# Patient Record
Sex: FEMALE | Race: WHITE | NOT HISPANIC OR LATINO
[De-identification: names, ages, dates, MRNs, and addresses within clinical notes are randomized per-mention and may not be internally consistent; named-entity substitution may affect disease eponyms.]

---

## 2017-01-04 ENCOUNTER — APPOINTMENT (OUTPATIENT)
Dept: CARDIOLOGY | Facility: CLINIC | Age: 72
End: 2017-01-04

## 2017-01-13 ENCOUNTER — APPOINTMENT (OUTPATIENT)
Dept: CARDIOLOGY | Facility: CLINIC | Age: 72
End: 2017-01-13

## 2017-04-05 ENCOUNTER — APPOINTMENT (OUTPATIENT)
Dept: CARDIOLOGY | Facility: CLINIC | Age: 72
End: 2017-04-05

## 2017-04-17 ENCOUNTER — APPOINTMENT (OUTPATIENT)
Dept: CARDIOLOGY | Facility: CLINIC | Age: 72
End: 2017-04-17

## 2017-04-17 VITALS — WEIGHT: 184 LBS | HEIGHT: 60 IN | BODY MASS INDEX: 36.12 KG/M2

## 2017-04-17 VITALS — DIASTOLIC BLOOD PRESSURE: 80 MMHG | SYSTOLIC BLOOD PRESSURE: 130 MMHG

## 2017-04-17 DIAGNOSIS — Z00.00 ENCOUNTER FOR GENERAL ADULT MEDICAL EXAMINATION W/OUT ABNORMAL FINDINGS: ICD-10-CM

## 2017-06-17 ENCOUNTER — OUTPATIENT (OUTPATIENT)
Dept: OUTPATIENT SERVICES | Facility: HOSPITAL | Age: 72
LOS: 1 days | Discharge: HOME | End: 2017-06-17

## 2017-06-17 DIAGNOSIS — R47.81 SLURRED SPEECH: ICD-10-CM

## 2017-06-28 DIAGNOSIS — I10 ESSENTIAL (PRIMARY) HYPERTENSION: ICD-10-CM

## 2017-06-28 DIAGNOSIS — I67.9 CEREBROVASCULAR DISEASE, UNSPECIFIED: ICD-10-CM

## 2017-06-28 DIAGNOSIS — E78.5 HYPERLIPIDEMIA, UNSPECIFIED: ICD-10-CM

## 2017-06-28 DIAGNOSIS — I69.322 DYSARTHRIA FOLLOWING CEREBRAL INFARCTION: ICD-10-CM

## 2017-06-28 DIAGNOSIS — E55.9 VITAMIN D DEFICIENCY, UNSPECIFIED: ICD-10-CM

## 2017-06-28 DIAGNOSIS — R53.83 OTHER FATIGUE: ICD-10-CM

## 2017-06-28 DIAGNOSIS — M15.9 POLYOSTEOARTHRITIS, UNSPECIFIED: ICD-10-CM

## 2017-06-28 DIAGNOSIS — E11.9 TYPE 2 DIABETES MELLITUS WITHOUT COMPLICATIONS: ICD-10-CM

## 2017-09-11 ENCOUNTER — APPOINTMENT (OUTPATIENT)
Dept: CARDIOLOGY | Facility: CLINIC | Age: 72
End: 2017-09-11

## 2017-09-11 VITALS — WEIGHT: 184 LBS | HEIGHT: 60 IN | BODY MASS INDEX: 36.12 KG/M2

## 2017-09-11 VITALS — DIASTOLIC BLOOD PRESSURE: 80 MMHG | HEART RATE: 68 BPM | SYSTOLIC BLOOD PRESSURE: 140 MMHG

## 2017-10-09 ENCOUNTER — APPOINTMENT (OUTPATIENT)
Dept: CARDIOLOGY | Facility: CLINIC | Age: 72
End: 2017-10-09

## 2017-10-26 ENCOUNTER — OUTPATIENT (OUTPATIENT)
Dept: OUTPATIENT SERVICES | Facility: HOSPITAL | Age: 72
LOS: 1 days | Discharge: HOME | End: 2017-10-26

## 2017-10-26 DIAGNOSIS — E11.9 TYPE 2 DIABETES MELLITUS WITHOUT COMPLICATIONS: ICD-10-CM

## 2017-10-26 DIAGNOSIS — I10 ESSENTIAL (PRIMARY) HYPERTENSION: ICD-10-CM

## 2017-10-26 DIAGNOSIS — Z01.810 ENCOUNTER FOR PREPROCEDURAL CARDIOVASCULAR EXAMINATION: ICD-10-CM

## 2017-10-26 DIAGNOSIS — R47.81 SLURRED SPEECH: ICD-10-CM

## 2017-10-26 DIAGNOSIS — I67.9 CEREBROVASCULAR DISEASE, UNSPECIFIED: ICD-10-CM

## 2017-10-26 DIAGNOSIS — E78.5 HYPERLIPIDEMIA, UNSPECIFIED: ICD-10-CM

## 2018-01-29 ENCOUNTER — APPOINTMENT (OUTPATIENT)
Dept: CARDIOLOGY | Facility: CLINIC | Age: 73
End: 2018-01-29

## 2018-03-12 ENCOUNTER — APPOINTMENT (OUTPATIENT)
Dept: CARDIOLOGY | Facility: CLINIC | Age: 73
End: 2018-03-12

## 2018-03-12 VITALS — HEIGHT: 60 IN | BODY MASS INDEX: 37.11 KG/M2 | WEIGHT: 189 LBS

## 2018-03-12 VITALS — HEART RATE: 83 BPM | SYSTOLIC BLOOD PRESSURE: 126 MMHG | DIASTOLIC BLOOD PRESSURE: 84 MMHG

## 2018-03-12 RX ORDER — LOSARTAN POTASSIUM 25 MG/1
25 TABLET, FILM COATED ORAL DAILY
Qty: 90 | Refills: 3 | Status: ACTIVE | COMMUNITY
Start: 2018-03-12 | End: 1900-01-01

## 2018-03-12 RX ORDER — PNV NO.95/FERROUS FUM/FOLIC AC 28MG-0.8MG
TABLET ORAL
Refills: 0 | Status: ACTIVE | COMMUNITY

## 2018-03-12 RX ORDER — SIMVASTATIN 20 MG/1
20 TABLET, FILM COATED ORAL DAILY
Refills: 0 | Status: ACTIVE | COMMUNITY

## 2018-03-12 RX ORDER — CHOLECALCIFEROL (VITAMIN D3) 25 MCG
TABLET ORAL
Refills: 0 | Status: ACTIVE | COMMUNITY

## 2018-03-21 ENCOUNTER — APPOINTMENT (OUTPATIENT)
Dept: CARDIOLOGY | Facility: CLINIC | Age: 73
End: 2018-03-21

## 2018-03-30 ENCOUNTER — APPOINTMENT (OUTPATIENT)
Dept: CARDIOLOGY | Facility: CLINIC | Age: 73
End: 2018-03-30

## 2018-04-04 ENCOUNTER — LABORATORY RESULT (OUTPATIENT)
Age: 73
End: 2018-04-04

## 2018-04-04 ENCOUNTER — OUTPATIENT (OUTPATIENT)
Dept: OUTPATIENT SERVICES | Facility: HOSPITAL | Age: 73
LOS: 1 days | Discharge: HOME | End: 2018-04-04

## 2018-04-04 DIAGNOSIS — I63.9 CEREBRAL INFARCTION, UNSPECIFIED: ICD-10-CM

## 2018-04-04 DIAGNOSIS — I10 ESSENTIAL (PRIMARY) HYPERTENSION: ICD-10-CM

## 2018-04-04 DIAGNOSIS — E78.5 HYPERLIPIDEMIA, UNSPECIFIED: ICD-10-CM

## 2018-04-04 DIAGNOSIS — E11.9 TYPE 2 DIABETES MELLITUS WITHOUT COMPLICATIONS: ICD-10-CM

## 2018-04-04 DIAGNOSIS — I34.0 NONRHEUMATIC MITRAL (VALVE) INSUFFICIENCY: ICD-10-CM

## 2018-04-11 ENCOUNTER — APPOINTMENT (OUTPATIENT)
Dept: CARDIOLOGY | Facility: CLINIC | Age: 73
End: 2018-04-11

## 2018-04-11 VITALS — HEART RATE: 78 BPM | OXYGEN SATURATION: 96 % | SYSTOLIC BLOOD PRESSURE: 140 MMHG | DIASTOLIC BLOOD PRESSURE: 70 MMHG

## 2018-04-11 VITALS — HEIGHT: 60 IN | BODY MASS INDEX: 36.71 KG/M2 | WEIGHT: 187 LBS

## 2018-04-11 VITALS — HEIGHT: 60 IN

## 2018-04-11 RX ORDER — METFORMIN HYDROCHLORIDE 500 MG/1
500 TABLET, COATED ORAL TWICE DAILY
Qty: 180 | Refills: 1 | Status: ACTIVE | COMMUNITY

## 2018-04-11 RX ORDER — DONEPEZIL HYDROCHLORIDE 10 MG/1
10 TABLET ORAL DAILY
Refills: 0 | Status: ACTIVE | COMMUNITY
Start: 2017-01-25

## 2018-04-11 RX ORDER — MINOCYCLINE HYDROCHLORIDE 50 MG/1
50 CAPSULE ORAL
Refills: 0 | Status: ACTIVE | COMMUNITY

## 2018-09-17 ENCOUNTER — OUTPATIENT (OUTPATIENT)
Dept: OUTPATIENT SERVICES | Facility: HOSPITAL | Age: 73
LOS: 1 days | Discharge: HOME | End: 2018-09-17

## 2018-09-17 DIAGNOSIS — R53.83 OTHER FATIGUE: ICD-10-CM

## 2018-09-17 DIAGNOSIS — E55.9 VITAMIN D DEFICIENCY, UNSPECIFIED: ICD-10-CM

## 2018-09-17 DIAGNOSIS — Z00.01 ENCOUNTER FOR GENERAL ADULT MEDICAL EXAMINATION WITH ABNORMAL FINDINGS: ICD-10-CM

## 2018-09-17 DIAGNOSIS — I10 ESSENTIAL (PRIMARY) HYPERTENSION: ICD-10-CM

## 2018-09-17 DIAGNOSIS — I67.9 CEREBROVASCULAR DISEASE, UNSPECIFIED: ICD-10-CM

## 2018-09-17 DIAGNOSIS — E78.5 HYPERLIPIDEMIA, UNSPECIFIED: ICD-10-CM

## 2018-09-17 DIAGNOSIS — E11.9 TYPE 2 DIABETES MELLITUS WITHOUT COMPLICATIONS: ICD-10-CM

## 2018-09-17 DIAGNOSIS — E83.52 HYPERCALCEMIA: ICD-10-CM

## 2018-10-17 ENCOUNTER — APPOINTMENT (OUTPATIENT)
Dept: CARDIOLOGY | Facility: CLINIC | Age: 73
End: 2018-10-17

## 2019-01-01 ENCOUNTER — INPATIENT (INPATIENT)
Facility: HOSPITAL | Age: 74
LOS: 6 days | Discharge: NURSING, REHAB OR LONG-TERM CARE | End: 2019-07-19
Attending: INTERNAL MEDICINE | Admitting: INTERNAL MEDICINE
Payer: MEDICARE

## 2019-01-01 ENCOUNTER — TRANSCRIPTION ENCOUNTER (OUTPATIENT)
Age: 74
End: 2019-01-01

## 2019-01-01 ENCOUNTER — APPOINTMENT (OUTPATIENT)
Dept: CARDIOLOGY | Facility: CLINIC | Age: 74
End: 2019-01-01

## 2019-01-01 ENCOUNTER — OUTPATIENT (OUTPATIENT)
Dept: OUTPATIENT SERVICES | Facility: HOSPITAL | Age: 74
LOS: 1 days | Discharge: HOME | End: 2019-01-01

## 2019-01-01 ENCOUNTER — EMERGENCY (EMERGENCY)
Facility: HOSPITAL | Age: 74
LOS: 0 days | End: 2019-11-17
Attending: EMERGENCY MEDICINE | Admitting: EMERGENCY MEDICINE
Payer: MEDICARE

## 2019-01-01 ENCOUNTER — RESULT REVIEW (OUTPATIENT)
Age: 74
End: 2019-01-01

## 2019-01-01 ENCOUNTER — INPATIENT (INPATIENT)
Facility: HOSPITAL | Age: 74
LOS: 40 days | Discharge: NURSING, REHAB OR LONG-TERM CARE | End: 2019-07-09
Attending: INTERNAL MEDICINE | Admitting: INTERNAL MEDICINE
Payer: MEDICARE

## 2019-01-01 ENCOUNTER — APPOINTMENT (OUTPATIENT)
Dept: NEUROLOGY | Facility: CLINIC | Age: 74
End: 2019-01-01
Payer: MEDICARE

## 2019-01-01 ENCOUNTER — APPOINTMENT (OUTPATIENT)
Dept: CARDIOLOGY | Facility: CLINIC | Age: 74
End: 2019-01-01
Payer: MEDICARE

## 2019-01-01 ENCOUNTER — RECORD ABSTRACTING (OUTPATIENT)
Age: 74
End: 2019-01-01

## 2019-01-01 ENCOUNTER — CLINICAL ADVICE (OUTPATIENT)
Age: 74
End: 2019-01-01

## 2019-01-01 VITALS
WEIGHT: 174 LBS | HEIGHT: 60 IN | DIASTOLIC BLOOD PRESSURE: 80 MMHG | HEART RATE: 79 BPM | BODY MASS INDEX: 34.16 KG/M2 | SYSTOLIC BLOOD PRESSURE: 130 MMHG

## 2019-01-01 VITALS
RESPIRATION RATE: 20 BRPM | DIASTOLIC BLOOD PRESSURE: 98 MMHG | SYSTOLIC BLOOD PRESSURE: 175 MMHG | HEIGHT: 65 IN | WEIGHT: 164.91 LBS | HEART RATE: 72 BPM

## 2019-01-01 VITALS — SYSTOLIC BLOOD PRESSURE: 124 MMHG | DIASTOLIC BLOOD PRESSURE: 80 MMHG | HEART RATE: 59 BPM

## 2019-01-01 VITALS — OXYGEN SATURATION: 100 %

## 2019-01-01 VITALS
HEART RATE: 76 BPM | BODY MASS INDEX: 34.95 KG/M2 | WEIGHT: 178 LBS | DIASTOLIC BLOOD PRESSURE: 83 MMHG | SYSTOLIC BLOOD PRESSURE: 167 MMHG | HEIGHT: 60 IN

## 2019-01-01 VITALS
SYSTOLIC BLOOD PRESSURE: 145 MMHG | TEMPERATURE: 97 F | RESPIRATION RATE: 18 BRPM | DIASTOLIC BLOOD PRESSURE: 64 MMHG | HEART RATE: 64 BPM

## 2019-01-01 VITALS
WEIGHT: 229.94 LBS | RESPIRATION RATE: 28 BRPM | HEART RATE: 126 BPM | OXYGEN SATURATION: 90 % | DIASTOLIC BLOOD PRESSURE: 110 MMHG | SYSTOLIC BLOOD PRESSURE: 200 MMHG | HEIGHT: 66 IN

## 2019-01-01 VITALS — HEART RATE: 59 BPM | HEIGHT: 60 IN | WEIGHT: 176 LBS | BODY MASS INDEX: 34.55 KG/M2

## 2019-01-01 DIAGNOSIS — J96.11 CHRONIC RESPIRATORY FAILURE WITH HYPOXIA: ICD-10-CM

## 2019-01-01 DIAGNOSIS — B95.5 UNSPECIFIED STREPTOCOCCUS AS THE CAUSE OF DISEASES CLASSIFIED ELSEWHERE: ICD-10-CM

## 2019-01-01 DIAGNOSIS — J18.9 PNEUMONIA, UNSPECIFIED ORGANISM: ICD-10-CM

## 2019-01-01 DIAGNOSIS — J69.0 PNEUMONITIS DUE TO INHALATION OF FOOD AND VOMIT: ICD-10-CM

## 2019-01-01 DIAGNOSIS — Z93.1 GASTROSTOMY STATUS: Chronic | ICD-10-CM

## 2019-01-01 DIAGNOSIS — K44.9 DIAPHRAGMATIC HERNIA WITHOUT OBSTRUCTION OR GANGRENE: ICD-10-CM

## 2019-01-01 DIAGNOSIS — Z86.73 PERSONAL HISTORY OF TRANSIENT ISCHEMIC ATTACK (TIA), AND CEREBRAL INFARCTION W/OUT RESIDUAL DEFICITS: ICD-10-CM

## 2019-01-01 DIAGNOSIS — R29.810 FACIAL WEAKNESS: ICD-10-CM

## 2019-01-01 DIAGNOSIS — E11.9 TYPE 2 DIABETES MELLITUS W/OUT COMPLICATIONS: ICD-10-CM

## 2019-01-01 DIAGNOSIS — Z79.84 LONG TERM (CURRENT) USE OF ORAL HYPOGLYCEMIC DRUGS: ICD-10-CM

## 2019-01-01 DIAGNOSIS — I10 ESSENTIAL (PRIMARY) HYPERTENSION: ICD-10-CM

## 2019-01-01 DIAGNOSIS — E78.5 HYPERLIPIDEMIA, UNSPECIFIED: ICD-10-CM

## 2019-01-01 DIAGNOSIS — I69.328 OTHER SPEECH AND LANGUAGE DEFICITS FOLLOWING CEREBRAL INFARCTION: ICD-10-CM

## 2019-01-01 DIAGNOSIS — N39.0 URINARY TRACT INFECTION, SITE NOT SPECIFIED: ICD-10-CM

## 2019-01-01 DIAGNOSIS — Z87.891 PERSONAL HISTORY OF NICOTINE DEPENDENCE: ICD-10-CM

## 2019-01-01 DIAGNOSIS — I25.10 ATHEROSCLEROTIC HEART DISEASE OF NATIVE CORONARY ARTERY WITHOUT ANGINA PECTORIS: ICD-10-CM

## 2019-01-01 DIAGNOSIS — K21.9 GASTRO-ESOPHAGEAL REFLUX DISEASE WITHOUT ESOPHAGITIS: ICD-10-CM

## 2019-01-01 DIAGNOSIS — N12 TUBULO-INTERSTITIAL NEPHRITIS, NOT SPECIFIED AS ACUTE OR CHRONIC: ICD-10-CM

## 2019-01-01 DIAGNOSIS — R47.89 OTHER SPEECH DISTURBANCES: ICD-10-CM

## 2019-01-01 DIAGNOSIS — R26.81 UNSTEADINESS ON FEET: ICD-10-CM

## 2019-01-01 DIAGNOSIS — I34.0 NONRHEUMATIC MITRAL (VALVE) INSUFFICIENCY: ICD-10-CM

## 2019-01-01 DIAGNOSIS — I69.154 HEMIPLEGIA AND HEMIPARESIS FOLLOWING NONTRAUMATIC INTRACEREBRAL HEMORRHAGE AFFECTING LEFT NON-DOMINANT SIDE: ICD-10-CM
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
## 2019-01-01 DIAGNOSIS — J13 PNEUMONIA DUE TO STREPTOCOCCUS PNEUMONIAE: ICD-10-CM

## 2019-01-01 DIAGNOSIS — I69.128 OTHER SPEECH AND LANGUAGE DEFICITS FOLLOWING NONTRAUMATIC INTRACEREBRAL HEMORRHAGE: ICD-10-CM

## 2019-01-01 DIAGNOSIS — R62.7 ADULT FAILURE TO THRIVE: ICD-10-CM

## 2019-01-01 DIAGNOSIS — I16.1 HYPERTENSIVE EMERGENCY: ICD-10-CM

## 2019-01-01 DIAGNOSIS — K57.90 DIVERTICULOSIS OF INTESTINE, PART UNSPECIFIED, WITHOUT PERFORATION OR ABSCESS WITHOUT BLEEDING: ICD-10-CM

## 2019-01-01 DIAGNOSIS — Z82.49 FAMILY HISTORY OF ISCHEMIC HEART DISEASE AND OTHER DISEASES OF THE CIRCULATORY SYSTEM: ICD-10-CM

## 2019-01-01 DIAGNOSIS — I69.254 HEMIPLEGIA AND HEMIPARESIS FOLLOWING OTHER NONTRAUMATIC INTRACRANIAL HEMORRHAGE AFFECTING LEFT NON-DOMINANT SIDE: ICD-10-CM

## 2019-01-01 DIAGNOSIS — H91.90 UNSPECIFIED HEARING LOSS, UNSPECIFIED EAR: ICD-10-CM

## 2019-01-01 DIAGNOSIS — Z93.0 TRACHEOSTOMY STATUS: ICD-10-CM

## 2019-01-01 DIAGNOSIS — G81.94 HEMIPLEGIA, UNSPECIFIED AFFECTING LEFT NONDOMINANT SIDE: ICD-10-CM

## 2019-01-01 DIAGNOSIS — R53.83 OTHER FATIGUE: ICD-10-CM

## 2019-01-01 DIAGNOSIS — I46.9 CARDIAC ARREST, CAUSE UNSPECIFIED: ICD-10-CM

## 2019-01-01 DIAGNOSIS — Z74.01 BED CONFINEMENT STATUS: ICD-10-CM

## 2019-01-01 DIAGNOSIS — E55.9 VITAMIN D DEFICIENCY, UNSPECIFIED: ICD-10-CM

## 2019-01-01 DIAGNOSIS — G40.909 EPILEPSY, UNSPECIFIED, NOT INTRACTABLE, WITHOUT STATUS EPILEPTICUS: ICD-10-CM

## 2019-01-01 DIAGNOSIS — M19.91 PRIMARY OSTEOARTHRITIS, UNSPECIFIED SITE: ICD-10-CM

## 2019-01-01 DIAGNOSIS — Z99.11 DEPENDENCE ON RESPIRATOR [VENTILATOR] STATUS: ICD-10-CM

## 2019-01-01 DIAGNOSIS — R78.81 BACTEREMIA: ICD-10-CM

## 2019-01-01 DIAGNOSIS — E11.9 TYPE 2 DIABETES MELLITUS WITHOUT COMPLICATIONS: ICD-10-CM

## 2019-01-01 DIAGNOSIS — G31.84 MILD COGNITIVE IMPAIRMENT, SO STATED: ICD-10-CM

## 2019-01-01 DIAGNOSIS — F41.9 ANXIETY DISORDER, UNSPECIFIED: ICD-10-CM

## 2019-01-01 DIAGNOSIS — R53.81 OTHER MALAISE: ICD-10-CM

## 2019-01-01 DIAGNOSIS — E11.40 TYPE 2 DIABETES MELLITUS WITH DIABETIC NEUROPATHY, UNSPECIFIED: ICD-10-CM

## 2019-01-01 DIAGNOSIS — J96.10 CHRONIC RESPIRATORY FAILURE, UNSPECIFIED WHETHER WITH HYPOXIA OR HYPERCAPNIA: ICD-10-CM

## 2019-01-01 DIAGNOSIS — I63.412 CEREBRAL INFARCTION DUE TO EMBOLISM OF LEFT MIDDLE CEREBRAL ARTERY: ICD-10-CM

## 2019-01-01 DIAGNOSIS — R13.10 DYSPHAGIA, UNSPECIFIED: ICD-10-CM

## 2019-01-01 DIAGNOSIS — B96.20 UNSPECIFIED ESCHERICHIA COLI [E. COLI] AS THE CAUSE OF DISEASES CLASSIFIED ELSEWHERE: ICD-10-CM

## 2019-01-01 DIAGNOSIS — Z79.4 LONG TERM (CURRENT) USE OF INSULIN: ICD-10-CM

## 2019-01-01 DIAGNOSIS — I69.120 APHASIA FOLLOWING NONTRAUMATIC INTRACEREBRAL HEMORRHAGE: ICD-10-CM

## 2019-01-01 DIAGNOSIS — R53.1 WEAKNESS: ICD-10-CM

## 2019-01-01 DIAGNOSIS — Z87.898 PERSONAL HISTORY OF OTHER SPECIFIED CONDITIONS: ICD-10-CM

## 2019-01-01 DIAGNOSIS — I45.81 LONG QT SYNDROME: ICD-10-CM

## 2019-01-01 DIAGNOSIS — I47.2 VENTRICULAR TACHYCARDIA: ICD-10-CM

## 2019-01-01 DIAGNOSIS — Z79.82 LONG TERM (CURRENT) USE OF ASPIRIN: ICD-10-CM

## 2019-01-01 DIAGNOSIS — E83.39 OTHER DISORDERS OF PHOSPHORUS METABOLISM: ICD-10-CM

## 2019-01-01 DIAGNOSIS — I62.9 NONTRAUMATIC INTRACRANIAL HEMORRHAGE, UNSPECIFIED: ICD-10-CM

## 2019-01-01 DIAGNOSIS — M19.90 UNSPECIFIED OSTEOARTHRITIS, UNSPECIFIED SITE: ICD-10-CM

## 2019-01-01 DIAGNOSIS — R26.89 OTHER ABNORMALITIES OF GAIT AND MOBILITY: ICD-10-CM

## 2019-01-01 DIAGNOSIS — I35.0 NONRHEUMATIC AORTIC (VALVE) STENOSIS: ICD-10-CM

## 2019-01-01 DIAGNOSIS — R20.2 PARESTHESIA OF SKIN: ICD-10-CM

## 2019-01-01 DIAGNOSIS — R79.9 ABNORMAL FINDING OF BLOOD CHEMISTRY, UNSPECIFIED: ICD-10-CM

## 2019-01-01 DIAGNOSIS — Z93.1 GASTROSTOMY STATUS: ICD-10-CM

## 2019-01-01 DIAGNOSIS — L89.321 PRESSURE ULCER OF LEFT BUTTOCK, STAGE 1: ICD-10-CM

## 2019-01-01 DIAGNOSIS — E66.9 OBESITY, UNSPECIFIED: ICD-10-CM

## 2019-01-01 DIAGNOSIS — J98.11 ATELECTASIS: ICD-10-CM

## 2019-01-01 DIAGNOSIS — I61.9 NONTRAUMATIC INTRACEREBRAL HEMORRHAGE, UNSPECIFIED: ICD-10-CM

## 2019-01-01 DIAGNOSIS — A41.81 SEPSIS DUE TO ENTEROCOCCUS: ICD-10-CM

## 2019-01-01 DIAGNOSIS — R06.02 SHORTNESS OF BREATH: ICD-10-CM

## 2019-01-01 DIAGNOSIS — I69.322 DYSARTHRIA FOLLOWING CEREBRAL INFARCTION: ICD-10-CM

## 2019-01-01 DIAGNOSIS — I63.9 CEREBRAL INFARCTION, UNSPECIFIED: ICD-10-CM

## 2019-01-01 LAB
-  AMIKACIN: SIGNIFICANT CHANGE UP
-  AMIKACIN: SIGNIFICANT CHANGE UP
-  AMPICILLIN/SULBACTAM: SIGNIFICANT CHANGE UP
-  AMPICILLIN/SULBACTAM: SIGNIFICANT CHANGE UP
-  AMPICILLIN: SIGNIFICANT CHANGE UP
-  AZTREONAM: SIGNIFICANT CHANGE UP
-  AZTREONAM: SIGNIFICANT CHANGE UP
-  CEFAZOLIN: SIGNIFICANT CHANGE UP
-  CEFEPIME: SIGNIFICANT CHANGE UP
-  CEFEPIME: SIGNIFICANT CHANGE UP
-  CEFOXITIN: SIGNIFICANT CHANGE UP
-  CEFOXITIN: SIGNIFICANT CHANGE UP
-  CEFTRIAXONE: SIGNIFICANT CHANGE UP
-  CEFTRIAXONE: SIGNIFICANT CHANGE UP
-  CIPROFLOXACIN: SIGNIFICANT CHANGE UP
-  ERTAPENEM: SIGNIFICANT CHANGE UP
-  ERTAPENEM: SIGNIFICANT CHANGE UP
-  GENTAMICIN SYNERGY: SIGNIFICANT CHANGE UP
-  GENTAMICIN: SIGNIFICANT CHANGE UP
-  GENTAMICIN: SIGNIFICANT CHANGE UP
-  IMIPENEM: SIGNIFICANT CHANGE UP
-  IMIPENEM: SIGNIFICANT CHANGE UP
-  LEVOFLOXACIN: SIGNIFICANT CHANGE UP
-  MEROPENEM: SIGNIFICANT CHANGE UP
-  MEROPENEM: SIGNIFICANT CHANGE UP
-  NITROFURANTOIN: SIGNIFICANT CHANGE UP
-  PIPERACILLIN/TAZOBACTAM: SIGNIFICANT CHANGE UP
-  PIPERACILLIN/TAZOBACTAM: SIGNIFICANT CHANGE UP
-  STREPTOCOCCUS SP. (NOT GRP A, B OR S PNEUMONIAE): SIGNIFICANT CHANGE UP
-  TETRACYCLINE: SIGNIFICANT CHANGE UP
-  TETRACYCLINE: SIGNIFICANT CHANGE UP
-  TIGECYCLINE: SIGNIFICANT CHANGE UP
-  TIGECYCLINE: SIGNIFICANT CHANGE UP
-  TOBRAMYCIN: SIGNIFICANT CHANGE UP
-  TOBRAMYCIN: SIGNIFICANT CHANGE UP
-  TRIMETHOPRIM/SULFAMETHOXAZOLE: SIGNIFICANT CHANGE UP
-  TRIMETHOPRIM/SULFAMETHOXAZOLE: SIGNIFICANT CHANGE UP
-  VANCOMYCIN: SIGNIFICANT CHANGE UP
ALBUMIN SERPL ELPH-MCNC: 2.8 G/DL — LOW (ref 3.5–5.2)
ALBUMIN SERPL ELPH-MCNC: 2.9 G/DL — LOW (ref 3.5–5.2)
ALBUMIN SERPL ELPH-MCNC: 2.9 G/DL — LOW (ref 3.5–5.2)
ALBUMIN SERPL ELPH-MCNC: 3 G/DL — LOW (ref 3.5–5.2)
ALBUMIN SERPL ELPH-MCNC: 3.1 G/DL — LOW (ref 3.5–5.2)
ALBUMIN SERPL ELPH-MCNC: 3.2 G/DL — LOW (ref 3.5–5.2)
ALBUMIN SERPL ELPH-MCNC: 3.3 G/DL — LOW (ref 3.5–5.2)
ALBUMIN SERPL ELPH-MCNC: 3.4 G/DL — LOW (ref 3.5–5.2)
ALBUMIN SERPL ELPH-MCNC: 3.4 G/DL — LOW (ref 3.5–5.2)
ALBUMIN SERPL ELPH-MCNC: 3.5 G/DL — SIGNIFICANT CHANGE UP (ref 3.5–5.2)
ALBUMIN SERPL ELPH-MCNC: 3.7 G/DL — SIGNIFICANT CHANGE UP (ref 3.5–5.2)
ALBUMIN SERPL ELPH-MCNC: 3.7 G/DL — SIGNIFICANT CHANGE UP (ref 3.5–5.2)
ALBUMIN SERPL ELPH-MCNC: 4 G/DL — SIGNIFICANT CHANGE UP (ref 3.5–5.2)
ALBUMIN SERPL ELPH-MCNC: 4.1 G/DL — SIGNIFICANT CHANGE UP (ref 3.5–5.2)
ALBUMIN SERPL ELPH-MCNC: 4.7 G/DL — SIGNIFICANT CHANGE UP (ref 3.5–5.2)
ALP SERPL-CCNC: 103 U/L — SIGNIFICANT CHANGE UP (ref 30–115)
ALP SERPL-CCNC: 48 U/L — SIGNIFICANT CHANGE UP (ref 30–115)
ALP SERPL-CCNC: 57 U/L — SIGNIFICANT CHANGE UP (ref 30–115)
ALP SERPL-CCNC: 61 U/L — SIGNIFICANT CHANGE UP (ref 30–115)
ALP SERPL-CCNC: 62 U/L — SIGNIFICANT CHANGE UP (ref 30–115)
ALP SERPL-CCNC: 64 U/L — SIGNIFICANT CHANGE UP (ref 30–115)
ALP SERPL-CCNC: 65 U/L — SIGNIFICANT CHANGE UP (ref 30–115)
ALP SERPL-CCNC: 65 U/L — SIGNIFICANT CHANGE UP (ref 30–115)
ALP SERPL-CCNC: 70 U/L — SIGNIFICANT CHANGE UP (ref 30–115)
ALP SERPL-CCNC: 71 U/L — SIGNIFICANT CHANGE UP (ref 30–115)
ALP SERPL-CCNC: 72 U/L — SIGNIFICANT CHANGE UP (ref 30–115)
ALP SERPL-CCNC: 73 U/L — SIGNIFICANT CHANGE UP (ref 30–115)
ALP SERPL-CCNC: 74 U/L — SIGNIFICANT CHANGE UP (ref 30–115)
ALP SERPL-CCNC: 75 U/L — SIGNIFICANT CHANGE UP (ref 30–115)
ALP SERPL-CCNC: 77 U/L — SIGNIFICANT CHANGE UP (ref 30–115)
ALP SERPL-CCNC: 78 U/L — SIGNIFICANT CHANGE UP (ref 30–115)
ALP SERPL-CCNC: 78 U/L — SIGNIFICANT CHANGE UP (ref 30–115)
ALP SERPL-CCNC: 80 U/L — SIGNIFICANT CHANGE UP (ref 30–115)
ALP SERPL-CCNC: 88 U/L — SIGNIFICANT CHANGE UP (ref 30–115)
ALP SERPL-CCNC: 93 U/L — SIGNIFICANT CHANGE UP (ref 30–115)
ALP SERPL-CCNC: 93 U/L — SIGNIFICANT CHANGE UP (ref 30–115)
ALT FLD-CCNC: 11 U/L — SIGNIFICANT CHANGE UP (ref 0–41)
ALT FLD-CCNC: 13 U/L — SIGNIFICANT CHANGE UP (ref 0–41)
ALT FLD-CCNC: 15 U/L — SIGNIFICANT CHANGE UP (ref 0–41)
ALT FLD-CCNC: 15 U/L — SIGNIFICANT CHANGE UP (ref 0–41)
ALT FLD-CCNC: 16 U/L — SIGNIFICANT CHANGE UP (ref 0–41)
ALT FLD-CCNC: 16 U/L — SIGNIFICANT CHANGE UP (ref 0–41)
ALT FLD-CCNC: 18 U/L — SIGNIFICANT CHANGE UP (ref 0–41)
ALT FLD-CCNC: 18 U/L — SIGNIFICANT CHANGE UP (ref 0–41)
ALT FLD-CCNC: 20 U/L — SIGNIFICANT CHANGE UP (ref 0–41)
ALT FLD-CCNC: 20 U/L — SIGNIFICANT CHANGE UP (ref 0–41)
ALT FLD-CCNC: 21 U/L — SIGNIFICANT CHANGE UP (ref 0–41)
ALT FLD-CCNC: 22 U/L — SIGNIFICANT CHANGE UP (ref 0–41)
ALT FLD-CCNC: 23 U/L — SIGNIFICANT CHANGE UP (ref 0–41)
ALT FLD-CCNC: 24 U/L — SIGNIFICANT CHANGE UP (ref 0–41)
ALT FLD-CCNC: 24 U/L — SIGNIFICANT CHANGE UP (ref 0–41)
ALT FLD-CCNC: 29 U/L — SIGNIFICANT CHANGE UP (ref 0–41)
ALT FLD-CCNC: 30 U/L — SIGNIFICANT CHANGE UP (ref 0–41)
ALT FLD-CCNC: 31 U/L — SIGNIFICANT CHANGE UP (ref 0–41)
ALT FLD-CCNC: 32 U/L — SIGNIFICANT CHANGE UP (ref 0–41)
ALT FLD-CCNC: 33 U/L — SIGNIFICANT CHANGE UP (ref 0–41)
ALT FLD-CCNC: 33 U/L — SIGNIFICANT CHANGE UP (ref 0–41)
ALT FLD-CCNC: 36 U/L — SIGNIFICANT CHANGE UP (ref 0–41)
ANION GAP SERPL CALC-SCNC: 10 MMOL/L — SIGNIFICANT CHANGE UP (ref 7–14)
ANION GAP SERPL CALC-SCNC: 11 MMOL/L — SIGNIFICANT CHANGE UP (ref 7–14)
ANION GAP SERPL CALC-SCNC: 12 MMOL/L — SIGNIFICANT CHANGE UP (ref 7–14)
ANION GAP SERPL CALC-SCNC: 13 MMOL/L — SIGNIFICANT CHANGE UP (ref 7–14)
ANION GAP SERPL CALC-SCNC: 14 MMOL/L — SIGNIFICANT CHANGE UP (ref 7–14)
ANION GAP SERPL CALC-SCNC: 15 MMOL/L — HIGH (ref 7–14)
ANION GAP SERPL CALC-SCNC: 16 MMOL/L — HIGH (ref 7–14)
ANION GAP SERPL CALC-SCNC: 16 MMOL/L — HIGH (ref 7–14)
ANION GAP SERPL CALC-SCNC: 18 MMOL/L — HIGH (ref 7–14)
ANION GAP SERPL CALC-SCNC: 9 MMOL/L — SIGNIFICANT CHANGE UP (ref 7–14)
ANISOCYTOSIS BLD QL: SIGNIFICANT CHANGE UP
APPEARANCE UR: ABNORMAL
APPEARANCE UR: CLEAR — SIGNIFICANT CHANGE UP
APTT BLD: 29 SEC — SIGNIFICANT CHANGE UP (ref 27–39.2)
APTT BLD: 29.8 SEC — SIGNIFICANT CHANGE UP (ref 27–39.2)
APTT BLD: 32.2 SEC — SIGNIFICANT CHANGE UP (ref 27–39.2)
APTT BLD: 34.5 SEC — SIGNIFICANT CHANGE UP (ref 27–39.2)
APTT BLD: 37 SEC — SIGNIFICANT CHANGE UP (ref 27–39.2)
AST SERPL-CCNC: 10 U/L — SIGNIFICANT CHANGE UP (ref 0–41)
AST SERPL-CCNC: 11 U/L — SIGNIFICANT CHANGE UP (ref 0–41)
AST SERPL-CCNC: 12 U/L — SIGNIFICANT CHANGE UP (ref 0–41)
AST SERPL-CCNC: 14 U/L — SIGNIFICANT CHANGE UP (ref 0–41)
AST SERPL-CCNC: 16 U/L — SIGNIFICANT CHANGE UP (ref 0–41)
AST SERPL-CCNC: 16 U/L — SIGNIFICANT CHANGE UP (ref 0–41)
AST SERPL-CCNC: 17 U/L — SIGNIFICANT CHANGE UP (ref 0–41)
AST SERPL-CCNC: 18 U/L — SIGNIFICANT CHANGE UP (ref 0–41)
AST SERPL-CCNC: 18 U/L — SIGNIFICANT CHANGE UP (ref 0–41)
AST SERPL-CCNC: 19 U/L — SIGNIFICANT CHANGE UP (ref 0–41)
AST SERPL-CCNC: 19 U/L — SIGNIFICANT CHANGE UP (ref 0–41)
AST SERPL-CCNC: 20 U/L — SIGNIFICANT CHANGE UP (ref 0–41)
AST SERPL-CCNC: 31 U/L — SIGNIFICANT CHANGE UP (ref 0–41)
AST SERPL-CCNC: 8 U/L — SIGNIFICANT CHANGE UP (ref 0–41)
AST SERPL-CCNC: 9 U/L — SIGNIFICANT CHANGE UP (ref 0–41)
BACTERIA # UR AUTO: ABNORMAL
BASE EXCESS BLDA CALC-SCNC: -0.2 MMOL/L — SIGNIFICANT CHANGE UP (ref -2–2)
BASE EXCESS BLDA CALC-SCNC: 1.8 MMOL/L — SIGNIFICANT CHANGE UP (ref -2–2)
BASE EXCESS BLDA CALC-SCNC: 2.1 MMOL/L — HIGH (ref -2–2)
BASE EXCESS BLDA CALC-SCNC: 3.1 MMOL/L — HIGH (ref -2–2)
BASE EXCESS BLDA CALC-SCNC: 4.6 MMOL/L — HIGH (ref -2–2)
BASE EXCESS BLDA CALC-SCNC: 5.5 MMOL/L — HIGH (ref -2–2)
BASE EXCESS BLDA CALC-SCNC: 5.5 MMOL/L — HIGH (ref -2–2)
BASE EXCESS BLDA CALC-SCNC: 8.6 MMOL/L — HIGH (ref -2–2)
BASE EXCESS BLDV CALC-SCNC: 4.9 MMOL/L — HIGH (ref -2–2)
BASOPHILS # BLD AUTO: 0.02 K/UL — SIGNIFICANT CHANGE UP (ref 0–0.2)
BASOPHILS # BLD AUTO: 0.03 K/UL — SIGNIFICANT CHANGE UP (ref 0–0.2)
BASOPHILS # BLD AUTO: 0.04 K/UL — SIGNIFICANT CHANGE UP (ref 0–0.2)
BASOPHILS # BLD AUTO: 0.06 K/UL — SIGNIFICANT CHANGE UP (ref 0–0.2)
BASOPHILS NFR BLD AUTO: 0.2 % — SIGNIFICANT CHANGE UP (ref 0–1)
BASOPHILS NFR BLD AUTO: 0.3 % — SIGNIFICANT CHANGE UP (ref 0–1)
BASOPHILS NFR BLD AUTO: 0.4 % — SIGNIFICANT CHANGE UP (ref 0–1)
BASOPHILS NFR BLD AUTO: 0.5 % — SIGNIFICANT CHANGE UP (ref 0–1)
BASOPHILS NFR BLD AUTO: 0.6 % — SIGNIFICANT CHANGE UP (ref 0–1)
BASOPHILS NFR BLD AUTO: 0.6 % — SIGNIFICANT CHANGE UP (ref 0–1)
BASOPHILS NFR BLD AUTO: 0.7 % — SIGNIFICANT CHANGE UP (ref 0–1)
BASOPHILS NFR BLD AUTO: 0.9 % — SIGNIFICANT CHANGE UP (ref 0–1)
BILIRUB SERPL-MCNC: 0.4 MG/DL — SIGNIFICANT CHANGE UP (ref 0.2–1.2)
BILIRUB SERPL-MCNC: 0.5 MG/DL — SIGNIFICANT CHANGE UP (ref 0.2–1.2)
BILIRUB SERPL-MCNC: 0.6 MG/DL — SIGNIFICANT CHANGE UP (ref 0.2–1.2)
BILIRUB SERPL-MCNC: 0.7 MG/DL — SIGNIFICANT CHANGE UP (ref 0.2–1.2)
BILIRUB SERPL-MCNC: 0.8 MG/DL — SIGNIFICANT CHANGE UP (ref 0.2–1.2)
BILIRUB SERPL-MCNC: 0.8 MG/DL — SIGNIFICANT CHANGE UP (ref 0.2–1.2)
BILIRUB SERPL-MCNC: 1 MG/DL — SIGNIFICANT CHANGE UP (ref 0.2–1.2)
BILIRUB SERPL-MCNC: 1.1 MG/DL — SIGNIFICANT CHANGE UP (ref 0.2–1.2)
BILIRUB UR-MCNC: NEGATIVE — SIGNIFICANT CHANGE UP
BILIRUB UR-MCNC: NEGATIVE — SIGNIFICANT CHANGE UP
BLD GP AB SCN SERPL QL: SIGNIFICANT CHANGE UP
BUN SERPL-MCNC: 15 MG/DL — SIGNIFICANT CHANGE UP (ref 10–20)
BUN SERPL-MCNC: 16 MG/DL — SIGNIFICANT CHANGE UP (ref 10–20)
BUN SERPL-MCNC: 17 MG/DL — SIGNIFICANT CHANGE UP (ref 10–20)
BUN SERPL-MCNC: 18 MG/DL — SIGNIFICANT CHANGE UP (ref 10–20)
BUN SERPL-MCNC: 18 MG/DL — SIGNIFICANT CHANGE UP (ref 10–20)
BUN SERPL-MCNC: 19 MG/DL — SIGNIFICANT CHANGE UP (ref 10–20)
BUN SERPL-MCNC: 21 MG/DL — HIGH (ref 10–20)
BUN SERPL-MCNC: 21 MG/DL — HIGH (ref 10–20)
BUN SERPL-MCNC: 22 MG/DL — HIGH (ref 10–20)
BUN SERPL-MCNC: 23 MG/DL — HIGH (ref 10–20)
BUN SERPL-MCNC: 24 MG/DL — HIGH (ref 10–20)
BUN SERPL-MCNC: 25 MG/DL — HIGH (ref 10–20)
BUN SERPL-MCNC: 27 MG/DL — HIGH (ref 10–20)
BUN SERPL-MCNC: 27 MG/DL — HIGH (ref 10–20)
BUN SERPL-MCNC: 28 MG/DL — HIGH (ref 10–20)
BUN SERPL-MCNC: 28 MG/DL — HIGH (ref 10–20)
BUN SERPL-MCNC: 31 MG/DL — HIGH (ref 10–20)
BUN SERPL-MCNC: 34 MG/DL — HIGH (ref 10–20)
C DIFF BY PCR RESULT: NEGATIVE — SIGNIFICANT CHANGE UP
C DIFF TOX GENS STL QL NAA+PROBE: SIGNIFICANT CHANGE UP
CA-I SERPL-SCNC: 1.48 MMOL/L — HIGH (ref 1.12–1.3)
CALCIUM SERPL-MCNC: 10 MG/DL — SIGNIFICANT CHANGE UP (ref 8.5–10.1)
CALCIUM SERPL-MCNC: 10 MG/DL — SIGNIFICANT CHANGE UP (ref 8.5–10.1)
CALCIUM SERPL-MCNC: 10.2 MG/DL — HIGH (ref 8.5–10.1)
CALCIUM SERPL-MCNC: 10.5 MG/DL — HIGH (ref 8.5–10.1)
CALCIUM SERPL-MCNC: 10.6 MG/DL — HIGH (ref 8.5–10.1)
CALCIUM SERPL-MCNC: 10.7 MG/DL — HIGH (ref 8.5–10.1)
CALCIUM SERPL-MCNC: 10.8 MG/DL — HIGH (ref 8.5–10.1)
CALCIUM SERPL-MCNC: 10.8 MG/DL — HIGH (ref 8.5–10.1)
CALCIUM SERPL-MCNC: 10.9 MG/DL — HIGH (ref 8.5–10.1)
CALCIUM SERPL-MCNC: 10.9 MG/DL — HIGH (ref 8.5–10.1)
CALCIUM SERPL-MCNC: 11 MG/DL — HIGH (ref 8.5–10.1)
CALCIUM SERPL-MCNC: 11.2 MG/DL — HIGH (ref 8.5–10.1)
CALCIUM SERPL-MCNC: 11.2 MG/DL — HIGH (ref 8.5–10.1)
CALCIUM SERPL-MCNC: 11.6 MG/DL — HIGH (ref 8.5–10.1)
CALCIUM SERPL-MCNC: 11.7 MG/DL — HIGH (ref 8.5–10.1)
CALCIUM SERPL-MCNC: 12.1 MG/DL — HIGH (ref 8.5–10.1)
CALCIUM SERPL-MCNC: 12.2 MG/DL — HIGH (ref 8.5–10.1)
CALCIUM SERPL-MCNC: 9.1 MG/DL — SIGNIFICANT CHANGE UP (ref 8.5–10.1)
CALCIUM SERPL-MCNC: 9.2 MG/DL — SIGNIFICANT CHANGE UP (ref 8.5–10.1)
CALCIUM SERPL-MCNC: 9.3 MG/DL — SIGNIFICANT CHANGE UP (ref 8.5–10.1)
CALCIUM SERPL-MCNC: 9.4 MG/DL — SIGNIFICANT CHANGE UP (ref 8.5–10.1)
CALCIUM SERPL-MCNC: 9.6 MG/DL — SIGNIFICANT CHANGE UP (ref 8.5–10.1)
CALCIUM SERPL-MCNC: 9.6 MG/DL — SIGNIFICANT CHANGE UP (ref 8.5–10.1)
CALCIUM SERPL-MCNC: 9.7 MG/DL — SIGNIFICANT CHANGE UP (ref 8.5–10.1)
CALCIUM SERPL-MCNC: 9.8 MG/DL — SIGNIFICANT CHANGE UP (ref 8.5–10.1)
CALCIUM SERPL-MCNC: 9.9 MG/DL — SIGNIFICANT CHANGE UP (ref 8.5–10.1)
CHLORIDE SERPL-SCNC: 100 MMOL/L — SIGNIFICANT CHANGE UP (ref 98–110)
CHLORIDE SERPL-SCNC: 101 MMOL/L — SIGNIFICANT CHANGE UP (ref 98–110)
CHLORIDE SERPL-SCNC: 102 MMOL/L — SIGNIFICANT CHANGE UP (ref 98–110)
CHLORIDE SERPL-SCNC: 103 MMOL/L — SIGNIFICANT CHANGE UP (ref 98–110)
CHLORIDE SERPL-SCNC: 103 MMOL/L — SIGNIFICANT CHANGE UP (ref 98–110)
CHLORIDE SERPL-SCNC: 104 MMOL/L — SIGNIFICANT CHANGE UP (ref 98–110)
CHLORIDE SERPL-SCNC: 105 MMOL/L — SIGNIFICANT CHANGE UP (ref 98–110)
CHLORIDE SERPL-SCNC: 105 MMOL/L — SIGNIFICANT CHANGE UP (ref 98–110)
CHLORIDE SERPL-SCNC: 106 MMOL/L — SIGNIFICANT CHANGE UP (ref 98–110)
CHLORIDE SERPL-SCNC: 107 MMOL/L — SIGNIFICANT CHANGE UP (ref 98–110)
CHLORIDE SERPL-SCNC: 108 MMOL/L — SIGNIFICANT CHANGE UP (ref 98–110)
CHLORIDE SERPL-SCNC: 108 MMOL/L — SIGNIFICANT CHANGE UP (ref 98–110)
CHLORIDE SERPL-SCNC: 109 MMOL/L — SIGNIFICANT CHANGE UP (ref 98–110)
CHLORIDE SERPL-SCNC: 110 MMOL/L — SIGNIFICANT CHANGE UP (ref 98–110)
CHLORIDE SERPL-SCNC: 110 MMOL/L — SIGNIFICANT CHANGE UP (ref 98–110)
CHLORIDE SERPL-SCNC: 94 MMOL/L — LOW (ref 98–110)
CHLORIDE SERPL-SCNC: 98 MMOL/L — SIGNIFICANT CHANGE UP (ref 98–110)
CHLORIDE SERPL-SCNC: 99 MMOL/L — SIGNIFICANT CHANGE UP (ref 98–110)
CO2 SERPL-SCNC: 21 MMOL/L — SIGNIFICANT CHANGE UP (ref 17–32)
CO2 SERPL-SCNC: 22 MMOL/L — SIGNIFICANT CHANGE UP (ref 17–32)
CO2 SERPL-SCNC: 22 MMOL/L — SIGNIFICANT CHANGE UP (ref 17–32)
CO2 SERPL-SCNC: 23 MMOL/L — SIGNIFICANT CHANGE UP (ref 17–32)
CO2 SERPL-SCNC: 24 MMOL/L — SIGNIFICANT CHANGE UP (ref 17–32)
CO2 SERPL-SCNC: 24 MMOL/L — SIGNIFICANT CHANGE UP (ref 17–32)
CO2 SERPL-SCNC: 25 MMOL/L — SIGNIFICANT CHANGE UP (ref 17–32)
CO2 SERPL-SCNC: 25 MMOL/L — SIGNIFICANT CHANGE UP (ref 17–32)
CO2 SERPL-SCNC: 26 MMOL/L — SIGNIFICANT CHANGE UP (ref 17–32)
CO2 SERPL-SCNC: 27 MMOL/L — SIGNIFICANT CHANGE UP (ref 17–32)
CO2 SERPL-SCNC: 28 MMOL/L — SIGNIFICANT CHANGE UP (ref 17–32)
CO2 SERPL-SCNC: 30 MMOL/L — SIGNIFICANT CHANGE UP (ref 17–32)
CO2 SERPL-SCNC: 31 MMOL/L — SIGNIFICANT CHANGE UP (ref 17–32)
COLOR SPEC: SIGNIFICANT CHANGE UP
COLOR SPEC: YELLOW — SIGNIFICANT CHANGE UP
CORTIS AM PEAK SERPL-MCNC: 16.5 UG/DL — SIGNIFICANT CHANGE UP (ref 6–18.4)
CREAT SERPL-MCNC: 0.6 MG/DL — LOW (ref 0.7–1.5)
CREAT SERPL-MCNC: 0.7 MG/DL — SIGNIFICANT CHANGE UP (ref 0.7–1.5)
CREAT SERPL-MCNC: 0.8 MG/DL — SIGNIFICANT CHANGE UP (ref 0.7–1.5)
CREAT SERPL-MCNC: 0.9 MG/DL — SIGNIFICANT CHANGE UP (ref 0.7–1.5)
CREAT SERPL-MCNC: 1 MG/DL — SIGNIFICANT CHANGE UP (ref 0.7–1.5)
CRP SERPL-MCNC: 1.68 MG/DL — HIGH (ref 0–0.4)
CRP SERPL-MCNC: 2.08 MG/DL — HIGH (ref 0–0.4)
CULTURE RESULTS: NO GROWTH — SIGNIFICANT CHANGE UP
CULTURE RESULTS: NO GROWTH — SIGNIFICANT CHANGE UP
CULTURE RESULTS: SIGNIFICANT CHANGE UP
DIFF PNL FLD: NEGATIVE — SIGNIFICANT CHANGE UP
DIFF PNL FLD: NEGATIVE — SIGNIFICANT CHANGE UP
ENTEROCOC DNA BLD POS QL NAA+NON-PROBE: SIGNIFICANT CHANGE UP
EOSINOPHIL # BLD AUTO: 0.02 K/UL — SIGNIFICANT CHANGE UP (ref 0–0.7)
EOSINOPHIL # BLD AUTO: 0.02 K/UL — SIGNIFICANT CHANGE UP (ref 0–0.7)
EOSINOPHIL # BLD AUTO: 0.04 K/UL — SIGNIFICANT CHANGE UP (ref 0–0.7)
EOSINOPHIL # BLD AUTO: 0.04 K/UL — SIGNIFICANT CHANGE UP (ref 0–0.7)
EOSINOPHIL # BLD AUTO: 0.05 K/UL — SIGNIFICANT CHANGE UP (ref 0–0.7)
EOSINOPHIL # BLD AUTO: 0.05 K/UL — SIGNIFICANT CHANGE UP (ref 0–0.7)
EOSINOPHIL # BLD AUTO: 0.09 K/UL — SIGNIFICANT CHANGE UP (ref 0–0.7)
EOSINOPHIL # BLD AUTO: 0.1 K/UL — SIGNIFICANT CHANGE UP (ref 0–0.7)
EOSINOPHIL # BLD AUTO: 0.1 K/UL — SIGNIFICANT CHANGE UP (ref 0–0.7)
EOSINOPHIL # BLD AUTO: 0.11 K/UL — SIGNIFICANT CHANGE UP (ref 0–0.7)
EOSINOPHIL # BLD AUTO: 0.11 K/UL — SIGNIFICANT CHANGE UP (ref 0–0.7)
EOSINOPHIL # BLD AUTO: 0.12 K/UL — SIGNIFICANT CHANGE UP (ref 0–0.7)
EOSINOPHIL # BLD AUTO: 0.13 K/UL — SIGNIFICANT CHANGE UP (ref 0–0.7)
EOSINOPHIL # BLD AUTO: 0.14 K/UL — SIGNIFICANT CHANGE UP (ref 0–0.7)
EOSINOPHIL # BLD AUTO: 0.15 K/UL — SIGNIFICANT CHANGE UP (ref 0–0.7)
EOSINOPHIL # BLD AUTO: 0.16 K/UL — SIGNIFICANT CHANGE UP (ref 0–0.7)
EOSINOPHIL # BLD AUTO: 0.17 K/UL — SIGNIFICANT CHANGE UP (ref 0–0.7)
EOSINOPHIL # BLD AUTO: 0.18 K/UL — SIGNIFICANT CHANGE UP (ref 0–0.7)
EOSINOPHIL # BLD AUTO: 0.3 K/UL — SIGNIFICANT CHANGE UP (ref 0–0.7)
EOSINOPHIL # BLD AUTO: 0.31 K/UL — SIGNIFICANT CHANGE UP (ref 0–0.7)
EOSINOPHIL # BLD AUTO: 0.35 K/UL — SIGNIFICANT CHANGE UP (ref 0–0.7)
EOSINOPHIL NFR BLD AUTO: 0.2 % — SIGNIFICANT CHANGE UP (ref 0–8)
EOSINOPHIL NFR BLD AUTO: 0.2 % — SIGNIFICANT CHANGE UP (ref 0–8)
EOSINOPHIL NFR BLD AUTO: 0.4 % — SIGNIFICANT CHANGE UP (ref 0–8)
EOSINOPHIL NFR BLD AUTO: 0.5 % — SIGNIFICANT CHANGE UP (ref 0–8)
EOSINOPHIL NFR BLD AUTO: 0.7 % — SIGNIFICANT CHANGE UP (ref 0–8)
EOSINOPHIL NFR BLD AUTO: 0.9 % — SIGNIFICANT CHANGE UP (ref 0–8)
EOSINOPHIL NFR BLD AUTO: 1 % — SIGNIFICANT CHANGE UP (ref 0–8)
EOSINOPHIL NFR BLD AUTO: 1 % — SIGNIFICANT CHANGE UP (ref 0–8)
EOSINOPHIL NFR BLD AUTO: 1.1 % — SIGNIFICANT CHANGE UP (ref 0–8)
EOSINOPHIL NFR BLD AUTO: 1.2 % — SIGNIFICANT CHANGE UP (ref 0–8)
EOSINOPHIL NFR BLD AUTO: 1.4 % — SIGNIFICANT CHANGE UP (ref 0–8)
EOSINOPHIL NFR BLD AUTO: 1.5 % — SIGNIFICANT CHANGE UP (ref 0–8)
EOSINOPHIL NFR BLD AUTO: 1.5 % — SIGNIFICANT CHANGE UP (ref 0–8)
EOSINOPHIL NFR BLD AUTO: 1.6 % — SIGNIFICANT CHANGE UP (ref 0–8)
EOSINOPHIL NFR BLD AUTO: 1.7 % — SIGNIFICANT CHANGE UP (ref 0–8)
EOSINOPHIL NFR BLD AUTO: 1.8 % — SIGNIFICANT CHANGE UP (ref 0–8)
EOSINOPHIL NFR BLD AUTO: 1.9 % — SIGNIFICANT CHANGE UP (ref 0–8)
EOSINOPHIL NFR BLD AUTO: 1.9 % — SIGNIFICANT CHANGE UP (ref 0–8)
EOSINOPHIL NFR BLD AUTO: 2.9 % — SIGNIFICANT CHANGE UP (ref 0–8)
EOSINOPHIL NFR BLD AUTO: 2.9 % — SIGNIFICANT CHANGE UP (ref 0–8)
EOSINOPHIL NFR BLD AUTO: 4.2 % — SIGNIFICANT CHANGE UP (ref 0–8)
EOSINOPHIL NFR BLD AUTO: 4.5 % — SIGNIFICANT CHANGE UP (ref 0–8)
EOSINOPHIL NFR BLD AUTO: 6.2 % — SIGNIFICANT CHANGE UP (ref 0–8)
EOSINOPHIL NFR BLD AUTO: 6.5 % — SIGNIFICANT CHANGE UP (ref 0–8)
EPI CELLS # UR: 1 /HPF — SIGNIFICANT CHANGE UP (ref 0–5)
ESTIMATED AVERAGE GLUCOSE: 171 MG/DL — HIGH (ref 68–114)
GAS PNL BLDA: SIGNIFICANT CHANGE UP
GAS PNL BLDV: 139 MMOL/L — SIGNIFICANT CHANGE UP (ref 136–145)
GAS PNL BLDV: SIGNIFICANT CHANGE UP
GIANT PLATELETS BLD QL SMEAR: PRESENT — SIGNIFICANT CHANGE UP
GLUCOSE BLDC GLUCOMTR-MCNC: 102 MG/DL — HIGH (ref 70–99)
GLUCOSE BLDC GLUCOMTR-MCNC: 108 MG/DL — HIGH (ref 70–99)
GLUCOSE BLDC GLUCOMTR-MCNC: 110 MG/DL — HIGH (ref 70–99)
GLUCOSE BLDC GLUCOMTR-MCNC: 114 MG/DL — HIGH (ref 70–99)
GLUCOSE BLDC GLUCOMTR-MCNC: 114 MG/DL — HIGH (ref 70–99)
GLUCOSE BLDC GLUCOMTR-MCNC: 120 MG/DL — HIGH (ref 70–99)
GLUCOSE BLDC GLUCOMTR-MCNC: 121 MG/DL — HIGH (ref 70–99)
GLUCOSE BLDC GLUCOMTR-MCNC: 122 MG/DL — HIGH (ref 70–99)
GLUCOSE BLDC GLUCOMTR-MCNC: 124 MG/DL — HIGH (ref 70–99)
GLUCOSE BLDC GLUCOMTR-MCNC: 125 MG/DL — HIGH (ref 70–99)
GLUCOSE BLDC GLUCOMTR-MCNC: 126 MG/DL — HIGH (ref 70–99)
GLUCOSE BLDC GLUCOMTR-MCNC: 127 MG/DL — HIGH (ref 70–99)
GLUCOSE BLDC GLUCOMTR-MCNC: 128 MG/DL — HIGH (ref 70–99)
GLUCOSE BLDC GLUCOMTR-MCNC: 128 MG/DL — HIGH (ref 70–99)
GLUCOSE BLDC GLUCOMTR-MCNC: 129 MG/DL — HIGH (ref 70–99)
GLUCOSE BLDC GLUCOMTR-MCNC: 130 MG/DL — HIGH (ref 70–99)
GLUCOSE BLDC GLUCOMTR-MCNC: 131 MG/DL — HIGH (ref 70–99)
GLUCOSE BLDC GLUCOMTR-MCNC: 132 MG/DL — HIGH (ref 70–99)
GLUCOSE BLDC GLUCOMTR-MCNC: 133 MG/DL — HIGH (ref 70–99)
GLUCOSE BLDC GLUCOMTR-MCNC: 134 MG/DL — HIGH (ref 70–99)
GLUCOSE BLDC GLUCOMTR-MCNC: 135 MG/DL — HIGH (ref 70–99)
GLUCOSE BLDC GLUCOMTR-MCNC: 135 MG/DL — HIGH (ref 70–99)
GLUCOSE BLDC GLUCOMTR-MCNC: 136 MG/DL — HIGH (ref 70–99)
GLUCOSE BLDC GLUCOMTR-MCNC: 137 MG/DL — HIGH (ref 70–99)
GLUCOSE BLDC GLUCOMTR-MCNC: 138 MG/DL — HIGH (ref 70–99)
GLUCOSE BLDC GLUCOMTR-MCNC: 140 MG/DL — HIGH (ref 70–99)
GLUCOSE BLDC GLUCOMTR-MCNC: 141 MG/DL — HIGH (ref 70–99)
GLUCOSE BLDC GLUCOMTR-MCNC: 142 MG/DL — HIGH (ref 70–99)
GLUCOSE BLDC GLUCOMTR-MCNC: 143 MG/DL — HIGH (ref 70–99)
GLUCOSE BLDC GLUCOMTR-MCNC: 144 MG/DL — HIGH (ref 70–99)
GLUCOSE BLDC GLUCOMTR-MCNC: 145 MG/DL — HIGH (ref 70–99)
GLUCOSE BLDC GLUCOMTR-MCNC: 146 MG/DL — HIGH (ref 70–99)
GLUCOSE BLDC GLUCOMTR-MCNC: 146 MG/DL — HIGH (ref 70–99)
GLUCOSE BLDC GLUCOMTR-MCNC: 147 MG/DL — HIGH (ref 70–99)
GLUCOSE BLDC GLUCOMTR-MCNC: 148 MG/DL — HIGH (ref 70–99)
GLUCOSE BLDC GLUCOMTR-MCNC: 148 MG/DL — HIGH (ref 70–99)
GLUCOSE BLDC GLUCOMTR-MCNC: 149 MG/DL — HIGH (ref 70–99)
GLUCOSE BLDC GLUCOMTR-MCNC: 150 MG/DL — HIGH (ref 70–99)
GLUCOSE BLDC GLUCOMTR-MCNC: 151 MG/DL — HIGH (ref 70–99)
GLUCOSE BLDC GLUCOMTR-MCNC: 152 MG/DL — HIGH (ref 70–99)
GLUCOSE BLDC GLUCOMTR-MCNC: 152 MG/DL — HIGH (ref 70–99)
GLUCOSE BLDC GLUCOMTR-MCNC: 153 MG/DL — HIGH (ref 70–99)
GLUCOSE BLDC GLUCOMTR-MCNC: 154 MG/DL — HIGH (ref 70–99)
GLUCOSE BLDC GLUCOMTR-MCNC: 156 MG/DL — HIGH (ref 70–99)
GLUCOSE BLDC GLUCOMTR-MCNC: 157 MG/DL — HIGH (ref 70–99)
GLUCOSE BLDC GLUCOMTR-MCNC: 158 MG/DL — HIGH (ref 70–99)
GLUCOSE BLDC GLUCOMTR-MCNC: 159 MG/DL — HIGH (ref 70–99)
GLUCOSE BLDC GLUCOMTR-MCNC: 160 MG/DL — HIGH (ref 70–99)
GLUCOSE BLDC GLUCOMTR-MCNC: 161 MG/DL — HIGH (ref 70–99)
GLUCOSE BLDC GLUCOMTR-MCNC: 162 MG/DL — HIGH (ref 70–99)
GLUCOSE BLDC GLUCOMTR-MCNC: 162 MG/DL — HIGH (ref 70–99)
GLUCOSE BLDC GLUCOMTR-MCNC: 163 MG/DL — HIGH (ref 70–99)
GLUCOSE BLDC GLUCOMTR-MCNC: 164 MG/DL — HIGH (ref 70–99)
GLUCOSE BLDC GLUCOMTR-MCNC: 164 MG/DL — HIGH (ref 70–99)
GLUCOSE BLDC GLUCOMTR-MCNC: 165 MG/DL — HIGH (ref 70–99)
GLUCOSE BLDC GLUCOMTR-MCNC: 166 MG/DL — HIGH (ref 70–99)
GLUCOSE BLDC GLUCOMTR-MCNC: 167 MG/DL — HIGH (ref 70–99)
GLUCOSE BLDC GLUCOMTR-MCNC: 168 MG/DL — HIGH (ref 70–99)
GLUCOSE BLDC GLUCOMTR-MCNC: 169 MG/DL — HIGH (ref 70–99)
GLUCOSE BLDC GLUCOMTR-MCNC: 169 MG/DL — HIGH (ref 70–99)
GLUCOSE BLDC GLUCOMTR-MCNC: 170 MG/DL — HIGH (ref 70–99)
GLUCOSE BLDC GLUCOMTR-MCNC: 170 MG/DL — HIGH (ref 70–99)
GLUCOSE BLDC GLUCOMTR-MCNC: 171 MG/DL — HIGH (ref 70–99)
GLUCOSE BLDC GLUCOMTR-MCNC: 172 MG/DL — HIGH (ref 70–99)
GLUCOSE BLDC GLUCOMTR-MCNC: 173 MG/DL — HIGH (ref 70–99)
GLUCOSE BLDC GLUCOMTR-MCNC: 175 MG/DL — HIGH (ref 70–99)
GLUCOSE BLDC GLUCOMTR-MCNC: 176 MG/DL — HIGH (ref 70–99)
GLUCOSE BLDC GLUCOMTR-MCNC: 177 MG/DL — HIGH (ref 70–99)
GLUCOSE BLDC GLUCOMTR-MCNC: 178 MG/DL — HIGH (ref 70–99)
GLUCOSE BLDC GLUCOMTR-MCNC: 178 MG/DL — HIGH (ref 70–99)
GLUCOSE BLDC GLUCOMTR-MCNC: 179 MG/DL — HIGH (ref 70–99)
GLUCOSE BLDC GLUCOMTR-MCNC: 179 MG/DL — HIGH (ref 70–99)
GLUCOSE BLDC GLUCOMTR-MCNC: 181 MG/DL — HIGH (ref 70–99)
GLUCOSE BLDC GLUCOMTR-MCNC: 181 MG/DL — HIGH (ref 70–99)
GLUCOSE BLDC GLUCOMTR-MCNC: 182 MG/DL — HIGH (ref 70–99)
GLUCOSE BLDC GLUCOMTR-MCNC: 183 MG/DL — HIGH (ref 70–99)
GLUCOSE BLDC GLUCOMTR-MCNC: 183 MG/DL — HIGH (ref 70–99)
GLUCOSE BLDC GLUCOMTR-MCNC: 185 MG/DL — HIGH (ref 70–99)
GLUCOSE BLDC GLUCOMTR-MCNC: 186 MG/DL — HIGH (ref 70–99)
GLUCOSE BLDC GLUCOMTR-MCNC: 187 MG/DL — HIGH (ref 70–99)
GLUCOSE BLDC GLUCOMTR-MCNC: 188 MG/DL — HIGH (ref 70–99)
GLUCOSE BLDC GLUCOMTR-MCNC: 188 MG/DL — HIGH (ref 70–99)
GLUCOSE BLDC GLUCOMTR-MCNC: 189 MG/DL — HIGH (ref 70–99)
GLUCOSE BLDC GLUCOMTR-MCNC: 190 MG/DL — HIGH (ref 70–99)
GLUCOSE BLDC GLUCOMTR-MCNC: 192 MG/DL — HIGH (ref 70–99)
GLUCOSE BLDC GLUCOMTR-MCNC: 193 MG/DL — HIGH (ref 70–99)
GLUCOSE BLDC GLUCOMTR-MCNC: 194 MG/DL — HIGH (ref 70–99)
GLUCOSE BLDC GLUCOMTR-MCNC: 195 MG/DL — HIGH (ref 70–99)
GLUCOSE BLDC GLUCOMTR-MCNC: 197 MG/DL — HIGH (ref 70–99)
GLUCOSE BLDC GLUCOMTR-MCNC: 197 MG/DL — HIGH (ref 70–99)
GLUCOSE BLDC GLUCOMTR-MCNC: 198 MG/DL — HIGH (ref 70–99)
GLUCOSE BLDC GLUCOMTR-MCNC: 199 MG/DL — HIGH (ref 70–99)
GLUCOSE BLDC GLUCOMTR-MCNC: 200 MG/DL — HIGH (ref 70–99)
GLUCOSE BLDC GLUCOMTR-MCNC: 200 MG/DL — HIGH (ref 70–99)
GLUCOSE BLDC GLUCOMTR-MCNC: 201 MG/DL — HIGH (ref 70–99)
GLUCOSE BLDC GLUCOMTR-MCNC: 202 MG/DL — HIGH (ref 70–99)
GLUCOSE BLDC GLUCOMTR-MCNC: 203 MG/DL — HIGH (ref 70–99)
GLUCOSE BLDC GLUCOMTR-MCNC: 203 MG/DL — HIGH (ref 70–99)
GLUCOSE BLDC GLUCOMTR-MCNC: 204 MG/DL — HIGH (ref 70–99)
GLUCOSE BLDC GLUCOMTR-MCNC: 205 MG/DL — HIGH (ref 70–99)
GLUCOSE BLDC GLUCOMTR-MCNC: 207 MG/DL — HIGH (ref 70–99)
GLUCOSE BLDC GLUCOMTR-MCNC: 208 MG/DL — HIGH (ref 70–99)
GLUCOSE BLDC GLUCOMTR-MCNC: 210 MG/DL — HIGH (ref 70–99)
GLUCOSE BLDC GLUCOMTR-MCNC: 212 MG/DL — HIGH (ref 70–99)
GLUCOSE BLDC GLUCOMTR-MCNC: 213 MG/DL — HIGH (ref 70–99)
GLUCOSE BLDC GLUCOMTR-MCNC: 213 MG/DL — HIGH (ref 70–99)
GLUCOSE BLDC GLUCOMTR-MCNC: 215 MG/DL — HIGH (ref 70–99)
GLUCOSE BLDC GLUCOMTR-MCNC: 218 MG/DL — HIGH (ref 70–99)
GLUCOSE BLDC GLUCOMTR-MCNC: 218 MG/DL — HIGH (ref 70–99)
GLUCOSE BLDC GLUCOMTR-MCNC: 220 MG/DL — HIGH (ref 70–99)
GLUCOSE BLDC GLUCOMTR-MCNC: 221 MG/DL — HIGH (ref 70–99)
GLUCOSE BLDC GLUCOMTR-MCNC: 222 MG/DL — HIGH (ref 70–99)
GLUCOSE BLDC GLUCOMTR-MCNC: 226 MG/DL — HIGH (ref 70–99)
GLUCOSE BLDC GLUCOMTR-MCNC: 231 MG/DL — HIGH (ref 70–99)
GLUCOSE BLDC GLUCOMTR-MCNC: 236 MG/DL — HIGH (ref 70–99)
GLUCOSE BLDC GLUCOMTR-MCNC: 239 MG/DL — HIGH (ref 70–99)
GLUCOSE BLDC GLUCOMTR-MCNC: 253 MG/DL — HIGH (ref 70–99)
GLUCOSE BLDC GLUCOMTR-MCNC: 253 MG/DL — HIGH (ref 70–99)
GLUCOSE BLDC GLUCOMTR-MCNC: 254 MG/DL — HIGH (ref 70–99)
GLUCOSE BLDC GLUCOMTR-MCNC: 257 MG/DL — HIGH (ref 70–99)
GLUCOSE BLDC GLUCOMTR-MCNC: 261 MG/DL — HIGH (ref 70–99)
GLUCOSE BLDC GLUCOMTR-MCNC: 262 MG/DL — HIGH (ref 70–99)
GLUCOSE BLDC GLUCOMTR-MCNC: 264 MG/DL — HIGH (ref 70–99)
GLUCOSE BLDC GLUCOMTR-MCNC: 268 MG/DL — HIGH (ref 70–99)
GLUCOSE BLDC GLUCOMTR-MCNC: 271 MG/DL — HIGH (ref 70–99)
GLUCOSE BLDC GLUCOMTR-MCNC: 297 MG/DL — HIGH (ref 70–99)
GLUCOSE BLDC GLUCOMTR-MCNC: 80 MG/DL — SIGNIFICANT CHANGE UP (ref 70–99)
GLUCOSE BLDC GLUCOMTR-MCNC: 99 MG/DL — SIGNIFICANT CHANGE UP (ref 70–99)
GLUCOSE SERPL-MCNC: 120 MG/DL — HIGH (ref 70–99)
GLUCOSE SERPL-MCNC: 128 MG/DL — HIGH (ref 70–99)
GLUCOSE SERPL-MCNC: 139 MG/DL — HIGH (ref 70–99)
GLUCOSE SERPL-MCNC: 144 MG/DL — HIGH (ref 70–99)
GLUCOSE SERPL-MCNC: 145 MG/DL — HIGH (ref 70–99)
GLUCOSE SERPL-MCNC: 149 MG/DL — HIGH (ref 70–99)
GLUCOSE SERPL-MCNC: 157 MG/DL — HIGH (ref 70–99)
GLUCOSE SERPL-MCNC: 157 MG/DL — HIGH (ref 70–99)
GLUCOSE SERPL-MCNC: 158 MG/DL — HIGH (ref 70–99)
GLUCOSE SERPL-MCNC: 159 MG/DL — HIGH (ref 70–99)
GLUCOSE SERPL-MCNC: 169 MG/DL — HIGH (ref 70–99)
GLUCOSE SERPL-MCNC: 180 MG/DL — HIGH (ref 70–99)
GLUCOSE SERPL-MCNC: 181 MG/DL — HIGH (ref 70–99)
GLUCOSE SERPL-MCNC: 183 MG/DL — HIGH (ref 70–99)
GLUCOSE SERPL-MCNC: 184 MG/DL — HIGH (ref 70–99)
GLUCOSE SERPL-MCNC: 184 MG/DL — HIGH (ref 70–99)
GLUCOSE SERPL-MCNC: 186 MG/DL — HIGH (ref 70–99)
GLUCOSE SERPL-MCNC: 188 MG/DL — HIGH (ref 70–99)
GLUCOSE SERPL-MCNC: 201 MG/DL — HIGH (ref 70–99)
GLUCOSE SERPL-MCNC: 201 MG/DL — HIGH (ref 70–99)
GLUCOSE SERPL-MCNC: 202 MG/DL — HIGH (ref 70–99)
GLUCOSE SERPL-MCNC: 207 MG/DL — HIGH (ref 70–99)
GLUCOSE SERPL-MCNC: 208 MG/DL — HIGH (ref 70–99)
GLUCOSE SERPL-MCNC: 210 MG/DL — HIGH (ref 70–99)
GLUCOSE SERPL-MCNC: 223 MG/DL — HIGH (ref 70–99)
GLUCOSE SERPL-MCNC: 224 MG/DL — HIGH (ref 70–99)
GLUCOSE SERPL-MCNC: 228 MG/DL — HIGH (ref 70–99)
GLUCOSE SERPL-MCNC: 232 MG/DL — HIGH (ref 70–99)
GLUCOSE SERPL-MCNC: 256 MG/DL — HIGH (ref 70–99)
GLUCOSE SERPL-MCNC: 266 MG/DL — HIGH (ref 70–99)
GLUCOSE SERPL-MCNC: 302 MG/DL — HIGH (ref 70–99)
GLUCOSE SERPL-MCNC: 406 MG/DL — HIGH (ref 70–99)
GLUCOSE UR QL: >=1000 MG/DL
GLUCOSE UR QL: ABNORMAL
GRAM STN FLD: SIGNIFICANT CHANGE UP
HBA1C BLD-MCNC: 7.6 % — HIGH (ref 4–5.6)
HCO3 BLDA-SCNC: 25 MMOL/L — SIGNIFICANT CHANGE UP (ref 23–27)
HCO3 BLDA-SCNC: 27 MMOL/L — SIGNIFICANT CHANGE UP (ref 23–27)
HCO3 BLDA-SCNC: 27 MMOL/L — SIGNIFICANT CHANGE UP (ref 23–27)
HCO3 BLDA-SCNC: 28 MMOL/L — HIGH (ref 23–27)
HCO3 BLDA-SCNC: 29 MMOL/L — HIGH (ref 23–27)
HCO3 BLDA-SCNC: 29 MMOL/L — HIGH (ref 23–27)
HCO3 BLDA-SCNC: 30 MMOL/L — HIGH (ref 23–27)
HCO3 BLDA-SCNC: 31 MMOL/L — HIGH (ref 23–27)
HCO3 BLDV-SCNC: 32 MMOL/L — HIGH (ref 22–29)
HCT VFR BLD CALC: 32.2 % — LOW (ref 37–47)
HCT VFR BLD CALC: 32.9 % — LOW (ref 37–47)
HCT VFR BLD CALC: 33.2 % — LOW (ref 37–47)
HCT VFR BLD CALC: 33.4 % — LOW (ref 37–47)
HCT VFR BLD CALC: 33.7 % — LOW (ref 37–47)
HCT VFR BLD CALC: 33.9 % — LOW (ref 37–47)
HCT VFR BLD CALC: 34.1 % — LOW (ref 37–47)
HCT VFR BLD CALC: 34.3 % — LOW (ref 37–47)
HCT VFR BLD CALC: 34.6 % — LOW (ref 37–47)
HCT VFR BLD CALC: 34.7 % — LOW (ref 37–47)
HCT VFR BLD CALC: 35.2 % — LOW (ref 37–47)
HCT VFR BLD CALC: 35.3 % — LOW (ref 37–47)
HCT VFR BLD CALC: 35.5 % — LOW (ref 37–47)
HCT VFR BLD CALC: 35.6 % — LOW (ref 37–47)
HCT VFR BLD CALC: 35.9 % — LOW (ref 37–47)
HCT VFR BLD CALC: 35.9 % — LOW (ref 37–47)
HCT VFR BLD CALC: 36 % — LOW (ref 37–47)
HCT VFR BLD CALC: 36.1 % — LOW (ref 37–47)
HCT VFR BLD CALC: 36.2 % — LOW (ref 37–47)
HCT VFR BLD CALC: 36.2 % — LOW (ref 37–47)
HCT VFR BLD CALC: 36.3 % — LOW (ref 37–47)
HCT VFR BLD CALC: 37.1 % — SIGNIFICANT CHANGE UP (ref 37–47)
HCT VFR BLD CALC: 37.5 % — SIGNIFICANT CHANGE UP (ref 37–47)
HCT VFR BLD CALC: 38.5 % — SIGNIFICANT CHANGE UP (ref 37–47)
HCT VFR BLD CALC: 39.1 % — SIGNIFICANT CHANGE UP (ref 37–47)
HCT VFR BLD CALC: 39.4 % — SIGNIFICANT CHANGE UP (ref 37–47)
HCT VFR BLD CALC: 41.2 % — SIGNIFICANT CHANGE UP (ref 37–47)
HCT VFR BLD CALC: 47.1 % — HIGH (ref 37–47)
HCT VFR BLDA CALC: 40.2 % — SIGNIFICANT CHANGE UP (ref 34–44)
HCV AB S/CO SERPL IA: 0.13 S/CO — SIGNIFICANT CHANGE UP (ref 0–0.99)
HCV AB SERPL-IMP: SIGNIFICANT CHANGE UP
HGB BLD CALC-MCNC: 13.1 G/DL — LOW (ref 14–18)
HGB BLD-MCNC: 10.1 G/DL — LOW (ref 12–16)
HGB BLD-MCNC: 10.2 G/DL — LOW (ref 12–16)
HGB BLD-MCNC: 10.3 G/DL — LOW (ref 12–16)
HGB BLD-MCNC: 10.4 G/DL — LOW (ref 12–16)
HGB BLD-MCNC: 10.4 G/DL — LOW (ref 12–16)
HGB BLD-MCNC: 10.5 G/DL — LOW (ref 12–16)
HGB BLD-MCNC: 10.6 G/DL — LOW (ref 12–16)
HGB BLD-MCNC: 10.7 G/DL — LOW (ref 12–16)
HGB BLD-MCNC: 10.8 G/DL — LOW (ref 12–16)
HGB BLD-MCNC: 10.9 G/DL — LOW (ref 12–16)
HGB BLD-MCNC: 11 G/DL — LOW (ref 12–16)
HGB BLD-MCNC: 11.1 G/DL — LOW (ref 12–16)
HGB BLD-MCNC: 11.2 G/DL — LOW (ref 12–16)
HGB BLD-MCNC: 11.2 G/DL — LOW (ref 12–16)
HGB BLD-MCNC: 11.3 G/DL — LOW (ref 12–16)
HGB BLD-MCNC: 11.5 G/DL — LOW (ref 12–16)
HGB BLD-MCNC: 11.9 G/DL — LOW (ref 12–16)
HGB BLD-MCNC: 12 G/DL — SIGNIFICANT CHANGE UP (ref 12–16)
HGB BLD-MCNC: 12.6 G/DL — SIGNIFICANT CHANGE UP (ref 12–16)
HGB BLD-MCNC: 12.8 G/DL — SIGNIFICANT CHANGE UP (ref 12–16)
HGB BLD-MCNC: 14.4 G/DL — SIGNIFICANT CHANGE UP (ref 12–16)
HGB BLD-MCNC: 9.9 G/DL — LOW (ref 12–16)
HOROWITZ INDEX BLDA+IHG-RTO: 40 — SIGNIFICANT CHANGE UP
HYALINE CASTS # UR AUTO: 7 /LPF — SIGNIFICANT CHANGE UP (ref 0–7)
HYPOCHROMIA BLD QL: SLIGHT — SIGNIFICANT CHANGE UP
IMM GRANULOCYTES NFR BLD AUTO: 0.1 % — SIGNIFICANT CHANGE UP (ref 0.1–0.3)
IMM GRANULOCYTES NFR BLD AUTO: 0.2 % — SIGNIFICANT CHANGE UP (ref 0.1–0.3)
IMM GRANULOCYTES NFR BLD AUTO: 0.2 % — SIGNIFICANT CHANGE UP (ref 0.1–0.3)
IMM GRANULOCYTES NFR BLD AUTO: 0.3 % — SIGNIFICANT CHANGE UP (ref 0.1–0.3)
IMM GRANULOCYTES NFR BLD AUTO: 0.4 % — HIGH (ref 0.1–0.3)
IMM GRANULOCYTES NFR BLD AUTO: 0.4 % — HIGH (ref 0.1–0.3)
IMM GRANULOCYTES NFR BLD AUTO: 0.5 % — HIGH (ref 0.1–0.3)
IMM GRANULOCYTES NFR BLD AUTO: 0.6 % — HIGH (ref 0.1–0.3)
IMM GRANULOCYTES NFR BLD AUTO: 0.7 % — HIGH (ref 0.1–0.3)
IMM GRANULOCYTES NFR BLD AUTO: 0.9 % — HIGH (ref 0.1–0.3)
IMM GRANULOCYTES NFR BLD AUTO: 1.4 % — HIGH (ref 0.1–0.3)
IMM GRANULOCYTES NFR BLD AUTO: 1.6 % — HIGH (ref 0.1–0.3)
IMM GRANULOCYTES NFR BLD AUTO: 2 % — HIGH (ref 0.1–0.3)
IMM GRANULOCYTES NFR BLD AUTO: 2.1 % — HIGH (ref 0.1–0.3)
INR BLD: 0.96 RATIO — SIGNIFICANT CHANGE UP (ref 0.65–1.3)
INR BLD: 0.96 RATIO — SIGNIFICANT CHANGE UP (ref 0.65–1.3)
INR BLD: 0.97 RATIO — SIGNIFICANT CHANGE UP (ref 0.65–1.3)
INR BLD: 1.03 RATIO — SIGNIFICANT CHANGE UP (ref 0.65–1.3)
INR BLD: 1.08 RATIO — SIGNIFICANT CHANGE UP (ref 0.65–1.3)
KETONES UR-MCNC: NEGATIVE — SIGNIFICANT CHANGE UP
KETONES UR-MCNC: NEGATIVE — SIGNIFICANT CHANGE UP
LACTATE BLDV-MCNC: 1.6 MMOL/L — SIGNIFICANT CHANGE UP (ref 0.5–1.6)
LACTATE SERPL-SCNC: 2 MMOL/L — SIGNIFICANT CHANGE UP (ref 0.5–2.2)
LEUKOCYTE ESTERASE UR-ACNC: ABNORMAL
LEUKOCYTE ESTERASE UR-ACNC: NEGATIVE — SIGNIFICANT CHANGE UP
LEVETIRACETAM SERPL-MCNC: 31.8 MCG/ML — SIGNIFICANT CHANGE UP (ref 12–46)
LEVETIRACETAM SERPL-MCNC: 47 MCG/ML — HIGH (ref 12–46)
LEVETIRACETAM SERPL-MCNC: 47.2 MCG/ML — HIGH (ref 12–46)
LEVETIRACETAM SERPL-MCNC: 55.5 MCG/ML — HIGH (ref 12–46)
LEVETIRACETAM SERPL-MCNC: 61.9 MCG/ML — HIGH (ref 12–46)
LEVETIRACETAM SERPL-MCNC: 68.8 MCG/ML — HIGH (ref 12–46)
LIDOCAIN IGE QN: 23 U/L — SIGNIFICANT CHANGE UP (ref 7–60)
LYMPHOCYTES # BLD AUTO: 0.92 K/UL — LOW (ref 1.2–3.4)
LYMPHOCYTES # BLD AUTO: 0.95 K/UL — LOW (ref 1.2–3.4)
LYMPHOCYTES # BLD AUTO: 0.95 K/UL — LOW (ref 1.2–3.4)
LYMPHOCYTES # BLD AUTO: 0.96 K/UL — LOW (ref 1.2–3.4)
LYMPHOCYTES # BLD AUTO: 1.03 K/UL — LOW (ref 1.2–3.4)
LYMPHOCYTES # BLD AUTO: 1.13 K/UL — LOW (ref 1.2–3.4)
LYMPHOCYTES # BLD AUTO: 1.16 K/UL — LOW (ref 1.2–3.4)
LYMPHOCYTES # BLD AUTO: 1.17 K/UL — LOW (ref 1.2–3.4)
LYMPHOCYTES # BLD AUTO: 1.21 K/UL — SIGNIFICANT CHANGE UP (ref 1.2–3.4)
LYMPHOCYTES # BLD AUTO: 1.23 K/UL — SIGNIFICANT CHANGE UP (ref 1.2–3.4)
LYMPHOCYTES # BLD AUTO: 1.29 K/UL — SIGNIFICANT CHANGE UP (ref 1.2–3.4)
LYMPHOCYTES # BLD AUTO: 1.33 K/UL — SIGNIFICANT CHANGE UP (ref 1.2–3.4)
LYMPHOCYTES # BLD AUTO: 1.36 K/UL — SIGNIFICANT CHANGE UP (ref 1.2–3.4)
LYMPHOCYTES # BLD AUTO: 1.36 K/UL — SIGNIFICANT CHANGE UP (ref 1.2–3.4)
LYMPHOCYTES # BLD AUTO: 1.37 K/UL — SIGNIFICANT CHANGE UP (ref 1.2–3.4)
LYMPHOCYTES # BLD AUTO: 1.38 K/UL — SIGNIFICANT CHANGE UP (ref 1.2–3.4)
LYMPHOCYTES # BLD AUTO: 1.43 K/UL — SIGNIFICANT CHANGE UP (ref 1.2–3.4)
LYMPHOCYTES # BLD AUTO: 1.49 K/UL — SIGNIFICANT CHANGE UP (ref 1.2–3.4)
LYMPHOCYTES # BLD AUTO: 1.52 K/UL — SIGNIFICANT CHANGE UP (ref 1.2–3.4)
LYMPHOCYTES # BLD AUTO: 1.63 K/UL — SIGNIFICANT CHANGE UP (ref 1.2–3.4)
LYMPHOCYTES # BLD AUTO: 1.67 K/UL — SIGNIFICANT CHANGE UP (ref 1.2–3.4)
LYMPHOCYTES # BLD AUTO: 1.81 K/UL — SIGNIFICANT CHANGE UP (ref 1.2–3.4)
LYMPHOCYTES # BLD AUTO: 1.89 K/UL — SIGNIFICANT CHANGE UP (ref 1.2–3.4)
LYMPHOCYTES # BLD AUTO: 10.8 % — LOW (ref 20.5–51.1)
LYMPHOCYTES # BLD AUTO: 11.2 % — LOW (ref 20.5–51.1)
LYMPHOCYTES # BLD AUTO: 11.5 % — LOW (ref 20.5–51.1)
LYMPHOCYTES # BLD AUTO: 11.8 % — LOW (ref 20.5–51.1)
LYMPHOCYTES # BLD AUTO: 11.8 % — LOW (ref 20.5–51.1)
LYMPHOCYTES # BLD AUTO: 13.5 % — LOW (ref 20.5–51.1)
LYMPHOCYTES # BLD AUTO: 13.8 % — LOW (ref 20.5–51.1)
LYMPHOCYTES # BLD AUTO: 14.1 % — LOW (ref 20.5–51.1)
LYMPHOCYTES # BLD AUTO: 14.2 % — LOW (ref 20.5–51.1)
LYMPHOCYTES # BLD AUTO: 15.3 % — LOW (ref 20.5–51.1)
LYMPHOCYTES # BLD AUTO: 15.9 % — LOW (ref 20.5–51.1)
LYMPHOCYTES # BLD AUTO: 17.5 % — LOW (ref 20.5–51.1)
LYMPHOCYTES # BLD AUTO: 18.6 % — LOW (ref 20.5–51.1)
LYMPHOCYTES # BLD AUTO: 19.3 % — LOW (ref 20.5–51.1)
LYMPHOCYTES # BLD AUTO: 2.37 K/UL — SIGNIFICANT CHANGE UP (ref 1.2–3.4)
LYMPHOCYTES # BLD AUTO: 2.39 K/UL — SIGNIFICANT CHANGE UP (ref 1.2–3.4)
LYMPHOCYTES # BLD AUTO: 2.61 K/UL — SIGNIFICANT CHANGE UP (ref 1.2–3.4)
LYMPHOCYTES # BLD AUTO: 20.4 % — LOW (ref 20.5–51.1)
LYMPHOCYTES # BLD AUTO: 21.2 % — SIGNIFICANT CHANGE UP (ref 20.5–51.1)
LYMPHOCYTES # BLD AUTO: 21.6 % — SIGNIFICANT CHANGE UP (ref 20.5–51.1)
LYMPHOCYTES # BLD AUTO: 22.7 % — SIGNIFICANT CHANGE UP (ref 20.5–51.1)
LYMPHOCYTES # BLD AUTO: 23.4 % — SIGNIFICANT CHANGE UP (ref 20.5–51.1)
LYMPHOCYTES # BLD AUTO: 23.7 % — SIGNIFICANT CHANGE UP (ref 20.5–51.1)
LYMPHOCYTES # BLD AUTO: 24.6 % — SIGNIFICANT CHANGE UP (ref 20.5–51.1)
LYMPHOCYTES # BLD AUTO: 24.7 % — SIGNIFICANT CHANGE UP (ref 20.5–51.1)
LYMPHOCYTES # BLD AUTO: 25.1 % — SIGNIFICANT CHANGE UP (ref 20.5–51.1)
LYMPHOCYTES # BLD AUTO: 25.8 % — SIGNIFICANT CHANGE UP (ref 20.5–51.1)
LYMPHOCYTES # BLD AUTO: 31.4 % — SIGNIFICANT CHANGE UP (ref 20.5–51.1)
LYMPHOCYTES # BLD AUTO: 37.9 % — SIGNIFICANT CHANGE UP (ref 20.5–51.1)
MAGNESIUM SERPL-MCNC: 1.7 MG/DL — LOW (ref 1.8–2.4)
MAGNESIUM SERPL-MCNC: 1.7 MG/DL — LOW (ref 1.8–2.4)
MAGNESIUM SERPL-MCNC: 1.9 MG/DL — SIGNIFICANT CHANGE UP (ref 1.8–2.4)
MAGNESIUM SERPL-MCNC: 2 MG/DL — SIGNIFICANT CHANGE UP (ref 1.8–2.4)
MAGNESIUM SERPL-MCNC: 2 MG/DL — SIGNIFICANT CHANGE UP (ref 1.8–2.4)
MAGNESIUM SERPL-MCNC: 2.1 MG/DL — SIGNIFICANT CHANGE UP (ref 1.8–2.4)
MAGNESIUM SERPL-MCNC: 2.2 MG/DL — SIGNIFICANT CHANGE UP (ref 1.8–2.4)
MAGNESIUM SERPL-MCNC: 2.3 MG/DL — SIGNIFICANT CHANGE UP (ref 1.8–2.4)
MAGNESIUM SERPL-MCNC: 2.4 MG/DL — SIGNIFICANT CHANGE UP (ref 1.8–2.4)
MAGNESIUM SERPL-MCNC: 2.7 MG/DL — HIGH (ref 1.8–2.4)
MANUAL SMEAR VERIFICATION: SIGNIFICANT CHANGE UP
MCHC RBC-ENTMCNC: 24.9 PG — LOW (ref 27–31)
MCHC RBC-ENTMCNC: 25 PG — LOW (ref 27–31)
MCHC RBC-ENTMCNC: 25 PG — LOW (ref 27–31)
MCHC RBC-ENTMCNC: 25.1 PG — LOW (ref 27–31)
MCHC RBC-ENTMCNC: 25.2 PG — LOW (ref 27–31)
MCHC RBC-ENTMCNC: 25.3 PG — LOW (ref 27–31)
MCHC RBC-ENTMCNC: 25.4 PG — LOW (ref 27–31)
MCHC RBC-ENTMCNC: 25.6 PG — LOW (ref 27–31)
MCHC RBC-ENTMCNC: 25.7 PG — LOW (ref 27–31)
MCHC RBC-ENTMCNC: 25.8 PG — LOW (ref 27–31)
MCHC RBC-ENTMCNC: 25.9 PG — LOW (ref 27–31)
MCHC RBC-ENTMCNC: 25.9 PG — LOW (ref 27–31)
MCHC RBC-ENTMCNC: 26 PG — LOW (ref 27–31)
MCHC RBC-ENTMCNC: 26.2 PG — LOW (ref 27–31)
MCHC RBC-ENTMCNC: 26.4 PG — LOW (ref 27–31)
MCHC RBC-ENTMCNC: 29.9 G/DL — LOW (ref 32–37)
MCHC RBC-ENTMCNC: 30.1 G/DL — LOW (ref 32–37)
MCHC RBC-ENTMCNC: 30.3 G/DL — LOW (ref 32–37)
MCHC RBC-ENTMCNC: 30.5 G/DL — LOW (ref 32–37)
MCHC RBC-ENTMCNC: 30.6 G/DL — LOW (ref 32–37)
MCHC RBC-ENTMCNC: 30.7 G/DL — LOW (ref 32–37)
MCHC RBC-ENTMCNC: 30.8 G/DL — LOW (ref 32–37)
MCHC RBC-ENTMCNC: 30.9 G/DL — LOW (ref 32–37)
MCHC RBC-ENTMCNC: 31 G/DL — LOW (ref 32–37)
MCHC RBC-ENTMCNC: 31.1 G/DL — LOW (ref 32–37)
MCHC RBC-ENTMCNC: 31.2 G/DL — LOW (ref 32–37)
MCHC RBC-ENTMCNC: 31.2 G/DL — LOW (ref 32–37)
MCHC RBC-ENTMCNC: 31.4 G/DL — LOW (ref 32–37)
MCHC RBC-ENTMCNC: 31.6 G/DL — LOW (ref 32–37)
MCHC RBC-ENTMCNC: 32.2 G/DL — SIGNIFICANT CHANGE UP (ref 32–37)
MCV RBC AUTO: 78.4 FL — LOW (ref 81–99)
MCV RBC AUTO: 80.5 FL — LOW (ref 81–99)
MCV RBC AUTO: 80.7 FL — LOW (ref 81–99)
MCV RBC AUTO: 81.2 FL — SIGNIFICANT CHANGE UP (ref 81–99)
MCV RBC AUTO: 81.3 FL — SIGNIFICANT CHANGE UP (ref 81–99)
MCV RBC AUTO: 81.5 FL — SIGNIFICANT CHANGE UP (ref 81–99)
MCV RBC AUTO: 82 FL — SIGNIFICANT CHANGE UP (ref 81–99)
MCV RBC AUTO: 82.1 FL — SIGNIFICANT CHANGE UP (ref 81–99)
MCV RBC AUTO: 82.1 FL — SIGNIFICANT CHANGE UP (ref 81–99)
MCV RBC AUTO: 82.2 FL — SIGNIFICANT CHANGE UP (ref 81–99)
MCV RBC AUTO: 82.2 FL — SIGNIFICANT CHANGE UP (ref 81–99)
MCV RBC AUTO: 82.4 FL — SIGNIFICANT CHANGE UP (ref 81–99)
MCV RBC AUTO: 82.6 FL — SIGNIFICANT CHANGE UP (ref 81–99)
MCV RBC AUTO: 82.7 FL — SIGNIFICANT CHANGE UP (ref 81–99)
MCV RBC AUTO: 82.9 FL — SIGNIFICANT CHANGE UP (ref 81–99)
MCV RBC AUTO: 83.1 FL — SIGNIFICANT CHANGE UP (ref 81–99)
MCV RBC AUTO: 83.4 FL — SIGNIFICANT CHANGE UP (ref 81–99)
MCV RBC AUTO: 83.5 FL — SIGNIFICANT CHANGE UP (ref 81–99)
MCV RBC AUTO: 83.5 FL — SIGNIFICANT CHANGE UP (ref 81–99)
MCV RBC AUTO: 83.8 FL — SIGNIFICANT CHANGE UP (ref 81–99)
MCV RBC AUTO: 84.1 FL — SIGNIFICANT CHANGE UP (ref 81–99)
MCV RBC AUTO: 84.3 FL — SIGNIFICANT CHANGE UP (ref 81–99)
MCV RBC AUTO: 84.3 FL — SIGNIFICANT CHANGE UP (ref 81–99)
MCV RBC AUTO: 84.4 FL — SIGNIFICANT CHANGE UP (ref 81–99)
MCV RBC AUTO: 84.4 FL — SIGNIFICANT CHANGE UP (ref 81–99)
MCV RBC AUTO: 84.6 FL — SIGNIFICANT CHANGE UP (ref 81–99)
MCV RBC AUTO: 84.9 FL — SIGNIFICANT CHANGE UP (ref 81–99)
MCV RBC AUTO: 85 FL — SIGNIFICANT CHANGE UP (ref 81–99)
MCV RBC AUTO: 85.3 FL — SIGNIFICANT CHANGE UP (ref 81–99)
MCV RBC AUTO: 85.4 FL — SIGNIFICANT CHANGE UP (ref 81–99)
METAMYELOCYTES # FLD: 0.9 % — HIGH (ref 0–0)
METHOD TYPE: SIGNIFICANT CHANGE UP
MICROCYTES BLD QL: SIGNIFICANT CHANGE UP
MONOCYTES # BLD AUTO: 0.35 K/UL — SIGNIFICANT CHANGE UP (ref 0.1–0.6)
MONOCYTES # BLD AUTO: 0.44 K/UL — SIGNIFICANT CHANGE UP (ref 0.1–0.6)
MONOCYTES # BLD AUTO: 0.45 K/UL — SIGNIFICANT CHANGE UP (ref 0.1–0.6)
MONOCYTES # BLD AUTO: 0.47 K/UL — SIGNIFICANT CHANGE UP (ref 0.1–0.6)
MONOCYTES # BLD AUTO: 0.49 K/UL — SIGNIFICANT CHANGE UP (ref 0.1–0.6)
MONOCYTES # BLD AUTO: 0.51 K/UL — SIGNIFICANT CHANGE UP (ref 0.1–0.6)
MONOCYTES # BLD AUTO: 0.51 K/UL — SIGNIFICANT CHANGE UP (ref 0.1–0.6)
MONOCYTES # BLD AUTO: 0.52 K/UL — SIGNIFICANT CHANGE UP (ref 0.1–0.6)
MONOCYTES # BLD AUTO: 0.53 K/UL — SIGNIFICANT CHANGE UP (ref 0.1–0.6)
MONOCYTES # BLD AUTO: 0.56 K/UL — SIGNIFICANT CHANGE UP (ref 0.1–0.6)
MONOCYTES # BLD AUTO: 0.56 K/UL — SIGNIFICANT CHANGE UP (ref 0.1–0.6)
MONOCYTES # BLD AUTO: 0.57 K/UL — SIGNIFICANT CHANGE UP (ref 0.1–0.6)
MONOCYTES # BLD AUTO: 0.58 K/UL — SIGNIFICANT CHANGE UP (ref 0.1–0.6)
MONOCYTES # BLD AUTO: 0.59 K/UL — SIGNIFICANT CHANGE UP (ref 0.1–0.6)
MONOCYTES # BLD AUTO: 0.59 K/UL — SIGNIFICANT CHANGE UP (ref 0.1–0.6)
MONOCYTES # BLD AUTO: 0.66 K/UL — HIGH (ref 0.1–0.6)
MONOCYTES # BLD AUTO: 0.66 K/UL — HIGH (ref 0.1–0.6)
MONOCYTES # BLD AUTO: 0.67 K/UL — HIGH (ref 0.1–0.6)
MONOCYTES # BLD AUTO: 0.74 K/UL — HIGH (ref 0.1–0.6)
MONOCYTES # BLD AUTO: 0.76 K/UL — HIGH (ref 0.1–0.6)
MONOCYTES # BLD AUTO: 0.8 K/UL — HIGH (ref 0.1–0.6)
MONOCYTES # BLD AUTO: 0.86 K/UL — HIGH (ref 0.1–0.6)
MONOCYTES NFR BLD AUTO: 4.9 % — SIGNIFICANT CHANGE UP (ref 1.7–9.3)
MONOCYTES NFR BLD AUTO: 5 % — SIGNIFICANT CHANGE UP (ref 1.7–9.3)
MONOCYTES NFR BLD AUTO: 6.1 % — SIGNIFICANT CHANGE UP (ref 1.7–9.3)
MONOCYTES NFR BLD AUTO: 6.2 % — SIGNIFICANT CHANGE UP (ref 1.7–9.3)
MONOCYTES NFR BLD AUTO: 6.3 % — SIGNIFICANT CHANGE UP (ref 1.7–9.3)
MONOCYTES NFR BLD AUTO: 6.6 % — SIGNIFICANT CHANGE UP (ref 1.7–9.3)
MONOCYTES NFR BLD AUTO: 6.6 % — SIGNIFICANT CHANGE UP (ref 1.7–9.3)
MONOCYTES NFR BLD AUTO: 6.7 % — SIGNIFICANT CHANGE UP (ref 1.7–9.3)
MONOCYTES NFR BLD AUTO: 6.8 % — SIGNIFICANT CHANGE UP (ref 1.7–9.3)
MONOCYTES NFR BLD AUTO: 6.8 % — SIGNIFICANT CHANGE UP (ref 1.7–9.3)
MONOCYTES NFR BLD AUTO: 6.9 % — SIGNIFICANT CHANGE UP (ref 1.7–9.3)
MONOCYTES NFR BLD AUTO: 7.1 % — SIGNIFICANT CHANGE UP (ref 1.7–9.3)
MONOCYTES NFR BLD AUTO: 7.1 % — SIGNIFICANT CHANGE UP (ref 1.7–9.3)
MONOCYTES NFR BLD AUTO: 7.4 % — SIGNIFICANT CHANGE UP (ref 1.7–9.3)
MONOCYTES NFR BLD AUTO: 7.4 % — SIGNIFICANT CHANGE UP (ref 1.7–9.3)
MONOCYTES NFR BLD AUTO: 7.5 % — SIGNIFICANT CHANGE UP (ref 1.7–9.3)
MONOCYTES NFR BLD AUTO: 7.5 % — SIGNIFICANT CHANGE UP (ref 1.7–9.3)
MONOCYTES NFR BLD AUTO: 8.1 % — SIGNIFICANT CHANGE UP (ref 1.7–9.3)
MONOCYTES NFR BLD AUTO: 8.8 % — SIGNIFICANT CHANGE UP (ref 1.7–9.3)
MONOCYTES NFR BLD AUTO: 9.3 % — SIGNIFICANT CHANGE UP (ref 1.7–9.3)
MONOCYTES NFR BLD AUTO: 9.4 % — HIGH (ref 1.7–9.3)
MONOCYTES NFR BLD AUTO: 9.4 % — HIGH (ref 1.7–9.3)
MONOCYTES NFR BLD AUTO: 9.7 % — HIGH (ref 1.7–9.3)
MONOCYTES NFR BLD AUTO: 9.7 % — HIGH (ref 1.7–9.3)
MRSA PCR RESULT.: NEGATIVE — SIGNIFICANT CHANGE UP
MYELOCYTES NFR BLD: 1.8 % — HIGH (ref 0–0)
NEUTROPHILS # BLD AUTO: 10.22 K/UL — HIGH (ref 1.4–6.5)
NEUTROPHILS # BLD AUTO: 2.41 K/UL — SIGNIFICANT CHANGE UP (ref 1.4–6.5)
NEUTROPHILS # BLD AUTO: 2.9 K/UL — SIGNIFICANT CHANGE UP (ref 1.4–6.5)
NEUTROPHILS # BLD AUTO: 3.23 K/UL — SIGNIFICANT CHANGE UP (ref 1.4–6.5)
NEUTROPHILS # BLD AUTO: 3.24 K/UL — SIGNIFICANT CHANGE UP (ref 1.4–6.5)
NEUTROPHILS # BLD AUTO: 3.54 K/UL — SIGNIFICANT CHANGE UP (ref 1.4–6.5)
NEUTROPHILS # BLD AUTO: 3.66 K/UL — SIGNIFICANT CHANGE UP (ref 1.4–6.5)
NEUTROPHILS # BLD AUTO: 4.03 K/UL — SIGNIFICANT CHANGE UP (ref 1.4–6.5)
NEUTROPHILS # BLD AUTO: 4.35 K/UL — SIGNIFICANT CHANGE UP (ref 1.4–6.5)
NEUTROPHILS # BLD AUTO: 4.39 K/UL — SIGNIFICANT CHANGE UP (ref 1.4–6.5)
NEUTROPHILS # BLD AUTO: 4.79 K/UL — SIGNIFICANT CHANGE UP (ref 1.4–6.5)
NEUTROPHILS # BLD AUTO: 5 K/UL — SIGNIFICANT CHANGE UP (ref 1.4–6.5)
NEUTROPHILS # BLD AUTO: 5.36 K/UL — SIGNIFICANT CHANGE UP (ref 1.4–6.5)
NEUTROPHILS # BLD AUTO: 5.54 K/UL — SIGNIFICANT CHANGE UP (ref 1.4–6.5)
NEUTROPHILS # BLD AUTO: 6.06 K/UL — SIGNIFICANT CHANGE UP (ref 1.4–6.5)
NEUTROPHILS # BLD AUTO: 6.19 K/UL — SIGNIFICANT CHANGE UP (ref 1.4–6.5)
NEUTROPHILS # BLD AUTO: 6.27 K/UL — SIGNIFICANT CHANGE UP (ref 1.4–6.5)
NEUTROPHILS # BLD AUTO: 6.57 K/UL — HIGH (ref 1.4–6.5)
NEUTROPHILS # BLD AUTO: 6.69 K/UL — HIGH (ref 1.4–6.5)
NEUTROPHILS # BLD AUTO: 6.72 K/UL — HIGH (ref 1.4–6.5)
NEUTROPHILS # BLD AUTO: 6.81 K/UL — HIGH (ref 1.4–6.5)
NEUTROPHILS # BLD AUTO: 7.01 K/UL — HIGH (ref 1.4–6.5)
NEUTROPHILS # BLD AUTO: 7.06 K/UL — HIGH (ref 1.4–6.5)
NEUTROPHILS # BLD AUTO: 7.14 K/UL — HIGH (ref 1.4–6.5)
NEUTROPHILS # BLD AUTO: 8.72 K/UL — HIGH (ref 1.4–6.5)
NEUTROPHILS # BLD AUTO: 9.01 K/UL — HIGH (ref 1.4–6.5)
NEUTROPHILS NFR BLD AUTO: 51.8 % — SIGNIFICANT CHANGE UP (ref 42.2–75.2)
NEUTROPHILS NFR BLD AUTO: 55.6 % — SIGNIFICANT CHANGE UP (ref 42.2–75.2)
NEUTROPHILS NFR BLD AUTO: 59.8 % — SIGNIFICANT CHANGE UP (ref 42.2–75.2)
NEUTROPHILS NFR BLD AUTO: 60.1 % — SIGNIFICANT CHANGE UP (ref 42.2–75.2)
NEUTROPHILS NFR BLD AUTO: 61.8 % — SIGNIFICANT CHANGE UP (ref 42.2–75.2)
NEUTROPHILS NFR BLD AUTO: 63.2 % — SIGNIFICANT CHANGE UP (ref 42.2–75.2)
NEUTROPHILS NFR BLD AUTO: 64 % — SIGNIFICANT CHANGE UP (ref 42.2–75.2)
NEUTROPHILS NFR BLD AUTO: 67.5 % — SIGNIFICANT CHANGE UP (ref 42.2–75.2)
NEUTROPHILS NFR BLD AUTO: 67.9 % — SIGNIFICANT CHANGE UP (ref 42.2–75.2)
NEUTROPHILS NFR BLD AUTO: 67.9 % — SIGNIFICANT CHANGE UP (ref 42.2–75.2)
NEUTROPHILS NFR BLD AUTO: 68 % — SIGNIFICANT CHANGE UP (ref 42.2–75.2)
NEUTROPHILS NFR BLD AUTO: 68.6 % — SIGNIFICANT CHANGE UP (ref 42.2–75.2)
NEUTROPHILS NFR BLD AUTO: 69.2 % — SIGNIFICANT CHANGE UP (ref 42.2–75.2)
NEUTROPHILS NFR BLD AUTO: 70.8 % — SIGNIFICANT CHANGE UP (ref 42.2–75.2)
NEUTROPHILS NFR BLD AUTO: 72.4 % — SIGNIFICANT CHANGE UP (ref 42.2–75.2)
NEUTROPHILS NFR BLD AUTO: 72.6 % — SIGNIFICANT CHANGE UP (ref 42.2–75.2)
NEUTROPHILS NFR BLD AUTO: 73 % — SIGNIFICANT CHANGE UP (ref 42.2–75.2)
NEUTROPHILS NFR BLD AUTO: 76.7 % — HIGH (ref 42.2–75.2)
NEUTROPHILS NFR BLD AUTO: 77.1 % — HIGH (ref 42.2–75.2)
NEUTROPHILS NFR BLD AUTO: 77.2 % — HIGH (ref 42.2–75.2)
NEUTROPHILS NFR BLD AUTO: 77.7 % — HIGH (ref 42.2–75.2)
NEUTROPHILS NFR BLD AUTO: 79.4 % — HIGH (ref 42.2–75.2)
NEUTROPHILS NFR BLD AUTO: 80.4 % — HIGH (ref 42.2–75.2)
NEUTROPHILS NFR BLD AUTO: 80.9 % — HIGH (ref 42.2–75.2)
NEUTROPHILS NFR BLD AUTO: 81.1 % — HIGH (ref 42.2–75.2)
NEUTROPHILS NFR BLD AUTO: 81.6 % — HIGH (ref 42.2–75.2)
NITRITE UR-MCNC: NEGATIVE — SIGNIFICANT CHANGE UP
NITRITE UR-MCNC: NEGATIVE — SIGNIFICANT CHANGE UP
NRBC # BLD: 0 /100 WBCS — SIGNIFICANT CHANGE UP (ref 0–0)
NT-PROBNP SERPL-SCNC: 295 PG/ML — SIGNIFICANT CHANGE UP (ref 0–300)
ORGANISM # SPEC MICROSCOPIC CNT: SIGNIFICANT CHANGE UP
PCO2 BLDA: 35 MMHG — LOW (ref 38–42)
PCO2 BLDA: 36 MMHG — LOW (ref 38–42)
PCO2 BLDA: 37 MMHG — LOW (ref 38–42)
PCO2 BLDA: 40 MMHG — SIGNIFICANT CHANGE UP (ref 38–42)
PCO2 BLDA: 41 MMHG — SIGNIFICANT CHANGE UP (ref 38–42)
PCO2 BLDA: 41 MMHG — SIGNIFICANT CHANGE UP (ref 38–42)
PCO2 BLDA: 42 MMHG — SIGNIFICANT CHANGE UP (ref 38–42)
PCO2 BLDA: 47 MMHG — HIGH (ref 38–42)
PCO2 BLDV: 59 MMHG — HIGH (ref 41–51)
PH BLDA: 7.38 — SIGNIFICANT CHANGE UP (ref 7.38–7.42)
PH BLDA: 7.39 — SIGNIFICANT CHANGE UP (ref 7.38–7.42)
PH BLDA: 7.42 — SIGNIFICANT CHANGE UP (ref 7.38–7.42)
PH BLDA: 7.46 — HIGH (ref 7.38–7.42)
PH BLDA: 7.47 — HIGH (ref 7.38–7.42)
PH BLDA: 7.47 — HIGH (ref 7.38–7.42)
PH BLDA: 7.52 — HIGH (ref 7.38–7.42)
PH BLDA: 7.55 — HIGH (ref 7.38–7.42)
PH BLDV: 7.35 — SIGNIFICANT CHANGE UP (ref 7.26–7.43)
PH UR: 6.5 — SIGNIFICANT CHANGE UP (ref 5–8)
PH UR: 6.5 — SIGNIFICANT CHANGE UP (ref 5–8)
PHOSPHATE SERPL-MCNC: 2 MG/DL — LOW (ref 2.1–4.9)
PHOSPHATE SERPL-MCNC: 2.3 MG/DL — SIGNIFICANT CHANGE UP (ref 2.1–4.9)
PHOSPHATE SERPL-MCNC: 2.3 MG/DL — SIGNIFICANT CHANGE UP (ref 2.1–4.9)
PHOSPHATE SERPL-MCNC: 2.4 MG/DL — SIGNIFICANT CHANGE UP (ref 2.1–4.9)
PHOSPHATE SERPL-MCNC: 2.5 MG/DL — SIGNIFICANT CHANGE UP (ref 2.1–4.9)
PHOSPHATE SERPL-MCNC: 2.7 MG/DL — SIGNIFICANT CHANGE UP (ref 2.1–4.9)
PHOSPHATE SERPL-MCNC: 2.9 MG/DL — SIGNIFICANT CHANGE UP (ref 2.1–4.9)
PHOSPHATE SERPL-MCNC: 3.1 MG/DL — SIGNIFICANT CHANGE UP (ref 2.1–4.9)
PHOSPHATE SERPL-MCNC: 3.2 MG/DL — SIGNIFICANT CHANGE UP (ref 2.1–4.9)
PHOSPHATE SERPL-MCNC: 3.3 MG/DL — SIGNIFICANT CHANGE UP (ref 2.1–4.9)
PHOSPHATE SERPL-MCNC: 3.4 MG/DL — SIGNIFICANT CHANGE UP (ref 2.1–4.9)
PHOSPHATE SERPL-MCNC: 3.5 MG/DL — SIGNIFICANT CHANGE UP (ref 2.1–4.9)
PHOSPHATE SERPL-MCNC: 3.9 MG/DL — SIGNIFICANT CHANGE UP (ref 2.1–4.9)
PLAT MORPH BLD: NORMAL — SIGNIFICANT CHANGE UP
PLATELET # BLD AUTO: 181 K/UL — SIGNIFICANT CHANGE UP (ref 130–400)
PLATELET # BLD AUTO: 181 K/UL — SIGNIFICANT CHANGE UP (ref 130–400)
PLATELET # BLD AUTO: 185 K/UL — SIGNIFICANT CHANGE UP (ref 130–400)
PLATELET # BLD AUTO: 192 K/UL — SIGNIFICANT CHANGE UP (ref 130–400)
PLATELET # BLD AUTO: 201 K/UL — SIGNIFICANT CHANGE UP (ref 130–400)
PLATELET # BLD AUTO: 202 K/UL — SIGNIFICANT CHANGE UP (ref 130–400)
PLATELET # BLD AUTO: 209 K/UL — SIGNIFICANT CHANGE UP (ref 130–400)
PLATELET # BLD AUTO: 218 K/UL — SIGNIFICANT CHANGE UP (ref 130–400)
PLATELET # BLD AUTO: 221 K/UL — SIGNIFICANT CHANGE UP (ref 130–400)
PLATELET # BLD AUTO: 223 K/UL — SIGNIFICANT CHANGE UP (ref 130–400)
PLATELET # BLD AUTO: 230 K/UL — SIGNIFICANT CHANGE UP (ref 130–400)
PLATELET # BLD AUTO: 230 K/UL — SIGNIFICANT CHANGE UP (ref 130–400)
PLATELET # BLD AUTO: 241 K/UL — SIGNIFICANT CHANGE UP (ref 130–400)
PLATELET # BLD AUTO: 244 K/UL — SIGNIFICANT CHANGE UP (ref 130–400)
PLATELET # BLD AUTO: 254 K/UL — SIGNIFICANT CHANGE UP (ref 130–400)
PLATELET # BLD AUTO: 256 K/UL — SIGNIFICANT CHANGE UP (ref 130–400)
PLATELET # BLD AUTO: 261 K/UL — SIGNIFICANT CHANGE UP (ref 130–400)
PLATELET # BLD AUTO: 262 K/UL — SIGNIFICANT CHANGE UP (ref 130–400)
PLATELET # BLD AUTO: 282 K/UL — SIGNIFICANT CHANGE UP (ref 130–400)
PLATELET # BLD AUTO: 285 K/UL — SIGNIFICANT CHANGE UP (ref 130–400)
PLATELET # BLD AUTO: 301 K/UL — SIGNIFICANT CHANGE UP (ref 130–400)
PLATELET # BLD AUTO: 342 K/UL — SIGNIFICANT CHANGE UP (ref 130–400)
PLATELET # BLD AUTO: 357 K/UL — SIGNIFICANT CHANGE UP (ref 130–400)
PLATELET # BLD AUTO: 362 K/UL — SIGNIFICANT CHANGE UP (ref 130–400)
PLATELET # BLD AUTO: 382 K/UL — SIGNIFICANT CHANGE UP (ref 130–400)
PLATELET # BLD AUTO: 382 K/UL — SIGNIFICANT CHANGE UP (ref 130–400)
PLATELET # BLD AUTO: 389 K/UL — SIGNIFICANT CHANGE UP (ref 130–400)
PLATELET # BLD AUTO: 423 K/UL — HIGH (ref 130–400)
PLATELET # BLD AUTO: 425 K/UL — HIGH (ref 130–400)
PLATELET # BLD AUTO: 489 K/UL — HIGH (ref 130–400)
PO2 BLDA: 109 MMHG — HIGH (ref 78–95)
PO2 BLDA: 114 MMHG — HIGH (ref 78–95)
PO2 BLDA: 119 MMHG — HIGH (ref 78–95)
PO2 BLDA: 65 MMHG — LOW (ref 78–95)
PO2 BLDA: 78 MMHG — SIGNIFICANT CHANGE UP (ref 78–95)
PO2 BLDA: 79 MMHG — SIGNIFICANT CHANGE UP (ref 78–95)
PO2 BLDA: 86 MMHG — SIGNIFICANT CHANGE UP (ref 78–95)
PO2 BLDA: 92 MMHG — SIGNIFICANT CHANGE UP (ref 78–95)
PO2 BLDV: 71 MMHG — HIGH (ref 20–40)
POLYCHROMASIA BLD QL SMEAR: SIGNIFICANT CHANGE UP
POTASSIUM BLDV-SCNC: 4.9 MMOL/L — SIGNIFICANT CHANGE UP (ref 3.3–5.6)
POTASSIUM SERPL-MCNC: 3.7 MMOL/L — SIGNIFICANT CHANGE UP (ref 3.5–5)
POTASSIUM SERPL-MCNC: 3.9 MMOL/L — SIGNIFICANT CHANGE UP (ref 3.5–5)
POTASSIUM SERPL-MCNC: 4 MMOL/L — SIGNIFICANT CHANGE UP (ref 3.5–5)
POTASSIUM SERPL-MCNC: 4.1 MMOL/L — SIGNIFICANT CHANGE UP (ref 3.5–5)
POTASSIUM SERPL-MCNC: 4.2 MMOL/L — SIGNIFICANT CHANGE UP (ref 3.5–5)
POTASSIUM SERPL-MCNC: 4.3 MMOL/L — SIGNIFICANT CHANGE UP (ref 3.5–5)
POTASSIUM SERPL-MCNC: 4.4 MMOL/L — SIGNIFICANT CHANGE UP (ref 3.5–5)
POTASSIUM SERPL-MCNC: 4.5 MMOL/L — SIGNIFICANT CHANGE UP (ref 3.5–5)
POTASSIUM SERPL-MCNC: 4.6 MMOL/L — SIGNIFICANT CHANGE UP (ref 3.5–5)
POTASSIUM SERPL-MCNC: 4.7 MMOL/L — SIGNIFICANT CHANGE UP (ref 3.5–5)
POTASSIUM SERPL-MCNC: 4.9 MMOL/L — SIGNIFICANT CHANGE UP (ref 3.5–5)
POTASSIUM SERPL-MCNC: 4.9 MMOL/L — SIGNIFICANT CHANGE UP (ref 3.5–5)
POTASSIUM SERPL-MCNC: 5 MMOL/L — SIGNIFICANT CHANGE UP (ref 3.5–5)
POTASSIUM SERPL-MCNC: 5.1 MMOL/L — HIGH (ref 3.5–5)
POTASSIUM SERPL-MCNC: 5.3 MMOL/L — HIGH (ref 3.5–5)
POTASSIUM SERPL-SCNC: 3.7 MMOL/L — SIGNIFICANT CHANGE UP (ref 3.5–5)
POTASSIUM SERPL-SCNC: 3.9 MMOL/L — SIGNIFICANT CHANGE UP (ref 3.5–5)
POTASSIUM SERPL-SCNC: 4 MMOL/L — SIGNIFICANT CHANGE UP (ref 3.5–5)
POTASSIUM SERPL-SCNC: 4.1 MMOL/L — SIGNIFICANT CHANGE UP (ref 3.5–5)
POTASSIUM SERPL-SCNC: 4.2 MMOL/L — SIGNIFICANT CHANGE UP (ref 3.5–5)
POTASSIUM SERPL-SCNC: 4.3 MMOL/L — SIGNIFICANT CHANGE UP (ref 3.5–5)
POTASSIUM SERPL-SCNC: 4.4 MMOL/L — SIGNIFICANT CHANGE UP (ref 3.5–5)
POTASSIUM SERPL-SCNC: 4.5 MMOL/L — SIGNIFICANT CHANGE UP (ref 3.5–5)
POTASSIUM SERPL-SCNC: 4.6 MMOL/L — SIGNIFICANT CHANGE UP (ref 3.5–5)
POTASSIUM SERPL-SCNC: 4.7 MMOL/L — SIGNIFICANT CHANGE UP (ref 3.5–5)
POTASSIUM SERPL-SCNC: 4.9 MMOL/L — SIGNIFICANT CHANGE UP (ref 3.5–5)
POTASSIUM SERPL-SCNC: 4.9 MMOL/L — SIGNIFICANT CHANGE UP (ref 3.5–5)
POTASSIUM SERPL-SCNC: 5 MMOL/L — SIGNIFICANT CHANGE UP (ref 3.5–5)
POTASSIUM SERPL-SCNC: 5.1 MMOL/L — HIGH (ref 3.5–5)
POTASSIUM SERPL-SCNC: 5.3 MMOL/L — HIGH (ref 3.5–5)
PREALB SERPL-MCNC: 21 MG/DL — SIGNIFICANT CHANGE UP (ref 20–40)
PREALB SERPL-MCNC: 23 MG/DL — SIGNIFICANT CHANGE UP (ref 20–40)
PROCALCITONIN SERPL-MCNC: 0.22 NG/ML — HIGH (ref 0.02–0.1)
PROT SERPL-MCNC: 5.4 G/DL — LOW (ref 6–8)
PROT SERPL-MCNC: 5.4 G/DL — LOW (ref 6–8)
PROT SERPL-MCNC: 5.5 G/DL — LOW (ref 6–8)
PROT SERPL-MCNC: 5.6 G/DL — LOW (ref 6–8)
PROT SERPL-MCNC: 5.7 G/DL — LOW (ref 6–8)
PROT SERPL-MCNC: 5.7 G/DL — LOW (ref 6–8)
PROT SERPL-MCNC: 5.8 G/DL — LOW (ref 6–8)
PROT SERPL-MCNC: 5.9 G/DL — LOW (ref 6–8)
PROT SERPL-MCNC: 6 G/DL — SIGNIFICANT CHANGE UP (ref 6–8)
PROT SERPL-MCNC: 6.1 G/DL — SIGNIFICANT CHANGE UP (ref 6–8)
PROT SERPL-MCNC: 6.2 G/DL — SIGNIFICANT CHANGE UP (ref 6–8)
PROT SERPL-MCNC: 6.3 G/DL — SIGNIFICANT CHANGE UP (ref 6–8)
PROT SERPL-MCNC: 6.3 G/DL — SIGNIFICANT CHANGE UP (ref 6–8)
PROT SERPL-MCNC: 6.6 G/DL — SIGNIFICANT CHANGE UP (ref 6–8)
PROT SERPL-MCNC: 6.6 G/DL — SIGNIFICANT CHANGE UP (ref 6–8)
PROT SERPL-MCNC: 7 G/DL — SIGNIFICANT CHANGE UP (ref 6–8)
PROT SERPL-MCNC: 7.4 G/DL — SIGNIFICANT CHANGE UP (ref 6–8)
PROT SERPL-MCNC: 7.8 G/DL — SIGNIFICANT CHANGE UP (ref 6–8)
PROT UR-MCNC: NEGATIVE MG/DL — SIGNIFICANT CHANGE UP
PROT UR-MCNC: SIGNIFICANT CHANGE UP
PROTHROM AB SERPL-ACNC: 11.1 SEC — SIGNIFICANT CHANGE UP (ref 9.95–12.87)
PROTHROM AB SERPL-ACNC: 11.1 SEC — SIGNIFICANT CHANGE UP (ref 9.95–12.87)
PROTHROM AB SERPL-ACNC: 11.2 SEC — SIGNIFICANT CHANGE UP (ref 9.95–12.87)
PROTHROM AB SERPL-ACNC: 11.8 SEC — SIGNIFICANT CHANGE UP (ref 9.95–12.87)
PROTHROM AB SERPL-ACNC: 12.4 SEC — SIGNIFICANT CHANGE UP (ref 9.95–12.87)
RBC # BLD: 3.94 M/UL — LOW (ref 4.2–5.4)
RBC # BLD: 3.96 M/UL — LOW (ref 4.2–5.4)
RBC # BLD: 3.97 M/UL — LOW (ref 4.2–5.4)
RBC # BLD: 4 M/UL — LOW (ref 4.2–5.4)
RBC # BLD: 4.04 M/UL — LOW (ref 4.2–5.4)
RBC # BLD: 4.04 M/UL — LOW (ref 4.2–5.4)
RBC # BLD: 4.09 M/UL — LOW (ref 4.2–5.4)
RBC # BLD: 4.1 M/UL — LOW (ref 4.2–5.4)
RBC # BLD: 4.21 M/UL — SIGNIFICANT CHANGE UP (ref 4.2–5.4)
RBC # BLD: 4.26 M/UL — SIGNIFICANT CHANGE UP (ref 4.2–5.4)
RBC # BLD: 4.27 M/UL — SIGNIFICANT CHANGE UP (ref 4.2–5.4)
RBC # BLD: 4.28 M/UL — SIGNIFICANT CHANGE UP (ref 4.2–5.4)
RBC # BLD: 4.3 M/UL — SIGNIFICANT CHANGE UP (ref 4.2–5.4)
RBC # BLD: 4.31 M/UL — SIGNIFICANT CHANGE UP (ref 4.2–5.4)
RBC # BLD: 4.33 M/UL — SIGNIFICANT CHANGE UP (ref 4.2–5.4)
RBC # BLD: 4.34 M/UL — SIGNIFICANT CHANGE UP (ref 4.2–5.4)
RBC # BLD: 4.34 M/UL — SIGNIFICANT CHANGE UP (ref 4.2–5.4)
RBC # BLD: 4.35 M/UL — SIGNIFICANT CHANGE UP (ref 4.2–5.4)
RBC # BLD: 4.36 M/UL — SIGNIFICANT CHANGE UP (ref 4.2–5.4)
RBC # BLD: 4.37 M/UL — SIGNIFICANT CHANGE UP (ref 4.2–5.4)
RBC # BLD: 4.38 M/UL — SIGNIFICANT CHANGE UP (ref 4.2–5.4)
RBC # BLD: 4.39 M/UL — SIGNIFICANT CHANGE UP (ref 4.2–5.4)
RBC # BLD: 4.61 M/UL — SIGNIFICANT CHANGE UP (ref 4.2–5.4)
RBC # BLD: 4.77 M/UL — SIGNIFICANT CHANGE UP (ref 4.2–5.4)
RBC # BLD: 4.89 M/UL — SIGNIFICANT CHANGE UP (ref 4.2–5.4)
RBC # BLD: 4.99 M/UL — SIGNIFICANT CHANGE UP (ref 4.2–5.4)
RBC # BLD: 5.74 M/UL — HIGH (ref 4.2–5.4)
RBC # FLD: 14.2 % — SIGNIFICANT CHANGE UP (ref 11.5–14.5)
RBC # FLD: 14.2 % — SIGNIFICANT CHANGE UP (ref 11.5–14.5)
RBC # FLD: 14.3 % — SIGNIFICANT CHANGE UP (ref 11.5–14.5)
RBC # FLD: 14.5 % — SIGNIFICANT CHANGE UP (ref 11.5–14.5)
RBC # FLD: 14.6 % — HIGH (ref 11.5–14.5)
RBC # FLD: 14.7 % — HIGH (ref 11.5–14.5)
RBC # FLD: 14.8 % — HIGH (ref 11.5–14.5)
RBC # FLD: 15.1 % — HIGH (ref 11.5–14.5)
RBC # FLD: 15.1 % — HIGH (ref 11.5–14.5)
RBC # FLD: 15.3 % — HIGH (ref 11.5–14.5)
RBC # FLD: 15.3 % — HIGH (ref 11.5–14.5)
RBC # FLD: 15.4 % — HIGH (ref 11.5–14.5)
RBC # FLD: 15.5 % — HIGH (ref 11.5–14.5)
RBC # FLD: 15.7 % — HIGH (ref 11.5–14.5)
RBC # FLD: 15.8 % — HIGH (ref 11.5–14.5)
RBC # FLD: 15.9 % — HIGH (ref 11.5–14.5)
RBC BLD AUTO: ABNORMAL
RBC CASTS # UR COMP ASSIST: 1 /HPF — SIGNIFICANT CHANGE UP (ref 0–4)
SAO2 % BLDA: 95 % — SIGNIFICANT CHANGE UP (ref 94–98)
SAO2 % BLDA: 97 % — SIGNIFICANT CHANGE UP (ref 94–98)
SAO2 % BLDA: 98 % — SIGNIFICANT CHANGE UP (ref 94–98)
SAO2 % BLDA: 98 % — SIGNIFICANT CHANGE UP (ref 94–98)
SAO2 % BLDA: 99 % — HIGH (ref 94–98)
SAO2 % BLDA: 99 % — HIGH (ref 94–98)
SAO2 % BLDV: 94 % — SIGNIFICANT CHANGE UP
SODIUM SERPL-SCNC: 137 MMOL/L — SIGNIFICANT CHANGE UP (ref 135–146)
SODIUM SERPL-SCNC: 138 MMOL/L — SIGNIFICANT CHANGE UP (ref 135–146)
SODIUM SERPL-SCNC: 138 MMOL/L — SIGNIFICANT CHANGE UP (ref 135–146)
SODIUM SERPL-SCNC: 139 MMOL/L — SIGNIFICANT CHANGE UP (ref 135–146)
SODIUM SERPL-SCNC: 140 MMOL/L — SIGNIFICANT CHANGE UP (ref 135–146)
SODIUM SERPL-SCNC: 141 MMOL/L — SIGNIFICANT CHANGE UP (ref 135–146)
SODIUM SERPL-SCNC: 142 MMOL/L — SIGNIFICANT CHANGE UP (ref 135–146)
SODIUM SERPL-SCNC: 143 MMOL/L — SIGNIFICANT CHANGE UP (ref 135–146)
SODIUM SERPL-SCNC: 145 MMOL/L — SIGNIFICANT CHANGE UP (ref 135–146)
SODIUM SERPL-SCNC: 146 MMOL/L — SIGNIFICANT CHANGE UP (ref 135–146)
SP GR SPEC: 1.02 — SIGNIFICANT CHANGE UP (ref 1.01–1.02)
SP GR SPEC: 1.02 — SIGNIFICANT CHANGE UP (ref 1.01–1.03)
SPECIMEN SOURCE: SIGNIFICANT CHANGE UP
SURGICAL PATHOLOGY STUDY: SIGNIFICANT CHANGE UP
TROPONIN T SERPL-MCNC: 0.01 NG/ML — SIGNIFICANT CHANGE UP
TROPONIN T SERPL-MCNC: 0.02 NG/ML — HIGH
TROPONIN T SERPL-MCNC: 0.02 NG/ML — HIGH
TROPONIN T SERPL-MCNC: <0.01 NG/ML — SIGNIFICANT CHANGE UP
TSH SERPL-MCNC: 1.21 UIU/ML — SIGNIFICANT CHANGE UP (ref 0.27–4.2)
TSH SERPL-MCNC: 1.28 UIU/ML — SIGNIFICANT CHANGE UP (ref 0.27–4.2)
TYPE + AB SCN PNL BLD: SIGNIFICANT CHANGE UP
UROBILINOGEN FLD QL: 2 MG/DL (ref 0.2–0.2)
UROBILINOGEN FLD QL: SIGNIFICANT CHANGE UP
WBC # BLD: 10.1 K/UL — SIGNIFICANT CHANGE UP (ref 4.8–10.8)
WBC # BLD: 10.74 K/UL — SIGNIFICANT CHANGE UP (ref 4.8–10.8)
WBC # BLD: 12.36 K/UL — HIGH (ref 4.8–10.8)
WBC # BLD: 12.72 K/UL — HIGH (ref 4.8–10.8)
WBC # BLD: 4.33 K/UL — LOW (ref 4.8–10.8)
WBC # BLD: 4.83 K/UL — SIGNIFICANT CHANGE UP (ref 4.8–10.8)
WBC # BLD: 5.34 K/UL — SIGNIFICANT CHANGE UP (ref 4.8–10.8)
WBC # BLD: 5.41 K/UL — SIGNIFICANT CHANGE UP (ref 4.8–10.8)
WBC # BLD: 5.59 K/UL — SIGNIFICANT CHANGE UP (ref 4.8–10.8)
WBC # BLD: 5.69 K/UL — SIGNIFICANT CHANGE UP (ref 4.8–10.8)
WBC # BLD: 5.93 K/UL — SIGNIFICANT CHANGE UP (ref 4.8–10.8)
WBC # BLD: 5.94 K/UL — SIGNIFICANT CHANGE UP (ref 4.8–10.8)
WBC # BLD: 6.25 K/UL — SIGNIFICANT CHANGE UP (ref 4.8–10.8)
WBC # BLD: 6.34 K/UL — SIGNIFICANT CHANGE UP (ref 4.8–10.8)
WBC # BLD: 6.77 K/UL — SIGNIFICANT CHANGE UP (ref 4.8–10.8)
WBC # BLD: 6.87 K/UL — SIGNIFICANT CHANGE UP (ref 4.8–10.8)
WBC # BLD: 6.95 K/UL — SIGNIFICANT CHANGE UP (ref 4.8–10.8)
WBC # BLD: 7.09 K/UL — SIGNIFICANT CHANGE UP (ref 4.8–10.8)
WBC # BLD: 7.36 K/UL — SIGNIFICANT CHANGE UP (ref 4.8–10.8)
WBC # BLD: 7.63 K/UL — SIGNIFICANT CHANGE UP (ref 4.8–10.8)
WBC # BLD: 7.79 K/UL — SIGNIFICANT CHANGE UP (ref 4.8–10.8)
WBC # BLD: 8.56 K/UL — SIGNIFICANT CHANGE UP (ref 4.8–10.8)
WBC # BLD: 8.62 K/UL — SIGNIFICANT CHANGE UP (ref 4.8–10.8)
WBC # BLD: 8.67 K/UL — SIGNIFICANT CHANGE UP (ref 4.8–10.8)
WBC # BLD: 8.74 K/UL — SIGNIFICANT CHANGE UP (ref 4.8–10.8)
WBC # BLD: 8.76 K/UL — SIGNIFICANT CHANGE UP (ref 4.8–10.8)
WBC # BLD: 8.93 K/UL — SIGNIFICANT CHANGE UP (ref 4.8–10.8)
WBC # BLD: 9.08 K/UL — SIGNIFICANT CHANGE UP (ref 4.8–10.8)
WBC # BLD: 9.43 K/UL — SIGNIFICANT CHANGE UP (ref 4.8–10.8)
WBC # BLD: 9.72 K/UL — SIGNIFICANT CHANGE UP (ref 4.8–10.8)
WBC # FLD AUTO: 10.1 K/UL — SIGNIFICANT CHANGE UP (ref 4.8–10.8)
WBC # FLD AUTO: 10.74 K/UL — SIGNIFICANT CHANGE UP (ref 4.8–10.8)
WBC # FLD AUTO: 12.36 K/UL — HIGH (ref 4.8–10.8)
WBC # FLD AUTO: 12.72 K/UL — HIGH (ref 4.8–10.8)
WBC # FLD AUTO: 4.33 K/UL — LOW (ref 4.8–10.8)
WBC # FLD AUTO: 4.83 K/UL — SIGNIFICANT CHANGE UP (ref 4.8–10.8)
WBC # FLD AUTO: 5.34 K/UL — SIGNIFICANT CHANGE UP (ref 4.8–10.8)
WBC # FLD AUTO: 5.41 K/UL — SIGNIFICANT CHANGE UP (ref 4.8–10.8)
WBC # FLD AUTO: 5.59 K/UL — SIGNIFICANT CHANGE UP (ref 4.8–10.8)
WBC # FLD AUTO: 5.69 K/UL — SIGNIFICANT CHANGE UP (ref 4.8–10.8)
WBC # FLD AUTO: 5.93 K/UL — SIGNIFICANT CHANGE UP (ref 4.8–10.8)
WBC # FLD AUTO: 5.94 K/UL — SIGNIFICANT CHANGE UP (ref 4.8–10.8)
WBC # FLD AUTO: 6.25 K/UL — SIGNIFICANT CHANGE UP (ref 4.8–10.8)
WBC # FLD AUTO: 6.34 K/UL — SIGNIFICANT CHANGE UP (ref 4.8–10.8)
WBC # FLD AUTO: 6.77 K/UL — SIGNIFICANT CHANGE UP (ref 4.8–10.8)
WBC # FLD AUTO: 6.87 K/UL — SIGNIFICANT CHANGE UP (ref 4.8–10.8)
WBC # FLD AUTO: 6.95 K/UL — SIGNIFICANT CHANGE UP (ref 4.8–10.8)
WBC # FLD AUTO: 7.09 K/UL — SIGNIFICANT CHANGE UP (ref 4.8–10.8)
WBC # FLD AUTO: 7.36 K/UL — SIGNIFICANT CHANGE UP (ref 4.8–10.8)
WBC # FLD AUTO: 7.63 K/UL — SIGNIFICANT CHANGE UP (ref 4.8–10.8)
WBC # FLD AUTO: 7.79 K/UL — SIGNIFICANT CHANGE UP (ref 4.8–10.8)
WBC # FLD AUTO: 8.56 K/UL — SIGNIFICANT CHANGE UP (ref 4.8–10.8)
WBC # FLD AUTO: 8.62 K/UL — SIGNIFICANT CHANGE UP (ref 4.8–10.8)
WBC # FLD AUTO: 8.67 K/UL — SIGNIFICANT CHANGE UP (ref 4.8–10.8)
WBC # FLD AUTO: 8.74 K/UL — SIGNIFICANT CHANGE UP (ref 4.8–10.8)
WBC # FLD AUTO: 8.76 K/UL — SIGNIFICANT CHANGE UP (ref 4.8–10.8)
WBC # FLD AUTO: 8.93 K/UL — SIGNIFICANT CHANGE UP (ref 4.8–10.8)
WBC # FLD AUTO: 9.08 K/UL — SIGNIFICANT CHANGE UP (ref 4.8–10.8)
WBC # FLD AUTO: 9.43 K/UL — SIGNIFICANT CHANGE UP (ref 4.8–10.8)
WBC # FLD AUTO: 9.72 K/UL — SIGNIFICANT CHANGE UP (ref 4.8–10.8)
WBC UR QL: 153 /HPF — HIGH (ref 0–5)

## 2019-01-01 PROCEDURE — 93010 ELECTROCARDIOGRAM REPORT: CPT | Mod: 77

## 2019-01-01 PROCEDURE — 31600 PLANNED TRACHEOSTOMY: CPT

## 2019-01-01 PROCEDURE — 99291 CRITICAL CARE FIRST HOUR: CPT

## 2019-01-01 PROCEDURE — 93970 EXTREMITY STUDY: CPT | Mod: 26

## 2019-01-01 PROCEDURE — 71045 X-RAY EXAM CHEST 1 VIEW: CPT | Mod: 26

## 2019-01-01 PROCEDURE — 74177 CT ABD & PELVIS W/CONTRAST: CPT | Mod: 26

## 2019-01-01 PROCEDURE — 92950 HEART/LUNG RESUSCITATION CPR: CPT

## 2019-01-01 PROCEDURE — 99285 EMERGENCY DEPT VISIT HI MDM: CPT | Mod: 25

## 2019-01-01 PROCEDURE — 93010 ELECTROCARDIOGRAM REPORT: CPT

## 2019-01-01 PROCEDURE — 88305 TISSUE EXAM BY PATHOLOGIST: CPT | Mod: 26

## 2019-01-01 PROCEDURE — 36620 INSERTION CATHETER ARTERY: CPT

## 2019-01-01 PROCEDURE — 95819 EEG AWAKE AND ASLEEP: CPT | Mod: 26

## 2019-01-01 PROCEDURE — 99223 1ST HOSP IP/OBS HIGH 75: CPT | Mod: 25

## 2019-01-01 PROCEDURE — 70450 CT HEAD/BRAIN W/O DYE: CPT | Mod: 26

## 2019-01-01 PROCEDURE — 43246 EGD PLACE GASTROSTOMY TUBE: CPT | Mod: 79

## 2019-01-01 PROCEDURE — 99232 SBSQ HOSP IP/OBS MODERATE 35: CPT

## 2019-01-01 PROCEDURE — 72170 X-RAY EXAM OF PELVIS: CPT | Mod: 26

## 2019-01-01 PROCEDURE — 99232 SBSQ HOSP IP/OBS MODERATE 35: CPT | Mod: 25

## 2019-01-01 PROCEDURE — 72125 CT NECK SPINE W/O DYE: CPT | Mod: 26

## 2019-01-01 PROCEDURE — 71250 CT THORAX DX C-: CPT | Mod: 26

## 2019-01-01 PROCEDURE — 73090 X-RAY EXAM OF FOREARM: CPT | Mod: 26,LT

## 2019-01-01 PROCEDURE — 71045 X-RAY EXAM CHEST 1 VIEW: CPT | Mod: 26,77

## 2019-01-01 PROCEDURE — 99222 1ST HOSP IP/OBS MODERATE 55: CPT

## 2019-01-01 PROCEDURE — 99215 OFFICE O/P EST HI 40 MIN: CPT

## 2019-01-01 PROCEDURE — 99291 CRITICAL CARE FIRST HOUR: CPT | Mod: 25

## 2019-01-01 PROCEDURE — 99204 OFFICE O/P NEW MOD 45 MIN: CPT

## 2019-01-01 PROCEDURE — 93000 ELECTROCARDIOGRAM COMPLETE: CPT

## 2019-01-01 PROCEDURE — 31500 INSERT EMERGENCY AIRWAY: CPT

## 2019-01-01 PROCEDURE — 93306 TTE W/DOPPLER COMPLETE: CPT | Mod: 26

## 2019-01-01 PROCEDURE — 88312 SPECIAL STAINS GROUP 1: CPT | Mod: 26

## 2019-01-01 PROCEDURE — 99285 EMERGENCY DEPT VISIT HI MDM: CPT

## 2019-01-01 PROCEDURE — 99233 SBSQ HOSP IP/OBS HIGH 50: CPT

## 2019-01-01 PROCEDURE — 99231 SBSQ HOSP IP/OBS SF/LOW 25: CPT

## 2019-01-01 PROCEDURE — 95951: CPT | Mod: 26

## 2019-01-01 RX ORDER — PANTOPRAZOLE SODIUM 20 MG/1
40 TABLET, DELAYED RELEASE ORAL DAILY
Refills: 0 | Status: DISCONTINUED | OUTPATIENT
Start: 2019-01-01 | End: 2019-01-01

## 2019-01-01 RX ORDER — LEVETIRACETAM 250 MG/1
1500 TABLET, FILM COATED ORAL
Refills: 0 | Status: DISCONTINUED | OUTPATIENT
Start: 2019-01-01 | End: 2019-01-01

## 2019-01-01 RX ORDER — GLUCAGON INJECTION, SOLUTION 0.5 MG/.1ML
1 INJECTION, SOLUTION SUBCUTANEOUS ONCE
Refills: 0 | Status: DISCONTINUED | OUTPATIENT
Start: 2019-01-01 | End: 2019-01-01

## 2019-01-01 RX ORDER — HEPARIN SODIUM 5000 [USP'U]/ML
5000 INJECTION INTRAVENOUS; SUBCUTANEOUS EVERY 8 HOURS
Refills: 0 | Status: DISCONTINUED | OUTPATIENT
Start: 2019-01-01 | End: 2019-01-01

## 2019-01-01 RX ORDER — CHOLECALCIFEROL (VITAMIN D3) 125 MCG
1000 CAPSULE ORAL DAILY
Refills: 0 | Status: DISCONTINUED | OUTPATIENT
Start: 2019-01-01 | End: 2019-01-01

## 2019-01-01 RX ORDER — AMLODIPINE BESYLATE 2.5 MG/1
1 TABLET ORAL
Qty: 0 | Refills: 0 | DISCHARGE

## 2019-01-01 RX ORDER — PROPOFOL 10 MG/ML
1 INJECTION, EMULSION INTRAVENOUS
Qty: 1000 | Refills: 0 | Status: DISCONTINUED | OUTPATIENT
Start: 2019-01-01 | End: 2019-01-01

## 2019-01-01 RX ORDER — LEVETIRACETAM 250 MG/1
1000 TABLET, FILM COATED ORAL
Refills: 0 | Status: DISCONTINUED | OUTPATIENT
Start: 2019-01-01 | End: 2019-01-01

## 2019-01-01 RX ORDER — INSULIN GLARGINE 100 [IU]/ML
18 INJECTION, SOLUTION SUBCUTANEOUS EVERY MORNING
Refills: 0 | Status: DISCONTINUED | OUTPATIENT
Start: 2019-01-01 | End: 2019-01-01

## 2019-01-01 RX ORDER — SODIUM CHLORIDE 9 MG/ML
1000 INJECTION, SOLUTION INTRAVENOUS
Refills: 0 | Status: DISCONTINUED | OUTPATIENT
Start: 2019-01-01 | End: 2019-01-01

## 2019-01-01 RX ORDER — CEFEPIME 1 G/1
2000 INJECTION, POWDER, FOR SOLUTION INTRAMUSCULAR; INTRAVENOUS EVERY 8 HOURS
Refills: 0 | Status: DISCONTINUED | OUTPATIENT
Start: 2019-01-01 | End: 2019-01-01

## 2019-01-01 RX ORDER — DEXTROSE 50 % IN WATER 50 %
25 SYRINGE (ML) INTRAVENOUS ONCE
Refills: 0 | Status: DISCONTINUED | OUTPATIENT
Start: 2019-01-01 | End: 2019-01-01

## 2019-01-01 RX ORDER — DEXTROSE 50 % IN WATER 50 %
15 SYRINGE (ML) INTRAVENOUS ONCE
Refills: 0 | Status: DISCONTINUED | OUTPATIENT
Start: 2019-01-01 | End: 2019-01-01

## 2019-01-01 RX ORDER — DEXTROSE 50 % IN WATER 50 %
12.5 SYRINGE (ML) INTRAVENOUS ONCE
Refills: 0 | Status: DISCONTINUED | OUTPATIENT
Start: 2019-01-01 | End: 2019-01-01

## 2019-01-01 RX ORDER — ONDANSETRON 8 MG/1
4 TABLET, FILM COATED ORAL ONCE
Refills: 0 | Status: COMPLETED | OUTPATIENT
Start: 2019-01-01 | End: 2019-01-01

## 2019-01-01 RX ORDER — LEVETIRACETAM 250 MG/1
1000 TABLET, FILM COATED ORAL EVERY 12 HOURS
Refills: 0 | Status: DISCONTINUED | OUTPATIENT
Start: 2019-01-01 | End: 2019-01-01

## 2019-01-01 RX ORDER — MAGNESIUM SULFATE 500 MG/ML
2 VIAL (ML) INJECTION ONCE
Refills: 0 | Status: COMPLETED | OUTPATIENT
Start: 2019-01-01 | End: 2019-01-01

## 2019-01-01 RX ORDER — METOPROLOL TARTRATE 50 MG
25 TABLET ORAL ONCE
Refills: 0 | Status: COMPLETED | OUTPATIENT
Start: 2019-01-01 | End: 2019-01-01

## 2019-01-01 RX ORDER — DOCUSATE SODIUM 100 MG
100 CAPSULE ORAL THREE TIMES A DAY
Refills: 0 | Status: DISCONTINUED | OUTPATIENT
Start: 2019-01-01 | End: 2019-01-01

## 2019-01-01 RX ORDER — SIMVASTATIN 20 MG/1
20 TABLET, FILM COATED ORAL AT BEDTIME
Refills: 0 | Status: DISCONTINUED | OUTPATIENT
Start: 2019-01-01 | End: 2019-01-01

## 2019-01-01 RX ORDER — AMLODIPINE BESYLATE 2.5 MG/1
5 TABLET ORAL ONCE
Refills: 0 | Status: COMPLETED | OUTPATIENT
Start: 2019-01-01 | End: 2019-01-01

## 2019-01-01 RX ORDER — CEFTRIAXONE 500 MG/1
1 INJECTION, POWDER, FOR SOLUTION INTRAMUSCULAR; INTRAVENOUS ONCE
Refills: 0 | Status: COMPLETED | OUTPATIENT
Start: 2019-01-01 | End: 2019-01-01

## 2019-01-01 RX ORDER — LABETALOL HCL 100 MG
10 TABLET ORAL ONCE
Refills: 0 | Status: COMPLETED | OUTPATIENT
Start: 2019-01-01 | End: 2019-01-01

## 2019-01-01 RX ORDER — AMOXICILLIN 250 MG/5ML
1 SUSPENSION, RECONSTITUTED, ORAL (ML) ORAL
Qty: 0 | Refills: 0 | DISCHARGE
End: 2019-01-01

## 2019-01-01 RX ORDER — CHLORHEXIDINE GLUCONATE 213 G/1000ML
1 SOLUTION TOPICAL
Refills: 0 | Status: DISCONTINUED | OUTPATIENT
Start: 2019-01-01 | End: 2019-01-01

## 2019-01-01 RX ORDER — NYSTATIN 500MM UNIT
5 POWDER (EA) MISCELLANEOUS
Qty: 0 | Refills: 0 | DISCHARGE
Start: 2019-01-01

## 2019-01-01 RX ORDER — LOSARTAN POTASSIUM 100 MG/1
25 TABLET, FILM COATED ORAL DAILY
Refills: 0 | Status: DISCONTINUED | OUTPATIENT
Start: 2019-01-01 | End: 2019-01-01

## 2019-01-01 RX ORDER — HYDRALAZINE HCL 50 MG
50 TABLET ORAL
Refills: 0 | Status: DISCONTINUED | OUTPATIENT
Start: 2019-01-01 | End: 2019-01-01

## 2019-01-01 RX ORDER — HYDRALAZINE HCL 50 MG
25 TABLET ORAL THREE TIMES A DAY
Refills: 0 | Status: DISCONTINUED | OUTPATIENT
Start: 2019-01-01 | End: 2019-01-01

## 2019-01-01 RX ORDER — POTASSIUM CHLORIDE 20 MEQ
20 PACKET (EA) ORAL ONCE
Refills: 0 | Status: COMPLETED | OUTPATIENT
Start: 2019-01-01 | End: 2019-01-01

## 2019-01-01 RX ORDER — AMLODIPINE BESYLATE 2.5 MG/1
10 TABLET ORAL ONCE
Refills: 0 | Status: COMPLETED | OUTPATIENT
Start: 2019-01-01 | End: 2019-01-01

## 2019-01-01 RX ORDER — MUPIROCIN 20 MG/G
1 OINTMENT TOPICAL EVERY 12 HOURS
Refills: 0 | Status: DISCONTINUED | OUTPATIENT
Start: 2019-01-01 | End: 2019-01-01

## 2019-01-01 RX ORDER — INSULIN LISPRO 100/ML
6 VIAL (ML) SUBCUTANEOUS
Refills: 0 | Status: DISCONTINUED | OUTPATIENT
Start: 2019-01-01 | End: 2019-01-01

## 2019-01-01 RX ORDER — INSULIN LISPRO 100/ML
3 VIAL (ML) SUBCUTANEOUS
Refills: 0 | Status: DISCONTINUED | OUTPATIENT
Start: 2019-01-01 | End: 2019-01-01

## 2019-01-01 RX ORDER — POTASSIUM CHLORIDE 20 MEQ
40 PACKET (EA) ORAL ONCE
Refills: 0 | Status: COMPLETED | OUTPATIENT
Start: 2019-01-01 | End: 2019-01-01

## 2019-01-01 RX ORDER — CEFEPIME 1 G/1
2000 INJECTION, POWDER, FOR SOLUTION INTRAMUSCULAR; INTRAVENOUS ONCE
Refills: 0 | Status: COMPLETED | OUTPATIENT
Start: 2019-01-01 | End: 2019-01-01

## 2019-01-01 RX ORDER — NYSTATIN 500MM UNIT
500000 POWDER (EA) MISCELLANEOUS
Refills: 0 | Status: DISCONTINUED | OUTPATIENT
Start: 2019-01-01 | End: 2019-01-01

## 2019-01-01 RX ORDER — MIDAZOLAM HYDROCHLORIDE 1 MG/ML
4 INJECTION, SOLUTION INTRAMUSCULAR; INTRAVENOUS ONCE
Refills: 0 | Status: DISCONTINUED | OUTPATIENT
Start: 2019-01-01 | End: 2019-01-01

## 2019-01-01 RX ORDER — FENOFIBRATE 145 MG/1
145 TABLET, COATED ORAL DAILY
Refills: 0 | Status: ACTIVE | COMMUNITY

## 2019-01-01 RX ORDER — HYDROMORPHONE HYDROCHLORIDE 2 MG/ML
1 INJECTION INTRAMUSCULAR; INTRAVENOUS; SUBCUTANEOUS ONCE
Refills: 0 | Status: DISCONTINUED | OUTPATIENT
Start: 2019-01-01 | End: 2019-01-01

## 2019-01-01 RX ORDER — NICARDIPINE HYDROCHLORIDE 30 MG/1
5 CAPSULE, EXTENDED RELEASE ORAL
Qty: 40 | Refills: 0 | Status: DISCONTINUED | OUTPATIENT
Start: 2019-01-01 | End: 2019-01-01

## 2019-01-01 RX ORDER — NYSTATIN CREAM 100000 [USP'U]/G
1 CREAM TOPICAL THREE TIMES A DAY
Refills: 0 | Status: DISCONTINUED | OUTPATIENT
Start: 2019-01-01 | End: 2019-01-01

## 2019-01-01 RX ORDER — CHLORHEXIDINE GLUCONATE 213 G/1000ML
15 SOLUTION TOPICAL
Qty: 0 | Refills: 0 | DISCHARGE
Start: 2019-01-01

## 2019-01-01 RX ORDER — LEVETIRACETAM 250 MG/1
1000 TABLET, FILM COATED ORAL ONCE
Refills: 0 | Status: COMPLETED | OUTPATIENT
Start: 2019-01-01 | End: 2019-01-01

## 2019-01-01 RX ORDER — AMLODIPINE BESYLATE 2.5 MG/1
2.5 TABLET ORAL DAILY
Refills: 0 | Status: DISCONTINUED | OUTPATIENT
Start: 2019-01-01 | End: 2019-01-01

## 2019-01-01 RX ORDER — LOSARTAN POTASSIUM 100 MG/1
50 TABLET, FILM COATED ORAL DAILY
Refills: 0 | Status: DISCONTINUED | OUTPATIENT
Start: 2019-01-01 | End: 2019-01-01

## 2019-01-01 RX ORDER — MORPHINE SULFATE 50 MG/1
4 CAPSULE, EXTENDED RELEASE ORAL ONCE
Refills: 0 | Status: DISCONTINUED | OUTPATIENT
Start: 2019-01-01 | End: 2019-01-01

## 2019-01-01 RX ORDER — AMLODIPINE BESYLATE 2.5 MG/1
10 TABLET ORAL DAILY
Refills: 0 | Status: DISCONTINUED | OUTPATIENT
Start: 2019-01-01 | End: 2019-01-01

## 2019-01-01 RX ORDER — ACETAMINOPHEN 500 MG
650 TABLET ORAL EVERY 6 HOURS
Refills: 0 | Status: DISCONTINUED | OUTPATIENT
Start: 2019-01-01 | End: 2019-01-01

## 2019-01-01 RX ORDER — PREGABALIN 225 MG/1
1000 CAPSULE ORAL DAILY
Refills: 0 | Status: DISCONTINUED | OUTPATIENT
Start: 2019-01-01 | End: 2019-01-01

## 2019-01-01 RX ORDER — SODIUM CHLORIDE 9 MG/ML
500 INJECTION INTRAMUSCULAR; INTRAVENOUS; SUBCUTANEOUS ONCE
Refills: 0 | Status: COMPLETED | OUTPATIENT
Start: 2019-01-01 | End: 2019-01-01

## 2019-01-01 RX ORDER — SODIUM CHLORIDE 9 MG/ML
1000 INJECTION, SOLUTION INTRAVENOUS ONCE
Refills: 0 | Status: COMPLETED | OUTPATIENT
Start: 2019-01-01 | End: 2019-01-01

## 2019-01-01 RX ORDER — PANTOPRAZOLE SODIUM 20 MG/1
40 TABLET, DELAYED RELEASE ORAL
Refills: 0 | Status: DISCONTINUED | OUTPATIENT
Start: 2019-01-01 | End: 2019-01-01

## 2019-01-01 RX ORDER — INSULIN LISPRO 100/ML
VIAL (ML) SUBCUTANEOUS EVERY 6 HOURS
Refills: 0 | Status: DISCONTINUED | OUTPATIENT
Start: 2019-01-01 | End: 2019-01-01

## 2019-01-01 RX ORDER — ACETAMINOPHEN 500 MG
2 TABLET ORAL
Qty: 0 | Refills: 0 | DISCHARGE
Start: 2019-01-01

## 2019-01-01 RX ORDER — AMPICILLIN TRIHYDRATE 250 MG
2 CAPSULE ORAL EVERY 4 HOURS
Refills: 0 | Status: DISCONTINUED | OUTPATIENT
Start: 2019-01-01 | End: 2019-01-01

## 2019-01-01 RX ORDER — INSULIN HUMAN 100 [IU]/ML
2 INJECTION, SOLUTION SUBCUTANEOUS
Qty: 100 | Refills: 0 | Status: DISCONTINUED | OUTPATIENT
Start: 2019-01-01 | End: 2019-01-01

## 2019-01-01 RX ORDER — SODIUM CHLORIDE 9 MG/ML
1000 INJECTION INTRAMUSCULAR; INTRAVENOUS; SUBCUTANEOUS
Refills: 0 | Status: DISCONTINUED | OUTPATIENT
Start: 2019-01-01 | End: 2019-01-01

## 2019-01-01 RX ORDER — POTASSIUM PHOSPHATE, MONOBASIC POTASSIUM PHOSPHATE, DIBASIC 236; 224 MG/ML; MG/ML
30 INJECTION, SOLUTION INTRAVENOUS ONCE
Refills: 0 | Status: COMPLETED | OUTPATIENT
Start: 2019-01-01 | End: 2019-01-01

## 2019-01-01 RX ORDER — INSULIN LISPRO 100/ML
VIAL (ML) SUBCUTANEOUS EVERY 4 HOURS
Refills: 0 | Status: DISCONTINUED | OUTPATIENT
Start: 2019-01-01 | End: 2019-01-01

## 2019-01-01 RX ORDER — CHOLECALCIFEROL (VITAMIN D3) 125 MCG
1000 CAPSULE ORAL
Qty: 0 | Refills: 0 | DISCHARGE
Start: 2019-01-01

## 2019-01-01 RX ORDER — DEXMEDETOMIDINE HYDROCHLORIDE IN 0.9% SODIUM CHLORIDE 4 UG/ML
78 INJECTION INTRAVENOUS ONCE
Refills: 0 | Status: COMPLETED | OUTPATIENT
Start: 2019-01-01 | End: 2019-01-01

## 2019-01-01 RX ORDER — LEVETIRACETAM 250 MG/1
500 TABLET, FILM COATED ORAL EVERY 12 HOURS
Refills: 0 | Status: DISCONTINUED | OUTPATIENT
Start: 2019-01-01 | End: 2019-01-01

## 2019-01-01 RX ORDER — LOSARTAN POTASSIUM 100 MG/1
1 TABLET, FILM COATED ORAL
Qty: 0 | Refills: 0 | DISCHARGE
Start: 2019-01-01

## 2019-01-01 RX ORDER — PREGABALIN 225 MG/1
1 CAPSULE ORAL
Qty: 0 | Refills: 0 | DISCHARGE

## 2019-01-01 RX ORDER — INSULIN GLARGINE 100 [IU]/ML
18 INJECTION, SOLUTION SUBCUTANEOUS
Qty: 0 | Refills: 0 | DISCHARGE

## 2019-01-01 RX ORDER — GLIMEPIRIDE 1 MG
1 TABLET ORAL
Qty: 0 | Refills: 0 | DISCHARGE

## 2019-01-01 RX ORDER — DONEPEZIL HYDROCHLORIDE 10 MG/1
1 TABLET, FILM COATED ORAL
Qty: 0 | Refills: 0 | DISCHARGE

## 2019-01-01 RX ORDER — PROPOFOL 10 MG/ML
5 INJECTION, EMULSION INTRAVENOUS
Qty: 1000 | Refills: 0 | Status: DISCONTINUED | OUTPATIENT
Start: 2019-01-01 | End: 2019-01-01

## 2019-01-01 RX ORDER — CEFTRIAXONE 500 MG/1
INJECTION, POWDER, FOR SOLUTION INTRAMUSCULAR; INTRAVENOUS
Refills: 0 | Status: DISCONTINUED | OUTPATIENT
Start: 2019-01-01 | End: 2019-01-01

## 2019-01-01 RX ORDER — SENNA PLUS 8.6 MG/1
2 TABLET ORAL AT BEDTIME
Refills: 0 | Status: DISCONTINUED | OUTPATIENT
Start: 2019-01-01 | End: 2019-01-01

## 2019-01-01 RX ORDER — SIMVASTATIN 20 MG/1
1 TABLET, FILM COATED ORAL
Qty: 0 | Refills: 0 | DISCHARGE

## 2019-01-01 RX ORDER — MORPHINE SULFATE 50 MG/1
2 CAPSULE, EXTENDED RELEASE ORAL ONCE
Refills: 0 | Status: DISCONTINUED | OUTPATIENT
Start: 2019-01-01 | End: 2019-01-01

## 2019-01-01 RX ORDER — LEVETIRACETAM 250 MG/1
10 TABLET, FILM COATED ORAL
Qty: 0 | Refills: 0 | DISCHARGE
Start: 2019-01-01

## 2019-01-01 RX ORDER — DOCUSATE SODIUM 100 MG
100 CAPSULE ORAL
Refills: 0 | Status: DISCONTINUED | OUTPATIENT
Start: 2019-01-01 | End: 2019-01-01

## 2019-01-01 RX ORDER — LABETALOL HCL 100 MG
100 TABLET ORAL
Refills: 0 | Status: DISCONTINUED | OUTPATIENT
Start: 2019-01-01 | End: 2019-01-01

## 2019-01-01 RX ORDER — CHLORHEXIDINE GLUCONATE 213 G/1000ML
15 SOLUTION TOPICAL
Refills: 0 | Status: DISCONTINUED | OUTPATIENT
Start: 2019-01-01 | End: 2019-01-01

## 2019-01-01 RX ORDER — HYDRALAZINE HCL 50 MG
1 TABLET ORAL
Qty: 0 | Refills: 0 | DISCHARGE
Start: 2019-01-01

## 2019-01-01 RX ORDER — DONEPEZIL HYDROCHLORIDE 10 MG/1
10 TABLET, FILM COATED ORAL AT BEDTIME
Refills: 0 | Status: DISCONTINUED | OUTPATIENT
Start: 2019-01-01 | End: 2019-01-01

## 2019-01-01 RX ORDER — LOSARTAN POTASSIUM 100 MG/1
50 TABLET, FILM COATED ORAL ONCE
Refills: 0 | Status: COMPLETED | OUTPATIENT
Start: 2019-01-01 | End: 2019-01-01

## 2019-01-01 RX ORDER — VANCOMYCIN HCL 1 G
1000 VIAL (EA) INTRAVENOUS EVERY 12 HOURS
Refills: 0 | Status: DISCONTINUED | OUTPATIENT
Start: 2019-01-01 | End: 2019-01-01

## 2019-01-01 RX ORDER — MAGNESIUM SULFATE 500 MG/ML
1 VIAL (ML) INJECTION ONCE
Refills: 0 | Status: COMPLETED | OUTPATIENT
Start: 2019-01-01 | End: 2019-01-01

## 2019-01-01 RX ORDER — MORPHINE SULFATE 50 MG/1
2 CAPSULE, EXTENDED RELEASE ORAL EVERY 4 HOURS
Refills: 0 | Status: DISCONTINUED | OUTPATIENT
Start: 2019-01-01 | End: 2019-01-01

## 2019-01-01 RX ORDER — INSULIN LISPRO 100/ML
VIAL (ML) SUBCUTANEOUS
Refills: 0 | Status: DISCONTINUED | OUTPATIENT
Start: 2019-01-01 | End: 2019-01-01

## 2019-01-01 RX ORDER — INSULIN GLARGINE 100 [IU]/ML
9 INJECTION, SOLUTION SUBCUTANEOUS AT BEDTIME
Refills: 0 | Status: DISCONTINUED | OUTPATIENT
Start: 2019-01-01 | End: 2019-01-01

## 2019-01-01 RX ORDER — INSULIN HUMAN 100 [IU]/ML
4 INJECTION, SOLUTION SUBCUTANEOUS
Qty: 100 | Refills: 0 | Status: DISCONTINUED | OUTPATIENT
Start: 2019-01-01 | End: 2019-01-01

## 2019-01-01 RX ORDER — FENTANYL CITRATE 50 UG/ML
0.5 INJECTION INTRAVENOUS
Qty: 2500 | Refills: 0 | Status: DISCONTINUED | OUTPATIENT
Start: 2019-01-01 | End: 2019-01-01

## 2019-01-01 RX ORDER — VANCOMYCIN HCL 1 G
1500 VIAL (EA) INTRAVENOUS EVERY 12 HOURS
Refills: 0 | Status: DISCONTINUED | OUTPATIENT
Start: 2019-01-01 | End: 2019-01-01

## 2019-01-01 RX ORDER — ENALAPRIL MALEATE 10 MG/1
10 TABLET ORAL TWICE DAILY
Refills: 0 | Status: ACTIVE | COMMUNITY

## 2019-01-01 RX ORDER — MEROPENEM 1 G/30ML
1000 INJECTION INTRAVENOUS EVERY 8 HOURS
Refills: 0 | Status: DISCONTINUED | OUTPATIENT
Start: 2019-01-01 | End: 2019-01-01

## 2019-01-01 RX ORDER — NYSTATIN CREAM 100000 [USP'U]/G
1 CREAM TOPICAL
Qty: 0 | Refills: 0 | DISCHARGE

## 2019-01-01 RX ORDER — MAGNESIUM SULFATE 500 MG/ML
2 VIAL (ML) INJECTION
Refills: 0 | Status: COMPLETED | OUTPATIENT
Start: 2019-01-01 | End: 2019-01-01

## 2019-01-01 RX ORDER — PANTOPRAZOLE SODIUM 20 MG/1
40 TABLET, DELAYED RELEASE ORAL
Qty: 0 | Refills: 0 | DISCHARGE
Start: 2019-01-01

## 2019-01-01 RX ORDER — MANNITOL
70 POWDER (GRAM) MISCELLANEOUS ONCE
Refills: 0 | Status: COMPLETED | OUTPATIENT
Start: 2019-01-01 | End: 2019-01-01

## 2019-01-01 RX ORDER — CHOLECALCIFEROL (VITAMIN D3) 125 MCG
1 CAPSULE ORAL
Qty: 0 | Refills: 0 | DISCHARGE

## 2019-01-01 RX ORDER — LEVETIRACETAM 250 MG/1
500 TABLET, FILM COATED ORAL ONCE
Refills: 0 | Status: COMPLETED | OUTPATIENT
Start: 2019-01-01 | End: 2019-01-01

## 2019-01-01 RX ORDER — ENOXAPARIN SODIUM 100 MG/ML
40 INJECTION SUBCUTANEOUS AT BEDTIME
Refills: 0 | Status: DISCONTINUED | OUTPATIENT
Start: 2019-01-01 | End: 2019-01-01

## 2019-01-01 RX ORDER — AMPICILLIN SODIUM AND SULBACTAM SODIUM 250; 125 MG/ML; MG/ML
1.5 INJECTION, POWDER, FOR SUSPENSION INTRAMUSCULAR; INTRAVENOUS EVERY 6 HOURS
Refills: 0 | Status: DISCONTINUED | OUTPATIENT
Start: 2019-01-01 | End: 2019-01-01

## 2019-01-01 RX ORDER — INSULIN GLARGINE 100 [IU]/ML
18 INJECTION, SOLUTION SUBCUTANEOUS
Qty: 0 | Refills: 0 | DISCHARGE
Start: 2019-01-01

## 2019-01-01 RX ADMIN — LOSARTAN POTASSIUM 25 MILLIGRAM(S): 100 TABLET, FILM COATED ORAL at 05:34

## 2019-01-01 RX ADMIN — Medication 3 UNIT(S): at 17:55

## 2019-01-01 RX ADMIN — NYSTATIN CREAM 1 APPLICATION(S): 100000 CREAM TOPICAL at 06:23

## 2019-01-01 RX ADMIN — AMPICILLIN SODIUM AND SULBACTAM SODIUM 100 GRAM(S): 250; 125 INJECTION, POWDER, FOR SUSPENSION INTRAMUSCULAR; INTRAVENOUS at 17:54

## 2019-01-01 RX ADMIN — LEVETIRACETAM 1500 MILLIGRAM(S): 250 TABLET, FILM COATED ORAL at 17:32

## 2019-01-01 RX ADMIN — DONEPEZIL HYDROCHLORIDE 10 MILLIGRAM(S): 10 TABLET, FILM COATED ORAL at 22:29

## 2019-01-01 RX ADMIN — INSULIN GLARGINE 9 UNIT(S): 100 INJECTION, SOLUTION SUBCUTANEOUS at 22:15

## 2019-01-01 RX ADMIN — LOSARTAN POTASSIUM 25 MILLIGRAM(S): 100 TABLET, FILM COATED ORAL at 05:06

## 2019-01-01 RX ADMIN — PANTOPRAZOLE SODIUM 40 MILLIGRAM(S): 20 TABLET, DELAYED RELEASE ORAL at 11:39

## 2019-01-01 RX ADMIN — Medication 500000 UNIT(S): at 05:36

## 2019-01-01 RX ADMIN — HEPARIN SODIUM 5000 UNIT(S): 5000 INJECTION INTRAVENOUS; SUBCUTANEOUS at 21:39

## 2019-01-01 RX ADMIN — AMLODIPINE BESYLATE 10 MILLIGRAM(S): 2.5 TABLET ORAL at 05:30

## 2019-01-01 RX ADMIN — Medication 650 MILLIGRAM(S): at 16:54

## 2019-01-01 RX ADMIN — Medication 1000 UNIT(S): at 12:27

## 2019-01-01 RX ADMIN — Medication 500000 UNIT(S): at 17:18

## 2019-01-01 RX ADMIN — CHLORHEXIDINE GLUCONATE 1 APPLICATION(S): 213 SOLUTION TOPICAL at 06:23

## 2019-01-01 RX ADMIN — CHLORHEXIDINE GLUCONATE 15 MILLILITER(S): 213 SOLUTION TOPICAL at 17:14

## 2019-01-01 RX ADMIN — INSULIN GLARGINE 18 UNIT(S): 100 INJECTION, SOLUTION SUBCUTANEOUS at 08:44

## 2019-01-01 RX ADMIN — LEVETIRACETAM 1000 MILLIGRAM(S): 250 TABLET, FILM COATED ORAL at 00:01

## 2019-01-01 RX ADMIN — SENNA PLUS 2 TABLET(S): 8.6 TABLET ORAL at 22:54

## 2019-01-01 RX ADMIN — LEVETIRACETAM 1000 MILLIGRAM(S): 250 TABLET, FILM COATED ORAL at 05:24

## 2019-01-01 RX ADMIN — AMLODIPINE BESYLATE 10 MILLIGRAM(S): 2.5 TABLET ORAL at 05:13

## 2019-01-01 RX ADMIN — HEPARIN SODIUM 5000 UNIT(S): 5000 INJECTION INTRAVENOUS; SUBCUTANEOUS at 14:24

## 2019-01-01 RX ADMIN — Medication 650 MILLIGRAM(S): at 15:40

## 2019-01-01 RX ADMIN — Medication 25 MILLIGRAM(S): at 22:29

## 2019-01-01 RX ADMIN — CHLORHEXIDINE GLUCONATE 15 MILLILITER(S): 213 SOLUTION TOPICAL at 05:24

## 2019-01-01 RX ADMIN — Medication 3 UNIT(S): at 17:28

## 2019-01-01 RX ADMIN — LOSARTAN POTASSIUM 25 MILLIGRAM(S): 100 TABLET, FILM COATED ORAL at 06:22

## 2019-01-01 RX ADMIN — CHLORHEXIDINE GLUCONATE 15 MILLILITER(S): 213 SOLUTION TOPICAL at 17:59

## 2019-01-01 RX ADMIN — CHLORHEXIDINE GLUCONATE 15 MILLILITER(S): 213 SOLUTION TOPICAL at 06:07

## 2019-01-01 RX ADMIN — NYSTATIN CREAM 1 APPLICATION(S): 100000 CREAM TOPICAL at 13:52

## 2019-01-01 RX ADMIN — HEPARIN SODIUM 5000 UNIT(S): 5000 INJECTION INTRAVENOUS; SUBCUTANEOUS at 05:16

## 2019-01-01 RX ADMIN — Medication 1000 UNIT(S): at 11:29

## 2019-01-01 RX ADMIN — Medication 50 GRAM(S): at 08:23

## 2019-01-01 RX ADMIN — HEPARIN SODIUM 5000 UNIT(S): 5000 INJECTION INTRAVENOUS; SUBCUTANEOUS at 21:07

## 2019-01-01 RX ADMIN — HEPARIN SODIUM 5000 UNIT(S): 5000 INJECTION INTRAVENOUS; SUBCUTANEOUS at 21:11

## 2019-01-01 RX ADMIN — SIMVASTATIN 20 MILLIGRAM(S): 20 TABLET, FILM COATED ORAL at 21:39

## 2019-01-01 RX ADMIN — Medication 25 MILLIGRAM(S): at 14:24

## 2019-01-01 RX ADMIN — PANTOPRAZOLE SODIUM 40 MILLIGRAM(S): 20 TABLET, DELAYED RELEASE ORAL at 11:27

## 2019-01-01 RX ADMIN — Medication 2: at 13:58

## 2019-01-01 RX ADMIN — Medication 500000 UNIT(S): at 18:21

## 2019-01-01 RX ADMIN — Medication 70 GRAM(S): at 00:01

## 2019-01-01 RX ADMIN — PANTOPRAZOLE SODIUM 40 MILLIGRAM(S): 20 TABLET, DELAYED RELEASE ORAL at 11:56

## 2019-01-01 RX ADMIN — DONEPEZIL HYDROCHLORIDE 10 MILLIGRAM(S): 10 TABLET, FILM COATED ORAL at 21:39

## 2019-01-01 RX ADMIN — Medication 5: at 11:46

## 2019-01-01 RX ADMIN — CHLORHEXIDINE GLUCONATE 15 MILLILITER(S): 213 SOLUTION TOPICAL at 17:39

## 2019-01-01 RX ADMIN — Medication 3 UNIT(S): at 11:38

## 2019-01-01 RX ADMIN — HEPARIN SODIUM 5000 UNIT(S): 5000 INJECTION INTRAVENOUS; SUBCUTANEOUS at 13:28

## 2019-01-01 RX ADMIN — CEFEPIME 100 MILLIGRAM(S): 1 INJECTION, POWDER, FOR SOLUTION INTRAMUSCULAR; INTRAVENOUS at 14:03

## 2019-01-01 RX ADMIN — DONEPEZIL HYDROCHLORIDE 10 MILLIGRAM(S): 10 TABLET, FILM COATED ORAL at 22:15

## 2019-01-01 RX ADMIN — HEPARIN SODIUM 5000 UNIT(S): 5000 INJECTION INTRAVENOUS; SUBCUTANEOUS at 05:30

## 2019-01-01 RX ADMIN — Medication 3: at 05:41

## 2019-01-01 RX ADMIN — Medication 500000 UNIT(S): at 17:42

## 2019-01-01 RX ADMIN — CHLORHEXIDINE GLUCONATE 15 MILLILITER(S): 213 SOLUTION TOPICAL at 17:27

## 2019-01-01 RX ADMIN — CEFEPIME 100 MILLIGRAM(S): 1 INJECTION, POWDER, FOR SOLUTION INTRAMUSCULAR; INTRAVENOUS at 14:26

## 2019-01-01 RX ADMIN — Medication 3: at 06:05

## 2019-01-01 RX ADMIN — Medication 650 MILLIGRAM(S): at 13:32

## 2019-01-01 RX ADMIN — LOSARTAN POTASSIUM 25 MILLIGRAM(S): 100 TABLET, FILM COATED ORAL at 05:33

## 2019-01-01 RX ADMIN — Medication 100 MILLIGRAM(S): at 05:16

## 2019-01-01 RX ADMIN — INSULIN GLARGINE 18 UNIT(S): 100 INJECTION, SOLUTION SUBCUTANEOUS at 07:05

## 2019-01-01 RX ADMIN — Medication 100 MILLIGRAM(S): at 13:37

## 2019-01-01 RX ADMIN — CHLORHEXIDINE GLUCONATE 15 MILLILITER(S): 213 SOLUTION TOPICAL at 17:47

## 2019-01-01 RX ADMIN — Medication 500000 UNIT(S): at 17:21

## 2019-01-01 RX ADMIN — Medication 500000 UNIT(S): at 05:06

## 2019-01-01 RX ADMIN — HEPARIN SODIUM 5000 UNIT(S): 5000 INJECTION INTRAVENOUS; SUBCUTANEOUS at 21:26

## 2019-01-01 RX ADMIN — Medication 100 MILLIGRAM(S): at 21:39

## 2019-01-01 RX ADMIN — HEPARIN SODIUM 5000 UNIT(S): 5000 INJECTION INTRAVENOUS; SUBCUTANEOUS at 05:47

## 2019-01-01 RX ADMIN — Medication 25 MILLIGRAM(S): at 21:57

## 2019-01-01 RX ADMIN — CHLORHEXIDINE GLUCONATE 15 MILLILITER(S): 213 SOLUTION TOPICAL at 05:11

## 2019-01-01 RX ADMIN — LEVETIRACETAM 500 MILLIGRAM(S): 250 TABLET, FILM COATED ORAL at 13:38

## 2019-01-01 RX ADMIN — Medication 25 MILLIGRAM(S): at 21:40

## 2019-01-01 RX ADMIN — Medication 1: at 17:10

## 2019-01-01 RX ADMIN — Medication 650 MILLIGRAM(S): at 11:00

## 2019-01-01 RX ADMIN — HEPARIN SODIUM 5000 UNIT(S): 5000 INJECTION INTRAVENOUS; SUBCUTANEOUS at 21:22

## 2019-01-01 RX ADMIN — CHLORHEXIDINE GLUCONATE 15 MILLILITER(S): 213 SOLUTION TOPICAL at 05:13

## 2019-01-01 RX ADMIN — AMLODIPINE BESYLATE 10 MILLIGRAM(S): 2.5 TABLET ORAL at 05:15

## 2019-01-01 RX ADMIN — LEVETIRACETAM 400 MILLIGRAM(S): 250 TABLET, FILM COATED ORAL at 19:59

## 2019-01-01 RX ADMIN — Medication 100 MILLIGRAM(S): at 21:30

## 2019-01-01 RX ADMIN — LOSARTAN POTASSIUM 25 MILLIGRAM(S): 100 TABLET, FILM COATED ORAL at 05:25

## 2019-01-01 RX ADMIN — LEVETIRACETAM 1000 MILLIGRAM(S): 250 TABLET, FILM COATED ORAL at 18:27

## 2019-01-01 RX ADMIN — SODIUM CHLORIDE 75 MILLILITER(S): 9 INJECTION, SOLUTION INTRAVENOUS at 20:21

## 2019-01-01 RX ADMIN — Medication 11: at 17:01

## 2019-01-01 RX ADMIN — CEFEPIME 100 MILLIGRAM(S): 1 INJECTION, POWDER, FOR SOLUTION INTRAMUSCULAR; INTRAVENOUS at 14:39

## 2019-01-01 RX ADMIN — Medication 500000 UNIT(S): at 17:02

## 2019-01-01 RX ADMIN — Medication 25 MILLIGRAM(S): at 13:25

## 2019-01-01 RX ADMIN — Medication 100 MILLIGRAM(S): at 05:35

## 2019-01-01 RX ADMIN — Medication 500000 UNIT(S): at 05:33

## 2019-01-01 RX ADMIN — PANTOPRAZOLE SODIUM 40 MILLIGRAM(S): 20 TABLET, DELAYED RELEASE ORAL at 11:49

## 2019-01-01 RX ADMIN — HEPARIN SODIUM 5000 UNIT(S): 5000 INJECTION INTRAVENOUS; SUBCUTANEOUS at 05:43

## 2019-01-01 RX ADMIN — Medication 25 MILLIGRAM(S): at 13:59

## 2019-01-01 RX ADMIN — LEVETIRACETAM 1000 MILLIGRAM(S): 250 TABLET, FILM COATED ORAL at 05:17

## 2019-01-01 RX ADMIN — Medication 100 MILLIGRAM(S): at 05:06

## 2019-01-01 RX ADMIN — Medication 2: at 17:02

## 2019-01-01 RX ADMIN — Medication 1000 UNIT(S): at 11:31

## 2019-01-01 RX ADMIN — CHLORHEXIDINE GLUCONATE 15 MILLILITER(S): 213 SOLUTION TOPICAL at 05:27

## 2019-01-01 RX ADMIN — Medication 25 MILLIGRAM(S): at 13:23

## 2019-01-01 RX ADMIN — AMLODIPINE BESYLATE 10 MILLIGRAM(S): 2.5 TABLET ORAL at 05:31

## 2019-01-01 RX ADMIN — Medication 25 MILLIGRAM(S): at 13:05

## 2019-01-01 RX ADMIN — CHLORHEXIDINE GLUCONATE 1 APPLICATION(S): 213 SOLUTION TOPICAL at 05:44

## 2019-01-01 RX ADMIN — CHLORHEXIDINE GLUCONATE 1 APPLICATION(S): 213 SOLUTION TOPICAL at 05:42

## 2019-01-01 RX ADMIN — LEVETIRACETAM 1000 MILLIGRAM(S): 250 TABLET, FILM COATED ORAL at 17:57

## 2019-01-01 RX ADMIN — HEPARIN SODIUM 5000 UNIT(S): 5000 INJECTION INTRAVENOUS; SUBCUTANEOUS at 21:38

## 2019-01-01 RX ADMIN — AMPICILLIN SODIUM AND SULBACTAM SODIUM 100 GRAM(S): 250; 125 INJECTION, POWDER, FOR SUSPENSION INTRAMUSCULAR; INTRAVENOUS at 06:12

## 2019-01-01 RX ADMIN — AMPICILLIN SODIUM AND SULBACTAM SODIUM 100 GRAM(S): 250; 125 INJECTION, POWDER, FOR SUSPENSION INTRAMUSCULAR; INTRAVENOUS at 20:26

## 2019-01-01 RX ADMIN — HEPARIN SODIUM 5000 UNIT(S): 5000 INJECTION INTRAVENOUS; SUBCUTANEOUS at 14:54

## 2019-01-01 RX ADMIN — LEVETIRACETAM 420 MILLIGRAM(S): 250 TABLET, FILM COATED ORAL at 05:48

## 2019-01-01 RX ADMIN — CHLORHEXIDINE GLUCONATE 1 APPLICATION(S): 213 SOLUTION TOPICAL at 05:48

## 2019-01-01 RX ADMIN — Medication 1: at 18:20

## 2019-01-01 RX ADMIN — NYSTATIN CREAM 1 APPLICATION(S): 100000 CREAM TOPICAL at 22:31

## 2019-01-01 RX ADMIN — AMLODIPINE BESYLATE 10 MILLIGRAM(S): 2.5 TABLET ORAL at 05:33

## 2019-01-01 RX ADMIN — AMLODIPINE BESYLATE 10 MILLIGRAM(S): 2.5 TABLET ORAL at 05:23

## 2019-01-01 RX ADMIN — LEVETIRACETAM 420 MILLIGRAM(S): 250 TABLET, FILM COATED ORAL at 05:21

## 2019-01-01 RX ADMIN — Medication 50 MILLIEQUIVALENT(S): at 06:34

## 2019-01-01 RX ADMIN — Medication 6 UNIT(S): at 17:06

## 2019-01-01 RX ADMIN — CHLORHEXIDINE GLUCONATE 15 MILLILITER(S): 213 SOLUTION TOPICAL at 17:09

## 2019-01-01 RX ADMIN — CHLORHEXIDINE GLUCONATE 15 MILLILITER(S): 213 SOLUTION TOPICAL at 18:16

## 2019-01-01 RX ADMIN — CHLORHEXIDINE GLUCONATE 1 APPLICATION(S): 213 SOLUTION TOPICAL at 05:27

## 2019-01-01 RX ADMIN — AMLODIPINE BESYLATE 2.5 MILLIGRAM(S): 2.5 TABLET ORAL at 06:22

## 2019-01-01 RX ADMIN — LOSARTAN POTASSIUM 25 MILLIGRAM(S): 100 TABLET, FILM COATED ORAL at 06:26

## 2019-01-01 RX ADMIN — LEVETIRACETAM 1500 MILLIGRAM(S): 250 TABLET, FILM COATED ORAL at 05:45

## 2019-01-01 RX ADMIN — HEPARIN SODIUM 5000 UNIT(S): 5000 INJECTION INTRAVENOUS; SUBCUTANEOUS at 05:24

## 2019-01-01 RX ADMIN — Medication 1: at 11:38

## 2019-01-01 RX ADMIN — Medication 1: at 21:40

## 2019-01-01 RX ADMIN — Medication 100 MILLIGRAM(S): at 14:24

## 2019-01-01 RX ADMIN — Medication 10 MILLIGRAM(S): at 17:14

## 2019-01-01 RX ADMIN — LEVETIRACETAM 1000 MILLIGRAM(S): 250 TABLET, FILM COATED ORAL at 17:53

## 2019-01-01 RX ADMIN — Medication 2: at 10:20

## 2019-01-01 RX ADMIN — Medication 100 MILLIGRAM(S): at 13:24

## 2019-01-01 RX ADMIN — Medication 2: at 10:09

## 2019-01-01 RX ADMIN — NYSTATIN CREAM 1 APPLICATION(S): 100000 CREAM TOPICAL at 22:16

## 2019-01-01 RX ADMIN — Medication 216 GRAM(S): at 13:44

## 2019-01-01 RX ADMIN — Medication 500000 UNIT(S): at 05:17

## 2019-01-01 RX ADMIN — PANTOPRAZOLE SODIUM 40 MILLIGRAM(S): 20 TABLET, DELAYED RELEASE ORAL at 12:47

## 2019-01-01 RX ADMIN — Medication 100 MILLIGRAM(S): at 21:38

## 2019-01-01 RX ADMIN — PANTOPRAZOLE SODIUM 40 MILLIGRAM(S): 20 TABLET, DELAYED RELEASE ORAL at 12:26

## 2019-01-01 RX ADMIN — Medication 3: at 08:35

## 2019-01-01 RX ADMIN — CHLORHEXIDINE GLUCONATE 1 APPLICATION(S): 213 SOLUTION TOPICAL at 05:53

## 2019-01-01 RX ADMIN — LEVETIRACETAM 1500 MILLIGRAM(S): 250 TABLET, FILM COATED ORAL at 05:34

## 2019-01-01 RX ADMIN — Medication 500000 UNIT(S): at 19:31

## 2019-01-01 RX ADMIN — CHLORHEXIDINE GLUCONATE 15 MILLILITER(S): 213 SOLUTION TOPICAL at 17:23

## 2019-01-01 RX ADMIN — HEPARIN SODIUM 5000 UNIT(S): 5000 INJECTION INTRAVENOUS; SUBCUTANEOUS at 13:23

## 2019-01-01 RX ADMIN — Medication 2: at 18:14

## 2019-01-01 RX ADMIN — LEVETIRACETAM 1500 MILLIGRAM(S): 250 TABLET, FILM COATED ORAL at 17:15

## 2019-01-01 RX ADMIN — HEPARIN SODIUM 5000 UNIT(S): 5000 INJECTION INTRAVENOUS; SUBCUTANEOUS at 21:20

## 2019-01-01 RX ADMIN — Medication 1: at 17:49

## 2019-01-01 RX ADMIN — HEPARIN SODIUM 5000 UNIT(S): 5000 INJECTION INTRAVENOUS; SUBCUTANEOUS at 05:23

## 2019-01-01 RX ADMIN — HEPARIN SODIUM 5000 UNIT(S): 5000 INJECTION INTRAVENOUS; SUBCUTANEOUS at 14:40

## 2019-01-01 RX ADMIN — AMLODIPINE BESYLATE 10 MILLIGRAM(S): 2.5 TABLET ORAL at 06:26

## 2019-01-01 RX ADMIN — PROPOFOL 2.24 MICROGRAM(S)/KG/MIN: 10 INJECTION, EMULSION INTRAVENOUS at 00:03

## 2019-01-01 RX ADMIN — CHLORHEXIDINE GLUCONATE 15 MILLILITER(S): 213 SOLUTION TOPICAL at 05:42

## 2019-01-01 RX ADMIN — PANTOPRAZOLE SODIUM 40 MILLIGRAM(S): 20 TABLET, DELAYED RELEASE ORAL at 11:13

## 2019-01-01 RX ADMIN — INSULIN GLARGINE 9 UNIT(S): 100 INJECTION, SOLUTION SUBCUTANEOUS at 22:30

## 2019-01-01 RX ADMIN — LOSARTAN POTASSIUM 25 MILLIGRAM(S): 100 TABLET, FILM COATED ORAL at 05:52

## 2019-01-01 RX ADMIN — Medication 1: at 05:32

## 2019-01-01 RX ADMIN — LEVETIRACETAM 1000 MILLIGRAM(S): 250 TABLET, FILM COATED ORAL at 05:51

## 2019-01-01 RX ADMIN — Medication 100 MILLIGRAM(S): at 06:26

## 2019-01-01 RX ADMIN — HEPARIN SODIUM 5000 UNIT(S): 5000 INJECTION INTRAVENOUS; SUBCUTANEOUS at 13:07

## 2019-01-01 RX ADMIN — NYSTATIN CREAM 1 APPLICATION(S): 100000 CREAM TOPICAL at 21:40

## 2019-01-01 RX ADMIN — HEPARIN SODIUM 5000 UNIT(S): 5000 INJECTION INTRAVENOUS; SUBCUTANEOUS at 05:10

## 2019-01-01 RX ADMIN — CHLORHEXIDINE GLUCONATE 15 MILLILITER(S): 213 SOLUTION TOPICAL at 05:16

## 2019-01-01 RX ADMIN — Medication 25 MILLIGRAM(S): at 21:30

## 2019-01-01 RX ADMIN — LOSARTAN POTASSIUM 25 MILLIGRAM(S): 100 TABLET, FILM COATED ORAL at 05:13

## 2019-01-01 RX ADMIN — LEVETIRACETAM 1000 MILLIGRAM(S): 250 TABLET, FILM COATED ORAL at 17:26

## 2019-01-01 RX ADMIN — LEVETIRACETAM 1500 MILLIGRAM(S): 250 TABLET, FILM COATED ORAL at 05:44

## 2019-01-01 RX ADMIN — Medication 650 MILLIGRAM(S): at 18:35

## 2019-01-01 RX ADMIN — INSULIN GLARGINE 18 UNIT(S): 100 INJECTION, SOLUTION SUBCUTANEOUS at 07:44

## 2019-01-01 RX ADMIN — INSULIN GLARGINE 18 UNIT(S): 100 INJECTION, SOLUTION SUBCUTANEOUS at 10:21

## 2019-01-01 RX ADMIN — Medication 3 UNIT(S): at 10:11

## 2019-01-01 RX ADMIN — CHLORHEXIDINE GLUCONATE 15 MILLILITER(S): 213 SOLUTION TOPICAL at 18:21

## 2019-01-01 RX ADMIN — Medication 25 MILLIGRAM(S): at 21:23

## 2019-01-01 RX ADMIN — Medication 25 MILLIGRAM(S): at 05:43

## 2019-01-01 RX ADMIN — DONEPEZIL HYDROCHLORIDE 10 MILLIGRAM(S): 10 TABLET, FILM COATED ORAL at 21:31

## 2019-01-01 RX ADMIN — CEFEPIME 100 MILLIGRAM(S): 1 INJECTION, POWDER, FOR SOLUTION INTRAMUSCULAR; INTRAVENOUS at 13:05

## 2019-01-01 RX ADMIN — LEVETIRACETAM 1000 MILLIGRAM(S): 250 TABLET, FILM COATED ORAL at 06:25

## 2019-01-01 RX ADMIN — Medication 25 MILLIGRAM(S): at 05:22

## 2019-01-01 RX ADMIN — NYSTATIN CREAM 1 APPLICATION(S): 100000 CREAM TOPICAL at 13:32

## 2019-01-01 RX ADMIN — CEFEPIME 100 MILLIGRAM(S): 1 INJECTION, POWDER, FOR SOLUTION INTRAMUSCULAR; INTRAVENOUS at 13:09

## 2019-01-01 RX ADMIN — Medication 1: at 11:39

## 2019-01-01 RX ADMIN — LOSARTAN POTASSIUM 50 MILLIGRAM(S): 100 TABLET, FILM COATED ORAL at 17:24

## 2019-01-01 RX ADMIN — HEPARIN SODIUM 5000 UNIT(S): 5000 INJECTION INTRAVENOUS; SUBCUTANEOUS at 22:27

## 2019-01-01 RX ADMIN — CHLORHEXIDINE GLUCONATE 1 APPLICATION(S): 213 SOLUTION TOPICAL at 05:22

## 2019-01-01 RX ADMIN — Medication 500000 UNIT(S): at 05:45

## 2019-01-01 RX ADMIN — Medication 5: at 17:00

## 2019-01-01 RX ADMIN — CHLORHEXIDINE GLUCONATE 15 MILLILITER(S): 213 SOLUTION TOPICAL at 05:38

## 2019-01-01 RX ADMIN — Medication 3 UNIT(S): at 17:53

## 2019-01-01 RX ADMIN — CEFEPIME 100 MILLIGRAM(S): 1 INJECTION, POWDER, FOR SOLUTION INTRAMUSCULAR; INTRAVENOUS at 21:12

## 2019-01-01 RX ADMIN — HEPARIN SODIUM 5000 UNIT(S): 5000 INJECTION INTRAVENOUS; SUBCUTANEOUS at 22:14

## 2019-01-01 RX ADMIN — Medication 650 MILLIGRAM(S): at 18:06

## 2019-01-01 RX ADMIN — Medication 3 UNIT(S): at 08:35

## 2019-01-01 RX ADMIN — HEPARIN SODIUM 5000 UNIT(S): 5000 INJECTION INTRAVENOUS; SUBCUTANEOUS at 14:48

## 2019-01-01 RX ADMIN — Medication 25 MILLIGRAM(S): at 05:26

## 2019-01-01 RX ADMIN — Medication 1: at 13:48

## 2019-01-01 RX ADMIN — Medication 50 MILLIEQUIVALENT(S): at 09:00

## 2019-01-01 RX ADMIN — CHLORHEXIDINE GLUCONATE 15 MILLILITER(S): 213 SOLUTION TOPICAL at 17:52

## 2019-01-01 RX ADMIN — LEVETIRACETAM 1000 MILLIGRAM(S): 250 TABLET, FILM COATED ORAL at 05:28

## 2019-01-01 RX ADMIN — INSULIN GLARGINE 18 UNIT(S): 100 INJECTION, SOLUTION SUBCUTANEOUS at 06:05

## 2019-01-01 RX ADMIN — HEPARIN SODIUM 5000 UNIT(S): 5000 INJECTION INTRAVENOUS; SUBCUTANEOUS at 21:25

## 2019-01-01 RX ADMIN — HEPARIN SODIUM 5000 UNIT(S): 5000 INJECTION INTRAVENOUS; SUBCUTANEOUS at 15:07

## 2019-01-01 RX ADMIN — Medication 25 MILLIGRAM(S): at 09:12

## 2019-01-01 RX ADMIN — HEPARIN SODIUM 5000 UNIT(S): 5000 INJECTION INTRAVENOUS; SUBCUTANEOUS at 05:35

## 2019-01-01 RX ADMIN — CHLORHEXIDINE GLUCONATE 15 MILLILITER(S): 213 SOLUTION TOPICAL at 05:06

## 2019-01-01 RX ADMIN — LEVETIRACETAM 420 MILLIGRAM(S): 250 TABLET, FILM COATED ORAL at 18:16

## 2019-01-01 RX ADMIN — CHLORHEXIDINE GLUCONATE 15 MILLILITER(S): 213 SOLUTION TOPICAL at 18:10

## 2019-01-01 RX ADMIN — Medication 25 MILLIGRAM(S): at 05:31

## 2019-01-01 RX ADMIN — AMLODIPINE BESYLATE 2.5 MILLIGRAM(S): 2.5 TABLET ORAL at 06:38

## 2019-01-01 RX ADMIN — NICARDIPINE HYDROCHLORIDE 25 MG/HR: 30 CAPSULE, EXTENDED RELEASE ORAL at 22:00

## 2019-01-01 RX ADMIN — Medication 25 MILLIGRAM(S): at 05:16

## 2019-01-01 RX ADMIN — CHLORHEXIDINE GLUCONATE 1 APPLICATION(S): 213 SOLUTION TOPICAL at 05:32

## 2019-01-01 RX ADMIN — CHLORHEXIDINE GLUCONATE 1 APPLICATION(S): 213 SOLUTION TOPICAL at 05:38

## 2019-01-01 RX ADMIN — INSULIN GLARGINE 18 UNIT(S): 100 INJECTION, SOLUTION SUBCUTANEOUS at 08:49

## 2019-01-01 RX ADMIN — LEVETIRACETAM 1000 MILLIGRAM(S): 250 TABLET, FILM COATED ORAL at 05:46

## 2019-01-01 RX ADMIN — Medication 33.33 GRAM(S): at 07:54

## 2019-01-01 RX ADMIN — Medication 500000 UNIT(S): at 05:14

## 2019-01-01 RX ADMIN — Medication 650 MILLIGRAM(S): at 07:49

## 2019-01-01 RX ADMIN — SODIUM CHLORIDE 75 MILLILITER(S): 9 INJECTION, SOLUTION INTRAVENOUS at 08:05

## 2019-01-01 RX ADMIN — CHLORHEXIDINE GLUCONATE 1 APPLICATION(S): 213 SOLUTION TOPICAL at 05:24

## 2019-01-01 RX ADMIN — Medication 50 MILLIEQUIVALENT(S): at 07:52

## 2019-01-01 RX ADMIN — Medication 1: at 08:37

## 2019-01-01 RX ADMIN — CHLORHEXIDINE GLUCONATE 15 MILLILITER(S): 213 SOLUTION TOPICAL at 05:22

## 2019-01-01 RX ADMIN — CHLORHEXIDINE GLUCONATE 15 MILLILITER(S): 213 SOLUTION TOPICAL at 05:29

## 2019-01-01 RX ADMIN — PANTOPRAZOLE SODIUM 40 MILLIGRAM(S): 20 TABLET, DELAYED RELEASE ORAL at 06:37

## 2019-01-01 RX ADMIN — CHLORHEXIDINE GLUCONATE 1 APPLICATION(S): 213 SOLUTION TOPICAL at 06:35

## 2019-01-01 RX ADMIN — SODIUM CHLORIDE 100 MILLILITER(S): 9 INJECTION INTRAMUSCULAR; INTRAVENOUS; SUBCUTANEOUS at 17:48

## 2019-01-01 RX ADMIN — Medication 100 MILLIGRAM(S): at 05:50

## 2019-01-01 RX ADMIN — Medication 650 MILLIGRAM(S): at 00:10

## 2019-01-01 RX ADMIN — Medication 3: at 11:57

## 2019-01-01 RX ADMIN — Medication 1: at 06:57

## 2019-01-01 RX ADMIN — Medication 3 UNIT(S): at 17:49

## 2019-01-01 RX ADMIN — SENNA PLUS 2 TABLET(S): 8.6 TABLET ORAL at 21:38

## 2019-01-01 RX ADMIN — Medication 100 MILLIGRAM(S): at 13:30

## 2019-01-01 RX ADMIN — LEVETIRACETAM 420 MILLIGRAM(S): 250 TABLET, FILM COATED ORAL at 17:37

## 2019-01-01 RX ADMIN — LEVETIRACETAM 1500 MILLIGRAM(S): 250 TABLET, FILM COATED ORAL at 17:48

## 2019-01-01 RX ADMIN — CEFEPIME 100 MILLIGRAM(S): 1 INJECTION, POWDER, FOR SOLUTION INTRAMUSCULAR; INTRAVENOUS at 22:23

## 2019-01-01 RX ADMIN — AMLODIPINE BESYLATE 10 MILLIGRAM(S): 2.5 TABLET ORAL at 06:01

## 2019-01-01 RX ADMIN — Medication 25 MILLIGRAM(S): at 13:01

## 2019-01-01 RX ADMIN — AMLODIPINE BESYLATE 10 MILLIGRAM(S): 2.5 TABLET ORAL at 05:25

## 2019-01-01 RX ADMIN — Medication 100 MILLIGRAM(S): at 22:04

## 2019-01-01 RX ADMIN — MORPHINE SULFATE 2 MILLIGRAM(S): 50 CAPSULE, EXTENDED RELEASE ORAL at 20:01

## 2019-01-01 RX ADMIN — Medication 1: at 17:31

## 2019-01-01 RX ADMIN — Medication 50 MILLIGRAM(S): at 05:08

## 2019-01-01 RX ADMIN — Medication 500000 UNIT(S): at 17:13

## 2019-01-01 RX ADMIN — HEPARIN SODIUM 5000 UNIT(S): 5000 INJECTION INTRAVENOUS; SUBCUTANEOUS at 13:25

## 2019-01-01 RX ADMIN — PANTOPRAZOLE SODIUM 40 MILLIGRAM(S): 20 TABLET, DELAYED RELEASE ORAL at 12:00

## 2019-01-01 RX ADMIN — HEPARIN SODIUM 5000 UNIT(S): 5000 INJECTION INTRAVENOUS; SUBCUTANEOUS at 21:29

## 2019-01-01 RX ADMIN — LEVETIRACETAM 1000 MILLIGRAM(S): 250 TABLET, FILM COATED ORAL at 05:30

## 2019-01-01 RX ADMIN — LEVETIRACETAM 1000 MILLIGRAM(S): 250 TABLET, FILM COATED ORAL at 17:14

## 2019-01-01 RX ADMIN — HEPARIN SODIUM 5000 UNIT(S): 5000 INJECTION INTRAVENOUS; SUBCUTANEOUS at 22:30

## 2019-01-01 RX ADMIN — Medication 500000 UNIT(S): at 17:27

## 2019-01-01 RX ADMIN — SODIUM CHLORIDE 500 MILLILITER(S): 9 INJECTION INTRAMUSCULAR; INTRAVENOUS; SUBCUTANEOUS at 13:50

## 2019-01-01 RX ADMIN — Medication 5: at 00:08

## 2019-01-01 RX ADMIN — Medication 25 MILLIGRAM(S): at 13:37

## 2019-01-01 RX ADMIN — Medication 500000 UNIT(S): at 19:09

## 2019-01-01 RX ADMIN — Medication 25 MILLIGRAM(S): at 21:08

## 2019-01-01 RX ADMIN — HEPARIN SODIUM 5000 UNIT(S): 5000 INJECTION INTRAVENOUS; SUBCUTANEOUS at 22:20

## 2019-01-01 RX ADMIN — CHLORHEXIDINE GLUCONATE 1 APPLICATION(S): 213 SOLUTION TOPICAL at 05:33

## 2019-01-01 RX ADMIN — HEPARIN SODIUM 5000 UNIT(S): 5000 INJECTION INTRAVENOUS; SUBCUTANEOUS at 14:19

## 2019-01-01 RX ADMIN — LEVETIRACETAM 1000 MILLIGRAM(S): 250 TABLET, FILM COATED ORAL at 18:33

## 2019-01-01 RX ADMIN — PANTOPRAZOLE SODIUM 40 MILLIGRAM(S): 20 TABLET, DELAYED RELEASE ORAL at 11:45

## 2019-01-01 RX ADMIN — CEFEPIME 2000 MILLIGRAM(S): 1 INJECTION, POWDER, FOR SOLUTION INTRAMUSCULAR; INTRAVENOUS at 09:08

## 2019-01-01 RX ADMIN — AMPICILLIN SODIUM AND SULBACTAM SODIUM 100 GRAM(S): 250; 125 INJECTION, POWDER, FOR SUSPENSION INTRAMUSCULAR; INTRAVENOUS at 14:47

## 2019-01-01 RX ADMIN — Medication 500000 UNIT(S): at 05:39

## 2019-01-01 RX ADMIN — Medication 25 MILLIGRAM(S): at 05:25

## 2019-01-01 RX ADMIN — AMLODIPINE BESYLATE 10 MILLIGRAM(S): 2.5 TABLET ORAL at 05:17

## 2019-01-01 RX ADMIN — INSULIN GLARGINE 18 UNIT(S): 100 INJECTION, SOLUTION SUBCUTANEOUS at 07:27

## 2019-01-01 RX ADMIN — LEVETIRACETAM 1500 MILLIGRAM(S): 250 TABLET, FILM COATED ORAL at 05:15

## 2019-01-01 RX ADMIN — Medication 1: at 09:47

## 2019-01-01 RX ADMIN — CHLORHEXIDINE GLUCONATE 15 MILLILITER(S): 213 SOLUTION TOPICAL at 06:03

## 2019-01-01 RX ADMIN — CHLORHEXIDINE GLUCONATE 15 MILLILITER(S): 213 SOLUTION TOPICAL at 05:36

## 2019-01-01 RX ADMIN — Medication 25 MILLIGRAM(S): at 05:02

## 2019-01-01 RX ADMIN — AMLODIPINE BESYLATE 2.5 MILLIGRAM(S): 2.5 TABLET ORAL at 06:37

## 2019-01-01 RX ADMIN — Medication 50 MILLIGRAM(S): at 17:23

## 2019-01-01 RX ADMIN — HEPARIN SODIUM 5000 UNIT(S): 5000 INJECTION INTRAVENOUS; SUBCUTANEOUS at 06:03

## 2019-01-01 RX ADMIN — Medication 25 MILLIGRAM(S): at 14:48

## 2019-01-01 RX ADMIN — Medication 25 MILLIGRAM(S): at 21:20

## 2019-01-01 RX ADMIN — Medication 1: at 07:32

## 2019-01-01 RX ADMIN — Medication 25 MILLIGRAM(S): at 14:27

## 2019-01-01 RX ADMIN — Medication 1: at 21:09

## 2019-01-01 RX ADMIN — Medication 100 MILLIGRAM(S): at 21:08

## 2019-01-01 RX ADMIN — Medication 216 GRAM(S): at 01:16

## 2019-01-01 RX ADMIN — PANTOPRAZOLE SODIUM 40 MILLIGRAM(S): 20 TABLET, DELAYED RELEASE ORAL at 13:34

## 2019-01-01 RX ADMIN — LEVETIRACETAM 1500 MILLIGRAM(S): 250 TABLET, FILM COATED ORAL at 17:50

## 2019-01-01 RX ADMIN — Medication 2: at 14:18

## 2019-01-01 RX ADMIN — Medication 100 MILLIGRAM(S): at 14:19

## 2019-01-01 RX ADMIN — NYSTATIN CREAM 1 APPLICATION(S): 100000 CREAM TOPICAL at 06:12

## 2019-01-01 RX ADMIN — Medication 1: at 01:45

## 2019-01-01 RX ADMIN — AMLODIPINE BESYLATE 10 MILLIGRAM(S): 2.5 TABLET ORAL at 05:22

## 2019-01-01 RX ADMIN — HEPARIN SODIUM 5000 UNIT(S): 5000 INJECTION INTRAVENOUS; SUBCUTANEOUS at 22:07

## 2019-01-01 RX ADMIN — LEVETIRACETAM 420 MILLIGRAM(S): 250 TABLET, FILM COATED ORAL at 05:16

## 2019-01-01 RX ADMIN — Medication 500000 UNIT(S): at 18:39

## 2019-01-01 RX ADMIN — AMLODIPINE BESYLATE 2.5 MILLIGRAM(S): 2.5 TABLET ORAL at 05:52

## 2019-01-01 RX ADMIN — HEPARIN SODIUM 5000 UNIT(S): 5000 INJECTION INTRAVENOUS; SUBCUTANEOUS at 06:10

## 2019-01-01 RX ADMIN — Medication 500000 UNIT(S): at 05:23

## 2019-01-01 RX ADMIN — Medication 25 MILLIGRAM(S): at 22:27

## 2019-01-01 RX ADMIN — PROPOFOL 0.47 MICROGRAM(S)/KG/MIN: 10 INJECTION, EMULSION INTRAVENOUS at 05:52

## 2019-01-01 RX ADMIN — Medication 25 MILLIGRAM(S): at 22:04

## 2019-01-01 RX ADMIN — PANTOPRAZOLE SODIUM 40 MILLIGRAM(S): 20 TABLET, DELAYED RELEASE ORAL at 12:59

## 2019-01-01 RX ADMIN — Medication 3: at 21:54

## 2019-01-01 RX ADMIN — Medication 216 GRAM(S): at 13:42

## 2019-01-01 RX ADMIN — DEXMEDETOMIDINE HYDROCHLORIDE IN 0.9% SODIUM CHLORIDE 121.68 MICROGRAM(S): 4 INJECTION INTRAVENOUS at 01:49

## 2019-01-01 RX ADMIN — PROPOFOL 0.47 MICROGRAM(S)/KG/MIN: 10 INJECTION, EMULSION INTRAVENOUS at 06:25

## 2019-01-01 RX ADMIN — NYSTATIN CREAM 1 APPLICATION(S): 100000 CREAM TOPICAL at 14:51

## 2019-01-01 RX ADMIN — CHLORHEXIDINE GLUCONATE 1 APPLICATION(S): 213 SOLUTION TOPICAL at 05:14

## 2019-01-01 RX ADMIN — CHLORHEXIDINE GLUCONATE 15 MILLILITER(S): 213 SOLUTION TOPICAL at 05:48

## 2019-01-01 RX ADMIN — CHLORHEXIDINE GLUCONATE 1 APPLICATION(S): 213 SOLUTION TOPICAL at 05:15

## 2019-01-01 RX ADMIN — PREGABALIN 1000 MICROGRAM(S): 225 CAPSULE ORAL at 11:46

## 2019-01-01 RX ADMIN — Medication 650 MILLIGRAM(S): at 20:47

## 2019-01-01 RX ADMIN — Medication 500000 UNIT(S): at 05:34

## 2019-01-01 RX ADMIN — HEPARIN SODIUM 5000 UNIT(S): 5000 INJECTION INTRAVENOUS; SUBCUTANEOUS at 21:17

## 2019-01-01 RX ADMIN — Medication 500000 UNIT(S): at 17:39

## 2019-01-01 RX ADMIN — PANTOPRAZOLE SODIUM 40 MILLIGRAM(S): 20 TABLET, DELAYED RELEASE ORAL at 12:06

## 2019-01-01 RX ADMIN — LEVETIRACETAM 1000 MILLIGRAM(S): 250 TABLET, FILM COATED ORAL at 17:52

## 2019-01-01 RX ADMIN — PANTOPRAZOLE SODIUM 40 MILLIGRAM(S): 20 TABLET, DELAYED RELEASE ORAL at 11:46

## 2019-01-01 RX ADMIN — PANTOPRAZOLE SODIUM 40 MILLIGRAM(S): 20 TABLET, DELAYED RELEASE ORAL at 11:36

## 2019-01-01 RX ADMIN — AMLODIPINE BESYLATE 10 MILLIGRAM(S): 2.5 TABLET ORAL at 05:43

## 2019-01-01 RX ADMIN — LOSARTAN POTASSIUM 25 MILLIGRAM(S): 100 TABLET, FILM COATED ORAL at 13:07

## 2019-01-01 RX ADMIN — SENNA PLUS 2 TABLET(S): 8.6 TABLET ORAL at 21:56

## 2019-01-01 RX ADMIN — CHLORHEXIDINE GLUCONATE 15 MILLILITER(S): 213 SOLUTION TOPICAL at 17:42

## 2019-01-01 RX ADMIN — Medication 3 UNIT(S): at 11:53

## 2019-01-01 RX ADMIN — Medication 7: at 23:06

## 2019-01-01 RX ADMIN — AMLODIPINE BESYLATE 10 MILLIGRAM(S): 2.5 TABLET ORAL at 05:02

## 2019-01-01 RX ADMIN — Medication 500000 UNIT(S): at 18:49

## 2019-01-01 RX ADMIN — Medication 100 MILLIGRAM(S): at 21:23

## 2019-01-01 RX ADMIN — Medication 650 MILLIGRAM(S): at 21:44

## 2019-01-01 RX ADMIN — CHLORHEXIDINE GLUCONATE 15 MILLILITER(S): 213 SOLUTION TOPICAL at 05:52

## 2019-01-01 RX ADMIN — AMLODIPINE BESYLATE 10 MILLIGRAM(S): 2.5 TABLET ORAL at 05:07

## 2019-01-01 RX ADMIN — LEVETIRACETAM 1000 MILLIGRAM(S): 250 TABLET, FILM COATED ORAL at 17:21

## 2019-01-01 RX ADMIN — Medication 300 MILLIGRAM(S): at 06:56

## 2019-01-01 RX ADMIN — Medication 1: at 21:48

## 2019-01-01 RX ADMIN — SENNA PLUS 2 TABLET(S): 8.6 TABLET ORAL at 21:30

## 2019-01-01 RX ADMIN — CHLORHEXIDINE GLUCONATE 15 MILLILITER(S): 213 SOLUTION TOPICAL at 17:54

## 2019-01-01 RX ADMIN — Medication 216 GRAM(S): at 06:22

## 2019-01-01 RX ADMIN — CEFEPIME 100 MILLIGRAM(S): 1 INJECTION, POWDER, FOR SOLUTION INTRAMUSCULAR; INTRAVENOUS at 13:24

## 2019-01-01 RX ADMIN — Medication 500000 UNIT(S): at 05:24

## 2019-01-01 RX ADMIN — SODIUM CHLORIDE 75 MILLILITER(S): 9 INJECTION INTRAMUSCULAR; INTRAVENOUS; SUBCUTANEOUS at 00:01

## 2019-01-01 RX ADMIN — LOSARTAN POTASSIUM 25 MILLIGRAM(S): 100 TABLET, FILM COATED ORAL at 05:02

## 2019-01-01 RX ADMIN — INSULIN GLARGINE 18 UNIT(S): 100 INJECTION, SOLUTION SUBCUTANEOUS at 08:37

## 2019-01-01 RX ADMIN — Medication 650 MILLIGRAM(S): at 06:41

## 2019-01-01 RX ADMIN — Medication 25 MILLIGRAM(S): at 05:17

## 2019-01-01 RX ADMIN — Medication 100 MILLIGRAM(S): at 05:21

## 2019-01-01 RX ADMIN — Medication 500000 UNIT(S): at 17:49

## 2019-01-01 RX ADMIN — PROPOFOL 2.24 MICROGRAM(S)/KG/MIN: 10 INJECTION, EMULSION INTRAVENOUS at 05:40

## 2019-01-01 RX ADMIN — CHLORHEXIDINE GLUCONATE 15 MILLILITER(S): 213 SOLUTION TOPICAL at 17:12

## 2019-01-01 RX ADMIN — SIMVASTATIN 20 MILLIGRAM(S): 20 TABLET, FILM COATED ORAL at 22:14

## 2019-01-01 RX ADMIN — HEPARIN SODIUM 5000 UNIT(S): 5000 INJECTION INTRAVENOUS; SUBCUTANEOUS at 05:38

## 2019-01-01 RX ADMIN — NYSTATIN CREAM 1 APPLICATION(S): 100000 CREAM TOPICAL at 15:07

## 2019-01-01 RX ADMIN — Medication 25 MILLIGRAM(S): at 05:33

## 2019-01-01 RX ADMIN — LOSARTAN POTASSIUM 25 MILLIGRAM(S): 100 TABLET, FILM COATED ORAL at 05:24

## 2019-01-01 RX ADMIN — PANTOPRAZOLE SODIUM 40 MILLIGRAM(S): 20 TABLET, DELAYED RELEASE ORAL at 11:28

## 2019-01-01 RX ADMIN — SIMVASTATIN 20 MILLIGRAM(S): 20 TABLET, FILM COATED ORAL at 22:31

## 2019-01-01 RX ADMIN — CEFEPIME 100 MILLIGRAM(S): 1 INJECTION, POWDER, FOR SOLUTION INTRAMUSCULAR; INTRAVENOUS at 05:02

## 2019-01-01 RX ADMIN — CHLORHEXIDINE GLUCONATE 15 MILLILITER(S): 213 SOLUTION TOPICAL at 17:18

## 2019-01-01 RX ADMIN — Medication 1: at 18:00

## 2019-01-01 RX ADMIN — DONEPEZIL HYDROCHLORIDE 10 MILLIGRAM(S): 10 TABLET, FILM COATED ORAL at 22:30

## 2019-01-01 RX ADMIN — PANTOPRAZOLE SODIUM 40 MILLIGRAM(S): 20 TABLET, DELAYED RELEASE ORAL at 14:19

## 2019-01-01 RX ADMIN — Medication 100 MILLIGRAM(S): at 05:48

## 2019-01-01 RX ADMIN — Medication 25 MILLIGRAM(S): at 14:32

## 2019-01-01 RX ADMIN — LEVETIRACETAM 1500 MILLIGRAM(S): 250 TABLET, FILM COATED ORAL at 18:05

## 2019-01-01 RX ADMIN — CHLORHEXIDINE GLUCONATE 15 MILLILITER(S): 213 SOLUTION TOPICAL at 18:01

## 2019-01-01 RX ADMIN — LEVETIRACETAM 1500 MILLIGRAM(S): 250 TABLET, FILM COATED ORAL at 17:44

## 2019-01-01 RX ADMIN — LEVETIRACETAM 1500 MILLIGRAM(S): 250 TABLET, FILM COATED ORAL at 19:06

## 2019-01-01 RX ADMIN — PREGABALIN 1000 MICROGRAM(S): 225 CAPSULE ORAL at 11:31

## 2019-01-01 RX ADMIN — Medication 1: at 17:40

## 2019-01-01 RX ADMIN — Medication 25 MILLIGRAM(S): at 05:36

## 2019-01-01 RX ADMIN — FENTANYL CITRATE 3.74 MICROGRAM(S)/KG/HR: 50 INJECTION INTRAVENOUS at 00:03

## 2019-01-01 RX ADMIN — Medication 1000 UNIT(S): at 11:46

## 2019-01-01 RX ADMIN — CHLORHEXIDINE GLUCONATE 15 MILLILITER(S): 213 SOLUTION TOPICAL at 17:46

## 2019-01-01 RX ADMIN — Medication 50 MILLIEQUIVALENT(S): at 13:10

## 2019-01-01 RX ADMIN — Medication 216 GRAM(S): at 14:19

## 2019-01-01 RX ADMIN — LOSARTAN POTASSIUM 25 MILLIGRAM(S): 100 TABLET, FILM COATED ORAL at 05:43

## 2019-01-01 RX ADMIN — Medication 500000 UNIT(S): at 05:22

## 2019-01-01 RX ADMIN — Medication 25 MILLIGRAM(S): at 21:28

## 2019-01-01 RX ADMIN — LEVETIRACETAM 1000 MILLIGRAM(S): 250 TABLET, FILM COATED ORAL at 17:29

## 2019-01-01 RX ADMIN — LOSARTAN POTASSIUM 25 MILLIGRAM(S): 100 TABLET, FILM COATED ORAL at 06:07

## 2019-01-01 RX ADMIN — NYSTATIN CREAM 1 APPLICATION(S): 100000 CREAM TOPICAL at 06:13

## 2019-01-01 RX ADMIN — Medication 2: at 08:20

## 2019-01-01 RX ADMIN — Medication 2: at 07:02

## 2019-01-01 RX ADMIN — AMLODIPINE BESYLATE 10 MILLIGRAM(S): 2.5 TABLET ORAL at 05:37

## 2019-01-01 RX ADMIN — Medication 6 UNIT(S): at 06:39

## 2019-01-01 RX ADMIN — Medication 2: at 22:29

## 2019-01-01 RX ADMIN — Medication 216 GRAM(S): at 21:39

## 2019-01-01 RX ADMIN — LEVETIRACETAM 1000 MILLIGRAM(S): 250 TABLET, FILM COATED ORAL at 19:09

## 2019-01-01 RX ADMIN — Medication 650 MILLIGRAM(S): at 10:00

## 2019-01-01 RX ADMIN — CEFEPIME 100 MILLIGRAM(S): 1 INJECTION, POWDER, FOR SOLUTION INTRAMUSCULAR; INTRAVENOUS at 16:48

## 2019-01-01 RX ADMIN — PANTOPRAZOLE SODIUM 40 MILLIGRAM(S): 20 TABLET, DELAYED RELEASE ORAL at 11:21

## 2019-01-01 RX ADMIN — CHLORHEXIDINE GLUCONATE 1 APPLICATION(S): 213 SOLUTION TOPICAL at 05:03

## 2019-01-01 RX ADMIN — Medication 25 MILLIGRAM(S): at 21:17

## 2019-01-01 RX ADMIN — LOSARTAN POTASSIUM 50 MILLIGRAM(S): 100 TABLET, FILM COATED ORAL at 05:48

## 2019-01-01 RX ADMIN — PANTOPRAZOLE SODIUM 40 MILLIGRAM(S): 20 TABLET, DELAYED RELEASE ORAL at 13:01

## 2019-01-01 RX ADMIN — HEPARIN SODIUM 5000 UNIT(S): 5000 INJECTION INTRAVENOUS; SUBCUTANEOUS at 13:06

## 2019-01-01 RX ADMIN — LEVETIRACETAM 1000 MILLIGRAM(S): 250 TABLET, FILM COATED ORAL at 05:32

## 2019-01-01 RX ADMIN — AMPICILLIN SODIUM AND SULBACTAM SODIUM 100 GRAM(S): 250; 125 INJECTION, POWDER, FOR SUSPENSION INTRAMUSCULAR; INTRAVENOUS at 05:45

## 2019-01-01 RX ADMIN — Medication 1: at 21:26

## 2019-01-01 RX ADMIN — LEVETIRACETAM 1000 MILLIGRAM(S): 250 TABLET, FILM COATED ORAL at 05:43

## 2019-01-01 RX ADMIN — LEVETIRACETAM 420 MILLIGRAM(S): 250 TABLET, FILM COATED ORAL at 17:08

## 2019-01-01 RX ADMIN — NYSTATIN CREAM 1 APPLICATION(S): 100000 CREAM TOPICAL at 06:41

## 2019-01-01 RX ADMIN — Medication 25 MILLIGRAM(S): at 14:54

## 2019-01-01 RX ADMIN — Medication 650 MILLIGRAM(S): at 16:08

## 2019-01-01 RX ADMIN — LEVETIRACETAM 1000 MILLIGRAM(S): 250 TABLET, FILM COATED ORAL at 18:56

## 2019-01-01 RX ADMIN — HYDROMORPHONE HYDROCHLORIDE 1 MILLIGRAM(S): 2 INJECTION INTRAMUSCULAR; INTRAVENOUS; SUBCUTANEOUS at 23:30

## 2019-01-01 RX ADMIN — AMLODIPINE BESYLATE 10 MILLIGRAM(S): 2.5 TABLET ORAL at 05:46

## 2019-01-01 RX ADMIN — Medication 25 MILLIGRAM(S): at 22:20

## 2019-01-01 RX ADMIN — Medication 250 MILLIGRAM(S): at 00:44

## 2019-01-01 RX ADMIN — AMLODIPINE BESYLATE 10 MILLIGRAM(S): 2.5 TABLET ORAL at 05:36

## 2019-01-01 RX ADMIN — CHLORHEXIDINE GLUCONATE 15 MILLILITER(S): 213 SOLUTION TOPICAL at 17:38

## 2019-01-01 RX ADMIN — Medication 25 MILLIGRAM(S): at 22:54

## 2019-01-01 RX ADMIN — Medication 25 MILLIGRAM(S): at 06:26

## 2019-01-01 RX ADMIN — CHLORHEXIDINE GLUCONATE 1 APPLICATION(S): 213 SOLUTION TOPICAL at 17:38

## 2019-01-01 RX ADMIN — HEPARIN SODIUM 5000 UNIT(S): 5000 INJECTION INTRAVENOUS; SUBCUTANEOUS at 21:24

## 2019-01-01 RX ADMIN — Medication 25 MILLIGRAM(S): at 13:16

## 2019-01-01 RX ADMIN — LEVETIRACETAM 1500 MILLIGRAM(S): 250 TABLET, FILM COATED ORAL at 05:46

## 2019-01-01 RX ADMIN — LOSARTAN POTASSIUM 25 MILLIGRAM(S): 100 TABLET, FILM COATED ORAL at 05:45

## 2019-01-01 RX ADMIN — Medication 500000 UNIT(S): at 05:27

## 2019-01-01 RX ADMIN — Medication 500000 UNIT(S): at 17:43

## 2019-01-01 RX ADMIN — Medication 1: at 17:28

## 2019-01-01 RX ADMIN — HEPARIN SODIUM 5000 UNIT(S): 5000 INJECTION INTRAVENOUS; SUBCUTANEOUS at 14:27

## 2019-01-01 RX ADMIN — PANTOPRAZOLE SODIUM 40 MILLIGRAM(S): 20 TABLET, DELAYED RELEASE ORAL at 11:24

## 2019-01-01 RX ADMIN — CHLORHEXIDINE GLUCONATE 1 APPLICATION(S): 213 SOLUTION TOPICAL at 05:07

## 2019-01-01 RX ADMIN — CHLORHEXIDINE GLUCONATE 15 MILLILITER(S): 213 SOLUTION TOPICAL at 17:01

## 2019-01-01 RX ADMIN — Medication 1: at 08:01

## 2019-01-01 RX ADMIN — AMLODIPINE BESYLATE 10 MILLIGRAM(S): 2.5 TABLET ORAL at 05:27

## 2019-01-01 RX ADMIN — LOSARTAN POTASSIUM 25 MILLIGRAM(S): 100 TABLET, FILM COATED ORAL at 05:15

## 2019-01-01 RX ADMIN — INSULIN GLARGINE 18 UNIT(S): 100 INJECTION, SOLUTION SUBCUTANEOUS at 08:47

## 2019-01-01 RX ADMIN — CHLORHEXIDINE GLUCONATE 15 MILLILITER(S): 213 SOLUTION TOPICAL at 19:03

## 2019-01-01 RX ADMIN — SIMVASTATIN 20 MILLIGRAM(S): 20 TABLET, FILM COATED ORAL at 21:29

## 2019-01-01 RX ADMIN — SENNA PLUS 2 TABLET(S): 8.6 TABLET ORAL at 22:04

## 2019-01-01 RX ADMIN — CHLORHEXIDINE GLUCONATE 15 MILLILITER(S): 213 SOLUTION TOPICAL at 18:03

## 2019-01-01 RX ADMIN — Medication 3 UNIT(S): at 08:37

## 2019-01-01 RX ADMIN — Medication 1: at 17:03

## 2019-01-01 RX ADMIN — CEFEPIME 100 MILLIGRAM(S): 1 INJECTION, POWDER, FOR SOLUTION INTRAMUSCULAR; INTRAVENOUS at 21:02

## 2019-01-01 RX ADMIN — Medication 25 MILLIGRAM(S): at 14:37

## 2019-01-01 RX ADMIN — Medication 100 MILLIGRAM(S): at 13:14

## 2019-01-01 RX ADMIN — CHLORHEXIDINE GLUCONATE 15 MILLILITER(S): 213 SOLUTION TOPICAL at 06:08

## 2019-01-01 RX ADMIN — HEPARIN SODIUM 5000 UNIT(S): 5000 INJECTION INTRAVENOUS; SUBCUTANEOUS at 05:45

## 2019-01-01 RX ADMIN — HEPARIN SODIUM 5000 UNIT(S): 5000 INJECTION INTRAVENOUS; SUBCUTANEOUS at 21:13

## 2019-01-01 RX ADMIN — Medication 650 MILLIGRAM(S): at 05:43

## 2019-01-01 RX ADMIN — Medication 650 MILLIGRAM(S): at 13:38

## 2019-01-01 RX ADMIN — Medication 25 MILLIGRAM(S): at 21:00

## 2019-01-01 RX ADMIN — AMLODIPINE BESYLATE 10 MILLIGRAM(S): 2.5 TABLET ORAL at 05:32

## 2019-01-01 RX ADMIN — Medication 500000 UNIT(S): at 05:12

## 2019-01-01 RX ADMIN — HEPARIN SODIUM 5000 UNIT(S): 5000 INJECTION INTRAVENOUS; SUBCUTANEOUS at 14:06

## 2019-01-01 RX ADMIN — CHLORHEXIDINE GLUCONATE 15 MILLILITER(S): 213 SOLUTION TOPICAL at 17:02

## 2019-01-01 RX ADMIN — CHLORHEXIDINE GLUCONATE 1 APPLICATION(S): 213 SOLUTION TOPICAL at 06:26

## 2019-01-01 RX ADMIN — Medication 216 GRAM(S): at 17:50

## 2019-01-01 RX ADMIN — Medication 2: at 18:53

## 2019-01-01 RX ADMIN — Medication 500000 UNIT(S): at 05:10

## 2019-01-01 RX ADMIN — Medication 25 MILLIGRAM(S): at 05:14

## 2019-01-01 RX ADMIN — HEPARIN SODIUM 5000 UNIT(S): 5000 INJECTION INTRAVENOUS; SUBCUTANEOUS at 16:23

## 2019-01-01 RX ADMIN — PANTOPRAZOLE SODIUM 40 MILLIGRAM(S): 20 TABLET, DELAYED RELEASE ORAL at 11:42

## 2019-01-01 RX ADMIN — LEVETIRACETAM 1500 MILLIGRAM(S): 250 TABLET, FILM COATED ORAL at 05:21

## 2019-01-01 RX ADMIN — Medication 650 MILLIGRAM(S): at 16:21

## 2019-01-01 RX ADMIN — Medication 40 MILLIEQUIVALENT(S): at 11:59

## 2019-01-01 RX ADMIN — Medication 650 MILLIGRAM(S): at 12:48

## 2019-01-01 RX ADMIN — Medication 1: at 11:53

## 2019-01-01 RX ADMIN — Medication 500000 UNIT(S): at 18:20

## 2019-01-01 RX ADMIN — CHLORHEXIDINE GLUCONATE 1 APPLICATION(S): 213 SOLUTION TOPICAL at 05:11

## 2019-01-01 RX ADMIN — LEVETIRACETAM 1000 MILLIGRAM(S): 250 TABLET, FILM COATED ORAL at 17:32

## 2019-01-01 RX ADMIN — CHLORHEXIDINE GLUCONATE 15 MILLILITER(S): 213 SOLUTION TOPICAL at 05:31

## 2019-01-01 RX ADMIN — CHLORHEXIDINE GLUCONATE 15 MILLILITER(S): 213 SOLUTION TOPICAL at 18:39

## 2019-01-01 RX ADMIN — CEFEPIME 100 MILLIGRAM(S): 1 INJECTION, POWDER, FOR SOLUTION INTRAMUSCULAR; INTRAVENOUS at 05:37

## 2019-01-01 RX ADMIN — Medication 25 MILLIGRAM(S): at 14:19

## 2019-01-01 RX ADMIN — PREGABALIN 1000 MICROGRAM(S): 225 CAPSULE ORAL at 11:50

## 2019-01-01 RX ADMIN — LOSARTAN POTASSIUM 50 MILLIGRAM(S): 100 TABLET, FILM COATED ORAL at 05:27

## 2019-01-01 RX ADMIN — Medication 216 GRAM(S): at 10:30

## 2019-01-01 RX ADMIN — INSULIN GLARGINE 9 UNIT(S): 100 INJECTION, SOLUTION SUBCUTANEOUS at 21:39

## 2019-01-01 RX ADMIN — Medication 25 MILLIGRAM(S): at 13:17

## 2019-01-01 RX ADMIN — Medication 650 MILLIGRAM(S): at 12:29

## 2019-01-01 RX ADMIN — Medication 25 MILLIGRAM(S): at 21:52

## 2019-01-01 RX ADMIN — PANTOPRAZOLE SODIUM 40 MILLIGRAM(S): 20 TABLET, DELAYED RELEASE ORAL at 11:52

## 2019-01-01 RX ADMIN — CHLORHEXIDINE GLUCONATE 1 APPLICATION(S): 213 SOLUTION TOPICAL at 06:06

## 2019-01-01 RX ADMIN — Medication 7: at 11:41

## 2019-01-01 RX ADMIN — Medication 3: at 06:09

## 2019-01-01 RX ADMIN — LEVETIRACETAM 1500 MILLIGRAM(S): 250 TABLET, FILM COATED ORAL at 05:35

## 2019-01-01 RX ADMIN — LEVETIRACETAM 1000 MILLIGRAM(S): 250 TABLET, FILM COATED ORAL at 06:00

## 2019-01-01 RX ADMIN — CHLORHEXIDINE GLUCONATE 15 MILLILITER(S): 213 SOLUTION TOPICAL at 05:35

## 2019-01-01 RX ADMIN — Medication 25 MILLIGRAM(S): at 14:30

## 2019-01-01 RX ADMIN — HEPARIN SODIUM 5000 UNIT(S): 5000 INJECTION INTRAVENOUS; SUBCUTANEOUS at 06:08

## 2019-01-01 RX ADMIN — CHLORHEXIDINE GLUCONATE 1 APPLICATION(S): 213 SOLUTION TOPICAL at 05:25

## 2019-01-01 RX ADMIN — Medication 650 MILLIGRAM(S): at 22:00

## 2019-01-01 RX ADMIN — Medication 25 MILLIGRAM(S): at 13:44

## 2019-01-01 RX ADMIN — LEVETIRACETAM 1000 MILLIGRAM(S): 250 TABLET, FILM COATED ORAL at 06:12

## 2019-01-01 RX ADMIN — Medication 25 MILLIGRAM(S): at 05:13

## 2019-01-01 RX ADMIN — LEVETIRACETAM 1000 MILLIGRAM(S): 250 TABLET, FILM COATED ORAL at 17:27

## 2019-01-01 RX ADMIN — LEVETIRACETAM 1500 MILLIGRAM(S): 250 TABLET, FILM COATED ORAL at 05:28

## 2019-01-01 RX ADMIN — Medication 7: at 17:18

## 2019-01-01 RX ADMIN — LEVETIRACETAM 1000 MILLIGRAM(S): 250 TABLET, FILM COATED ORAL at 18:36

## 2019-01-01 RX ADMIN — Medication 100 MILLIGRAM(S): at 13:59

## 2019-01-01 RX ADMIN — LEVETIRACETAM 400 MILLIGRAM(S): 250 TABLET, FILM COATED ORAL at 22:17

## 2019-01-01 RX ADMIN — AMPICILLIN SODIUM AND SULBACTAM SODIUM 100 GRAM(S): 250; 125 INJECTION, POWDER, FOR SUSPENSION INTRAMUSCULAR; INTRAVENOUS at 01:44

## 2019-01-01 RX ADMIN — CHLORHEXIDINE GLUCONATE 15 MILLILITER(S): 213 SOLUTION TOPICAL at 05:17

## 2019-01-01 RX ADMIN — LEVETIRACETAM 1000 MILLIGRAM(S): 250 TABLET, FILM COATED ORAL at 17:40

## 2019-01-01 RX ADMIN — HEPARIN SODIUM 5000 UNIT(S): 5000 INJECTION INTRAVENOUS; SUBCUTANEOUS at 05:06

## 2019-01-01 RX ADMIN — Medication 1: at 17:43

## 2019-01-01 RX ADMIN — PANTOPRAZOLE SODIUM 40 MILLIGRAM(S): 20 TABLET, DELAYED RELEASE ORAL at 13:06

## 2019-01-01 RX ADMIN — Medication 25 MILLIGRAM(S): at 05:50

## 2019-01-01 RX ADMIN — CHLORHEXIDINE GLUCONATE 15 MILLILITER(S): 213 SOLUTION TOPICAL at 05:32

## 2019-01-01 RX ADMIN — LOSARTAN POTASSIUM 25 MILLIGRAM(S): 100 TABLET, FILM COATED ORAL at 05:19

## 2019-01-01 RX ADMIN — Medication 25 MILLIGRAM(S): at 06:38

## 2019-01-01 RX ADMIN — CHLORHEXIDINE GLUCONATE 15 MILLILITER(S): 213 SOLUTION TOPICAL at 05:51

## 2019-01-01 RX ADMIN — CEFEPIME 100 MILLIGRAM(S): 1 INJECTION, POWDER, FOR SOLUTION INTRAMUSCULAR; INTRAVENOUS at 05:23

## 2019-01-01 RX ADMIN — CHLORHEXIDINE GLUCONATE 15 MILLILITER(S): 213 SOLUTION TOPICAL at 06:25

## 2019-01-01 RX ADMIN — Medication 1: at 10:14

## 2019-01-01 RX ADMIN — Medication 1: at 22:24

## 2019-01-01 RX ADMIN — LEVETIRACETAM 420 MILLIGRAM(S): 250 TABLET, FILM COATED ORAL at 18:10

## 2019-01-01 RX ADMIN — LOSARTAN POTASSIUM 50 MILLIGRAM(S): 100 TABLET, FILM COATED ORAL at 00:34

## 2019-01-01 RX ADMIN — CHLORHEXIDINE GLUCONATE 15 MILLILITER(S): 213 SOLUTION TOPICAL at 05:26

## 2019-01-01 RX ADMIN — NYSTATIN CREAM 1 APPLICATION(S): 100000 CREAM TOPICAL at 05:54

## 2019-01-01 RX ADMIN — Medication 1: at 12:46

## 2019-01-01 RX ADMIN — PANTOPRAZOLE SODIUM 40 MILLIGRAM(S): 20 TABLET, DELAYED RELEASE ORAL at 11:43

## 2019-01-01 RX ADMIN — HEPARIN SODIUM 5000 UNIT(S): 5000 INJECTION INTRAVENOUS; SUBCUTANEOUS at 05:25

## 2019-01-01 RX ADMIN — CHLORHEXIDINE GLUCONATE 15 MILLILITER(S): 213 SOLUTION TOPICAL at 17:13

## 2019-01-01 RX ADMIN — HEPARIN SODIUM 5000 UNIT(S): 5000 INJECTION INTRAVENOUS; SUBCUTANEOUS at 21:57

## 2019-01-01 RX ADMIN — Medication 3 UNIT(S): at 08:29

## 2019-01-01 RX ADMIN — Medication 3 UNIT(S): at 12:25

## 2019-01-01 RX ADMIN — SIMVASTATIN 20 MILLIGRAM(S): 20 TABLET, FILM COATED ORAL at 22:28

## 2019-01-01 RX ADMIN — FENTANYL CITRATE 3.74 MICROGRAM(S)/KG/HR: 50 INJECTION INTRAVENOUS at 22:46

## 2019-01-01 RX ADMIN — Medication 650 MILLIGRAM(S): at 08:42

## 2019-01-01 RX ADMIN — Medication 1: at 14:30

## 2019-01-01 RX ADMIN — CHLORHEXIDINE GLUCONATE 1 APPLICATION(S): 213 SOLUTION TOPICAL at 05:19

## 2019-01-01 RX ADMIN — Medication 25 MILLIGRAM(S): at 21:26

## 2019-01-01 RX ADMIN — Medication 500000 UNIT(S): at 05:32

## 2019-01-01 RX ADMIN — CHLORHEXIDINE GLUCONATE 15 MILLILITER(S): 213 SOLUTION TOPICAL at 17:06

## 2019-01-01 RX ADMIN — LEVETIRACETAM 1000 MILLIGRAM(S): 250 TABLET, FILM COATED ORAL at 18:29

## 2019-01-01 RX ADMIN — INSULIN GLARGINE 18 UNIT(S): 100 INJECTION, SOLUTION SUBCUTANEOUS at 07:01

## 2019-01-01 RX ADMIN — HEPARIN SODIUM 5000 UNIT(S): 5000 INJECTION INTRAVENOUS; SUBCUTANEOUS at 22:33

## 2019-01-01 RX ADMIN — Medication 650 MILLIGRAM(S): at 11:36

## 2019-01-01 RX ADMIN — Medication 500000 UNIT(S): at 19:03

## 2019-01-01 RX ADMIN — LEVETIRACETAM 1000 MILLIGRAM(S): 250 TABLET, FILM COATED ORAL at 06:07

## 2019-01-01 RX ADMIN — NYSTATIN CREAM 1 APPLICATION(S): 100000 CREAM TOPICAL at 14:07

## 2019-01-01 RX ADMIN — Medication 25 MILLIGRAM(S): at 05:37

## 2019-01-01 RX ADMIN — AMPICILLIN SODIUM AND SULBACTAM SODIUM 100 GRAM(S): 250; 125 INJECTION, POWDER, FOR SUSPENSION INTRAMUSCULAR; INTRAVENOUS at 12:26

## 2019-01-01 RX ADMIN — AMLODIPINE BESYLATE 2.5 MILLIGRAM(S): 2.5 TABLET ORAL at 05:45

## 2019-01-01 RX ADMIN — Medication 1: at 12:25

## 2019-01-01 RX ADMIN — LOSARTAN POTASSIUM 25 MILLIGRAM(S): 100 TABLET, FILM COATED ORAL at 05:12

## 2019-01-01 RX ADMIN — HEPARIN SODIUM 5000 UNIT(S): 5000 INJECTION INTRAVENOUS; SUBCUTANEOUS at 05:54

## 2019-01-01 RX ADMIN — Medication 25 MILLIGRAM(S): at 21:38

## 2019-01-01 RX ADMIN — Medication 25 MILLIGRAM(S): at 21:24

## 2019-01-01 RX ADMIN — Medication 25 MILLIGRAM(S): at 22:32

## 2019-01-01 RX ADMIN — LEVETIRACETAM 1000 MILLIGRAM(S): 250 TABLET, FILM COATED ORAL at 18:30

## 2019-01-01 RX ADMIN — Medication 50 GRAM(S): at 11:43

## 2019-01-01 RX ADMIN — Medication 25 MILLIGRAM(S): at 17:35

## 2019-01-01 RX ADMIN — NYSTATIN CREAM 1 APPLICATION(S): 100000 CREAM TOPICAL at 21:32

## 2019-01-01 RX ADMIN — CHLORHEXIDINE GLUCONATE 15 MILLILITER(S): 213 SOLUTION TOPICAL at 19:09

## 2019-01-01 RX ADMIN — PANTOPRAZOLE SODIUM 40 MILLIGRAM(S): 20 TABLET, DELAYED RELEASE ORAL at 12:12

## 2019-01-01 RX ADMIN — CHLORHEXIDINE GLUCONATE 15 MILLILITER(S): 213 SOLUTION TOPICAL at 19:13

## 2019-01-01 RX ADMIN — PANTOPRAZOLE SODIUM 40 MILLIGRAM(S): 20 TABLET, DELAYED RELEASE ORAL at 11:29

## 2019-01-01 RX ADMIN — CHLORHEXIDINE GLUCONATE 15 MILLILITER(S): 213 SOLUTION TOPICAL at 05:43

## 2019-01-01 RX ADMIN — Medication 100 MILLIGRAM(S): at 14:54

## 2019-01-01 RX ADMIN — Medication 216 GRAM(S): at 02:09

## 2019-01-01 RX ADMIN — HEPARIN SODIUM 5000 UNIT(S): 5000 INJECTION INTRAVENOUS; SUBCUTANEOUS at 06:25

## 2019-01-01 RX ADMIN — AMLODIPINE BESYLATE 10 MILLIGRAM(S): 2.5 TABLET ORAL at 05:18

## 2019-01-01 RX ADMIN — Medication 25 MILLIGRAM(S): at 14:26

## 2019-01-01 RX ADMIN — Medication 650 MILLIGRAM(S): at 18:12

## 2019-01-01 RX ADMIN — Medication 100 MILLIGRAM(S): at 21:50

## 2019-01-01 RX ADMIN — Medication 50 GRAM(S): at 10:33

## 2019-01-01 RX ADMIN — CHLORHEXIDINE GLUCONATE 1 APPLICATION(S): 213 SOLUTION TOPICAL at 05:37

## 2019-01-01 RX ADMIN — Medication 100 MILLIGRAM(S): at 05:33

## 2019-01-01 RX ADMIN — Medication 1: at 06:27

## 2019-01-01 RX ADMIN — HEPARIN SODIUM 5000 UNIT(S): 5000 INJECTION INTRAVENOUS; SUBCUTANEOUS at 13:31

## 2019-01-01 RX ADMIN — Medication 6 UNIT(S): at 06:07

## 2019-01-01 RX ADMIN — PANTOPRAZOLE SODIUM 40 MILLIGRAM(S): 20 TABLET, DELAYED RELEASE ORAL at 12:44

## 2019-01-01 RX ADMIN — INSULIN GLARGINE 9 UNIT(S): 100 INJECTION, SOLUTION SUBCUTANEOUS at 22:38

## 2019-01-01 RX ADMIN — AMLODIPINE BESYLATE 10 MILLIGRAM(S): 2.5 TABLET ORAL at 05:42

## 2019-01-01 RX ADMIN — CEFEPIME 100 MILLIGRAM(S): 1 INJECTION, POWDER, FOR SOLUTION INTRAMUSCULAR; INTRAVENOUS at 05:38

## 2019-01-01 RX ADMIN — LOSARTAN POTASSIUM 25 MILLIGRAM(S): 100 TABLET, FILM COATED ORAL at 05:32

## 2019-01-01 RX ADMIN — LOSARTAN POTASSIUM 25 MILLIGRAM(S): 100 TABLET, FILM COATED ORAL at 06:41

## 2019-01-01 RX ADMIN — Medication 650 MILLIGRAM(S): at 21:02

## 2019-01-01 RX ADMIN — Medication 216 GRAM(S): at 06:38

## 2019-01-01 RX ADMIN — LEVETIRACETAM 420 MILLIGRAM(S): 250 TABLET, FILM COATED ORAL at 05:08

## 2019-01-01 RX ADMIN — Medication 25 MILLIGRAM(S): at 22:07

## 2019-01-01 RX ADMIN — LEVETIRACETAM 1000 MILLIGRAM(S): 250 TABLET, FILM COATED ORAL at 18:52

## 2019-01-01 RX ADMIN — LEVETIRACETAM 1000 MILLIGRAM(S): 250 TABLET, FILM COATED ORAL at 17:02

## 2019-01-01 RX ADMIN — PANTOPRAZOLE SODIUM 40 MILLIGRAM(S): 20 TABLET, DELAYED RELEASE ORAL at 12:58

## 2019-01-01 RX ADMIN — Medication 500000 UNIT(S): at 17:06

## 2019-01-01 RX ADMIN — CEFEPIME 100 MILLIGRAM(S): 1 INJECTION, POWDER, FOR SOLUTION INTRAMUSCULAR; INTRAVENOUS at 21:00

## 2019-01-01 RX ADMIN — HEPARIN SODIUM 5000 UNIT(S): 5000 INJECTION INTRAVENOUS; SUBCUTANEOUS at 21:48

## 2019-01-01 RX ADMIN — Medication 25 MILLIGRAM(S): at 05:11

## 2019-01-01 RX ADMIN — Medication 25 MILLIGRAM(S): at 21:11

## 2019-01-01 RX ADMIN — PANTOPRAZOLE SODIUM 40 MILLIGRAM(S): 20 TABLET, DELAYED RELEASE ORAL at 11:02

## 2019-01-01 RX ADMIN — Medication 500000 UNIT(S): at 18:11

## 2019-01-01 RX ADMIN — Medication 25 MILLIGRAM(S): at 16:48

## 2019-01-01 RX ADMIN — HEPARIN SODIUM 5000 UNIT(S): 5000 INJECTION INTRAVENOUS; SUBCUTANEOUS at 06:38

## 2019-01-01 RX ADMIN — HEPARIN SODIUM 5000 UNIT(S): 5000 INJECTION INTRAVENOUS; SUBCUTANEOUS at 05:13

## 2019-01-01 RX ADMIN — Medication 1: at 22:22

## 2019-01-01 RX ADMIN — Medication 1: at 14:25

## 2019-01-01 RX ADMIN — LEVETIRACETAM 1500 MILLIGRAM(S): 250 TABLET, FILM COATED ORAL at 06:23

## 2019-01-01 RX ADMIN — SENNA PLUS 2 TABLET(S): 8.6 TABLET ORAL at 21:52

## 2019-01-01 RX ADMIN — Medication 25 MILLIGRAM(S): at 21:05

## 2019-01-01 RX ADMIN — LEVETIRACETAM 1000 MILLIGRAM(S): 250 TABLET, FILM COATED ORAL at 17:18

## 2019-01-01 RX ADMIN — PANTOPRAZOLE SODIUM 40 MILLIGRAM(S): 20 TABLET, DELAYED RELEASE ORAL at 14:23

## 2019-01-01 RX ADMIN — LOSARTAN POTASSIUM 25 MILLIGRAM(S): 100 TABLET, FILM COATED ORAL at 06:38

## 2019-01-01 RX ADMIN — PROPOFOL 5 MICROGRAM(S)/KG/MIN: 10 INJECTION, EMULSION INTRAVENOUS at 00:04

## 2019-01-01 RX ADMIN — Medication 25 MILLIGRAM(S): at 06:22

## 2019-01-01 RX ADMIN — HEPARIN SODIUM 5000 UNIT(S): 5000 INJECTION INTRAVENOUS; SUBCUTANEOUS at 21:52

## 2019-01-01 RX ADMIN — AMLODIPINE BESYLATE 10 MILLIGRAM(S): 2.5 TABLET ORAL at 05:34

## 2019-01-01 RX ADMIN — INSULIN GLARGINE 18 UNIT(S): 100 INJECTION, SOLUTION SUBCUTANEOUS at 07:12

## 2019-01-01 RX ADMIN — Medication 50 GRAM(S): at 10:17

## 2019-01-01 RX ADMIN — Medication 1: at 21:17

## 2019-01-01 RX ADMIN — HEPARIN SODIUM 5000 UNIT(S): 5000 INJECTION INTRAVENOUS; SUBCUTANEOUS at 14:37

## 2019-01-01 RX ADMIN — Medication 25 MILLIGRAM(S): at 13:07

## 2019-01-01 RX ADMIN — Medication 25 MILLIGRAM(S): at 21:25

## 2019-01-01 RX ADMIN — Medication 1: at 05:26

## 2019-01-01 RX ADMIN — Medication 2: at 16:54

## 2019-01-01 RX ADMIN — AMLODIPINE BESYLATE 2.5 MILLIGRAM(S): 2.5 TABLET ORAL at 06:06

## 2019-01-01 RX ADMIN — Medication 25 MILLIGRAM(S): at 16:23

## 2019-01-01 RX ADMIN — Medication 25 MILLIGRAM(S): at 13:41

## 2019-01-01 RX ADMIN — AMPICILLIN SODIUM AND SULBACTAM SODIUM 100 GRAM(S): 250; 125 INJECTION, POWDER, FOR SUSPENSION INTRAMUSCULAR; INTRAVENOUS at 17:28

## 2019-01-01 RX ADMIN — Medication 6 UNIT(S): at 17:11

## 2019-01-01 RX ADMIN — PANTOPRAZOLE SODIUM 40 MILLIGRAM(S): 20 TABLET, DELAYED RELEASE ORAL at 12:09

## 2019-01-01 RX ADMIN — PANTOPRAZOLE SODIUM 40 MILLIGRAM(S): 20 TABLET, DELAYED RELEASE ORAL at 11:48

## 2019-01-01 RX ADMIN — PREGABALIN 1000 MICROGRAM(S): 225 CAPSULE ORAL at 12:05

## 2019-01-01 RX ADMIN — LOSARTAN POTASSIUM 25 MILLIGRAM(S): 100 TABLET, FILM COATED ORAL at 05:36

## 2019-01-01 RX ADMIN — CHLORHEXIDINE GLUCONATE 15 MILLILITER(S): 213 SOLUTION TOPICAL at 06:38

## 2019-01-01 RX ADMIN — Medication 25 MILLIGRAM(S): at 05:42

## 2019-01-01 RX ADMIN — CHLORHEXIDINE GLUCONATE 15 MILLILITER(S): 213 SOLUTION TOPICAL at 05:33

## 2019-01-01 RX ADMIN — SENNA PLUS 2 TABLET(S): 8.6 TABLET ORAL at 21:29

## 2019-01-01 RX ADMIN — HEPARIN SODIUM 5000 UNIT(S): 5000 INJECTION INTRAVENOUS; SUBCUTANEOUS at 14:44

## 2019-01-01 RX ADMIN — Medication 1: at 02:16

## 2019-01-01 RX ADMIN — LEVETIRACETAM 1000 MILLIGRAM(S): 250 TABLET, FILM COATED ORAL at 05:53

## 2019-01-01 RX ADMIN — Medication 500000 UNIT(S): at 17:05

## 2019-01-01 RX ADMIN — HEPARIN SODIUM 5000 UNIT(S): 5000 INJECTION INTRAVENOUS; SUBCUTANEOUS at 21:40

## 2019-01-01 RX ADMIN — Medication 650 MILLIGRAM(S): at 07:06

## 2019-01-01 RX ADMIN — LEVETIRACETAM 1000 MILLIGRAM(S): 250 TABLET, FILM COATED ORAL at 17:16

## 2019-01-01 RX ADMIN — Medication 100 MILLIGRAM(S): at 05:13

## 2019-01-01 RX ADMIN — Medication 1: at 22:28

## 2019-01-01 RX ADMIN — MORPHINE SULFATE 2 MILLIGRAM(S): 50 CAPSULE, EXTENDED RELEASE ORAL at 23:19

## 2019-01-01 RX ADMIN — NYSTATIN CREAM 1 APPLICATION(S): 100000 CREAM TOPICAL at 21:38

## 2019-01-01 RX ADMIN — INSULIN GLARGINE 18 UNIT(S): 100 INJECTION, SOLUTION SUBCUTANEOUS at 08:26

## 2019-01-01 RX ADMIN — PANTOPRAZOLE SODIUM 40 MILLIGRAM(S): 20 TABLET, DELAYED RELEASE ORAL at 13:23

## 2019-01-01 RX ADMIN — MORPHINE SULFATE 2 MILLIGRAM(S): 50 CAPSULE, EXTENDED RELEASE ORAL at 23:30

## 2019-01-01 RX ADMIN — INSULIN GLARGINE 9 UNIT(S): 100 INJECTION, SOLUTION SUBCUTANEOUS at 22:25

## 2019-01-01 RX ADMIN — NYSTATIN CREAM 1 APPLICATION(S): 100000 CREAM TOPICAL at 06:38

## 2019-01-01 RX ADMIN — Medication 1000 UNIT(S): at 11:52

## 2019-01-01 RX ADMIN — Medication 100 MILLIGRAM(S): at 05:08

## 2019-01-01 RX ADMIN — PANTOPRAZOLE SODIUM 40 MILLIGRAM(S): 20 TABLET, DELAYED RELEASE ORAL at 11:31

## 2019-01-01 RX ADMIN — Medication 100 MILLIGRAM(S): at 21:57

## 2019-01-01 RX ADMIN — Medication 1: at 14:55

## 2019-01-01 RX ADMIN — AMLODIPINE BESYLATE 10 MILLIGRAM(S): 2.5 TABLET ORAL at 05:10

## 2019-01-01 RX ADMIN — CHLORHEXIDINE GLUCONATE 1 APPLICATION(S): 213 SOLUTION TOPICAL at 05:29

## 2019-01-01 RX ADMIN — AMPICILLIN SODIUM AND SULBACTAM SODIUM 100 GRAM(S): 250; 125 INJECTION, POWDER, FOR SUSPENSION INTRAMUSCULAR; INTRAVENOUS at 01:34

## 2019-01-01 RX ADMIN — INSULIN GLARGINE 18 UNIT(S): 100 INJECTION, SOLUTION SUBCUTANEOUS at 09:55

## 2019-01-01 RX ADMIN — Medication 25 MILLIGRAM(S): at 21:39

## 2019-01-01 RX ADMIN — SENNA PLUS 2 TABLET(S): 8.6 TABLET ORAL at 21:08

## 2019-01-01 RX ADMIN — Medication 25 MILLIGRAM(S): at 05:46

## 2019-01-01 RX ADMIN — Medication 2: at 14:35

## 2019-01-01 RX ADMIN — CHLORHEXIDINE GLUCONATE 15 MILLILITER(S): 213 SOLUTION TOPICAL at 17:22

## 2019-01-01 RX ADMIN — LEVETIRACETAM 1500 MILLIGRAM(S): 250 TABLET, FILM COATED ORAL at 05:06

## 2019-01-01 RX ADMIN — LEVETIRACETAM 1500 MILLIGRAM(S): 250 TABLET, FILM COATED ORAL at 18:01

## 2019-01-01 RX ADMIN — CHLORHEXIDINE GLUCONATE 15 MILLILITER(S): 213 SOLUTION TOPICAL at 05:47

## 2019-01-01 RX ADMIN — Medication 1000 UNIT(S): at 11:50

## 2019-01-01 RX ADMIN — CEFEPIME 100 MILLIGRAM(S): 1 INJECTION, POWDER, FOR SOLUTION INTRAMUSCULAR; INTRAVENOUS at 05:50

## 2019-01-01 RX ADMIN — Medication 216 GRAM(S): at 22:13

## 2019-01-01 RX ADMIN — Medication 500000 UNIT(S): at 17:12

## 2019-01-01 RX ADMIN — Medication 1: at 06:39

## 2019-01-01 RX ADMIN — Medication 25 MILLIGRAM(S): at 06:07

## 2019-01-01 RX ADMIN — PROPOFOL 0.47 MICROGRAM(S)/KG/MIN: 10 INJECTION, EMULSION INTRAVENOUS at 01:24

## 2019-01-01 RX ADMIN — CHLORHEXIDINE GLUCONATE 15 MILLILITER(S): 213 SOLUTION TOPICAL at 17:40

## 2019-01-01 RX ADMIN — HEPARIN SODIUM 5000 UNIT(S): 5000 INJECTION INTRAVENOUS; SUBCUTANEOUS at 21:30

## 2019-01-01 RX ADMIN — Medication 1: at 21:45

## 2019-01-01 RX ADMIN — HEPARIN SODIUM 5000 UNIT(S): 5000 INJECTION INTRAVENOUS; SUBCUTANEOUS at 13:14

## 2019-01-01 RX ADMIN — Medication 2 MILLIGRAM(S): at 06:21

## 2019-01-01 RX ADMIN — LEVETIRACETAM 1000 MILLIGRAM(S): 250 TABLET, FILM COATED ORAL at 05:33

## 2019-01-01 RX ADMIN — LEVETIRACETAM 1000 MILLIGRAM(S): 250 TABLET, FILM COATED ORAL at 05:02

## 2019-01-01 RX ADMIN — SODIUM CHLORIDE 100 MILLILITER(S): 9 INJECTION INTRAMUSCULAR; INTRAVENOUS; SUBCUTANEOUS at 05:40

## 2019-01-01 RX ADMIN — Medication 100 MILLIGRAM(S): at 21:52

## 2019-01-01 RX ADMIN — Medication 1: at 06:08

## 2019-01-01 RX ADMIN — Medication 650 MILLIGRAM(S): at 05:51

## 2019-01-01 RX ADMIN — Medication 500000 UNIT(S): at 17:14

## 2019-01-01 RX ADMIN — LEVETIRACETAM 420 MILLIGRAM(S): 250 TABLET, FILM COATED ORAL at 17:22

## 2019-01-01 RX ADMIN — Medication 3 UNIT(S): at 08:01

## 2019-01-01 RX ADMIN — Medication 1: at 17:55

## 2019-01-01 RX ADMIN — CHLORHEXIDINE GLUCONATE 15 MILLILITER(S): 213 SOLUTION TOPICAL at 17:03

## 2019-01-01 RX ADMIN — Medication 500000 UNIT(S): at 06:00

## 2019-01-01 RX ADMIN — Medication 3: at 17:53

## 2019-01-01 RX ADMIN — Medication 216 GRAM(S): at 02:30

## 2019-01-01 RX ADMIN — LEVETIRACETAM 1000 MILLIGRAM(S): 250 TABLET, FILM COATED ORAL at 17:54

## 2019-01-01 RX ADMIN — CHLORHEXIDINE GLUCONATE 15 MILLILITER(S): 213 SOLUTION TOPICAL at 17:44

## 2019-01-01 RX ADMIN — Medication 300 MILLIGRAM(S): at 22:37

## 2019-01-01 RX ADMIN — Medication 216 GRAM(S): at 09:22

## 2019-01-01 RX ADMIN — NYSTATIN CREAM 1 APPLICATION(S): 100000 CREAM TOPICAL at 05:46

## 2019-01-01 RX ADMIN — Medication 25 MILLIGRAM(S): at 21:02

## 2019-01-01 RX ADMIN — FENTANYL CITRATE 3.74 MICROGRAM(S)/KG/HR: 50 INJECTION INTRAVENOUS at 05:40

## 2019-01-01 RX ADMIN — INSULIN GLARGINE 18 UNIT(S): 100 INJECTION, SOLUTION SUBCUTANEOUS at 05:41

## 2019-01-01 RX ADMIN — INSULIN GLARGINE 18 UNIT(S): 100 INJECTION, SOLUTION SUBCUTANEOUS at 10:06

## 2019-01-01 RX ADMIN — Medication 25 MILLIGRAM(S): at 13:29

## 2019-01-01 RX ADMIN — Medication 500000 UNIT(S): at 05:13

## 2019-01-01 RX ADMIN — CHLORHEXIDINE GLUCONATE 15 MILLILITER(S): 213 SOLUTION TOPICAL at 18:54

## 2019-01-01 RX ADMIN — NYSTATIN CREAM 1 APPLICATION(S): 100000 CREAM TOPICAL at 13:42

## 2019-01-01 RX ADMIN — Medication 500000 UNIT(S): at 17:01

## 2019-01-01 RX ADMIN — Medication 25 MILLIGRAM(S): at 05:45

## 2019-01-01 RX ADMIN — HEPARIN SODIUM 5000 UNIT(S): 5000 INJECTION INTRAVENOUS; SUBCUTANEOUS at 13:16

## 2019-01-01 RX ADMIN — CHLORHEXIDINE GLUCONATE 1 APPLICATION(S): 213 SOLUTION TOPICAL at 05:50

## 2019-01-01 RX ADMIN — Medication 500000 UNIT(S): at 05:47

## 2019-01-01 RX ADMIN — HEPARIN SODIUM 5000 UNIT(S): 5000 INJECTION INTRAVENOUS; SUBCUTANEOUS at 05:29

## 2019-01-01 RX ADMIN — LEVETIRACETAM 400 MILLIGRAM(S): 250 TABLET, FILM COATED ORAL at 05:25

## 2019-01-01 RX ADMIN — HEPARIN SODIUM 5000 UNIT(S): 5000 INJECTION INTRAVENOUS; SUBCUTANEOUS at 22:05

## 2019-01-01 RX ADMIN — LEVETIRACETAM 1000 MILLIGRAM(S): 250 TABLET, FILM COATED ORAL at 06:56

## 2019-01-01 RX ADMIN — ONDANSETRON 4 MILLIGRAM(S): 8 TABLET, FILM COATED ORAL at 00:01

## 2019-01-01 RX ADMIN — Medication 25 MILLIGRAM(S): at 13:22

## 2019-01-01 RX ADMIN — CHLORHEXIDINE GLUCONATE 1 APPLICATION(S): 213 SOLUTION TOPICAL at 06:32

## 2019-01-01 RX ADMIN — Medication 500000 UNIT(S): at 17:54

## 2019-01-01 RX ADMIN — CHLORHEXIDINE GLUCONATE 1 APPLICATION(S): 213 SOLUTION TOPICAL at 05:16

## 2019-01-01 RX ADMIN — SENNA PLUS 2 TABLET(S): 8.6 TABLET ORAL at 21:50

## 2019-01-01 RX ADMIN — Medication 216 GRAM(S): at 10:46

## 2019-01-01 RX ADMIN — INSULIN GLARGINE 18 UNIT(S): 100 INJECTION, SOLUTION SUBCUTANEOUS at 07:41

## 2019-01-01 RX ADMIN — LEVETIRACETAM 1000 MILLIGRAM(S): 250 TABLET, FILM COATED ORAL at 05:16

## 2019-01-01 RX ADMIN — POTASSIUM PHOSPHATE, MONOBASIC POTASSIUM PHOSPHATE, DIBASIC 83.33 MILLIMOLE(S): 236; 224 INJECTION, SOLUTION INTRAVENOUS at 12:47

## 2019-01-01 RX ADMIN — LEVETIRACETAM 1500 MILLIGRAM(S): 250 TABLET, FILM COATED ORAL at 18:20

## 2019-01-01 RX ADMIN — Medication 25 MILLIGRAM(S): at 05:06

## 2019-01-01 RX ADMIN — LEVETIRACETAM 1500 MILLIGRAM(S): 250 TABLET, FILM COATED ORAL at 17:12

## 2019-01-01 RX ADMIN — LEVETIRACETAM 1000 MILLIGRAM(S): 250 TABLET, FILM COATED ORAL at 18:49

## 2019-01-01 RX ADMIN — Medication 500000 UNIT(S): at 05:03

## 2019-01-01 RX ADMIN — Medication 1: at 06:05

## 2019-01-01 RX ADMIN — Medication 650 MILLIGRAM(S): at 08:57

## 2019-01-01 RX ADMIN — CHLORHEXIDINE GLUCONATE 15 MILLILITER(S): 213 SOLUTION TOPICAL at 06:56

## 2019-01-01 RX ADMIN — Medication 1: at 21:37

## 2019-01-01 RX ADMIN — LEVETIRACETAM 1500 MILLIGRAM(S): 250 TABLET, FILM COATED ORAL at 17:02

## 2019-01-01 RX ADMIN — CHLORHEXIDINE GLUCONATE 15 MILLILITER(S): 213 SOLUTION TOPICAL at 05:07

## 2019-01-01 RX ADMIN — Medication 25 MILLIGRAM(S): at 15:07

## 2019-01-01 RX ADMIN — Medication 250 MILLIGRAM(S): at 12:04

## 2019-01-01 RX ADMIN — LOSARTAN POTASSIUM 25 MILLIGRAM(S): 100 TABLET, FILM COATED ORAL at 06:06

## 2019-01-01 RX ADMIN — Medication 1: at 08:28

## 2019-01-01 RX ADMIN — LOSARTAN POTASSIUM 50 MILLIGRAM(S): 100 TABLET, FILM COATED ORAL at 05:08

## 2019-01-01 RX ADMIN — Medication 1: at 14:23

## 2019-01-01 RX ADMIN — Medication 500000 UNIT(S): at 18:01

## 2019-01-01 RX ADMIN — Medication 25 MILLIGRAM(S): at 06:06

## 2019-01-01 RX ADMIN — Medication 25 MILLIGRAM(S): at 14:40

## 2019-01-01 RX ADMIN — Medication 500000 UNIT(S): at 17:16

## 2019-01-01 RX ADMIN — HEPARIN SODIUM 5000 UNIT(S): 5000 INJECTION INTRAVENOUS; SUBCUTANEOUS at 05:27

## 2019-01-01 RX ADMIN — Medication 2: at 22:53

## 2019-01-01 RX ADMIN — LEVETIRACETAM 1000 MILLIGRAM(S): 250 TABLET, FILM COATED ORAL at 18:24

## 2019-01-01 RX ADMIN — CHLORHEXIDINE GLUCONATE 15 MILLILITER(S): 213 SOLUTION TOPICAL at 18:05

## 2019-01-01 RX ADMIN — Medication 6 UNIT(S): at 13:24

## 2019-01-01 RX ADMIN — CHLORHEXIDINE GLUCONATE 15 MILLILITER(S): 213 SOLUTION TOPICAL at 17:25

## 2019-01-01 RX ADMIN — Medication 25 MILLIGRAM(S): at 05:18

## 2019-01-01 RX ADMIN — LOSARTAN POTASSIUM 25 MILLIGRAM(S): 100 TABLET, FILM COATED ORAL at 06:03

## 2019-01-01 RX ADMIN — Medication 1: at 01:56

## 2019-01-01 RX ADMIN — AMLODIPINE BESYLATE 10 MILLIGRAM(S): 2.5 TABLET ORAL at 05:14

## 2019-01-01 RX ADMIN — Medication 1: at 01:50

## 2019-01-01 RX ADMIN — Medication 3 UNIT(S): at 08:20

## 2019-01-01 RX ADMIN — PREGABALIN 1000 MICROGRAM(S): 225 CAPSULE ORAL at 11:52

## 2019-01-01 RX ADMIN — CHLORHEXIDINE GLUCONATE 15 MILLILITER(S): 213 SOLUTION TOPICAL at 05:44

## 2019-01-01 RX ADMIN — HEPARIN SODIUM 5000 UNIT(S): 5000 INJECTION INTRAVENOUS; SUBCUTANEOUS at 13:05

## 2019-01-01 RX ADMIN — Medication 7: at 17:01

## 2019-01-01 RX ADMIN — Medication 25 MILLIGRAM(S): at 13:24

## 2019-01-01 RX ADMIN — Medication 25 MILLIGRAM(S): at 13:03

## 2019-01-01 RX ADMIN — Medication 3: at 23:58

## 2019-01-01 RX ADMIN — Medication 500000 UNIT(S): at 05:31

## 2019-01-01 RX ADMIN — Medication 650 MILLIGRAM(S): at 07:53

## 2019-01-01 RX ADMIN — HEPARIN SODIUM 5000 UNIT(S): 5000 INJECTION INTRAVENOUS; SUBCUTANEOUS at 05:33

## 2019-01-01 RX ADMIN — NYSTATIN CREAM 1 APPLICATION(S): 100000 CREAM TOPICAL at 22:33

## 2019-01-01 RX ADMIN — Medication 6 UNIT(S): at 11:23

## 2019-01-01 RX ADMIN — SENNA PLUS 2 TABLET(S): 8.6 TABLET ORAL at 21:39

## 2019-01-01 RX ADMIN — LOSARTAN POTASSIUM 25 MILLIGRAM(S): 100 TABLET, FILM COATED ORAL at 05:47

## 2019-01-01 RX ADMIN — CHLORHEXIDINE GLUCONATE 1 APPLICATION(S): 213 SOLUTION TOPICAL at 05:36

## 2019-01-01 RX ADMIN — CHLORHEXIDINE GLUCONATE 15 MILLILITER(S): 213 SOLUTION TOPICAL at 05:02

## 2019-01-01 RX ADMIN — Medication 216 GRAM(S): at 05:52

## 2019-01-01 RX ADMIN — Medication 650 MILLIGRAM(S): at 13:45

## 2019-01-01 RX ADMIN — Medication 6 UNIT(S): at 08:31

## 2019-01-01 RX ADMIN — HEPARIN SODIUM 5000 UNIT(S): 5000 INJECTION INTRAVENOUS; SUBCUTANEOUS at 21:08

## 2019-01-01 RX ADMIN — CHLORHEXIDINE GLUCONATE 1 APPLICATION(S): 213 SOLUTION TOPICAL at 05:51

## 2019-01-01 RX ADMIN — LEVETIRACETAM 1500 MILLIGRAM(S): 250 TABLET, FILM COATED ORAL at 05:18

## 2019-01-01 RX ADMIN — Medication 1: at 10:09

## 2019-01-01 RX ADMIN — Medication 100 MILLIGRAM(S): at 13:06

## 2019-01-01 RX ADMIN — Medication 3 UNIT(S): at 13:43

## 2019-01-01 RX ADMIN — Medication 1: at 05:14

## 2019-01-01 RX ADMIN — Medication 500000 UNIT(S): at 17:38

## 2019-01-01 RX ADMIN — Medication 25 MILLIGRAM(S): at 14:06

## 2019-01-01 RX ADMIN — LEVETIRACETAM 1000 MILLIGRAM(S): 250 TABLET, FILM COATED ORAL at 17:48

## 2019-01-01 RX ADMIN — AMLODIPINE BESYLATE 10 MILLIGRAM(S): 2.5 TABLET ORAL at 05:38

## 2019-01-01 RX ADMIN — Medication 500000 UNIT(S): at 06:26

## 2019-01-01 RX ADMIN — Medication 1: at 08:10

## 2019-01-01 RX ADMIN — HYDROMORPHONE HYDROCHLORIDE 1 MILLIGRAM(S): 2 INJECTION INTRAMUSCULAR; INTRAVENOUS; SUBCUTANEOUS at 22:01

## 2019-01-01 RX ADMIN — SENNA PLUS 2 TABLET(S): 8.6 TABLET ORAL at 21:23

## 2019-01-01 RX ADMIN — Medication 2: at 17:35

## 2019-01-01 RX ADMIN — Medication 25 MILLIGRAM(S): at 13:06

## 2019-01-01 RX ADMIN — Medication 25 MILLIGRAM(S): at 22:31

## 2019-01-01 RX ADMIN — MORPHINE SULFATE 2 MILLIGRAM(S): 50 CAPSULE, EXTENDED RELEASE ORAL at 20:24

## 2019-01-01 RX ADMIN — LEVETIRACETAM 1500 MILLIGRAM(S): 250 TABLET, FILM COATED ORAL at 17:06

## 2019-01-01 RX ADMIN — AMPICILLIN SODIUM AND SULBACTAM SODIUM 100 GRAM(S): 250; 125 INJECTION, POWDER, FOR SUSPENSION INTRAMUSCULAR; INTRAVENOUS at 11:24

## 2019-01-01 RX ADMIN — Medication 1: at 21:18

## 2019-01-01 RX ADMIN — CHLORHEXIDINE GLUCONATE 15 MILLILITER(S): 213 SOLUTION TOPICAL at 06:36

## 2019-01-01 RX ADMIN — CHLORHEXIDINE GLUCONATE 15 MILLILITER(S): 213 SOLUTION TOPICAL at 05:37

## 2019-01-01 RX ADMIN — Medication 100 MILLIGRAM(S): at 21:56

## 2019-01-01 RX ADMIN — POTASSIUM PHOSPHATE, MONOBASIC POTASSIUM PHOSPHATE, DIBASIC 83.33 MILLIMOLE(S): 236; 224 INJECTION, SOLUTION INTRAVENOUS at 17:13

## 2019-01-01 RX ADMIN — CHLORHEXIDINE GLUCONATE 15 MILLILITER(S): 213 SOLUTION TOPICAL at 05:45

## 2019-01-01 RX ADMIN — CHLORHEXIDINE GLUCONATE 1 APPLICATION(S): 213 SOLUTION TOPICAL at 05:01

## 2019-01-01 RX ADMIN — FENTANYL CITRATE 0.5 MICROGRAM(S)/KG/HR: 50 INJECTION INTRAVENOUS at 23:30

## 2019-01-01 RX ADMIN — HEPARIN SODIUM 5000 UNIT(S): 5000 INJECTION INTRAVENOUS; SUBCUTANEOUS at 21:44

## 2019-01-01 RX ADMIN — LEVETIRACETAM 1500 MILLIGRAM(S): 250 TABLET, FILM COATED ORAL at 05:03

## 2019-01-01 RX ADMIN — Medication 3: at 08:30

## 2019-01-01 RX ADMIN — HEPARIN SODIUM 5000 UNIT(S): 5000 INJECTION INTRAVENOUS; SUBCUTANEOUS at 06:22

## 2019-01-01 RX ADMIN — INSULIN HUMAN 4 UNIT(S)/HR: 100 INJECTION, SOLUTION SUBCUTANEOUS at 19:00

## 2019-01-01 RX ADMIN — INSULIN GLARGINE 18 UNIT(S): 100 INJECTION, SOLUTION SUBCUTANEOUS at 10:16

## 2019-01-01 RX ADMIN — AMLODIPINE BESYLATE 2.5 MILLIGRAM(S): 2.5 TABLET ORAL at 06:07

## 2019-01-01 RX ADMIN — Medication 500000 UNIT(S): at 18:05

## 2019-01-01 RX ADMIN — CHLORHEXIDINE GLUCONATE 15 MILLILITER(S): 213 SOLUTION TOPICAL at 05:50

## 2019-01-01 RX ADMIN — LOSARTAN POTASSIUM 25 MILLIGRAM(S): 100 TABLET, FILM COATED ORAL at 05:50

## 2019-01-01 RX ADMIN — PANTOPRAZOLE SODIUM 40 MILLIGRAM(S): 20 TABLET, DELAYED RELEASE ORAL at 12:04

## 2019-01-01 RX ADMIN — Medication 216 GRAM(S): at 18:29

## 2019-01-01 RX ADMIN — Medication 25 MILLIGRAM(S): at 22:15

## 2019-01-01 RX ADMIN — HEPARIN SODIUM 5000 UNIT(S): 5000 INJECTION INTRAVENOUS; SUBCUTANEOUS at 06:37

## 2019-01-01 RX ADMIN — Medication 1: at 16:29

## 2019-01-01 RX ADMIN — CHLORHEXIDINE GLUCONATE 1 APPLICATION(S): 213 SOLUTION TOPICAL at 05:17

## 2019-01-01 RX ADMIN — Medication 5: at 11:23

## 2019-01-01 RX ADMIN — Medication 25 MILLIGRAM(S): at 13:15

## 2019-01-01 RX ADMIN — HEPARIN SODIUM 5000 UNIT(S): 5000 INJECTION INTRAVENOUS; SUBCUTANEOUS at 05:02

## 2019-01-01 RX ADMIN — Medication 1: at 14:39

## 2019-01-01 RX ADMIN — Medication 25 MILLIGRAM(S): at 14:38

## 2019-01-01 RX ADMIN — AMLODIPINE BESYLATE 10 MILLIGRAM(S): 2.5 TABLET ORAL at 05:50

## 2019-01-01 RX ADMIN — Medication 100 MILLIGRAM(S): at 05:42

## 2019-01-01 RX ADMIN — LEVETIRACETAM 1000 MILLIGRAM(S): 250 TABLET, FILM COATED ORAL at 05:35

## 2019-01-01 RX ADMIN — LEVETIRACETAM 1000 MILLIGRAM(S): 250 TABLET, FILM COATED ORAL at 05:50

## 2019-01-01 RX ADMIN — LEVETIRACETAM 1000 MILLIGRAM(S): 250 TABLET, FILM COATED ORAL at 05:10

## 2019-01-01 RX ADMIN — INSULIN HUMAN 4 UNIT(S)/HR: 100 INJECTION, SOLUTION SUBCUTANEOUS at 16:43

## 2019-01-01 RX ADMIN — PANTOPRAZOLE SODIUM 40 MILLIGRAM(S): 20 TABLET, DELAYED RELEASE ORAL at 13:02

## 2019-01-01 RX ADMIN — HEPARIN SODIUM 5000 UNIT(S): 5000 INJECTION INTRAVENOUS; SUBCUTANEOUS at 05:22

## 2019-01-01 RX ADMIN — AMPICILLIN SODIUM AND SULBACTAM SODIUM 100 GRAM(S): 250; 125 INJECTION, POWDER, FOR SUSPENSION INTRAMUSCULAR; INTRAVENOUS at 12:05

## 2019-01-01 RX ADMIN — CEFEPIME 100 MILLIGRAM(S): 1 INJECTION, POWDER, FOR SOLUTION INTRAMUSCULAR; INTRAVENOUS at 21:05

## 2019-01-01 RX ADMIN — INSULIN GLARGINE 9 UNIT(S): 100 INJECTION, SOLUTION SUBCUTANEOUS at 22:37

## 2019-01-01 RX ADMIN — NICARDIPINE HYDROCHLORIDE 5 MG/HR: 30 CAPSULE, EXTENDED RELEASE ORAL at 23:30

## 2019-01-01 RX ADMIN — NYSTATIN CREAM 1 APPLICATION(S): 100000 CREAM TOPICAL at 22:30

## 2019-01-01 RX ADMIN — Medication 1: at 09:15

## 2019-01-01 RX ADMIN — Medication 500000 UNIT(S): at 05:51

## 2019-01-01 RX ADMIN — HEPARIN SODIUM 5000 UNIT(S): 5000 INJECTION INTRAVENOUS; SUBCUTANEOUS at 14:32

## 2019-01-01 RX ADMIN — Medication 500000 UNIT(S): at 05:01

## 2019-01-01 RX ADMIN — HEPARIN SODIUM 5000 UNIT(S): 5000 INJECTION INTRAVENOUS; SUBCUTANEOUS at 13:24

## 2019-01-01 RX ADMIN — Medication 650 MILLIGRAM(S): at 23:25

## 2019-01-01 RX ADMIN — Medication 25 MILLIGRAM(S): at 05:48

## 2019-01-01 RX ADMIN — PREGABALIN 1000 MICROGRAM(S): 225 CAPSULE ORAL at 11:30

## 2019-01-01 RX ADMIN — Medication 500000 UNIT(S): at 17:44

## 2019-01-01 RX ADMIN — LEVETIRACETAM 1500 MILLIGRAM(S): 250 TABLET, FILM COATED ORAL at 05:38

## 2019-01-01 RX ADMIN — Medication 500000 UNIT(S): at 18:55

## 2019-01-01 RX ADMIN — Medication 500000 UNIT(S): at 05:43

## 2019-01-01 RX ADMIN — LEVETIRACETAM 1500 MILLIGRAM(S): 250 TABLET, FILM COATED ORAL at 18:39

## 2019-01-01 RX ADMIN — SODIUM CHLORIDE 1000 MILLILITER(S): 9 INJECTION, SOLUTION INTRAVENOUS at 05:51

## 2019-01-01 RX ADMIN — SIMVASTATIN 20 MILLIGRAM(S): 20 TABLET, FILM COATED ORAL at 22:33

## 2019-01-01 RX ADMIN — PROPOFOL 2.24 MICROGRAM(S)/KG/MIN: 10 INJECTION, EMULSION INTRAVENOUS at 22:47

## 2019-01-01 RX ADMIN — DONEPEZIL HYDROCHLORIDE 10 MILLIGRAM(S): 10 TABLET, FILM COATED ORAL at 22:32

## 2019-01-01 RX ADMIN — Medication 2100 GRAM(S): at 22:45

## 2019-01-01 RX ADMIN — Medication 1: at 11:45

## 2019-01-01 RX ADMIN — CHLORHEXIDINE GLUCONATE 1 APPLICATION(S): 213 SOLUTION TOPICAL at 06:00

## 2019-01-01 RX ADMIN — Medication 5: at 05:35

## 2019-01-01 RX ADMIN — CHLORHEXIDINE GLUCONATE 1 APPLICATION(S): 213 SOLUTION TOPICAL at 05:23

## 2019-01-01 RX ADMIN — HEPARIN SODIUM 5000 UNIT(S): 5000 INJECTION INTRAVENOUS; SUBCUTANEOUS at 14:38

## 2019-01-01 RX ADMIN — SENNA PLUS 2 TABLET(S): 8.6 TABLET ORAL at 21:57

## 2019-01-01 RX ADMIN — Medication 100 MILLIGRAM(S): at 22:58

## 2019-01-01 RX ADMIN — Medication 25 MILLIGRAM(S): at 21:14

## 2019-01-01 RX ADMIN — LEVETIRACETAM 420 MILLIGRAM(S): 250 TABLET, FILM COATED ORAL at 06:20

## 2019-01-01 RX ADMIN — Medication 500000 UNIT(S): at 17:23

## 2019-01-01 RX ADMIN — CHLORHEXIDINE GLUCONATE 1 APPLICATION(S): 213 SOLUTION TOPICAL at 06:20

## 2019-01-01 RX ADMIN — LOSARTAN POTASSIUM 25 MILLIGRAM(S): 100 TABLET, FILM COATED ORAL at 05:39

## 2019-01-01 RX ADMIN — HEPARIN SODIUM 5000 UNIT(S): 5000 INJECTION INTRAVENOUS; SUBCUTANEOUS at 13:59

## 2019-01-01 RX ADMIN — Medication 100 MILLIGRAM(S): at 05:31

## 2019-01-01 RX ADMIN — Medication 25 MILLIGRAM(S): at 05:52

## 2019-01-01 RX ADMIN — Medication 650 MILLIGRAM(S): at 05:16

## 2019-01-01 RX ADMIN — Medication 100 MILLIGRAM(S): at 14:37

## 2019-01-01 RX ADMIN — Medication 500000 UNIT(S): at 17:32

## 2019-01-01 RX ADMIN — Medication 25 MILLIGRAM(S): at 06:37

## 2019-01-01 RX ADMIN — Medication 100 MILLIGRAM(S): at 13:23

## 2019-01-01 RX ADMIN — Medication 3 UNIT(S): at 08:10

## 2019-01-01 RX ADMIN — CHLORHEXIDINE GLUCONATE 15 MILLILITER(S): 213 SOLUTION TOPICAL at 17:32

## 2019-01-01 RX ADMIN — Medication 25 MILLIGRAM(S): at 05:27

## 2019-01-01 RX ADMIN — MEROPENEM 100 MILLIGRAM(S): 1 INJECTION INTRAVENOUS at 22:31

## 2019-01-01 RX ADMIN — CEFEPIME 100 MILLIGRAM(S): 1 INJECTION, POWDER, FOR SOLUTION INTRAMUSCULAR; INTRAVENOUS at 21:30

## 2019-01-01 RX ADMIN — Medication 25 MILLIGRAM(S): at 21:56

## 2019-01-01 RX ADMIN — LEVETIRACETAM 1500 MILLIGRAM(S): 250 TABLET, FILM COATED ORAL at 17:24

## 2019-01-01 RX ADMIN — Medication 1: at 21:06

## 2019-01-01 RX ADMIN — Medication 650 MILLIGRAM(S): at 14:00

## 2019-01-01 RX ADMIN — Medication 25 MILLIGRAM(S): at 14:03

## 2019-01-01 RX ADMIN — AMLODIPINE BESYLATE 10 MILLIGRAM(S): 2.5 TABLET ORAL at 05:06

## 2019-01-01 RX ADMIN — CEFEPIME 100 MILLIGRAM(S): 1 INJECTION, POWDER, FOR SOLUTION INTRAMUSCULAR; INTRAVENOUS at 08:42

## 2019-01-01 RX ADMIN — Medication 25 MILLIGRAM(S): at 05:30

## 2019-01-01 RX ADMIN — CEFTRIAXONE 100 GRAM(S): 500 INJECTION, POWDER, FOR SOLUTION INTRAMUSCULAR; INTRAVENOUS at 10:17

## 2019-01-01 RX ADMIN — Medication 1000 UNIT(S): at 12:08

## 2019-01-01 RX ADMIN — HEPARIN SODIUM 5000 UNIT(S): 5000 INJECTION INTRAVENOUS; SUBCUTANEOUS at 13:03

## 2019-01-01 RX ADMIN — LOSARTAN POTASSIUM 50 MILLIGRAM(S): 100 TABLET, FILM COATED ORAL at 05:16

## 2019-01-01 RX ADMIN — LOSARTAN POTASSIUM 25 MILLIGRAM(S): 100 TABLET, FILM COATED ORAL at 05:27

## 2019-01-01 RX ADMIN — NYSTATIN CREAM 1 APPLICATION(S): 100000 CREAM TOPICAL at 14:19

## 2019-01-01 RX ADMIN — CHLORHEXIDINE GLUCONATE 1 APPLICATION(S): 213 SOLUTION TOPICAL at 06:39

## 2019-01-01 RX ADMIN — Medication 1: at 11:12

## 2019-01-01 RX ADMIN — LEVETIRACETAM 1000 MILLIGRAM(S): 250 TABLET, FILM COATED ORAL at 06:36

## 2019-01-01 RX ADMIN — CHLORHEXIDINE GLUCONATE 15 MILLILITER(S): 213 SOLUTION TOPICAL at 17:15

## 2019-01-01 RX ADMIN — Medication 25 MILLIGRAM(S): at 13:14

## 2019-01-01 RX ADMIN — Medication 25 MILLIGRAM(S): at 05:34

## 2019-01-01 RX ADMIN — HEPARIN SODIUM 5000 UNIT(S): 5000 INJECTION INTRAVENOUS; SUBCUTANEOUS at 21:23

## 2019-01-01 RX ADMIN — MEROPENEM 100 MILLIGRAM(S): 1 INJECTION INTRAVENOUS at 06:56

## 2019-01-01 RX ADMIN — Medication 25 MILLIGRAM(S): at 06:01

## 2019-01-01 RX ADMIN — HEPARIN SODIUM 5000 UNIT(S): 5000 INJECTION INTRAVENOUS; SUBCUTANEOUS at 13:01

## 2019-01-01 RX ADMIN — HEPARIN SODIUM 5000 UNIT(S): 5000 INJECTION INTRAVENOUS; SUBCUTANEOUS at 05:18

## 2019-01-01 RX ADMIN — PANTOPRAZOLE SODIUM 40 MILLIGRAM(S): 20 TABLET, DELAYED RELEASE ORAL at 11:15

## 2019-01-01 RX ADMIN — Medication 25 MILLIGRAM(S): at 21:49

## 2019-01-01 RX ADMIN — AMLODIPINE BESYLATE 10 MILLIGRAM(S): 2.5 TABLET ORAL at 11:59

## 2019-01-01 RX ADMIN — INSULIN GLARGINE 18 UNIT(S): 100 INJECTION, SOLUTION SUBCUTANEOUS at 08:20

## 2019-01-01 RX ADMIN — INSULIN GLARGINE 18 UNIT(S): 100 INJECTION, SOLUTION SUBCUTANEOUS at 06:27

## 2019-01-01 RX ADMIN — HEPARIN SODIUM 5000 UNIT(S): 5000 INJECTION INTRAVENOUS; SUBCUTANEOUS at 13:44

## 2019-01-01 RX ADMIN — HEPARIN SODIUM 5000 UNIT(S): 5000 INJECTION INTRAVENOUS; SUBCUTANEOUS at 13:17

## 2019-01-01 RX ADMIN — CHLORHEXIDINE GLUCONATE 15 MILLILITER(S): 213 SOLUTION TOPICAL at 18:49

## 2019-01-01 RX ADMIN — INSULIN GLARGINE 18 UNIT(S): 100 INJECTION, SOLUTION SUBCUTANEOUS at 21:47

## 2019-01-01 RX ADMIN — Medication 100 MILLIGRAM(S): at 16:49

## 2019-01-01 RX ADMIN — HEPARIN SODIUM 5000 UNIT(S): 5000 INJECTION INTRAVENOUS; SUBCUTANEOUS at 13:41

## 2019-01-01 RX ADMIN — Medication 25 MILLIGRAM(S): at 13:10

## 2019-01-01 RX ADMIN — Medication 25 MILLIGRAM(S): at 13:30

## 2019-01-01 RX ADMIN — PREGABALIN 1000 MICROGRAM(S): 225 CAPSULE ORAL at 12:26

## 2019-01-01 RX ADMIN — Medication 12.5 GRAM(S): at 02:21

## 2019-01-01 RX ADMIN — Medication 216 GRAM(S): at 17:17

## 2019-01-01 RX ADMIN — CEFEPIME 100 MILLIGRAM(S): 1 INJECTION, POWDER, FOR SOLUTION INTRAMUSCULAR; INTRAVENOUS at 05:28

## 2019-01-01 RX ADMIN — Medication 3 UNIT(S): at 17:52

## 2019-01-01 RX ADMIN — Medication 1: at 06:31

## 2019-01-01 RX ADMIN — LEVETIRACETAM 1500 MILLIGRAM(S): 250 TABLET, FILM COATED ORAL at 05:39

## 2019-01-01 RX ADMIN — Medication 650 MILLIGRAM(S): at 18:49

## 2019-01-01 RX ADMIN — Medication 100 MILLIGRAM(S): at 21:29

## 2019-01-01 RX ADMIN — LEVETIRACETAM 1000 MILLIGRAM(S): 250 TABLET, FILM COATED ORAL at 17:34

## 2019-01-01 RX ADMIN — INSULIN GLARGINE 18 UNIT(S): 100 INJECTION, SOLUTION SUBCUTANEOUS at 06:43

## 2019-01-01 RX ADMIN — CHLORHEXIDINE GLUCONATE 1 APPLICATION(S): 213 SOLUTION TOPICAL at 06:07

## 2019-01-01 RX ADMIN — CHLORHEXIDINE GLUCONATE 15 MILLILITER(S): 213 SOLUTION TOPICAL at 18:20

## 2019-01-01 RX ADMIN — HEPARIN SODIUM 5000 UNIT(S): 5000 INJECTION INTRAVENOUS; SUBCUTANEOUS at 13:30

## 2019-01-01 RX ADMIN — Medication 25 MILLIGRAM(S): at 05:39

## 2019-01-01 RX ADMIN — HEPARIN SODIUM 5000 UNIT(S): 5000 INJECTION INTRAVENOUS; SUBCUTANEOUS at 21:04

## 2019-01-01 RX ADMIN — Medication 500000 UNIT(S): at 05:16

## 2019-01-01 RX ADMIN — CHLORHEXIDINE GLUCONATE 15 MILLILITER(S): 213 SOLUTION TOPICAL at 05:03

## 2019-01-01 RX ADMIN — AMLODIPINE BESYLATE 10 MILLIGRAM(S): 2.5 TABLET ORAL at 05:12

## 2019-01-01 RX ADMIN — LEVETIRACETAM 400 MILLIGRAM(S): 250 TABLET, FILM COATED ORAL at 08:12

## 2019-01-01 RX ADMIN — Medication 1: at 17:12

## 2019-01-01 RX ADMIN — INSULIN GLARGINE 18 UNIT(S): 100 INJECTION, SOLUTION SUBCUTANEOUS at 08:29

## 2019-01-01 RX ADMIN — Medication 100 MILLIGRAM(S): at 05:24

## 2019-01-01 RX ADMIN — Medication 3 UNIT(S): at 13:29

## 2019-01-01 RX ADMIN — Medication 25 MILLIGRAM(S): at 21:44

## 2019-01-01 RX ADMIN — Medication 1: at 02:42

## 2019-01-01 RX ADMIN — PANTOPRAZOLE SODIUM 40 MILLIGRAM(S): 20 TABLET, DELAYED RELEASE ORAL at 12:32

## 2019-01-01 RX ADMIN — Medication 25 MILLIGRAM(S): at 05:23

## 2019-01-01 RX ADMIN — PANTOPRAZOLE SODIUM 40 MILLIGRAM(S): 20 TABLET, DELAYED RELEASE ORAL at 13:14

## 2019-01-01 RX ADMIN — ONDANSETRON 104 MILLIGRAM(S): 8 TABLET, FILM COATED ORAL at 22:04

## 2019-01-01 RX ADMIN — Medication 25 MILLIGRAM(S): at 05:10

## 2019-01-01 RX ADMIN — INSULIN GLARGINE 18 UNIT(S): 100 INJECTION, SOLUTION SUBCUTANEOUS at 08:07

## 2019-01-01 RX ADMIN — Medication 25 MILLIGRAM(S): at 21:29

## 2019-01-01 RX ADMIN — Medication 5: at 12:13

## 2019-01-01 RX ADMIN — Medication 3 UNIT(S): at 21:37

## 2019-01-01 RX ADMIN — Medication 25 MILLIGRAM(S): at 21:12

## 2019-01-01 RX ADMIN — Medication 100 MILLIGRAM(S): at 05:39

## 2019-01-01 RX ADMIN — CEFEPIME 100 MILLIGRAM(S): 1 INJECTION, POWDER, FOR SOLUTION INTRAMUSCULAR; INTRAVENOUS at 21:23

## 2019-01-01 RX ADMIN — SIMVASTATIN 20 MILLIGRAM(S): 20 TABLET, FILM COATED ORAL at 21:38

## 2019-01-01 RX ADMIN — Medication 650 MILLIGRAM(S): at 21:24

## 2019-01-01 RX ADMIN — CHLORHEXIDINE GLUCONATE 15 MILLILITER(S): 213 SOLUTION TOPICAL at 20:25

## 2019-01-01 RX ADMIN — Medication 650 MILLIGRAM(S): at 18:20

## 2019-01-01 RX ADMIN — Medication 100 MILLIGRAM(S): at 16:31

## 2019-03-27 PROBLEM — I34.0 MODERATE MITRAL REGURGITATION: Status: ACTIVE | Noted: 2017-09-11

## 2019-03-27 PROBLEM — R06.02 SHORTNESS OF BREATH: Status: ACTIVE | Noted: 2018-03-12

## 2019-03-27 PROBLEM — E11.9 DIABETES: Status: ACTIVE | Noted: 2017-09-11

## 2019-03-27 NOTE — PHYSICAL EXAM
[General Appearance - Well Developed] : well developed [Normal Appearance] : normal appearance [Well Groomed] : well groomed [General Appearance - Well Nourished] : well nourished [No Deformities] : no deformities [General Appearance - In No Acute Distress] : no acute distress [Normal Conjunctiva] : the conjunctiva exhibited no abnormalities [Eyelids - No Xanthelasma] : the eyelids demonstrated no xanthelasmas [Normal Oral Mucosa] : normal oral mucosa [No Oral Pallor] : no oral pallor [No Oral Cyanosis] : no oral cyanosis [Respiration, Rhythm And Depth] : normal respiratory rhythm and effort [Exaggerated Use Of Accessory Muscles For Inspiration] : no accessory muscle use [Abdomen Soft] : soft [Abdomen Tenderness] : non-tender [Abdomen Mass (___ Cm)] : no abdominal mass palpated [Abnormal Walk] : normal gait [Nail Clubbing] : no clubbing of the fingernails [Cyanosis, Localized] : no localized cyanosis [Petechial Hemorrhages (___cm)] : no petechial hemorrhages [Skin Color & Pigmentation] : normal skin color and pigmentation [] : no rash [No Venous Stasis] : no venous stasis [Skin Lesions] : no skin lesions [No Skin Ulcers] : no skin ulcer [No Xanthoma] : no  xanthoma was observed [Oriented To Time, Place, And Person] : oriented to person, place, and time [Affect] : the affect was normal [Mood] : the mood was normal [No Anxiety] : not feeling anxious [Normal Rate] : normal [Rhythm Regular] : regular [No Murmur] : no murmurs heard [1+] : left 1+ [No Pitting Edema] : no pitting edema present [FreeTextEntry1] : mild scattered rhonchi.  [Rt] : no varicose veins of the right leg

## 2019-03-27 NOTE — REVIEW OF SYSTEMS
[Dyspnea on exertion] : dyspnea during exertion [Joint Pain] : joint pain [Joint Swelling] : joint swelling [Joint Stiffness] : joint stiffness [Negative] : Heme/Lymph [Blurry Vision] : no blurred vision [Eye Pain] : no eye pain

## 2019-03-27 NOTE — ASSESSMENT
[FreeTextEntry1] : The patieint has had fallen. Etiology remain uncertain. She denies having lightheadedness  although she does get lightheaded. She has had a cryptogenic stroke in the past . The patint has moderate MR - read as mild last year. . No chest pain . She has a minimal systolic gradient across the aortic valve . Patient has not gone for nuclear stress test .

## 2019-03-27 NOTE — HISTORY OF PRESENT ILLNESS
[FreeTextEntry1] : The patient fell at home . She does not have an explanation as to why this occurs. She just finds herself on the floor . SHe does not get lightheaded or has no LOC . Carotid Doppler done last year showed mild disease and antegrade flow from the Vertebral arteries. She has not gone fro blood work and does not recall any changes on her medication . She states she has intermittent episodes of diplopia

## 2019-05-01 PROBLEM — I35.0 AORTIC STENOSIS, MILD: Status: ACTIVE | Noted: 2017-09-11
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
## 2019-05-01 PROBLEM — Z82.49 FAMILY HISTORY OF CARDIAC DISORDER: Status: ACTIVE | Noted: 2019-01-01

## 2019-05-01 PROBLEM — E78.5 HYPERLIPEMIA: Status: ACTIVE | Noted: 2017-04-17

## 2019-05-01 NOTE — REASON FOR VISIT
[Initial Evaluation] : an initial evaluation of [FreeTextEntry1] : Cardiologist: Dr. Miranda [FreeTextEntry2] : falls and history of stroke

## 2019-05-01 NOTE — HISTORY OF PRESENT ILLNESS
[FreeTextEntry1] : 73 yo F with history of HTN, HL, AS who presents for evaluation of falls and history of CVA. She states her last fall was 1 month ago when she was getting ready for bed. She didn't have any warning signs but fell suddenly. No loss of consciousness. Patient states she had a stroke 10 years ago and states her voice became more "wobbly" due to the stroke and she still has that. She states she was left with right sided weakness. \par \par The patient denies any cardiovascular complaints including chest pain, dyspnea, palpitations, dizziness, lightheadedness, presyncope or syncope.\par  \par Carotid US 3/12/18: < 50% bilateral ICA stenosis

## 2019-05-01 NOTE — ASSESSMENT
[FreeTextEntry1] : Ms. Ha was referred for evaluation of frequent falls without warning and without loss of consciousness. Given her history of CVA many years ago, I think she would benefit from a loop recorder to evaluate for occult AFib as a possible etiology. I explained the procedure in detail including benefits/risks/alternative. Risk involved is less than 1% of bleeding and infection. We also discussed remote monitoring in detail. All questions were answered and the patient would like to proceed with loop implant.\par \par I will schedule the loop implant and I have asked the patient to follow up with me in 1 month from implant. \par \par I have also advised the patient to go to the nearest emergency room if she experiences any chest pain, dyspnea, syncope, or has any other compelling symptoms.\par

## 2019-05-01 NOTE — PHYSICAL EXAM
[General Appearance - Well Developed] : well developed [Well Groomed] : well groomed [Normal Appearance] : normal appearance [No Deformities] : no deformities [Normal Conjunctiva] : the conjunctiva exhibited no abnormalities [General Appearance - In No Acute Distress] : no acute distress [Normal Oropharynx] : normal oropharynx [Heart Rate And Rhythm] : heart rate and rhythm were normal [Murmurs] : no murmurs present [Heart Sounds] : normal S1 and S2 [Edema] : no peripheral edema present [Respiration, Rhythm And Depth] : normal respiratory rhythm and effort [Auscultation Breath Sounds / Voice Sounds] : lungs were clear to auscultation bilaterally [Exaggerated Use Of Accessory Muscles For Inspiration] : no accessory muscle use [Abdomen Soft] : soft [Abdomen Tenderness] : non-tender [FreeTextEntry1] : obese [Nail Clubbing] : no clubbing of the fingernails [] : no rash [Oriented To Time, Place, And Person] : oriented to person, place, and time [No Anxiety] : not feeling anxious

## 2019-05-09 PROBLEM — R20.2 TINGLING: Status: RESOLVED | Noted: 2019-01-01 | Resolved: 2019-01-01

## 2019-05-09 PROBLEM — R47.89: Status: RESOLVED | Noted: 2019-01-01 | Resolved: 2019-01-01

## 2019-05-09 PROBLEM — R53.1 WEAKNESS OF RIGHT SIDE OF BODY: Status: RESOLVED | Noted: 2019-01-01 | Resolved: 2019-01-01

## 2019-05-09 PROBLEM — Z87.898 HISTORY OF FATIGUE: Status: RESOLVED | Noted: 2019-01-01 | Resolved: 2019-01-01

## 2019-05-09 PROBLEM — G31.84 MILD COGNITIVE IMPAIRMENT: Status: ACTIVE | Noted: 2019-01-01

## 2019-05-09 PROBLEM — R26.81 UNSTEADY GAIT: Status: ACTIVE | Noted: 2019-01-01

## 2019-05-20 PROBLEM — I61.9 INTRACEREBRAL HEMORRHAGE: Status: ACTIVE | Noted: 2019-01-01

## 2019-05-20 PROBLEM — I63.412 CEREBROVASCULAR ACCIDENT (CVA) DUE TO EMBOLISM OF LEFT MIDDLE CEREBRAL ARTERY: Status: ACTIVE | Noted: 2019-01-01

## 2019-05-20 NOTE — DISCUSSION/SUMMARY
[FreeTextEntry1] : Patient with cogntive issues and language issues which occurred post stroke.  Likely she has subtle unmasking due too fatigue, stress, lack of sleep.  However give recent syncopal episode workup will repeat imaging to insure no new strokes

## 2019-05-20 NOTE — PHYSICAL EXAM
[FreeTextEntry1] : Alert oriented to person place and year.  Has some expressive aphasia which is mild.\par Able to count fingers and do simple math\par CN 2-12 normal\par no drift power 5/5\par Temp, Vib, LT, PP symmetric\par FTN NL\par Gait abnormal uses cane with support

## 2019-05-20 NOTE — HISTORY OF PRESENT ILLNESS
[FreeTextEntry1] : Ms. Ha is a 74 year old woman with pMhx below presenting for followup of language issues post stroke.  The son believes these issues have been progressing for the last 6 months.  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  She had recent fall about 2 months ago and saw cardiologist Dr. Miranda and then Dr. Christianson of EPS who recommended an implantable loop recorder for which she declined.\par Son says these symptoms fluctuate and are usually worse in evenings\par Stopped taking tricor for some reason\par

## 2019-05-20 NOTE — END OF VISIT
[FreeTextEntry3] : Plan\par 1. Obtain MRI brain w/o JENISE to compare with MRI from 2015\par 2. Neuropsych testing to better understand stroke related deficits vs development of ealry dementia with stroke\par 3. Check routine bloodwork and urine\par  [>50% of Time Spent on Counseling and Coordination of Care for  ___] : Greater than 50% of the encounter time was spent on counseling and coordination of care for [unfilled] [Time Spent: ___ minutes] : I have spent [unfilled] minutes of face to face time with the patient

## 2019-05-29 NOTE — ED ADULT NURSE REASSESSMENT NOTE - NS ED NURSE REASSESS COMMENT FT1
Pt received from EMS from [facility]. Pt intubated, on sedation medication, no s/s of distress noted on assessment. Restraints to be initiated, comfort measures offered, family at bedside, will cont to monitor.

## 2019-05-29 NOTE — ED PROVIDER NOTE - PHYSICAL EXAMINATION
CONSTITUTIONAL: poor responsiveness; in no apparent distress.   EYES: PERRL; EOM intact. 3mm equal b/l  CARDIOVASCULAR: Normal S1, S2; no murmurs, rubs, or gallops.   RESPIRATORY: Normal chest excursion with respiration; breath sounds clear and equal bilaterally; no wheezes, rhonchi, or rales.  GI/: Normal bowel sounds; non-distended; non-tender; no palpable organomegaly.   MS: No evidence of trauma or deformity. Non-tender to palpation. No scoliosis. No CVA tenderness. Normal ROM in all four extremities; non-tender to palpation; distal pulses are normal.   SKIN: Normal for age and race; warm; dry; good turgor; no apparent lesions or exudate.   NEURO/PSYCH: A & O x 2 disoriented to time. significant left sided arm/leg weakness. + left side facial droop and slur speech. limited exam 2/2 poor responsiveness.

## 2019-05-29 NOTE — ED ADULT TRIAGE NOTE - CHIEF COMPLAINT QUOTE
BIBA after pt. fell at home in the street.  She became suddenly SOB and fell forward  over on the bush (time observed on home camera was pta 2040.   Stroke code activated on arrival to ED

## 2019-05-29 NOTE — ED ADULT NURSE NOTE - OBJECTIVE STATEMENT
stroke code activated on arrival to ED.  Pt has slurred speech and left sided weakness with facial droop.  Pt has had 4 previous strokes and has residual effects.

## 2019-05-29 NOTE — ED PROVIDER NOTE - ATTENDING CONTRIBUTION TO CARE
I personally evaluated the patient. I reviewed the Resident’s or Physician Assistant’s note (as assigned above), and agree with the findings and plan except as documented in my note.  74yF pmhx DM  HTN   CVA x 5 - last  5 years ago -  residual left sided weakness uses cane  BIBEMS  after being found on floor outside. per daughter at bedside  neighbor rang a bell  when e  saw patient outside on the floor -  daughter called ems -   they ahd  cameras  they  showed event - pt walked outside   stopped  , bent forward looked like she had  SOB,  then collapsed on the floo r- per daughter  pt was  awake, talking to her  ,  has  some baseline slurred speech.  Pt  takes   low dose ASA.    PE: awake ,  pupils  3mm b/l,  cvs rrr reps cta     head atraumatic,  neuro:   awake,    right sides gaze preference,  5/5 strength rue rle, 4/5 left side 9 baseline per daughter),  follows commands   cn2-12intact.   stroke code called by staff on eval

## 2019-05-29 NOTE — ED ADULT NURSE NOTE - NSIMPLEMENTINTERV_GEN_ALL_ED
Implemented All Fall with Harm Risk Interventions:  [place] to call system. Call bell, personal items and telephone within reach. Instruct patient to call for assistance. Room bathroom lighting operational. Non-slip footwear when patient is off stretcher. Physically safe environment: no spills, clutter or unnecessary equipment. Stretcher in lowest position, wheels locked, appropriate side rails in place. Provide visual cue, wrist band, yellow gown, etc. Monitor gait and stability. Monitor for mental status changes and reorient to person, place, and time. Review medications for side effects contributing to fall risk. Reinforce activity limits and safety measures with patient and family. Provide visual clues: red socks.

## 2019-05-29 NOTE — ED ADULT NURSE REASSESSMENT NOTE - NS ED NURSE REASSESS COMMENT FT1
Patient lethargic, MD at bedside. patient intubated, LL 22, ETT 7.5, NGT inserted, report given by staff EMS at bedside

## 2019-05-29 NOTE — ED PROVIDER NOTE - CLINICAL SUMMARY MEDICAL DECISION MAKING FREE TEXT BOX
pw weakness after [facility] fall[facility] for dropping SpO2 and airway protection, Txfr to [facility], Platelets given after consent, and BP control prepped though not needed as pt maintained BP as normal. Admitted to ICU.

## 2019-05-29 NOTE — ED PROVIDER NOTE - PROGRESS NOTE DETAILS
pt  became  more  drowsy  decreased responsiveness   flexion of left arm -pupils equal  miosis  pt intubated - Patient received from [facility]. Intubated. VS stable. NSX consulted, at bedside. Trauma consult placed. I received pt in signout from [facility]. Patient intubated, on propofol and fentanyl drips, sedated, BP in goal range without intervention. Family present - discussed with them and accepted arterial line for BP monitoring. Stopping sedation in the ED for Neurosurgical eval. DW trauma Dr. Catalan. Location of bleed not likely traumatic in origin. However, will eval for trauma and add on ct c spine, pelvic xray and FAST. Fast negative. pt  became  more  drowsy  decreased responsiveness   flexion of left arm -pupils equal  miosis  pt intubated -  Neurosurgery RAGHU Lanza aware of consultation. Dr Dowd aware of transfer

## 2019-05-29 NOTE — STROKE CODE NOTE - NIH STROKE SCALE: 8. SENSORY, QM
Chief complaint:  Chest pain    History of present illness:  Cici is a pleasant 89-year-old woman who presents with a history of known coronary artery disease and recurrent chest pain.  She has had multiple recurrent hospitalizations over the past several months for recurrent symptoms.  She presented with identical symptoms to her prior myocardial infarction.    Hospital course:  The patient underwent an initial evaluation and medical therapy but continued to have significant symptoms.  She was consulted by both myself and Dr. Rivas and together we felt that there was a high likelihood of significant disease.  As such, she did undergo cardiac catheterization which revealed severe disease involving the proximal right coronary artery.  She underwent drug-eluting stent implantation to this vessel.  She was observed overnight did well and is being discharged home.    Discharge diagnoses:  Coronary artery disease  Hypertension  Hyperlipidemia    Discharge medications:  Norvasc 5 mg daily  Aspirin 325 mg daily  Pulmicort  Vitamin D  Coreg 6.25 mg twice a day  Zyrtec 5 mg daily  Plavix 75 mg daily  Singulair 10 mg daily  Multiple vitamins daily  Crestor 5 mg daily    Discharge instructions:  The patient has been counseled regarding the importance of dual antiplatelet therapy.  Given that she is in normal sinus rhythm she will hold her Eliquis for the following 4 weeks  The patient will take her medications as prescribed  The patient will follow-up with me in 2 weeks  The patient will follow-up with Dr. Cleary in 4 weeks.      
(0) Normal; no sensory loss

## 2019-05-29 NOTE — ED PROVIDER NOTE - OBJECTIVE STATEMENT
75 yo female hx of HTN/DM/stroke x 4 (last one about 5 years ago) BIBA c/o left sided weakness/slur speech and lethargic. as per daughter, patient lived at her house lower level. she walked out side and fell into the brush. as per security camera footage, patient was lying forward and fell into brush. no injury to head to any hard service. She didn't see her mother all day today. She spoke with her last night and patient had no complaints. Patient didn't complain to her about not feeling well today.

## 2019-05-30 NOTE — ED PROCEDURE NOTE - CPROC ED ARTER LINE DETAIL1
Using sterile technique, the correct location was identified, and a needle was inserted into the artery (specify in FT)./Connected to a pressurized flush line./Line was sutured in place./Positive blood return was obtained via the catheter.

## 2019-05-30 NOTE — CONSULT NOTE ADULT - SUBJECTIVE AND OBJECTIVE BOX
TRAUMA ACTIVATION LEVEL:      MECHANISM OF INJURY:      [x] Blunt  	[] MVC	[x] Fall	[] Pedestrian Struck	[] Motorcycle   [] Assault   [] Bicycle collision  [] Sports injury     [] Penetrating  	[] Gun Shot Wound 		[] Stab Wound    74y old f found down, ?HT, +LOC, +ASA  HPI:  Patient is a 74 year old female with PMH significant for DM, HTN, CVA x 5 last one was 5 years ago and takes daily ASA per daughter presented to the ED after being found down outside.  Patient's daughter states that she witnessed on cameras her mother outside bent forward with what looked like and episode of SOB and witnessed her collapse.  EMS was called, patient was awake and speaking to her daughter prior to EMS arrival however her daughter noted slurred speech at that time.  Patient was taken to the [facility] ED where she was evaluated, became more drowsy then unresponsive with flexion of left arm, patient was intubated for airway protection.  CT Head at [facility] showed < from: CT Head No Cont (05.29.19 @ 21:43) >  1.9 x 1.3 x 1.5 cm region of acute hemorrhage noted within the right   thalamus with extension of hemorrhage noted within the right lateral and   third ventricles    < end of copied text >      Attending Contribution to Care: I personally evaluated the patient. I reviewed the Resident’s or Physician Assistant’s note (as assigned above), and agree with the findings and plan except as documented in my note.  74yF pmhx DM  HTN   CVA x 5 - last  5 years ago -  residual left sided weakness uses cane  BIBEMS  after being found on floor outside. per daughter at bedside  neighbor rang a bell  when e  saw patient outside on the floor -  daughter called ems -   they ahd  cameras  they  showed event - pt walked outside   stopped  , bent forward looked like she had  SOB,  then collapsed on the floo r- per daughter  pt was  awake, talking to her  ,  has  some baseline slurred speech.  Pt  takes   low dose ASA.    PE: awake ,  pupils  3mm b/l,  cvs rrr reps cta     head atraumatic,  neuro:   awake,    right sides gaze preference,  5/5 strength rue rle, 4/5 left side 9 baseline per daughter),  follows commands   cn2-12intact.   stroke code called by staff on eval.      PAST MEDICAL & SURGICAL HISTORY:  Diabetes mellitus  Stroke  Hypertension      Allergies    No Known Allergies    Intolerances        Home Medications:  amLODIPine 2.5 mg oral tablet: 1 tab(s) orally once a day (29 May 2019 22:02)  donepezil 10 mg oral tablet: 1 tab(s) orally once a day (at bedtime) (29 May 2019 22:02)  enalapril 10 mg oral tablet: 1 tab(s) orally once a day (29 May 2019 22:02)  enalapril 10 mg oral tablet: 1 tab(s) orally once a day (29 May 2019 22:02)  glimepiride 1 mg oral tablet: 1 tab(s) orally once a day (29 May 2019 22:02)  Norvasc 2.5 mg oral tablet: 1 tab(s) orally once a day (29 May 2019 22:02)  simvastatin 20 mg oral tablet: 1 tab(s) orally once a day (at bedtime) (29 May 2019 22:02)  Vitamin B12 1000 mcg oral tablet: 1 tab(s) orally once a day (29 May 2019 22:02)  Vitamin D3 1000 intl units oral tablet, chewable: 1 tab(s) orally once a day (29 May 2019 22:02)      ROS: 10-system review is otherwise negative except HPI above.      Primary Survey:    A - airway intact  B - bilateral breath sounds and good chest rise  C - palpable pulses in all extremities  D - GCS 15 on arrival, SAUNDERS  Exposure obtained    Vital Signs Last 24 Hrs  T(C): 36.8 (30 May 2019 01:45), Max: 36.9 (29 May 2019 21:36)  T(F): 98.2 (30 May 2019 01:45), Max: 98.4 (29 May 2019 21:36)  HR: 51 (30 May 2019 01:45) (51 - 86)  BP: 161/69 (30 May 2019 00:09) (110/55 - 189/82)  BP(mean): --  RR: 16 (30 May 2019 00:14) (16 - 20)  SpO2: 100% (30 May 2019 00:14) (90% - 100%)    Secondary Survey:   General: NAD  HEENT: Normocephalic, atraumatic, EOMI, PEERLA. no scalp lacerations   Neck: Soft, midline trachea. no cspine tenderness  Chest: No chest wall tenderness. or subq  emphysema   Cardiac: S1, S2, RRR  Respiratory: Bilateral breath sounds, clear and equal bilaterally  Abdomen: Soft, non-distended, non-tender, no rebound,   Groin: Normal appearing, pelvis stable   Ext: palp radial b/l UE, b/l DP palp in Lower Extrem.   Back: no TTP, no palpable runoff/stepoff/deformity  Rectal: No kinsey blood, EYAD with good tone    FAST    Procedures:    LABS:  Labs:  CAPILLARY BLOOD GLUCOSE      POCT Blood Glucose.: 125 mg/dL (29 May 2019 21:26)                          14.4   6.25  )-----------( 254      ( 29 May 2019 22:00 )             47.1       Auto Neutrophil %: 51.8 % (05-29-19 @ 22:00)  Auto Immature Granulocyte %: 0.3 % (05-29-19 @ 22:00)    05-29    146  |  105  |  28<H>  ----------------------------<  149<H>  4.9   |  31  |  0.9      Calcium, Total Serum: 12.2 mg/dL (05-29-19 @ 22:00)      LFTs:             7.4  | 0.5  | 16       ------------------[65      ( 29 May 2019 22:00 )  4.7  | x    | 18          Lipase:x      Amylase:x         Blood Gas Arterial, Lactate: 0.9 mmoL/L (05-29-19 @ 22:52)    ABG - ( 29 May 2019 22:52 )  pH: 7.44  /  pCO2: 40    /  pO2: 209   / HCO3: 27    / Base Excess: 2.5   /  SaO2: 100               Coags:     11.20  ----< 0.97    ( 29 May 2019 22:00 )     34.5        CARDIAC MARKERS ( 29 May 2019 22:00 )  x     / <0.01 ng/mL / x     / x     / x                          RADIOLOGY & ADDITIONAL STUDIES:      ---------------------------------------------------------------------------------------    ASSESSMENT:  74y old f s/p    PLAN:    -   -   -   -  d/w TRAUMA ACTIVATION LEVEL:      MECHANISM OF INJURY:      [x] Blunt  	[] MVC	[x] Fall	[] Pedestrian Struck	[] Motorcycle   [] Assault   [] Bicycle collision  [] Sports injury     [] Penetrating  	[] Gun Shot Wound 		[] Stab Wound    74y old f found down, ?HT, +LOC, +ASA  HPI:  Patient is a 74 year old female with PMH significant for DM, HTN, CVA x 5 last one was 5 years ago and takes daily ASA per daughter presented to the ED after being found down outside.  Patient's daughter states that she witnessed on cameras her mother outside bent forward with what looked like and episode of SOB and witnessed her collapse.  EMS was called, patient was awake and speaking to her daughter prior to EMS arrival however her daughter noted slurred speech at that time.  Patient was taken to the [facility] ED where she was evaluated, became more drowsy then unresponsive with flexion of left arm, patient was intubated for airway protection.  CT Head at [facility] showed < from: CT Head No Cont (05.29.19 @ 21:43) >  1.9 x 1.3 x 1.5 cm region of acute hemorrhage noted within the right   thalamus with extension of hemorrhage noted within the right lateral and   third ventricles    < end of copied text >      Attending Contribution to Care: I personally evaluated the patient. I reviewed the Resident’s or Physician Assistant’s note (as assigned above), and agree with the findings and plan except as documented in my note.  74yF pmhx DM  HTN   CVA x 5 - last  5 years ago -  residual left sided weakness uses cane  BIBEMS  after being found on floor outside. per daughter at bedside  neighbor rang a bell  when e  saw patient outside on the floor -  daughter called ems -   they ahd  cameras  they  showed event - pt walked outside   stopped  , bent forward looked like she had  SOB,  then collapsed on the floo r- per daughter  pt was  awake, talking to her  ,  has  some baseline slurred speech.  Pt  takes   low dose ASA.    PE: awake ,  pupils  3mm b/l,  cvs rrr reps cta     head atraumatic,  neuro:   awake,    right sides gaze preference,  5/5 strength rue rle, 4/5 left side 9 baseline per daughter),  follows commands   cn2-12intact.   stroke code called by staff on eval.      PAST MEDICAL & SURGICAL HISTORY:  Diabetes mellitus  Stroke  Hypertension      Allergies    No Known Allergies    Intolerances        Home Medications:  amLODIPine 2.5 mg oral tablet: 1 tab(s) orally once a day (29 May 2019 22:02)  donepezil 10 mg oral tablet: 1 tab(s) orally once a day (at bedtime) (29 May 2019 22:02)  enalapril 10 mg oral tablet: 1 tab(s) orally once a day (29 May 2019 22:02)  enalapril 10 mg oral tablet: 1 tab(s) orally once a day (29 May 2019 22:02)  glimepiride 1 mg oral tablet: 1 tab(s) orally once a day (29 May 2019 22:02)  Norvasc 2.5 mg oral tablet: 1 tab(s) orally once a day (29 May 2019 22:02)  simvastatin 20 mg oral tablet: 1 tab(s) orally once a day (at bedtime) (29 May 2019 22:02)  Vitamin B12 1000 mcg oral tablet: 1 tab(s) orally once a day (29 May 2019 22:02)  Vitamin D3 1000 intl units oral tablet, chewable: 1 tab(s) orally once a day (29 May 2019 22:02)      ROS: 10-system review is otherwise negative except HPI above.      Primary Survey:    A - airway intact  B - bilateral breath sounds and good chest rise  C - palpable pulses in all extremities  D - GCS 15 on arrival, SAUNDERS  Exposure obtained    Vital Signs Last 24 Hrs  T(C): 36.8 (30 May 2019 01:45), Max: 36.9 (29 May 2019 21:36)  T(F): 98.2 (30 May 2019 01:45), Max: 98.4 (29 May 2019 21:36)  HR: 51 (30 May 2019 01:45) (51 - 86)  BP: 161/69 (30 May 2019 00:09) (110/55 - 189/82)  BP(mean): --  RR: 16 (30 May 2019 00:14) (16 - 20)  SpO2: 100% (30 May 2019 00:14) (90% - 100%)    Secondary Survey:   General: NAD  HEENT: Normocephalic, atraumatic, EOMI, PEERLA. no scalp lacerations   Neck: Soft, midline trachea. no cspine tenderness  Chest: No chest wall tenderness. or subq  emphysema   Cardiac: S1, S2, RRR  Respiratory: Bilateral breath sounds, clear and equal bilaterally  Abdomen: Soft, non-distended, non-tender, no rebound,   Groin: Normal appearing, pelvis stable   Ext: palp radial b/l UE, b/l DP palp in Lower Extrem.   Back: no TTP, no palpable runoff/stepoff/deformity  Rectal: No kinsey blood, EYAD with good tone    FAST    Procedures:    LABS:  Labs:  CAPILLARY BLOOD GLUCOSE      POCT Blood Glucose.: 125 mg/dL (29 May 2019 21:26)                          14.4   6.25  )-----------( 254      ( 29 May 2019 22:00 )             47.1       Auto Neutrophil %: 51.8 % (05-29-19 @ 22:00)  Auto Immature Granulocyte %: 0.3 % (05-29-19 @ 22:00)    05-29    146  |  105  |  28<H>  ----------------------------<  149<H>  4.9   |  31  |  0.9      Calcium, Total Serum: 12.2 mg/dL (05-29-19 @ 22:00)      LFTs:             7.4  | 0.5  | 16       ------------------[65      ( 29 May 2019 22:00 )  4.7  | x    | 18          Lipase:x      Amylase:x         Blood Gas Arterial, Lactate: 0.9 mmoL/L (05-29-19 @ 22:52)    ABG - ( 29 May 2019 22:52 )  pH: 7.44  /  pCO2: 40    /  pO2: 209   / HCO3: 27    / Base Excess: 2.5   /  SaO2: 100               Coags:     11.20  ----< 0.97    ( 29 May 2019 22:00 )     34.5        CARDIAC MARKERS ( 29 May 2019 22:00 )  x     / <0.01 ng/mL / x     / x     / x                          RADIOLOGY & ADDITIONAL STUDIES:      ---------------------------------------------------------------------------------------    ASSESSMENT:  74y old f s/p hemorrhagic stroke    PLAN:    - Neurosurgical eval  - Neurology eval  - CT [facility], [facility]  Tertiary Survey  -  No evidence of traumatic injury TRAUMA ACTIVATION LEVEL:      MECHANISM OF INJURY:      [x] Blunt  	[] MVC	[x] Fall	[] Pedestrian Struck	[] Motorcycle   [] Assault   [] Bicycle collision  [] Sports injury     [] Penetrating  	[] Gun Shot Wound 		[] Stab Wound    74y old f found down, ?HT, +LOC, +ASA  HPI:  Patient is a 74 year old female with PMH significant for DM, HTN, CVA x 5 last one was 5 years ago and takes daily ASA per daughter presented to the ED after being found down outside.  Patient's daughter states that she witnessed on cameras her mother outside bent forward with what looked like and episode of SOB and witnessed her collapse.  EMS was called, patient was awake and speaking to her daughter prior to EMS arrival however her daughter noted slurred speech at that time.  Patient was taken to the [facility] ED where she was evaluated, became more drowsy then unresponsive with flexion of left arm, patient was intubated for airway protection.  CT Head at [facility] showed a 1.9 x 1.3 x 1.5 cm region of acute hemorrhage noted within the right thalamus with extension of hemorrhage noted within the right lateral and third ventricles.  Patient was transferred to the [facility] for neurosurgery eval and complete trauma eval.    PAST MEDICAL & SURGICAL HISTORY:  Diabetes mellitus  Stroke  Hypertension    Allergies  No Known Allergies    Home Medications:  amLODIPine 2.5 mg oral tablet: 1 tab(s) orally once a day (29 May 2019 22:02)  donepezil 10 mg oral tablet: 1 tab(s) orally once a day (at bedtime) (29 May 2019 22:02)  enalapril 10 mg oral tablet: 1 tab(s) orally once a day (29 May 2019 22:02)  enalapril 10 mg oral tablet: 1 tab(s) orally once a day (29 May 2019 22:02)  glimepiride 1 mg oral tablet: 1 tab(s) orally once a day (29 May 2019 22:02)  Norvasc 2.5 mg oral tablet: 1 tab(s) orally once a day (29 May 2019 22:02)  simvastatin 20 mg oral tablet: 1 tab(s) orally once a day (at bedtime) (29 May 2019 22:02)  Vitamin B12 1000 mcg oral tablet: 1 tab(s) orally once a day (29 May 2019 22:02)  Vitamin D3 1000 intl units oral tablet, chewable: 1 tab(s) orally once a day (29 May 2019 22:02)    ROS: 10-system review is otherwise negative except HPI above.      Primary Survey:    A - airway intact  B - bilateral breath sounds and good chest rise  C - palpable pulses in all extremities    Vital Signs Last 24 Hrs  T(C): 36.8 (30 May 2019 01:45), Max: 36.9 (29 May 2019 21:36)  T(F): 98.2 (30 May 2019 01:45), Max: 98.4 (29 May 2019 21:36)  HR: 51 (30 May 2019 01:45) (51 - 86)  BP: 161/69 (30 May 2019 00:09) (110/55 - 189/82)  RR: 16 (30 May 2019 00:14) (16 - 20)  SpO2: 100% (30 May 2019 00:14) (90% - 100%)    Secondary Survey:   General: lying in bed intubated on ventilator  HEENT: Normocephalic, atraumatic, EOMI, PEERLA. no scalp lacerations   Neck: Soft, midline trachea  Chest: No subq emphysema   Cardiac: S1, S2, RRR  Respiratory: Bilateral breath sounds, clear and equal bilaterally  Abdomen: Soft, non-distended   Groin: Normal appearing, pelvis stable   Ext: palp radial b/l UE, b/l DP palp in Lower Extrem.     Procedures: intubated, a line placed    Labs:  CAPILLARY BLOOD GLUCOSE      POCT Blood Glucose.: 125 mg/dL (29 May 2019 21:26)                          14.4   6.25  )-----------( 254      ( 29 May 2019 22:00 )             47.1       Auto Neutrophil %: 51.8 % (05-29-19 @ 22:00)  Auto Immature Granulocyte %: 0.3 % (05-29-19 @ 22:00)    05-29    146  |  105  |  28<H>  ----------------------------<  149<H>  4.9   |  31  |  0.9      Calcium, Total Serum: 12.2 mg/dL (05-29-19 @ 22:00)      LFTs:             7.4  | 0.5  | 16       ------------------[65      ( 29 May 2019 22:00 )  4.7  | x    | 18          Lipase:x      Amylase:x         Blood Gas Arterial, Lactate: 0.9 mmoL/L (05-29-19 @ 22:52)    ABG - ( 29 May 2019 22:52 )  pH: 7.44  /  pCO2: 40    /  pO2: 209   / HCO3: 27    / Base Excess: 2.5   /  SaO2: 100               Coags:     11.20  ----< 0.97    ( 29 May 2019 22:00 )     34.5        CARDIAC MARKERS ( 29 May 2019 22:00 )  x     / <0.01 ng/mL / x     / x     / x          RADIOLOGY & ADDITIONAL STUDIES:    < from: CT Head No Cont (05.29.19 @ 21:43) >    IMPRESSION:       1.  1.9 x 1.3 x 1.5 cm region of acute hemorrhage noted within the right   thalamus with extension of hemorrhage noted within the right lateral and   third ventricles.     2.  Chronic microvascular ischemic changes and chronic lacunar infarcts.    < end of copied text >    < from: CT Head No Cont (05.30.19 @ 01:19) >  Findings/  IMPRESSION:    Acute hemorrhage noted within the right thalamus with extension of   hemorrhage noted within the right lateral and third ventricles with mild   mass effect, not significantly changed since the prior study.    No acute midline shift. Stable ventricular size without definite evidence   for hydrocephalus.    Patchy hypodensities in the periventricular and subcortical white matter   without mass effect compatible with chronic microvascular changes.    Chronicright basal ganglia lacunar infarct and chronic left external   capsule and thalamic infarcts.    There is no depressed skull fracture. There is mild mucosal thickening of   the bilateral maxillary sinuses. The bilateral mastoid air cells are   well-aerated.    < end of copied text >    < from: CT Cervical Spine No Cont (05.30.19 @ 01:19) >  IMPRESSION:    No evidence of acute cervical spine fracture or subluxation.    Multilevel degenerative changes.    Bilateral apical groundglass opacities.    < end of copied text >    < from: Xray Pelvis AP only (05.30.19 @ 01:44) >    IMPRESSION:     No acute fracture or dislocation.    < end of copied text >

## 2019-05-30 NOTE — CONSULT NOTE ADULT - ASSESSMENT
ASSESSMENT:  74y old f s/p hemorrhagic stroke    PLAN:    - Neurosurgical eval  - Neurology eval  - CT [facility], [facility]  Tertiary Survey  -  No evidence of traumatic injury

## 2019-05-30 NOTE — CONSULT NOTE ADULT - SUBJECTIVE AND OBJECTIVE BOX
Patient is a 74y old  Female who presents with a chief complaint of left sided weakness and slurred speech (30 May 2019 03:06)      HPI:  74 y.o F with PMH of HTN, HLD, DM, hemorrhagic stroke,? EVD placement at that time ( about 8 years ago)  with residual left side weakness and slurred speech, ambulated with cane, on ASA daily  Pt. brought to [facility] ED as a stroke code. At the time of presentation to [facility] ED A&Ox 2, baseline slurped speech with left sided weakness. /82, 55 HR, 18 RR, CTH prelim report findings with acute right thalamic hemorrhage with extension to right lateral and third ventricle. At [facility] ED Pt. Intubated 2/2 worsening of neuro exam for air way protection as per ED attending note Pt.  became more drowsy with decreased responsiveness. Pt. was transferred to [facility]. At time of NSGY eval Pt intubated, sedated on Propofol and Fentanyl, radial arterial line, she received keppra, zofran and mannitol in ED. Repeat CT scan stable from prior study as per neurosurgery. Platelets one unit ordered as per neurosurgery (30 May 2019 02:50)      PAST MEDICAL & SURGICAL HISTORY:  Diabetes mellitus  Stroke  Hypertension  No significant past surgical history      FAMILY HISTORY:  No pertinent family history in first degree relatives  :  No known cardiovacular family hisotry     ROS:  See HPI     Allergies    No Known Allergies    PHYSICAL EXAM    ICU Vital Signs Last 24 Hrs  T(C): 36.4 (30 May 2019 08:00), Max: 36.9 (29 May 2019 21:36)  T(F): 97.6 (30 May 2019 08:00), Max: 98.5 (30 May 2019 04:45)  HR: 52 (30 May 2019 08:00) (48 - 86)  BP: 112/54 (30 May 2019 04:45) (110/55 - 189/82)  BP(mean): 74 (30 May 2019 04:45) (74 - 74)  ABP: 102/50 (30 May 2019 08:00) (102/50 - 182/92)  ABP(mean): 68 (30 May 2019 08:00) (68 - 126)  RR: 15 (30 May 2019 08:00) (15 - 37)  SpO2: 100% (30 May 2019 08:00) (90% - 100%)      General: +GAG.  HEENT:  ET +              Lymphatic system: No cervical LN   Lungs: Bilateral BS  Cardiovascular: Regular  Gastrointestinal: Soft, Positive BS  Musculoskeletal: No clubbing.  Moves all extremities.  Full range of motion   Skin: Warm.  Intact  Neurological: No motor or sensory deficit       05-29-19 @ 07:01  -  05-30-19 @ 07:00  --------------------------------------------------------  IN:    fentaNYL  Infusion: 60 mL    Platelets - Single Donor: 211 mL    propofol Infusion: 8.8 mL    sodium chloride 0.9%.: 400 mL  Total IN: 679.8 mL    OUT:    Indwelling Catheter - Urethral: 375 mL  Total OUT: 375 mL    Total NET: 304.8 mL      05-30-19 @ 07:01  -  05-30-19 @ 09:39  --------------------------------------------------------  IN:    fentaNYL  Infusion: 30 mL    propofol Infusion: 4.4 mL    sodium chloride 0.9%.: 200 mL  Total IN: 234.4 mL    OUT:    Indwelling Catheter - Urethral: 45 mL  Total OUT: 45 mL    Total NET: 189.4 mL          LABS:                          12.6   8.56  )-----------( 230      ( 30 May 2019 06:30 )             39.1                                               05-30    142  |  104  |  25<H>  ----------------------------<  224<H>  4.1   |  23  |  0.9    Ca    10.7<H>      30 May 2019 06:30  Phos  2.3     05-30  Mg     1.7     05-30    TPro  6.3  /  Alb  4.1  /  TBili  1.0  /  DBili  x   /  AST  14  /  ALT  15  /  AlkPhos  57  05-30      PT/INR - ( 29 May 2019 22:00 )   PT: 11.20 sec;   INR: 0.97 ratio         PTT - ( 29 May 2019 22:00 )  PTT:34.5 sec                                           CARDIAC MARKERS ( 29 May 2019 22:00 )  x     / <0.01 ng/mL / x     / x     / x                                                LIVER FUNCTIONS - ( 30 May 2019 06:30 )  Alb: 4.1 g/dL / Pro: 6.3 g/dL / ALK PHOS: 57 U/L / ALT: 15 U/L / AST: 14 U/L / GGT: x                                                          Mode: AC/ CMV (Assist Control/ Continuous Mandatory Ventilation)  RR (machine): 16  TV (machine): 450  FiO2: 40  PEEP: 5  ITime: 1  MAP: 9  PIP: 20                                      ABG - ( 30 May 2019 05:58 )  pH, Arterial: 7.47  pH, Blood: x     /  pCO2: 37    /  pO2: 109   / HCO3: 27    / Base Excess: 3.1   /  SaO2: 99        X-Rays                                                                                     ECHO    MEDICATIONS  (STANDING):  chlorhexidine 4% Liquid 1 Application(s) Topical <User Schedule>  fentaNYL   Infusion 0.5 MICROgram(s)/kG/Hr (3.74 mL/Hr) IV Continuous <Continuous>  levETIRAcetam  IVPB 1000 milliGRAM(s) IV Intermittent every 12 hours  pantoprazole  Injectable 40 milliGRAM(s) IV Push daily  propofol Infusion 5 MICROgram(s)/kG/Min (2.244 mL/Hr) IV Continuous <Continuous>  sodium chloride 0.9%. 1000 milliLiter(s) (100 mL/Hr) IV Continuous <Continuous>    MEDICATIONS  (PRN):

## 2019-05-30 NOTE — CONSULT NOTE ADULT - ASSESSMENT
Impression:  Intra cerebral Bleed  Hypertensive emergency  HO hemorrhagic stroke      PLAN:    CNS: DC fentanyl. Repeat CT head in afternoon. Continue with Keppra. Follow neurosurgery and neurology.     HEENT: Oral care    PULMONARY:  HOB @ 30 degrees.     CARDIOVASCULAR: i=o. Maintain BP <140.    GI: GI prophylaxis.  Feeding through OG tube.    RENAL:  Follow up lytes.  Correct as needed    INFECTIOUS DISEASE: Follow up cultures    HEMATOLOGICAL:  Seq DVT prophylaxis.    ENDOCRINE:  Follow up FS.     MUSCULOSKELETAL: Bed rest    Poor prognosis  Palliative care consult.   ALICIA villatoro.

## 2019-05-30 NOTE — CONSULT NOTE ADULT - ASSESSMENT
74 y.o F with PMH of hemorrhagic stroke with residual left sided weakness and slurred speech on ASA brought  from [facility] ED as Stroke code with preliminary CTH findings of 1.9 x 1.3 x 1.5 cm region of acute hemorrhage noted within the right thalamus with extension of hemorrhage noted within the right lateral and third ventricles. Mild mass effect. No significant midline shift. Ventricular size is relatively similar to prior study without definite evidence for hydrocephalus at this time. Chronic microvascular ischemic changes and chronic lacunar infarcts. before Transfer to [facility] Pt. was intubated 2/2 worsening of neuro exam with decrease responsiveness/ increased drowsiness. On NSGY exam open both eyes, + corneal reflex b/l,  pupils 1 mm B/L non reactive, SAUNDERS x 4 to noxious stimuli  RUE: + EF/EE, + WF/WE;  LUE: + EF/EE  + WF, no WE noted,B/L LE: + KF/KE , + DF/PF to noxious stimuli. Not following verbal commands.         Plan:   Repeat CTH STAT  Keppra  STAT Platelets  Control and Monitor SBP  Keep HOB at 30 degrees  Neurology eval   Neuro checks Q1H 74 y.o F with PMH of hemorrhagic stroke with residual left sided weakness and slurred speech on ASA brought  from [facility] ED as Stroke code with preliminary CTH findings of 1.9 x 1.3 x 1.5 cm region of acute hemorrhage noted within the right thalamus with extension of hemorrhage noted within the right lateral and third ventricles. Mild mass effect. No significant midline shift. Ventricular size is relatively similar to prior study without definite evidence for hydrocephalus at this time. Chronic microvascular ischemic changes and chronic lacunar infarcts. before Transfer to [facility] ED Pt. was intubated 2/2 worsening of neuro exam with decrease responsiveness/ increased drowsiness. On NSGY exam open both eyes, + corneal reflex b/l,  pupils 1 mm B/L non reactive, SAUNDERS x 4 to noxious stimuli  RUE: + EF/EE, + WF/WE;  LUE: + EF/EE  + WF, no WE noted,B/L LE: + KF/KE , + DF/PF to noxious stimuli. Not following verbal commands.         Plan:   Repeat CTH STAT  Keppra  STAT Platelets  Control and Monitor SBP  Keep HOB at 30 degrees  Neurology eval   Neuro checks Q1H     < from: CT Head No Cont (05.30.19 @ 01:19) >  Findings/  IMPRESSION:    Acute hemorrhage noted within the right thalamus with extension of   hemorrhage noted within the right lateral and third ventricles with mild   mass effect, not significantly changed since the prior study.    No acute midline shift. Stable ventricular size without definite evidence   for hydrocephalus.    Patchy hypodensities in the periventricular and subcortical white matter   without mass effect compatible with chronic microvascular changes.    Chronicright basal ganglia lacunar infarct and chronic left external   capsule and thalamic infarcts.    There is no depressed skull fracture. There is mild mucosal thickening of   the bilateral maxillary sinuses. The bilateral mastoid air cells are   well-aerated.    < end of copied text >      Addendum       Repeat CTH reviewed:  No significant change from prior study, mild mass effect , no acute mild shift with stable ventricular size, without definitive evidence of hydrocephalus   Admit to MICU  NO acte NSGY intervention needed at this time   Cont Keppra  Platelets   Monitor and control SBP  Neuro checks Q1H  Case d/w Dr. Mcgraw 74 y.o F with PMH of hemorrhagic stroke with residual left sided weakness and slurred speech on ASA brought  from [facility] ED as Stroke code with preliminary CTH findings of 1.9 x 1.3 x 1.5 cm region of acute hemorrhage noted within the right thalamus with extension of hemorrhage noted within the right lateral and third ventricles. Mild mass effect. No significant midline shift. Ventricular size is relatively similar to prior study without definite evidence for hydrocephalus at this time. Chronic microvascular ischemic changes and chronic lacunar infarcts. before Transfer to [facility] ED Pt. was intubated 2/2 worsening of neuro exam with decrease responsiveness/ increased drowsiness. On NSGY exam open both eyes, + corneal reflex b/l,  pupils 1 mm B/L non reactive, SAUNDERS x 4 to noxious stimuli  RUE: + EF/EE, + WF/WE;  LUE: + EF/EE  + WF, no WE noted,B/L LE: + KF/KE , + DF/PF to noxious stimuli. Not following verbal commands.         Plan:   Repeat CTH STAT  Keppra  STAT Platelets  Control and Monitor SBP  Keep HOB at 30 degrees  Neurology eval   Neuro checks Q1H     < from: CT Head No Cont (05.30.19 @ 01:19) >  Findings/  IMPRESSION:    Acute hemorrhage noted within the right thalamus with extension of   hemorrhage noted within the right lateral and third ventricles with mild   mass effect, not significantly changed since the prior study.    No acute midline shift. Stable ventricular size without definite evidence   for hydrocephalus.    Patchy hypodensities in the periventricular and subcortical white matter   without mass effect compatible with chronic microvascular changes.    Chronicright basal ganglia lacunar infarct and chronic left external   capsule and thalamic infarcts.    There is no depressed skull fracture. There is mild mucosal thickening of   the bilateral maxillary sinuses. The bilateral mastoid air cells are   well-aerated.    < end of copied text >      Addendum       Repeat CTH reviewed:  No significant change from prior study, mild mass effect , no acute mildline shift, stable ventricular size, without definitive evidence of hydrocephalus   Admit to MICU  NO acte NSGY intervention needed at this time   Cont Keppra  Platelets   Monitor and control SBP  Neuro checks Q1H  Case d/w Dr. Mcgraw

## 2019-05-30 NOTE — H&P ADULT - NSHPPHYSICALEXAM_GEN_ALL_CORE
ICU Vital Signs Last 24 Hrs  T(C): 36.8 (30 May 2019 01:45), Max: 36.9 (29 May 2019 21:36)  T(F): 98.2 (30 May 2019 01:45), Max: 98.4 (29 May 2019 21:36)  HR: 51 (30 May 2019 01:45) (51 - 86)  BP: 161/69 (30 May 2019 00:09) (110/55 - 189/82)  BP(mean): --  ABP: 128/61 (30 May 2019 01:45) (128/61 - 147/65)  ABP(mean): --  RR: 16 (30 May 2019 00:14) (16 - 20)  SpO2: 100% (30 May 2019 00:14) (90% - 100%)    PHYSICAL EXAM:  GENERAL: NAD, speaks in full sentences, no signs of respiratory distress  HEAD:  Atraumatic, Normocephalic  EYES: EOMI, PERRLA, conjunctiva and sclera clear  NECK: Supple, No JVD  CHEST/LUNG: Clear to auscultation bilaterally; No wheeze; No crackles; No accessory muscles used  HEART: Regular rate and rhythm; No murmurs;   ABDOMEN: Soft, Nontender, Nondistended; Bowel sounds present; No guarding  EXTREMITIES:  2+ Peripheral Pulses, No cyanosis or edema  PSYCH: AAOx3  NEUROLOGY: non-focal ICU Vital Signs Last 24 Hrs  T(C): 36.8 (30 May 2019 01:45), Max: 36.9 (29 May 2019 21:36)  T(F): 98.2 (30 May 2019 01:45), Max: 98.4 (29 May 2019 21:36)  HR: 51 (30 May 2019 01:45) (51 - 86)  BP: 161/69 (30 May 2019 00:09) (110/55 - 189/82)  BP(mean): --  ABP: 128/61 (30 May 2019 01:45) (128/61 - 147/65)  ABP(mean): --  RR: 16 (30 May 2019 00:14) (16 - 20)  SpO2: 100% (30 May 2019 00:14) (90% - 100%)    PHYSICAL EXAM:  GENERAL: Intubated  HEAD:  Atraumatic, Normocephalic  EYES: PERRLA  CHEST/LUNG: Clear to auscultation bilaterally; No wheeze; No crackles  HEART: Regular rate and rhythm; No murmurs  ABDOMEN: Soft, Nontender, Nondistended; Bowel sounds present; No guarding  EXTREMITIES:  2+ Peripheral Pulses, No cyanosis or edema  Neurology: Not able to assess sedated

## 2019-05-30 NOTE — CHART NOTE - NSCHARTNOTEFT_GEN_A_CORE
GENERAL SURGERY PROGRESS NOTE     HERNANDO HERNANDEZ  74y  Female    Patient is intubated and sedated, unable to perform complete physical exam.     T(F): 97.6 (05-30-19 @ 08:00), Max: 98.5 (05-30-19 @ 04:45)  HR: 54 (05-30-19 @ 10:00) (46 - 86)  BP: 112/54 (05-30-19 @ 04:45) (110/55 - 189/82)  ABP: 154/74 (05-30-19 @ 10:00) (102/50 - 182/92)  ABP(mean): 102 (05-30-19 @ 10:00) (68 - 126)  RR: 15 (05-30-19 @ 10:00) (15 - 37)  SpO2: 100% (05-30-19 @ 10:00) (90% - 100%)    DIET/FLUIDS: sodium chloride 0.9%. 1000 milliLiter(s) IV Continuous <Continuous>        URINE:   05-29-19 @ 07:01  -  05-30-19 @ 07:00  --------------------------------------------------------  OUT: 375 mL     Indwelling Urethral Catheter:     Connect To:  Straight Drainage/Gravity    Indication:  Urine Output Monitoring in Critically Ill (05-29-19 @ 21:52)  Indwelling Urethral Catheter:     Connect To:  Straight Drainage/Gravity    Indication:  Urine Output Monitoring in Critically Ill (05-30-19 @ 03:50)  Indwelling Urethral Catheter:     Connect To:  Straight Drainage/Gravity    Indication:  Urine Output Monitoring in Critically Ill (05-30-19 @ 06:29)    GI proph:  pantoprazole  Injectable 40 milliGRAM(s) IV Push daily    AC/ proph:   ABx:     PHYSICAL EXAM:  GENERAL: sedated and on vent   CHEST/LUNG: on ventilator   ABDOMEN: Soft, Nondistended;   EXTREMITIES:  No signs of external trauma, no ecchymosis or abrasions.       LABS  POCT Blood Glucose.: 186 mg/dL (30 May 2019 07:54)  POCT Blood Glucose.: 212 mg/dL (30 May 2019 05:45)  POCT Blood Glucose.: 125 mg/dL (29 May 2019 21:26)                          12.6   8.56  )-----------( 230      ( 30 May 2019 06:30 )             39.1       Auto Neutrophil %: 76.7 % (05-30-19 @ 06:30)  Auto Immature Granulocyte %: 0.2 % (05-30-19 @ 06:30)  Auto Neutrophil %: 51.8 % (05-29-19 @ 22:00)  Auto Immature Granulocyte %: 0.3 % (05-29-19 @ 22:00)    05-30    142  |  104  |  25<H>  ----------------------------<  224<H>  4.1   |  23  |  0.9      Calcium, Total Serum: 10.7 mg/dL (05-30-19 @ 06:30)      LFTs:             6.3  | 1.0  | 14       ------------------[57      ( 30 May 2019 06:30 )  4.1  | x    | 15          Lipase:x      Amylase:x         Blood Gas Arterial, Lactate: 1.1 mmoL/L (05-30-19 @ 05:58)  Blood Gas Arterial, Lactate: 0.9 mmoL/L (05-29-19 @ 22:52)    ABG - ( 30 May 2019 05:58 )  pH: 7.47  /  pCO2: 37    /  pO2: 109   / HCO3: 27    / Base Excess: 3.1   /  SaO2: 99              ABG - ( 29 May 2019 22:52 )  pH: 7.44  /  pCO2: 40    /  pO2: 209   / HCO3: 27    / Base Excess: 2.5   /  SaO2: 100               Coags:     11.20  ----< 0.97    ( 29 May 2019 22:00 )     34.5        CARDIAC MARKERS ( 29 May 2019 22:00 )  x     / <0.01 ng/mL / x     / x     / x                        A/P  as patient is sedated and on ventilator full physical exam is unable to be obtained. We would recommend completion CT scans of C/A/P to r/o in acute traumatic injury. Will f/u scans when obtained. If negative trauma will follow PRN.    Pt seen, examined, and plan discussed with Dr. Mckeon GENERAL SURGERY PROGRESS NOTE     HERNANDO HERNANDEZ  74y  Female    Patient is intubated and sedated, unable to perform complete physical exam.     T(F): 97.6 (05-30-19 @ 08:00), Max: 98.5 (05-30-19 @ 04:45)  HR: 54 (05-30-19 @ 10:00) (46 - 86)  BP: 112/54 (05-30-19 @ 04:45) (110/55 - 189/82)  ABP: 154/74 (05-30-19 @ 10:00) (102/50 - 182/92)  ABP(mean): 102 (05-30-19 @ 10:00) (68 - 126)  RR: 15 (05-30-19 @ 10:00) (15 - 37)  SpO2: 100% (05-30-19 @ 10:00) (90% - 100%)    DIET/FLUIDS: sodium chloride 0.9%. 1000 milliLiter(s) IV Continuous <Continuous>        URINE:   05-29-19 @ 07:01  -  05-30-19 @ 07:00  --------------------------------------------------------  OUT: 375 mL     Indwelling Urethral Catheter:     Connect To:  Straight Drainage/Gravity    Indication:  Urine Output Monitoring in Critically Ill (05-29-19 @ 21:52)  Indwelling Urethral Catheter:     Connect To:  Straight Drainage/Gravity    Indication:  Urine Output Monitoring in Critically Ill (05-30-19 @ 03:50)  Indwelling Urethral Catheter:     Connect To:  Straight Drainage/Gravity    Indication:  Urine Output Monitoring in Critically Ill (05-30-19 @ 06:29)    GI proph:  pantoprazole  Injectable 40 milliGRAM(s) IV Push daily    AC/ proph:   ABx:     PHYSICAL EXAM:  GENERAL: sedated and on vent   CHEST/LUNG: on ventilator   ABDOMEN: Soft, Nondistended;   EXTREMITIES:  No signs of external trauma, no ecchymosis or abrasions.       LABS  POCT Blood Glucose.: 186 mg/dL (30 May 2019 07:54)  POCT Blood Glucose.: 212 mg/dL (30 May 2019 05:45)  POCT Blood Glucose.: 125 mg/dL (29 May 2019 21:26)                          12.6   8.56  )-----------( 230      ( 30 May 2019 06:30 )             39.1       Auto Neutrophil %: 76.7 % (05-30-19 @ 06:30)  Auto Immature Granulocyte %: 0.2 % (05-30-19 @ 06:30)  Auto Neutrophil %: 51.8 % (05-29-19 @ 22:00)  Auto Immature Granulocyte %: 0.3 % (05-29-19 @ 22:00)    05-30    142  |  104  |  25<H>  ----------------------------<  224<H>  4.1   |  23  |  0.9      Calcium, Total Serum: 10.7 mg/dL (05-30-19 @ 06:30)      LFTs:             6.3  | 1.0  | 14       ------------------[57      ( 30 May 2019 06:30 )  4.1  | x    | 15          Lipase:x      Amylase:x         Blood Gas Arterial, Lactate: 1.1 mmoL/L (05-30-19 @ 05:58)  Blood Gas Arterial, Lactate: 0.9 mmoL/L (05-29-19 @ 22:52)    ABG - ( 30 May 2019 05:58 )  pH: 7.47  /  pCO2: 37    /  pO2: 109   / HCO3: 27    / Base Excess: 3.1   /  SaO2: 99              ABG - ( 29 May 2019 22:52 )  pH: 7.44  /  pCO2: 40    /  pO2: 209   / HCO3: 27    / Base Excess: 2.5   /  SaO2: 100               Coags:     11.20  ----< 0.97    ( 29 May 2019 22:00 )     34.5        CARDIAC MARKERS ( 29 May 2019 22:00 )  x     / <0.01 ng/mL / x     / x     / x          Imaging:    < from: CT Abdomen and Pelvis w/ IV Cont (05.30.19 @ 17:15) >    1. Mild compression deformity of L2 of indeterminate age. Subacute   fracture of the left pubicbody.  2. Otherwise, no evidence for acute traumatic injury to the chest,   abdomen, or pelvis.  3. Mild interlobular septal thickening with patchy groundglass opacities   indicative of mild pulmonary edema.  4. Mildly dilated central pulmonary arteries indicative of pulmonary   artery hypertension      < end of copied text >    < from: CT Head No Cont (05.30.19 @ 17:02) >    1.  Stable right thalamic hematoma measuring 2.2 cm with intraventricular   extension of hemorrhage. Stable mild ventricular prominence.    2.  Stable severe chronic microvascular changes, chronic hematoma cavity   in the left basal ganglia and chronic bilateraldeep lacunar infarcts.      < end of copied text >    < from: CT Cervical Spine No Cont (05.30.19 @ 01:19) >    No evidence of acute cervical spine fracture or subluxation.    Multilevel degenerative changes.    Bilateral apical groundglass opacities.    < end of copied text >          A/P  as patient is sedated and on ventilator full physical exam is unable to be obtained. We would recommend completion CT scans of C/A/P to r/o in acute traumatic injury. Will f/u scans when obtained. If negative trauma will follow PRN.    Pt seen, examined, and plan discussed with Dr. Mckeon    Update 5/31/19: Imaging reviewed, above. Please recall PRN

## 2019-05-30 NOTE — CONSULT NOTE ADULT - ATTENDING COMMENTS
As above.  Hypertensive hemorrhage of the right BG.  No overt hydrocephalous As above.  Hypertensive hemorrhage of the right BG into ventricle right.  No overt hydrocephalous As above.  Hypertensive hemorrhage of the right thalamus into ventricle right.  No overt hydrocephalous

## 2019-05-30 NOTE — CONSULT NOTE ADULT - SUBJECTIVE AND OBJECTIVE BOX
Patient is a 74y old  Female who presents with a chief complaint of     HPI:  74 y.o F with PMH of HTN, HLD, DM, hemorrhagic stroke,? EVD placement ( abou8t 8 years ago) residual left side weakness, ambulated with cane, on ASA daily  Pt. brought to [facility] ED as a stroke code  At the time of presentation A&Ox 2, baseline slurped speech. CTH prelim report findings with acute right thalamic hemorrhage with extension to right lateral and third ventricle. At [facility] ED Pt. Intubated 2/2 worsening of neuro exam as per ED attending note Pt.  become more drowsy with decreased responsiveness. Pt. was transferred to ED-[place]. At time of NSGY eval Pt intubated, sedated on Propofol and Fentanyl, Pt. examined when sedation on hold x 5 minutes Pt. was able to open both eyes, + corneal reflex b/l,  pupils 1 mm B/L non reactive, SAUNDERS x 4 to noxious stimuli  RUE: + EF/EE, + WF/WE;  LUE: + EF/EE  + WF, no WE. B/L LE: + KF/KE , + DF/PF to noxious stimuli. Not following verbal commands.    All medical history taking from Pt's daughter and medical records     PAST MEDICAL & SURGICAL HISTORY:  Diabetes mellitus  Stroke  Hypertension      REVIEW OF SYSTEMS:  Pt. intubated, sedated unable to provide medical history      MEDICATIONS  (STANDING):  fentaNYL   Infusion 0.5 MICROgram(s)/kG/Hr (3.74 mL/Hr) IV Continuous <Continuous>  levETIRAcetam  IVPB 1000 milliGRAM(s) IV Intermittent every 12 hours  niCARdipine Infusion 5 mG/Hr (25 mL/Hr) IV Continuous <Continuous>  propofol Infusion 5 MICROgram(s)/kG/Min (2.244 mL/Hr) IV Continuous <Continuous>    MEDICATIONS  (PRN):      Allergies: No Known Allergies     SOCIAL HISTORY: No illicit drug use, no smoking       Vital Signs Last 24 Hrs  T(C): 36.9 (29 May 2019 21:36), Max: 36.9 (29 May 2019 21:36)  T(F): 98.4 (29 May 2019 21:36), Max: 98.4 (29 May 2019 21:36)  HR: 56 (29 May 2019 23:51) (54 - 86)  BP: 147/66 (29 May 2019 23:51) (110/55 - 189/82)  BP(mean): --  RR: 16 (29 May 2019 23:51) (16 - 20)  SpO2: 100% (30 May 2019 00:00) (90% - 100%)   [ ] yes [ ] no  PHYSICAL EXAM:    Constitutional: NAD, well-developed  HEENT: EOMI  Neck: no pain  Back: No CVA tenderness  Respiratory: No accessory respiratory muscle use  Abd: Soft, NT/ND  no organomegally  no hernia  :   EYAD:   Extremities: no edema  Neurological: A/O x 3  Psychiatric: Normal mood, normal affect  Skin: No rashes    I&O's Summary      LABS:                        14.4   6.25  )-----------( 254      ( 29 May 2019 22:00 )             47.1     05-29    146  |  105  |  28<H>  ----------------------------<  149<H>  4.9   |  31  |  0.9    Ca    12.2<H>      29 May 2019 22:00    TPro  7.4  /  Alb  4.7  /  TBili  0.5  /  DBili  x   /  AST  16  /  ALT  18  /  AlkPhos  65  05-29    PT/INR - ( 29 May 2019 22:00 )   PT: 11.20 sec;   INR: 0.97 ratio         PTT - ( 29 May 2019 22:00 )  PTT:34.5 sec    Urine Culture:         RADIOLOGY & ADDITIONAL STUDIES:      Assessment :        Plan : Patient is a 74y old  Female who presents with a chief complaint of     HPI:  74 y.o F with PMH of HTN, HLD, DM, hemorrhagic stroke,? EVD placement ( abou8t 8 years ago) residual left side weakness and slurred speech, ambulated with cane, on ASA daily  Pt. brought to [facility] ED as a stroke code  At the time of presentation A&Ox 2, baseline slurped speech. CTH prelim report findings with acute right thalamic hemorrhage with extension to right lateral and third ventricle. At [facility] ED Pt. Intubated 2/2 worsening of neuro exam as per ED attending note Pt.  become more drowsy with decreased responsiveness. Pt. was transferred to ED-[place]. At time of NSGY eval Pt intubated, sedated on Propofol and Fentanyl, Pt. examined when sedation on hold x 5 minutes Pt. was able to open both eyes, + corneal reflex b/l,  pupils 1 mm B/L non reactive, SAUNDERS x 4 to noxious stimuli  RUE: + EF/EE, + WF/WE;  LUE: + EF/EE  + WF, no WE. B/L LE: + KF/KE , + DF/PF to noxious stimuli. Not following verbal commands.    All medical history taking from Pt's daughter and medical records     PAST MEDICAL & SURGICAL HISTORY:  Diabetes mellitus  Stroke  Hypertension      REVIEW OF SYSTEMS:  Pt. intubated, sedated unable to provide medical history      Home Medications:  as per Pt's Daughter Pt. take ASA 81 mg Daily  amLODIPine 2.5 mg oral tablet: 1 tab(s) orally once a day (29 May 2019 22:02)  donepezil 10 mg oral tablet: 1 tab(s) orally once a day (at bedtime) (29 May 2019 22:02)  enalapril 10 mg oral tablet: 1 tab(s) orally once a day (29 May 2019 22:02)  enalapril 10 mg oral tablet: 1 tab(s) orally once a day (29 May 2019 22:02)  glimepiride 1 mg oral tablet: 1 tab(s) orally once a day (29 May 2019 22:02)  Norvasc 2.5 mg oral tablet: 1 tab(s) orally once a day (29 May 2019 22:02)  simvastatin 20 mg oral tablet: 1 tab(s) orally once a day (at bedtime) (29 May 2019 22:02)  Vitamin B12 1000 mcg oral tablet: 1 tab(s) orally once a day (29 May 2019 22:02)  Vitamin D3 1000 intl units oral tablet, chewable: 1 tab(s) orally once a day (29 May 2019 22:02)    MEDICATIONS  (STANDING):  fentaNYL   Infusion 0.5 MICROgram(s)/kG/Hr (3.74 mL/Hr) IV Continuous <Continuous>  levETIRAcetam  IVPB 1000 milliGRAM(s) IV Intermittent every 12 hours  niCARdipine Infusion 5 mG/Hr (25 mL/Hr) IV Continuous <Continuous>  propofol Infusion 5 MICROgram(s)/kG/Min (2.244 mL/Hr) IV Continuous <Continuous>      Allergies: No Known Allergies     SOCIAL HISTORY: No illicit drug use, no smoking, no ETOH        Vital Signs Last 24 Hrs  T(C): 36.9 (29 May 2019 21:36), Max: 36.9 (29 May 2019 21:36)  T(F): 98.4 (29 May 2019 21:36), Max: 98.4 (29 May 2019 21:36)  HR: 56 (29 May 2019 23:51) (54 - 86)  BP: 147/66 (29 May 2019 23:51) (110/55 - 189/82)  RR: 16 (29 May 2019 23:51) (16 - 20)  SpO2: 100% (30 May 2019 00:00) (90% - 100%)     PHYSICAL EXAM:    Constitutional: intubated, sedated, examined 5 minutes off sedation, not following verbal commands.   HEENT: NC/AT, opens her eyes to noxious stimuli, pupils 1 mm nonreactive , + b/l corneal reflexes.    Abd: Soft, NT/ND  no organomegally  no hernia  Extremities: SAUNDERS x4 , + EF/EE B/L UE, + WF/WE on the RUE no wrist movement noted on the LUE, moving to noxious stimuli B/L LE           LABS:                        14.4   6.25  )-----------( 254      ( 29 May 2019 22:00 )             47.1     05-29    146  |  105  |  28<H>  ----------------------------<  149<H>  4.9   |  31  |  0.9    Ca    12.2<H>      29 May 2019 22:00    TPro  7.4  /  Alb  4.7  /  TBili  0.5  /  DBili  x   /  AST  16  /  ALT  18  /  AlkPhos  65  05-29    PT/INR - ( 29 May 2019 22:00 )   PT: 11.20 sec;   INR: 0.97 ratio         PTT - ( 29 May 2019 22:00 )  PTT:34.5 sec     RADIOLOGY & ADDITIONAL STUDIES:     < from: CT Head No Cont (05.29.19 @ 21:43) >  EXAM:  CT BRAIN            PROCEDURE DATE:  05/29/2019            INTERPRETATION:  Clinical History / Reason for exam: Stroke code.    Technique: Noncontrast head CT.  Contiguous unenhanced CT axial images of   the head from the base to the vertex with coronal and sagittal   reformatted images.    Comparison: Noncontrast CT head 4/20/2015.    Findings:    Patient motion and streak artifact from earrings as well as inherent   streak artifact to CT limits evaluation of the skull base.    The ventricles, basal cisterns and sulcal pattern are prominent   consistent with parenchymal volume loss.    There is round 1.9 x 1.3 x 1.5 cm region of acute parenchymal hemorrhage   noted within the right thalamus with extension of hemorrhage noted within   the right lateral ventricle and third ventricle with mild mass effect. No   significant midline shift. Ventricular size is relatively similar to   prior study without definite evidence for hydrocephalus at this time.    There are patchy hypodensities in the periventricular and subcortical   white matter without mass effect compatible with chronic microvascular   changes.      Chronic right basal ganglia lacunar infarct and chronic left external   capsule and thalamic infarcts.    There is calcific atherosclerotic disease at the skull base.    There is no depressed skull fracture. The mucosal thickening of the   bilateral maxillary sinuses. The bilateral mastoid air cells are   well-aerated.      IMPRESSION:   1.  1.9 x 1.3 x 1.5 cm region of acute hemorrhage noted within the right   thalamus with extension of hemorrhage noted within the right lateral and   third ventricles.     2.  Chronic microvascular ischemic changes and chronic lacunar infarcts.      Dr. Kendrick Elizabeth discussed preliminary findings with HIRA GRANADOS PA on   5/29/2019 9:48 PM with readback.        < end of copied text > Patient is a 74y old  Female who presents with a chief complaint of     HPI:  74 y.o F with PMH of HTN, HLD, DM, hemorrhagic stroke,? EVD placement at that time ( about 8 years ago)  with residual left side weakness and slurred speech, ambulated with cane, on ASA daily  Pt. brought to [facility] ED as a stroke code. At the time of presentation to [facility] ED A&Ox 2, baseline slurped speech with left sided weakness. CTH prelim report findings with acute right thalamic hemorrhage with extension to right lateral and third ventricle. At [facility] ED Pt. Intubated 2/2 worsening of neuro exam as per ED attending note Pt.  became more drowsy with decreased responsiveness. Pt. was transferred to ED-[place]. At time of NSGY eval Pt intubated, sedated on Propofol and Fentanyl. PE off sedation (on hold x 5 minutes). Pt. was able to open both eyes, + corneal reflex b/l,  pupils 1 mm B/L non reactive, SAUNDERS x 4 to noxious stimuli  RUE: + EF/EE, + WF/WE;  LUE: + EF/EE  + WF, no WE. B/L LE: + KF/KE , + DF/PF to noxious stimuli. Not following verbal commands.    All medical history taking from Pt's daughter and medical records     PAST MEDICAL & SURGICAL HISTORY:  Diabetes mellitus  Stroke  Hypertension      REVIEW OF SYSTEMS:  Pt. intubated, sedated unable to provide medical history      Home Medications:  as per Pt's Daughter Pt. take ASA 81 mg Daily  amLODIPine 2.5 mg oral tablet: 1 tab(s) orally once a day (29 May 2019 22:02)  donepezil 10 mg oral tablet: 1 tab(s) orally once a day (at bedtime) (29 May 2019 22:02)  enalapril 10 mg oral tablet: 1 tab(s) orally once a day (29 May 2019 22:02)  enalapril 10 mg oral tablet: 1 tab(s) orally once a day (29 May 2019 22:02)  glimepiride 1 mg oral tablet: 1 tab(s) orally once a day (29 May 2019 22:02)  Norvasc 2.5 mg oral tablet: 1 tab(s) orally once a day (29 May 2019 22:02)  simvastatin 20 mg oral tablet: 1 tab(s) orally once a day (at bedtime) (29 May 2019 22:02)  Vitamin B12 1000 mcg oral tablet: 1 tab(s) orally once a day (29 May 2019 22:02)  Vitamin D3 1000 intl units oral tablet, chewable: 1 tab(s) orally once a day (29 May 2019 22:02)    MEDICATIONS  (STANDING):  fentaNYL   Infusion 0.5 MICROgram(s)/kG/Hr (3.74 mL/Hr) IV Continuous <Continuous>  levETIRAcetam  IVPB 1000 milliGRAM(s) IV Intermittent every 12 hours  niCARdipine Infusion 5 mG/Hr (25 mL/Hr) IV Continuous <Continuous>  propofol Infusion 5 MICROgram(s)/kG/Min (2.244 mL/Hr) IV Continuous <Continuous>      Allergies: No Known Allergies     SOCIAL HISTORY: No illicit drug use, no smoking, no ETOH        Vital Signs Last 24 Hrs  T(C): 36.9 (29 May 2019 21:36), Max: 36.9 (29 May 2019 21:36)  T(F): 98.4 (29 May 2019 21:36), Max: 98.4 (29 May 2019 21:36)  HR: 56 (29 May 2019 23:51) (54 - 86)  BP: 147/66 (29 May 2019 23:51) (110/55 - 189/82)  RR: 16 (29 May 2019 23:51) (16 - 20)  SpO2: 100% (30 May 2019 00:00) (90% - 100%)     PHYSICAL EXAM:    Constitutional: intubated, sedated, examined 5 minutes off sedation, not following verbal commands.   HEENT: NC/AT, opens her eyes to noxious stimuli, pupils 1 mm nonreactive , + b/l corneal reflexes.    Abd: Soft, NT/ND.   : +Mckeon catheter.   Extremities: SAUNDERS x4 , + EF/EE B/L UE, + WF/WE on the RUE no wrist movement noted on the LUE, moving to noxious stimuli B/L LE           LABS:                        14.4   6.25  )-----------( 254      ( 29 May 2019 22:00 )             47.1     05-29    146  |  105  |  28<H>  ----------------------------<  149<H>  4.9   |  31  |  0.9    Ca    12.2<H>      29 May 2019 22:00    TPro  7.4  /  Alb  4.7  /  TBili  0.5  /  DBili  x   /  AST  16  /  ALT  18  /  AlkPhos  65  05-29    PT/INR - ( 29 May 2019 22:00 )   PT: 11.20 sec;   INR: 0.97 ratio         PTT - ( 29 May 2019 22:00 )  PTT:34.5 sec     RADIOLOGY & ADDITIONAL STUDIES:     < from: CT Head No Cont (05.29.19 @ 21:43) >  EXAM:  CT BRAIN            PROCEDURE DATE:  05/29/2019            INTERPRETATION:  Clinical History / Reason for exam: Stroke code.    Technique: Noncontrast head CT.  Contiguous unenhanced CT axial images of   the head from the base to the vertex with coronal and sagittal   reformatted images.    Comparison: Noncontrast CT head 4/20/2015.    Findings:    Patient motion and streak artifact from earrings as well as inherent   streak artifact to CT limits evaluation of the skull base.    The ventricles, basal cisterns and sulcal pattern are prominent   consistent with parenchymal volume loss.    There is round 1.9 x 1.3 x 1.5 cm region of acute parenchymal hemorrhage   noted within the right thalamus with extension of hemorrhage noted within   the right lateral ventricle and third ventricle with mild mass effect. No   significant midline shift. Ventricular size is relatively similar to   prior study without definite evidence for hydrocephalus at this time.    There are patchy hypodensities in the periventricular and subcortical   white matter without mass effect compatible with chronic microvascular   changes.      Chronic right basal ganglia lacunar infarct and chronic left external   capsule and thalamic infarcts.    There is calcific atherosclerotic disease at the skull base.    There is no depressed skull fracture. The mucosal thickening of the   bilateral maxillary sinuses. The bilateral mastoid air cells are   well-aerated.      IMPRESSION:   1.  1.9 x 1.3 x 1.5 cm region of acute hemorrhage noted within the right   thalamus with extension of hemorrhage noted within the right lateral and   third ventricles.     2.  Chronic microvascular ischemic changes and chronic lacunar infarcts.      Dr. Kendrick Elizabeth discussed preliminary findings with HIRA GRANADOS PA on   5/29/2019 9:48 PM with readback.        < end of copied text >

## 2019-05-30 NOTE — PROGRESS NOTE ADULT - SUBJECTIVE AND OBJECTIVE BOX
________________________________________________________________________________  DAILY PROGRESS NOTE:    ================== SUBJECTIVE ==================    HERNANDO ALICIAORE  /   74y  /  Female  /  MRN#: [id number]  Patient is a 74y old Female who presents with a chief complaint of left sided weakness and slurred speech (30 May 2019 10:17)  Currently admitted to medicine with the primary diagnosis of Hypertensive emergency      HOSPITAL DAY: 1  ICU DAY:1  SUMMARY OF HOSPITAL COURSE TO DATE : 74 y.o F with PMH of HTN, HLD, DM, hemorrhagic stroke,? EVD placement at that time ( about 8 years ago)  with residual left side weakness and slurred speech, ambulated with cane, on ASA daily.  Pt. brought to [facility] ED as a stroke code. At the time of presentation to [facility] ED A&Ox 2, baseline slurred speech with left sided weakness. /82, 55 HR, 18 RR, CTH showed acute right thalamic hemorrhage with extension to right lateral and third ventricle. At [facility] ED Pt. Intubated 2/2 worsening of neuro exam for air way protection as per ED attending note. Pt.  became more drowsy with decreased responsiveness. Pt. was transferred to ED-[place]. At time of NSGY eval Pt intubated, sedated on Propofol and Fentanyl, radial arterial line, she received keppra, zofran and mannitol in ED. Repeat CT scan stable from prior study as per neurosurgery. Platelets one unit ordered as per neurosurgery      OVERNIGHT EVENTS: none    TODAY: Patient was seen this morning at bedside.     Review of systems is otherwise negative.    ================= PRESENT TODAY ==================    1-Mckeon Catheter: yes  2-Central Line: no  3-IV Fluids: no  4-Drips:   4.1-Pressors: no  4.2-Sedation: no  4.3-Heparin:  no   5-Intubated: yes      =================== OBJECTIVE ===================    VITAL SIGNS: Last 24 Hours  T(C): 37.2 (30 May 2019 12:00), Max: 37.2 (30 May 2019 12:00)  T(F): 98.9 (30 May 2019 12:00), Max: 98.9 (30 May 2019 12:00)  HR: 52 (30 May 2019 14:00) (46 - 86)  BP: 112/54 (30 May 2019 04:45) (110/55 - 189/82)  BP(mean): 74 (30 May 2019 04:45) (74 - 74)  RR: 11 (30 May 2019 14:00) (11 - 37)  SpO2: 100% (30 May 2019 14:00) (90% - 100%)    05-29-19 @ 07:01  -  05-30-19 @ 07:00  --------------------------------------------------------  IN: 679.8 mL / OUT: 375 mL / NET: 304.8 mL    05-30-19 @ 07:01  -  05-30-19 @ 15:47  --------------------------------------------------------  IN: 834.4 mL / OUT: 205 mL / NET: 629.4 mL      PHYSICAL EXAM:  GENERAL: NAD, well-developed  HEAD:  Atraumatic, Normocephalic  EYES: miotic. pinpoint pupils   NECK: ET +  CHEST/LUNG: Clear to auscultation bilaterally; No wheeze  HEART: Regular rate and rhythm; No murmurs, rubs, or gallops  ABDOMEN: Soft, Nontender, Nondistended; Bowel sounds present  EXTREMITIES:  2+ Peripheral Pulses, No clubbing, cyanosis, or edema  PSYCH: Follows commands  NEUROLOGY: moves extremities   SKIN: No rashes or lesions    ===================== LABS =====================                        12.6   8.56  )-----------( 230      ( 30 May 2019 06:30 )             39.1     05-30    142  |  104  |  25<H>  ----------------------------<  224<H>  4.1   |  23  |  0.9    Ca    10.7<H>      30 May 2019 06:30  Phos  2.3     05-30  Mg     1.7     05-30    TPro  6.3  /  Alb  4.1  /  TBili  1.0  /  DBili  x   /  AST  14  /  ALT  15  /  AlkPhos  57  05-30    PT/INR - ( 29 May 2019 22:00 )   PT: 11.20 sec;   INR: 0.97 ratio         PTT - ( 29 May 2019 22:00 )  PTT:34.5 sec    ABG - ( 30 May 2019 14:40 )  pH, Arterial: 7.38  pH, Blood: x     /  pCO2: 47    /  pO2: 119   / HCO3: 28    / Base Excess: 1.8   /  SaO2: 98          Troponin T, Serum: <0.01 ng/mL (05-29-19 @ 22:00)    CARDIAC MARKERS ( 29 May 2019 22:00 )  x     / <0.01 ng/mL / x     / x     / x          ================= MICROBIOLOGY ================    ================== IMAGING TO DATE ==================  < from: CT Head No Cont (05.30.19 @ 01:19) >  IMPRESSION:    Acute hemorrhage noted within the right thalamus with extension of   hemorrhage noted within the right lateral and third ventricles with mild   mass effect, not significantly changed since the prior study. A little   more hemorrhage is noted layering in the occipital horn of the right   lateral ventricle.    No acute midline shift. Essentially stable ventricular size without   definite evidence for hydrocephalus.    Patchy hypodensities in the periventricular and subcortical white matter   without mass effect compatible with chronic microvascular changes.    Chronic right basal ganglia lacunar infarct and chronic left external   capsule and thalamic infarcts.    There is no depressed skull fracture. Right frontal louie hole. There is   mild mucosal thickening of the bilateral maxillary sinuses. The bilateral   mastoid air cells are well-aerated.    < end of copied text >    < from: CT Cervical Spine No Cont (05.30.19 @ 01:19) >  IMPRESSION:    No evidence of acute cervical spine fracture or subluxation.    Multilevel degenerative changes.    Bilateral apical groundglass opacities.      < end of copied text >    ================== OTHER SIGNIFICANT FINDINGS ==================    ================== ALLERGIES ===================  No Known Allergies    ==================== MEDS =====================  chlorhexidine 4% Liquid 1 Application(s) Topical <User Schedule>  levETIRAcetam  IVPB 500 milliGRAM(s) IV Intermittent every 12 hours  pantoprazole  Injectable 40 milliGRAM(s) IV Push daily  sodium chloride 0.9%. 1000 milliLiter(s) IV Continuous <Continuous>    PRN MEDICATIONS      ================= CONSULTS ==================    ================= ASSESS/PLAN ==================  Patient is a 74y old Female who presents with a chief complaint of left sided weakness and slurred speech   Currently admitted to medicine with the primary diagnosis of Hypertensive emergency      PLAN  74 y.o F with PMH of HTN, HLD, DM, hemorrhagic stroke,? EVD placement at that time ( about 8 years ago)  with residual left side weakness and slurred speech, ambulated with cane, on ASA daily  Pt. brought to [facility] ED as a stroke code    #)Slurred speech and left sided weakness sec to Acute right thalamic hemorrhagic stroke  -Neurosurgery recs appreciated frequent neurochecks  -MICU monitoring  -NO acte NSGY intervention needed at this time   -Cont Keppra  -Monitor and control SBP between 140-160  -c/w IVF  -Trend platelets     #)DM   -check FS will start insulin if FS>180    #)HTN  -Hold HTN meds for now maintain -160    #)Diet; NPO for now  #)Activtiy: Berdrest   #)DVT ppx: SCD  #)GI ppx: Protonix  #)Dispo: From home, MICU   #)Code: Full        GI PPX:   DVT PPX:     DIET:    ACTIVITY:     ================= CODE STATUS =================                  () FULL CODE     |     () DNR     |     () DNI ________________________________________________________________________________  DAILY PROGRESS NOTE:    ================== SUBJECTIVE ==================    HERNANDO ALICIAORE  /   74y  /  Female  /  MRN#: [id number]  Patient is a 74y old Female who presents with a chief complaint of left sided weakness and slurred speech (30 May 2019 10:17)  Currently admitted to medicine with the primary diagnosis of Hypertensive emergency      HOSPITAL DAY: 1  ICU DAY:1  SUMMARY OF HOSPITAL COURSE TO DATE : 74 y.o F with PMH of HTN, HLD, DM, hemorrhagic stroke,? EVD placement at that time ( about 8 years ago)  with residual left side weakness and slurred speech, ambulated with cane, on ASA daily.  Pt. brought to [facility] ED as a stroke code. At the time of presentation to [facility] ED A&Ox 2, baseline slurred speech with left sided weakness. /82, 55 HR, 18 RR, CTH showed acute right thalamic hemorrhage with extension to right lateral and third ventricle. At [facility] ED Pt. Intubated 2/2 worsening of neuro exam for air way protection as per ED attending note. Pt.  became more drowsy with decreased responsiveness. Pt. was transferred to ED-[place]. At time of NSGY eval Pt intubated, sedated on Propofol and Fentanyl, radial arterial line, she received keppra, zofran and mannitol in ED. Repeat CT scan stable from prior study as per neurosurgery. Platelets one unit ordered as per neurosurgery      OVERNIGHT EVENTS: none    TODAY: Patient was seen this morning at bedside.     Review of systems is otherwise negative.    ================= PRESENT TODAY ==================    1-Mckeon Catheter: yes  2-Central Line: no  3-IV Fluids: no  4-Drips:   4.1-Pressors: no  4.2-Sedation: no  4.3-Heparin:  no   5-Intubated: yes      =================== OBJECTIVE ===================    VITAL SIGNS: Last 24 Hours  T(C): 37.2 (30 May 2019 12:00), Max: 37.2 (30 May 2019 12:00)  T(F): 98.9 (30 May 2019 12:00), Max: 98.9 (30 May 2019 12:00)  HR: 52 (30 May 2019 14:00) (46 - 86)  BP: 112/54 (30 May 2019 04:45) (110/55 - 189/82)  BP(mean): 74 (30 May 2019 04:45) (74 - 74)  RR: 11 (30 May 2019 14:00) (11 - 37)  SpO2: 100% (30 May 2019 14:00) (90% - 100%)    05-29-19 @ 07:01  -  05-30-19 @ 07:00  --------------------------------------------------------  IN: 679.8 mL / OUT: 375 mL / NET: 304.8 mL    05-30-19 @ 07:01  -  05-30-19 @ 15:47  --------------------------------------------------------  IN: 834.4 mL / OUT: 205 mL / NET: 629.4 mL      PHYSICAL EXAM:  GENERAL: NAD, well-developed  HEAD:  Atraumatic, Normocephalic  EYES: miotic. pinpoint pupils   NECK: ET +  CHEST/LUNG: Clear to auscultation bilaterally; No wheeze  HEART: Regular rate and rhythm; No murmurs, rubs, or gallops  ABDOMEN: Soft, Nontender, Nondistended; Bowel sounds present  EXTREMITIES:  2+ Peripheral Pulses, No clubbing, cyanosis, or edema  PSYCH: Follows commands  NEUROLOGY: moves extremities   SKIN: No rashes or lesions    ===================== LABS =====================                        12.6   8.56  )-----------( 230      ( 30 May 2019 06:30 )             39.1     05-30    142  |  104  |  25<H>  ----------------------------<  224<H>  4.1   |  23  |  0.9    Ca    10.7<H>      30 May 2019 06:30  Phos  2.3     05-30  Mg     1.7     05-30    TPro  6.3  /  Alb  4.1  /  TBili  1.0  /  DBili  x   /  AST  14  /  ALT  15  /  AlkPhos  57  05-30    PT/INR - ( 29 May 2019 22:00 )   PT: 11.20 sec;   INR: 0.97 ratio         PTT - ( 29 May 2019 22:00 )  PTT:34.5 sec    ABG - ( 30 May 2019 14:40 )  pH, Arterial: 7.38  pH, Blood: x     /  pCO2: 47    /  pO2: 119   / HCO3: 28    / Base Excess: 1.8   /  SaO2: 98          Troponin T, Serum: <0.01 ng/mL (05-29-19 @ 22:00)    CARDIAC MARKERS ( 29 May 2019 22:00 )  x     / <0.01 ng/mL / x     / x     / x          ================= MICROBIOLOGY ================    ================== IMAGING TO DATE ==================  < from: CT Head No Cont (05.30.19 @ 01:19) >  IMPRESSION:    Acute hemorrhage noted within the right thalamus with extension of   hemorrhage noted within the right lateral and third ventricles with mild   mass effect, not significantly changed since the prior study. A little   more hemorrhage is noted layering in the occipital horn of the right   lateral ventricle.    No acute midline shift. Essentially stable ventricular size without   definite evidence for hydrocephalus.    Patchy hypodensities in the periventricular and subcortical white matter   without mass effect compatible with chronic microvascular changes.    Chronic right basal ganglia lacunar infarct and chronic left external   capsule and thalamic infarcts.    There is no depressed skull fracture. Right frontal louie hole. There is   mild mucosal thickening of the bilateral maxillary sinuses. The bilateral   mastoid air cells are well-aerated.    < end of copied text >    < from: CT Cervical Spine No Cont (05.30.19 @ 01:19) >  IMPRESSION:    No evidence of acute cervical spine fracture or subluxation.    Multilevel degenerative changes.    Bilateral apical groundglass opacities.      < end of copied text >    ================== OTHER SIGNIFICANT FINDINGS ==================    ================== ALLERGIES ===================  No Known Allergies    ==================== MEDS =====================  chlorhexidine 4% Liquid 1 Application(s) Topical <User Schedule>  levETIRAcetam  IVPB 500 milliGRAM(s) IV Intermittent every 12 hours  pantoprazole  Injectable 40 milliGRAM(s) IV Push daily  sodium chloride 0.9%. 1000 milliLiter(s) IV Continuous <Continuous>    PRN MEDICATIONS      ================= CONSULTS ==================    ================= ASSESS/PLAN ==================  Patient is a 74y old Female who presents with a chief complaint of left sided weakness and slurred speech   Currently admitted to medicine with the primary diagnosis of Hypertensive emergency      PLAN  74 y.o F with PMH of HTN, HLD, DM, hemorrhagic stroke,? EVD placement at that time ( about 8 years ago)  with residual left side weakness and slurred speech, ambulated with cane, on ASA daily  Pt. brought to [facility] ED as a stroke code    #)Slurred speech and left sided weakness sec to Acute right thalamic hemorrhagic stroke  -Neurosurgery recs appreciated frequent neurochecks  -MICU monitoring  -NO acte NSGY intervention needed at this time   -Cont Keppra  -Monitor and control SBP between 140-160  -c/w IVF  -Trend platelets     #)DM   -check FS will start insulin if FS>180    #)HTN  -Hold HTN meds for now maintain -160    #)Diet; NPO for now  #)Activtiy: Berdrest   #)DVT ppx: SCD  #)GI ppx: Protonix  #)Dispo: From home, MICU   #)Code: Full        GI PPX: protonix  DVT PPX: sequentials    DIET: NPO   ACTIVITY: Bed rest    ================= CODE STATUS =================                  () FULL CODE     |     () DNR     |     () DNI ________________________________________________________________________________  DAILY PROGRESS NOTE:    ================== SUBJECTIVE ==================    HERNANDO ALICIAORE  /   74y  /  Female  /  MRN#: [id number]  Patient is a 74y old Female who presents with a chief complaint of left sided weakness and slurred speech (30 May 2019 10:17)  Currently admitted to medicine with the primary diagnosis of Hypertensive emergency      HOSPITAL DAY: 1  ICU DAY:1  SUMMARY OF HOSPITAL COURSE TO DATE : 74 y.o F with PMH of HTN, HLD, DM, hemorrhagic stroke,? EVD placement at that time ( about 8 years ago)  with residual left side weakness and slurred speech, ambulated with cane, on ASA daily.  Pt. brought to [facility] ED as a stroke code. At the time of presentation to [facility] ED A&Ox 2, baseline slurred speech with left sided weakness. /82, 55 HR, 18 RR, CTH showed acute right thalamic hemorrhage with extension to right lateral and third ventricle. At [facility] ED Pt. Intubated 2/2 worsening of neuro exam for air way protection as per ED attending note. Pt.  became more drowsy with decreased responsiveness. Pt. was transferred to ED-[place]. At time of NSGY eval Pt intubated, sedated on Propofol and Fentanyl, radial arterial line, she received keppra, zofran and mannitol in ED. Repeat CT scan stable from prior study as per neurosurgery. Platelets one unit ordered as per neurosurgery      OVERNIGHT EVENTS: none    TODAY: Patient was seen this morning at bedside.     Review of systems is otherwise negative.    ================= PRESENT TODAY ==================    1-Mckeon Catheter: yes  2-Central Line: no  3-IV Fluids: no  4-Drips:   4.1-Pressors: no  4.2-Sedation: no  4.3-Heparin:  no   5-Intubated: yes      =================== OBJECTIVE ===================    VITAL SIGNS: Last 24 Hours  T(C): 37.2 (30 May 2019 12:00), Max: 37.2 (30 May 2019 12:00)  T(F): 98.9 (30 May 2019 12:00), Max: 98.9 (30 May 2019 12:00)  HR: 52 (30 May 2019 14:00) (46 - 86)  BP: 112/54 (30 May 2019 04:45) (110/55 - 189/82)  BP(mean): 74 (30 May 2019 04:45) (74 - 74)  RR: 11 (30 May 2019 14:00) (11 - 37)  SpO2: 100% (30 May 2019 14:00) (90% - 100%)    05-29-19 @ 07:01  -  05-30-19 @ 07:00  --------------------------------------------------------  IN: 679.8 mL / OUT: 375 mL / NET: 304.8 mL    05-30-19 @ 07:01  -  05-30-19 @ 15:47  --------------------------------------------------------  IN: 834.4 mL / OUT: 205 mL / NET: 629.4 mL      PHYSICAL EXAM:  GENERAL: NAD, well-developed  HEAD:  Atraumatic, Normocephalic  EYES: miotic. pinpoint pupils   NECK: ET +  CHEST/LUNG: Clear to auscultation bilaterally; No wheeze  HEART: Regular rate and rhythm; No murmurs, rubs, or gallops  ABDOMEN: Soft, Nontender, Nondistended; Bowel sounds present  EXTREMITIES:  2+ Peripheral Pulses, No clubbing, cyanosis, or edema  PSYCH: Follows commands  NEUROLOGY: moves extremities   SKIN: No rashes or lesions    ===================== LABS =====================                        12.6   8.56  )-----------( 230      ( 30 May 2019 06:30 )             39.1     05-30    142  |  104  |  25<H>  ----------------------------<  224<H>  4.1   |  23  |  0.9    Ca    10.7<H>      30 May 2019 06:30  Phos  2.3     05-30  Mg     1.7     05-30    TPro  6.3  /  Alb  4.1  /  TBili  1.0  /  DBili  x   /  AST  14  /  ALT  15  /  AlkPhos  57  05-30    PT/INR - ( 29 May 2019 22:00 )   PT: 11.20 sec;   INR: 0.97 ratio         PTT - ( 29 May 2019 22:00 )  PTT:34.5 sec    ABG - ( 30 May 2019 14:40 )  pH, Arterial: 7.38  pH, Blood: x     /  pCO2: 47    /  pO2: 119   / HCO3: 28    / Base Excess: 1.8   /  SaO2: 98          Troponin T, Serum: <0.01 ng/mL (05-29-19 @ 22:00)    CARDIAC MARKERS ( 29 May 2019 22:00 )  x     / <0.01 ng/mL / x     / x     / x          ================= MICROBIOLOGY ================    ================== IMAGING TO DATE ==================  < from: CT Head No Cont (05.30.19 @ 01:19) >  IMPRESSION:    Acute hemorrhage noted within the right thalamus with extension of   hemorrhage noted within the right lateral and third ventricles with mild   mass effect, not significantly changed since the prior study. A little   more hemorrhage is noted layering in the occipital horn of the right   lateral ventricle.    No acute midline shift. Essentially stable ventricular size without   definite evidence for hydrocephalus.    Patchy hypodensities in the periventricular and subcortical white matter   without mass effect compatible with chronic microvascular changes.    Chronic right basal ganglia lacunar infarct and chronic left external   capsule and thalamic infarcts.    There is no depressed skull fracture. Right frontal louie hole. There is   mild mucosal thickening of the bilateral maxillary sinuses. The bilateral   mastoid air cells are well-aerated.    < end of copied text >    < from: CT Cervical Spine No Cont (05.30.19 @ 01:19) >  IMPRESSION:    No evidence of acute cervical spine fracture or subluxation.    Multilevel degenerative changes.    Bilateral apical groundglass opacities.      < end of copied text >    ================== OTHER SIGNIFICANT FINDINGS ==================    ================== ALLERGIES ===================  No Known Allergies    ==================== MEDS =====================  chlorhexidine 4% Liquid 1 Application(s) Topical <User Schedule>  levETIRAcetam  IVPB 500 milliGRAM(s) IV Intermittent every 12 hours  pantoprazole  Injectable 40 milliGRAM(s) IV Push daily  sodium chloride 0.9%. 1000 milliLiter(s) IV Continuous <Continuous>    PRN MEDICATIONS      ================= CONSULTS ==================    ================= ASSESS/PLAN ==================  Patient is a 74y old Female who presents with a chief complaint of left sided weakness and slurred speech   Currently admitted to medicine with the primary diagnosis of Hypertensive emergency      PLAN  74 y.o F with PMH of HTN, HLD, DM, hemorrhagic stroke,? EVD placement at that time ( about 8 years ago)  with residual left side weakness and slurred speech, ambulated with cane, on ASA daily  Pt. brought to [facility] ED as a stroke code    #)Acute right thalamic hemorrhagic stroke  -Sudden onset slurred speech and left sided weakness   -Neurosurgery recs appreciated. Continue frequent neurochecks   -No acute neurosurgical intervention needed at this time   -Continue Keppra  -Monitor and control SBP between 140-160  -Patient is currently off sedation. Responding to commands   -GAG and corneal reflex present.  -f/u repeat CT head, CT chest and abdomen    #)DM   -check FS will start insulin if FS>180    #)HTN  -Maintain BP between 120-140mm Hg    GI PPX: protonix  DVT PPX: sequentials    DIET: NPO for now  ACTIVITY: Bed rest    ================= CODE STATUS =================                  (X) FULL CODE     |     () DNR     |     () DNI

## 2019-05-30 NOTE — CONSULT NOTE ADULT - SUBJECTIVE AND OBJECTIVE BOX
CC: [facility]        HPI:   74 y.o  Female with PMH of HTN, HLD, DM, Hemorrhagic stroke,? EVD placement at that time ( about 8 years ago)  with residual left side weakness and slurred speech, ambulates with cane at baseline on ASA daily. Patient was initially brought to  [facility] ED as a stroke code due to left sided weakness and slurred speech. Per daughter at bedside patient was in her yard when she suddenly fell on the ground -  daughter called ems. They had  cameras  they  showed event - pt walked outside   stopped, bent forward and  then collapsed on the floor. Daughter further stated that pt was awake, and talking to her but was confused at that time. CTH revealed an acute right thalamic hemorrhage with extension to right lateral and third ventricle. At [facility] ED patient was Intubated 2/2 worsening of neuro exam, she became more drowsy with decreased responsiveness. Patient was then  transferred to ED-[place] for NSGY eval and possible surgical  intervention. Pt intubated, sedated on Propofol and Fentanyl she received Keppra and mannitol in [facility] ED.         Past Medical History:  Diabetes mellitus    Hypertension   HLD   Hemorrhagic Stroke.     Past Surgical History:  No significant past surgical history.       Family History:  No pertinent family history in first degree relatives.        Home Medications:  AmLODIPine 2.5 mg oral tablet: Q 24  Donepezil 10 mg oral tablet: Q24  Enalapril 10 mg oral tablet: Q24  Enalapril 10 mg oral tablet: Q 24  Glimepiride 1 mg oral tablet: Q24  Norvasc 2.5 mg oral tablet: Q 24  Simvastatin 20 mg oral tablet: Q24  Vitamin B12 1000 mcg oral tablet: Q 24  Vitamin D3 1000 intl units oral tablet, chewable: Q24    Neuro Exam:  Orientation: Patient intubated and sedated not following commands  Cranial Nerves: Pupils pinpoint, eyes midline  , +corneal and gag reflex , grimaces frequently   Motor: moving all 4 extremities at random  Sensory exam: Responding to pain by withdrawing all 4 extremities and grimacing  Plantar response upgoing      ICH SCORE: 2  GCS:  6   ([id number] )  ICH func score: 7 Points (1-20% probability of functional independence at 90 days , but patient is currently intubated and will need re-evaluation of score post extubation)      Allergies    No Known Allergies    Intolerances      MEDICATIONS  (STANDING):  chlorhexidine 4% Liquid 1 Application(s) Topical <User Schedule>  fentaNYL   Infusion 0.5 MICROgram(s)/kG/Hr (3.74 mL/Hr) IV Continuous <Continuous>  levETIRAcetam  IVPB 1000 milliGRAM(s) IV Intermittent every 12 hours  niCARdipine Infusion 5 mG/Hr (25 mL/Hr) IV Continuous <Continuous>  pantoprazole  Injectable 40 milliGRAM(s) IV Push daily  propofol Infusion 5 MICROgram(s)/kG/Min (2.244 mL/Hr) IV Continuous <Continuous>    MEDICATIONS  (PRN):      LABS:                        14.4   6.25  )-----------( 254      ( 29 May 2019 22:00 )             47.1     05-29    146  |  105  |  28<H>  ----------------------------<  149<H>  4.9   |  31  |  0.9    Ca    12.2<H>      29 May 2019 22:00    TPro  7.4  /  Alb  4.7  /  TBili  0.5  /  DBili  x   /  AST  16  /  ALT  18  /  AlkPhos  65  05-29    PT/INR - ( 29 May 2019 22:00 )   PT: 11.20 sec;   INR: 0.97 ratio         PTT - ( 29 May 2019 22:00 )  PTT:34.5 sec        Neuro Imaging:  < from: CT Head No Cont (05.30.19 @ 01:19) >  Findings/  IMPRESSION:    Acute hemorrhage noted within the right thalamus with extension of   hemorrhage noted within the right lateral and third ventricles with mild   mass effect, not significantly changed since the prior study.    No acute midline shift. Stable ventricular size without definite evidence   for hydrocephalus.    Patchy hypodensities in the periventricular and subcortical white matter   without mass effect compatible with chronic microvascular changes.    Chronicright basal ganglia lacunar infarct and chronic left external   capsule and thalamic infarcts.    There is no depressed skull fracture. There is mild mucosal thickening of   the bilateral maxillary sinuses. The bilateral mastoid air cells are   well-aerated.              < from: CT Cervical Spine No Cont (05.30.19 @ 01:19) >  FINDINGS:    There is no evidence of acute fracture, compression deformity or facet   subluxation of the cervical spine.     The atlantoaxial relationships are maintained.  The posterior elements   are intact. There is no significant prevertebral soft tissue swelling.   The visualized paraspinal soft tissues are unremarkable.     There is trace anterolisthesis of C4 on C5. There are multilevel endplate   degenerative changes as evidenced by disc space narrowing, uncovertebral   hypertrophy and facet arthropathy. This is most pronounced at C5-C6 and   C6-C7  levels.    Straightening of the cervical lordosis secondary to positioning or muscle   spasm.    There are bilateral apical ground glass opacities. Endotracheal tube is   noted, partially imaged.       IMPRESSION:    No evidence of acute cervical spine fracture or subluxation.    Multilevel degenerative changes.    Bilateral apical ground glass opacities.    < end of copied text >      EEG:     Echo:   Carotid Doppler: N/A  Telemetry:

## 2019-05-30 NOTE — H&P ADULT - NSHPLABSRESULTS_GEN_ALL_CORE
14.4   6.25  )-----------( 254      ( 29 May 2019 22:00 )             47.1       05-29    146  |  105  |  28<H>  ----------------------------<  149<H>  4.9   |  31  |  0.9    Ca    12.2<H>      29 May 2019 22:00    TPro  7.4  /  Alb  4.7  /  TBili  0.5  /  DBili  x   /  AST  16  /  ALT  18  /  AlkPhos  65  05-29    Blood Gas Profile - Arterial (05.29.19 @ 22:52)    pH, Arterial: 7.44    pCO2, Arterial: 40 mmHg    pO2, Arterial: 209 mmHg    HCO3, Arterial: 27 mmoL/L    Base Excess, Arterial: 2.5 mmoL/L    Oxygen Saturation, Arterial: 100 %    FIO2, Arterial: 100    < from: CT Head No Cont (05.29.19 @ 21:43) >      IMPRESSION:       1.  1.9 x 1.3 x 1.5 cm region of acute hemorrhage noted within the right   thalamus with extension of hemorrhage noted within the right lateral and   third ventricles.     2.  Chronic microvascular ischemic changes and chronic lacunar infarcts.    < end of copied text >    < from: CT Cervical Spine No Cont (05.30.19 @ 01:19) >      IMPRESSION:    No evidence of acute cervical spine fracture or subluxation.    Multilevel degenerative changes.    Bilateral apical groundglass opacities.    < end of copied text > 14.4   6.25  )-----------( 254      ( 29 May 2019 22:00 )             47.1       05-29    146  |  105  |  28<H>  ----------------------------<  149<H>  4.9   |  31  |  0.9    Ca    12.2<H>      29 May 2019 22:00    TPro  7.4  /  Alb  4.7  /  TBili  0.5  /  DBili  x   /  AST  16  /  ALT  18  /  AlkPhos  65  05-29    Blood Gas Profile - Arterial (05.29.19 @ 22:52)    pH, Arterial: 7.44    pCO2, Arterial: 40 mmHg    pO2, Arterial: 209 mmHg    HCO3, Arterial: 27 mmoL/L    Base Excess, Arterial: 2.5 mmoL/L    Oxygen Saturation, Arterial: 100 %    FIO2, Arterial: 100    < from: CT Head No Cont (05.29.19 @ 21:43) >      IMPRESSION:       1.  1.9 x 1.3 x 1.5 cm region of acute hemorrhage noted within the right   thalamus with extension of hemorrhage noted within the right lateral and   third ventricles.     2.  Chronic microvascular ischemic changes and chronic lacunar infarcts.    < end of copied text >    < from: CT Cervical Spine No Cont (05.30.19 @ 01:19) >      IMPRESSION:    No evidence of acute cervical spine fracture or subluxation.    Multilevel degenerative changes.    Bilateral apical groundglass opacities.    < end of copied text >    < from: CT Head No Cont (05.30.19 @ 01:19) >    Findings/  IMPRESSION:    Acute hemorrhage noted within the right thalamus with extension of   hemorrhage noted within the right lateral and third ventricles with mild   mass effect, not significantly changed since the prior study.    No acute midline shift. Stable ventricular size without definite evidence   for hydrocephalus.    Patchy hypodensities in the periventricular and subcortical white matter   without mass effect compatible with chronic microvascular changes.    Chronicright basal ganglia lacunar infarct and chronic left external   capsule and thalamic infarcts.    There is no depressed skull fracture. There is mild mucosal thickening of   the bilateral maxillary sinuses. The bilateral mastoid air cells are   well-aerated.    < end of copied text >

## 2019-05-30 NOTE — H&P ADULT - HISTORY OF PRESENT ILLNESS
74 y.o F with PMH of HTN, HLD, DM, hemorrhagic stroke,? EVD placement at that time ( about 8 years ago)  with residual left side weakness and slurred speech, ambulated with cane, on ASA daily  Pt. brought to [facility] ED as a stroke code. At the time of presentation to [facility] ED A&Ox 2, baseline slurped speech with left sided weakness. CTH prelim report findings with acute right thalamic hemorrhage with extension to right lateral and third ventricle. At [facility] ED Pt. Intubated 2/2 worsening of neuro exam for air way protection as per ED attending note Pt.  became more drowsy with decreased responsiveness. Pt. was transferred to ED-[place]. At time of NSGY eval Pt intubated, sedated on Propofol and Fentanyl she received keppra and mannitol in ED. 74 y.o F with PMH of HTN, HLD, DM, hemorrhagic stroke,? EVD placement at that time ( about 8 years ago)  with residual left side weakness and slurred speech, ambulated with cane, on ASA daily  Pt. brought to [facility] ED as a stroke code. At the time of presentation to [facility] ED A&Ox 2, baseline slurped speech with left sided weakness. CTH prelim report findings with acute right thalamic hemorrhage with extension to right lateral and third ventricle. At [facility] ED Pt. Intubated 2/2 worsening of neuro exam for air way protection as per ED attending note Pt.  became more drowsy with decreased responsiveness. Pt. was transferred to ED-[place]. At time of NSGY eval Pt intubated, sedated on Propofol and Fentanyl she received keppra, zofran and mannitol in ED. 74 y.o F with PMH of HTN, HLD, DM, hemorrhagic stroke,? EVD placement at that time ( about 8 years ago)  with residual left side weakness and slurred speech, ambulated with cane, on ASA daily  Pt. brought to [facility] ED as a stroke code. At the time of presentation to [facility] ED A&Ox 2, baseline slurped speech with left sided weakness. /82, 55 HR, 18 RR, CTH prelim report findings with acute right thalamic hemorrhage with extension to right lateral and third ventricle. At [facility] ED Pt. Intubated 2/2 worsening of neuro exam for air way protection as per ED attending note Pt.  became more drowsy with decreased responsiveness. Pt. was transferred to ED-[place]. At time of NSGY eval Pt intubated, sedated on Propofol and Fentanyl, radial arterial line, she received keppra, zofran and mannitol in ED. 74 y.o F with PMH of HTN, HLD, DM, hemorrhagic stroke,? EVD placement at that time ( about 8 years ago)  with residual left side weakness and slurred speech, ambulated with cane, on ASA daily  Pt. brought to [facility] ED as a stroke code. At the time of presentation to [facility] ED A&Ox 2, baseline slurped speech with left sided weakness. /82, 55 HR, 18 RR, CTH prelim report findings with acute right thalamic hemorrhage with extension to right lateral and third ventricle. At [facility] ED Pt. Intubated 2/2 worsening of neuro exam for air way protection as per ED attending note Pt.  became more drowsy with decreased responsiveness. Pt. was transferred to ED-[place]. At time of NSGY eval Pt intubated, sedated on Propofol and Fentanyl, radial arterial line, she received keppra, zofran and mannitol in ED. Repeat CT scan stable from prior study as per neurosurgery. Platelets one unit ordered as per neurosurgery

## 2019-05-30 NOTE — PROGRESS NOTE ADULT - SUBJECTIVE AND OBJECTIVE BOX
HD#1    Right Thalamic Bleed    Pt seen and examined at bedside. Pt intubated, sedated with Propofol, Fentanyl    Vital Signs Last 24 Hrs  T(C): 36.4 (30 May 2019 08:00), Max: 36.9 (29 May 2019 21:36)  T(F): 97.6 (30 May 2019 08:00), Max: 98.5 (30 May 2019 04:45)  HR: 54 (30 May 2019 10:00) (46 - 86)  BP: 112/54 (30 May 2019 04:45) (110/55 - 189/82)  BP(mean): 74 (30 May 2019 04:45) (74 - 74)  RR: 15 (30 May 2019 10:00) (15 - 37)  SpO2: 100% (30 May 2019 10:00) (90% - 100%)    I&O's Detail    29 May 2019 07:01  -  30 May 2019 07:00  --------------------------------------------------------  IN:    fentaNYL  Infusion: 60 mL    Platelets - Single Donor: 211 mL    propofol Infusion: 8.8 mL    sodium chloride 0.9%.: 400 mL  Total IN: 679.8 mL    OUT:    Indwelling Catheter - Urethral: 375 mL  Total OUT: 375 mL    Total NET: 304.8 mL      30 May 2019 07:01  -  30 May 2019 10:17  --------------------------------------------------------  IN:    fentaNYL  Infusion: 30 mL    propofol Infusion: 4.4 mL    sodium chloride 0.9%.: 200 mL  Total IN: 234.4 mL    OUT:    Indwelling Catheter - Urethral: 45 mL  Total OUT: 45 mL    Total NET: 189.4 mL    I&O's Summary    29 May 2019 07:01  -  30 May 2019 07:00  --------------------------------------------------------  IN: 679.8 mL / OUT: 375 mL / NET: 304.8 mL    30 May 2019 07:01  -  30 May 2019 10:17  --------------------------------------------------------  IN: 234.4 mL / OUT: 45 mL / NET: 189.4 mL    PHYSICAL EXAM:  Neurological:  Pupils pinpoint  w/d B/L UE R>L  w/d RLE to pain  min w/d of LLE to pain  No commands at this time    LABS:                        12.6   8.56  )-----------( 230      ( 30 May 2019 06:30 )             39.1     05-30    142  |  104  |  25<H>  ----------------------------<  224<H>  4.1   |  23  |  0.9    Ca    10.7<H>      30 May 2019 06:30  Phos  2.3     05-30  Mg     1.7     05-30    TPro  6.3  /  Alb  4.1  /  TBili  1.0  /  DBili  x   /  AST  14  /  ALT  15  /  AlkPhos  57  05-30    PT/INR - ( 29 May 2019 22:00 )   PT: 11.20 sec;   INR: 0.97 ratio         PTT - ( 29 May 2019 22:00 )  PTT:34.5 sec    CARDIAC MARKERS ( 29 May 2019 22:00 )  x     / <0.01 ng/mL / x     / x     / x        POCT Blood Glucose.: 186 mg/dL (30 May 2019 07:54)  POCT Blood Glucose.: 212 mg/dL (30 May 2019 05:45)  POCT Blood Glucose.: 125 mg/dL (29 May 2019 21:26)    Allergies    No Known Allergies    Intolerances    MEDICATIONS:  Antibiotics:    Neuro:  fentaNYL   Infusion 0.5 MICROgram(s)/kG/Hr IV Continuous <Continuous>  levETIRAcetam  IVPB 1000 milliGRAM(s) IV Intermittent every 12 hours  propofol Infusion 5 MICROgram(s)/kG/Min IV Continuous <Continuous>    IVF:  sodium chloride 0.9%. 1000 milliLiter(s) IV Continuous <Continuous>    RADIOLOGY & ADDITIONAL TESTS:  < from: CT Head No Cont (05.30.19 @ 01:19) >  Acute hemorrhage noted within the right thalamus with extension of   hemorrhage noted within the right lateral and third ventricles with mild   mass effect, not significantly changed since the prior study. A little   more hemorrhage is noted layering in the occipital horn of the right   lateral ventricle.    < end of copied text >    ASSESSMENT:  74y Female s/p    HYPERTENSIVE EMERGENCY;INTRACRANIAL HEMORRHAGE  ^HYPERTENSIVE EMERGENCY;INTRACRANIAL HEMORRHAGE  No pertinent family history in first degree relatives  Handoff  MEWS Score  Diabetes mellitus  Stroke  Hypertension  Hypertensive emergency  No significant past surgical history  FALL  Intracranial hemorrhage      PLAN:  OK for SQ Heparin   Repeat HCT Saturday  Continue Neuro checks off sedation

## 2019-05-30 NOTE — CONSULT NOTE ADULT - ATTENDING COMMENTS
Patient seen and examined and agree with above except as noted. Patient intubated on fentanyl drip.  Not following commands no moving right extremities but withdraws left extremities, plantar up on right, Pupils pin point    Plan as above

## 2019-05-30 NOTE — CONSULT NOTE ADULT - ASSESSMENT
74 y.o  Female with PMH of HTN, HLD, DM, Hemorrhagic stroke,? EVD placement at that time ( about 8 years ago)  with residual left side weakness and slurred speech, ambulates with cane at baseline on ASA daily. Patient was initially brought to  [facility] ED as a stroke code due to left sided weakness and slurred speech. CTH revealed an acute right thalamic hemorrhage with extension to right lateral and third ventricle. Patient is currently intubated sedated and not following commands. Received Keppra and mannitol in ed      Plan  Admit to ICU  Neurosurgical consultation  Administer platelets  Repeat HCT in 6-8 hrs or sooner if there is acute change in mental status  start Keppra 500mg bid for seizure prophylaxis  Neuro checks q 1  Keep hob >30 degrees  Neuro attending note to follow 74 y.o  Female with PMH of HTN, HLD, DM, Hemorrhagic stroke,? EVD placement at that time ( about 8 years ago)  with residual left side weakness and slurred speech, ambulates with cane at baseline on ASA daily. Patient was initially brought to  [facility] ED as a stroke code due to left sided weakness and slurred speech. CTH revealed an acute right thalamic hemorrhage with extension to right lateral and third ventricle. Patient is currently intubated sedated and not following commands. Received Keppra and mannitol in ed.      Plan  Admit to ICU  Neurosurgical consultation  Administer platelets stat  Repeat HCT in 6-8 hrs or sooner if there is acute change in mental status  start Keppra 500mg bid for seizure prophylaxis  Neuro checks q 1  Keep hob >30 degrees  Neuro attending note to follow

## 2019-05-30 NOTE — H&P ADULT - ASSESSMENT
#)Acute right thalamic hemorrhagic stroke  -Neurosurgery recs appreciated frequent neurochecks  -MICU monitoring  -NO acte NSGY intervention needed at this time   -Cont Keppra  -Monitor and control SBP  -Trend SBP     #)DM   -check FS will start insulin if FS>180    #)HTN  -Hold HTN meds for now maintain -160    #)Diet; NPO for now  #)Activtiy: Berdrest   #)DVT ppx: SCD  #)GI ppx: Protonix  #)Dispo: From home, MICU   #)Code: Full 74 y.o F with PMH of HTN, HLD, DM, hemorrhagic stroke,? EVD placement at that time ( about 8 years ago)  with residual left side weakness and slurred speech, ambulated with cane, on ASA daily  Pt. brought to [facility] ED as a stroke code    #)Slurred speech and left sided weakness sec to Acute right thalamic hemorrhagic stroke  -Neurosurgery recs appreciated frequent neurochecks  -MICU monitoring  -NO acte NSGY intervention needed at this time   -Cont Keppra  -Monitor and control SBP between 140-160  -c/w IVF  -Trend platelets     #)DM   -check FS will start insulin if FS>180    #)HTN  -Hold HTN meds for now maintain -160    #)Diet; NPO for now  #)Activtiy: Berdrest   #)DVT ppx: SCD  #)GI ppx: Protonix  #)Dispo: From home, MICU   #)Code: Full

## 2019-05-31 NOTE — PROGRESS NOTE ADULT - SUBJECTIVE AND OBJECTIVE BOX
Patient is a 74y old  Female who presents with a chief complaint of left sided weakness and slurred speech (30 May 2019 15:47)        Over Night Events: On MV         ROS:  See HPI    PHYSICAL EXAM    ICU Vital Signs Last 24 Hrs  T(C): 37.9 (31 May 2019 08:00), Max: 37.9 (31 May 2019 08:00)  T(F): 100.2 (31 May 2019 08:00), Max: 100.2 (31 May 2019 08:00)  HR: 60 (31 May 2019 08:00) (52 - 78)  BP: --  BP(mean): --  ABP: 132/64 (31 May 2019 08:00) (97/54 - 181/86)  ABP(mean): 81 (31 May 2019 08:00) (68 - 118)  RR: 12 (31 May 2019 08:00) (11 - 30)  SpO2: 100% (31 May 2019 08:00) (100% - 100%)      General:  HEENT: AGUSTÍN             Lymphatic system: No cervical LN   Lungs: Bilateral BS  Cardiovascular: Regular   Gastrointestinal: Soft, Positive BS  Extremities: No clubbing.  Moves extremities.  Full Range of motion   Skin: Warm, intact  Neurological: No motor or sensory deficit       19 @ 07:  -  19 @ 07:00  --------------------------------------------------------  IN:    fentaNYL  Infusion: 30 mL    propofol Infusion: 4.4 mL    sodium chloride 0.9%: 2000 mL    sodium chloride 0.9%.: 400 mL  Total IN: 2434.4 mL    OUT:    Incontinent per Collection Ba mL    Indwelling Catheter - Urethral: 205 mL  Total OUT: 505 mL    Total NET: 1929.4 mL      19 @ 07:19 @ 09:39  --------------------------------------------------------  IN:    sodium chloride 0.9%.: 100 mL  Total IN: 100 mL    OUT:    Incontinent per Collection Ba mL  Total OUT: 200 mL    Total NET: -100 mL      LABS:                        12.6   8.56  )-----------( 230      ( 30 May 2019 06:30 )             39.1                                               05    142  |  104  |  25<H>  ----------------------------<  224<H>  4.1   |  23  |  0.9    Ca    10.7<H>      30 May 2019 06:30  Phos  2.3       Mg     1.7         TPro  6.3  /  Alb  4.1  /  TBili  1.0  /  DBili  x   /  AST  14  /  ALT  15  /  AlkPhos  57  30      PT/INR - ( 29 May 2019 22:00 )   PT: 11.20 sec;   INR: 0.97 ratio         PTT - ( 29 May 2019 22:00 )  PTT:34.5 sec                                           CARDIAC MARKERS ( 29 May 2019 22:00 )  x     / <0.01 ng/mL / x     / x     / x                                                LIVER FUNCTIONS - ( 30 May 2019 06:30 )  Alb: 4.1 g/dL / Pro: 6.3 g/dL / ALK PHOS: 57 U/L / ALT: 15 U/L / AST: 14 U/L / GGT: x                                                                Mode: AC/ CMV (Assist Control/ Continuous Mandatory Ventilation)  RR (machine): 12  TV (machine): 450  FiO2: 40  PEEP: 5  ITime: 1  MAP: 9  PIP: 25                                      ABG - ( 31 May 2019 03:49 )  pH, Arterial: 7.36  pH, Blood: x     /  pCO2: 44    /  pO2: 100   / HCO3: 25    / Base Excess: -0.5  /  SaO2: 98        MEDICATIONS  (STANDING):  chlorhexidine 0.12% Liquid 15 milliLiter(s) Oral Mucosa two times a day  chlorhexidine 4% Liquid 1 Application(s) Topical <User Schedule>  levETIRAcetam  IVPB 500 milliGRAM(s) IV Intermittent every 12 hours  midazolam Injectable 4 milliGRAM(s) IV Push once  pantoprazole  Injectable 40 milliGRAM(s) IV Push daily  sodium chloride 0.9%. 1000 milliLiter(s) (100 mL/Hr) IV Continuous <Continuous>    MEDICATIONS  (PRN):      Xrays:                                                                                     ECHO

## 2019-05-31 NOTE — CONSULT NOTE ADULT - SUBJECTIVE AND OBJECTIVE BOX
Patient is a 74y old  Female who presents with a chief complaint of left sided weakness and slurred speech (31 May 2019 09:37)  HPI:    74 y.o F with PMH of HTN, HLD, DM, hemorrhagic stroke,? EVD placement at that time ( about 8 years ago)  with residual left side weakness and slurred speech, ambulated with cane, on ASA daily  Pt. brought to [facility] ED as a stroke code. At the time of presentation to [facility] ED A&Ox 2, baseline slurped speech with left sided weakness. /82, 55 HR, 18 RR, CTH prelim report findings with acute right thalamic hemorrhage with extension to right lateral and third ventricle. At [facility] ED Pt. Intubated 2/2 worsening of neuro exam for air way protection as per ED attending note Pt.  became more drowsy with decreased responsiveness. Pt. was transferred to ED-[place]. At time of NSGY eval Pt intubated, sedated on Propofol and Fentanyl, radial arterial line, she received keppra, zofran and mannitol in ED. Repeat CT scan stable from prior study as per neurosurgery. Pt received a unit of platelets.    Pt remains intubated.  She follows some simple commands.  On tele, she was noted to have prolonged Qtc with runs of NSVT.      PAST MEDICAL & SURGICAL HISTORY:  Diabetes mellitus  Stroke  Hypertension  No significant past surgical history    PREVIOUS DIAGNOSTIC TESTING:      ECHO  FINDINGS:   No echo in system    MEDICATIONS  (STANDING):  chlorhexidine 0.12% Liquid 15 milliLiter(s) Oral Mucosa two times a day  chlorhexidine 4% Liquid 1 Application(s) Topical <User Schedule>  levETIRAcetam  IVPB 500 milliGRAM(s) IV Intermittent every 12 hours  midazolam Injectable 4 milliGRAM(s) IV Push once  sodium chloride 0.9%. 1000 milliLiter(s) (100 mL/Hr) IV Continuous <Continuous>    MEDICATIONS  (PRN):      FAMILY HISTORY:  No pertinent family history in first degree relatives      SOCIAL HISTORY:    CIGARETTES: unknown     ALCOHOL: unknown    Past Surgical History:    Allergies:    No Known Allergies      REVIEW OF SYSTEMS:  unable to obtain as pt is intubated      Vital Signs Last 24 Hrs  T(C): 37.9 (31 May 2019 08:00), Max: 37.9 (31 May 2019 08:00)  T(F): 100.2 (31 May 2019 08:00), Max: 100.2 (31 May 2019 08:00)  HR: 60 (31 May 2019 08:00) (52 - 78)  BP: --  BP(mean): --  RR: 12 (31 May 2019 08:00) (11 - 30)  SpO2: 100% (31 May 2019 08:00) (100% - 100%)    PHYSICAL EXAM:  GENERAL: In no apparent distress  ENMT: +ETT  NECK: Supple   HEART: Regular rate and rhythm  PULMONARY: Clear to auscultation and perfusion.  No rales, wheezing, or rhonchi bilaterally.  ABDOMEN: Soft, Nontender, Nondistended; Bowel sounds present  EXTREMITIES:  2+ Peripheral Pulses  NEUROLOGICAL:  Pt is following simple commands      INTERPRETATION OF TELEMETRY:    ECG: < from: 12 Lead ECG (19 @ 07:40) >  Ventricular Rate 56 BPM    Atrial Rate 326 BPM    QRS Duration 80 ms    Q-T Interval 480 ms    QTC Calculation(Bezet) 463 ms    R Axis 35 degrees    T Axis 92 degrees    Diagnosis Line Sinus bradycardia  Nonspecific ST and T wave abnormality  Abnormal ECG          I&O's Detail    30 May 2019 07:01  -  31 May 2019 07:00  --------------------------------------------------------  IN:    fentaNYL  Infusion: 30 mL    propofol Infusion: 4.4 mL    sodium chloride 0.9%: 2000 mL    sodium chloride 0.9%.: 400 mL  Total IN: 2434.4 mL    OUT:    Incontinent per Collection Ba mL    Indwelling Catheter - Urethral: 205 mL  Total OUT: 505 mL    Total NET: 1929.4 mL      31 May 2019 07:01  -  31 May 2019 10:13  --------------------------------------------------------  IN:    sodium chloride 0.9%.: 100 mL  Total IN: 100 mL    OUT:    Incontinent per Collection Ba mL  Total OUT: 200 mL    Total NET: -100 mL          LABS:                        11.1   8.74  )-----------( 185      ( 31 May 2019 09:39 )             36.0         142  |  108  |  18  ----------------------------<  201<H>  3.9   |  23  |  0.9    Ca    9.4      31 May 2019 09:39  Phos  2.0       Mg     1.7         TPro  5.5<L>  /  Alb  3.4<L>  /  TBili  1.0  /  DBili  x   /  AST  8   /  ALT  11  /  AlkPhos  48      CARDIAC MARKERS ( 29 May 2019 22:00 )  x     / <0.01 ng/mL / x     / x     / x          PT/INR - ( 29 May 2019 22:00 )   PT: 11.20 sec;   INR: 0.97 ratio         PTT - ( 29 May 2019 22:00 )  PTT:34.5 sec    BNP  I&O's Detail    30 May 2019 07:01  -  31 May 2019 07:00  --------------------------------------------------------  IN:    fentaNYL  Infusion: 30 mL    propofol Infusion: 4.4 mL    sodium chloride 0.9%: 2000 mL    sodium chloride 0.9%.: 400 mL  Total IN: 2434.4 mL    OUT:    Incontinent per Collection Ba mL    Indwelling Catheter - Urethral: 205 mL  Total OUT: 505 mL    Total NET: 1929.4 mL      31 May 2019 07:  -  31 May 2019 10:13  --------------------------------------------------------  IN:    sodium chloride 0.9%.: 100 mL  Total IN: 100 mL    OUT:    Incontinent per Collection Ba mL  Total OUT: 200 mL    Total NET: -100 mL        Daily     Daily Weight in k.7 (31 May 2019 06:00)    RADIOLOGY & ADDITIONAL STUDIES:

## 2019-05-31 NOTE — CONSULT NOTE ADULT - SUBJECTIVE AND OBJECTIVE BOX
Patient is a 74y old  Female who presents with a chief complaint of left sided weakness and slurred speech (31 May 2019 10:13)      HPI:  74 y.o F with PMH of HTN, HLD, DM, hemorrhagic stroke,? EVD placement at that time ( about 8 years ago)  with residual left side weakness and slurred speech, ambulated with cane, on ASA daily  Pt. brought to [facility] ED as a stroke code. At the time of presentation to [facility] ED A&Ox 2, baseline slurped speech with left sided weakness. /82, 55 HR, 18 RR, CTH prelim report findings with acute right thalamic hemorrhage with extension to right lateral and third ventricle. At [facility] ED Pt. Intubated 2/2 worsening of neuro exam for air way protection as per ED attending note Pt.  became more drowsy with decreased responsiveness. Pt. was transferred to ED-[place]. At time of NSGY eval Pt intubated, sedated on Propofol and Fentanyl, radial arterial line, she received keppra, zofran and mannitol in ED. Repeat CT scan stable from prior study as per neurosurgery. Platelets one unit ordered as per neurosurgery (30 May 2019 02:50)      PAST MEDICAL & SURGICAL HISTORY:  Diabetes mellitus  Stroke  Hypertension  No significant past surgical history    FAMILY HISTORY:  No pertinent family history in first degree relatives  :  No known cardiovacular family hisotry     ROS:  See HPI     Allergies    No Known Allergies    PHYSICAL EXAM    ICU Vital Signs Last 24 Hrs  T(C): 37.9 (31 May 2019 08:00), Max: 37.9 (31 May 2019 08:00)  T(F): 100.2 (31 May 2019 08:00), Max: 100.2 (31 May 2019 08:00)  HR: 60 (31 May 2019 08:00) (52 - 78)  BP: --  BP(mean): --  ABP: 132/64 (31 May 2019 08:00) (97/54 - 181/86)  ABP(mean): 81 (31 May 2019 08:00) (68 - 118)  RR: 12 (31 May 2019 08:00) (11 - 30)  SpO2: 100% (31 May 2019 08:00) (100% - 100%)      General: NOt awake  HEENT:  + ET            Lymphatic system: No cervical LN   Lungs: Bilateral BS  Cardiovascular: Regular  Gastrointestinal: Soft, Positive BS  Musculoskeletal: No clubbing.   Skin: Warm.  Intact  Neurological: No motor or sensory deficit       19 @ 07:19 @ 07:00  --------------------------------------------------------  IN:    fentaNYL  Infusion: 30 mL    propofol Infusion: 4.4 mL    sodium chloride 0.9%: 2000 mL    sodium chloride 0.9%.: 400 mL  Total IN: 2434.4 mL    OUT:    Incontinent per Collection Ba mL    Indwelling Catheter - Urethral: 205 mL  Total OUT: 505 mL    Total NET: 1929.4 mL      19 @ 07:19 @ 10:35  --------------------------------------------------------  IN:    sodium chloride 0.9%.: 100 mL  Total IN: 100 mL    OUT:    Incontinent per Collection Ba mL  Total OUT: 200 mL    Total NET: -100 mL          LABS:                          11.1   8.74  )-----------( 185      ( 31 May 2019 09:39 )             36.0                                                   142  |  108  |  18  ----------------------------<  201<H>  3.9   |  23  |  0.9    Ca    9.4      31 May 2019 09:39  Phos  2.0       Mg     1.7         TPro  5.5<L>  /  Alb  3.4<L>  /  TBili  1.0  /  DBili  x   /  AST  8   /  ALT  11  /  AlkPhos  48        PT/INR - ( 29 May 2019 22:00 )   PT: 11.20 sec;   INR: 0.97 ratio         PTT - ( 29 May 2019 22:00 )  PTT:34.5 sec                                           CARDIAC MARKERS ( 29 May 2019 22:00 )  x     / <0.01 ng/mL / x     / x     / x                                                LIVER FUNCTIONS - ( 31 May 2019 09:39 )  Alb: 3.4 g/dL / Pro: 5.5 g/dL / ALK PHOS: 48 U/L / ALT: 11 U/L / AST: 8 U/L / GGT: x                                                                                               Mode: AC/ CMV (Assist Control/ Continuous Mandatory Ventilation)  RR (machine): 12  TV (machine): 450  FiO2: 40  PEEP: 5  ITime: 1  MAP: 9  PIP: 25                                      ABG - ( 31 May 2019 03:49 )  pH, Arterial: 7.36  pH, Blood: x     /  pCO2: 44    /  pO2: 100   / HCO3: 25    / Base Excess: -0.5  /  SaO2: 98              X-Rays                                                                                     ECHO    MEDICATIONS  (STANDING):  chlorhexidine 0.12% Liquid 15 milliLiter(s) Oral Mucosa two times a day  chlorhexidine 4% Liquid 1 Application(s) Topical <User Schedule>  levETIRAcetam  IVPB 500 milliGRAM(s) IV Intermittent every 12 hours  magnesium sulfate  IVPB 2 Gram(s) IV Intermittent every 2 hours  midazolam Injectable 4 milliGRAM(s) IV Push once  potassium chloride   Solution 40 milliEquivalent(s) Oral once  sodium chloride 0.9%. 1000 milliLiter(s) (100 mL/Hr) IV Continuous <Continuous>    MEDICATIONS  (PRN):

## 2019-05-31 NOTE — DIETITIAN INITIAL EVALUATION ADULT. - ENERGY NEEDS
calorie ~968-1149kcal     887-1037kcal (60-70% PSE 2003b) vs. 867-1103 (11-14kcal/kg CBW) vs. 1150-1308kcal (22-25kcal/kg IBW)  protein ~105g (2g/kg IBW)   fluid per CCU team

## 2019-05-31 NOTE — PROGRESS NOTE ADULT - SUBJECTIVE AND OBJECTIVE BOX
________________________________________________________________________________  DAILY PROGRESS NOTE:    ================== SUBJECTIVE ==================    HERNANDO ALICIARERE  /   74y  /  Female  /  MRN#: [id number]  Patient is a 74y old Female who presents with a chief complaint of left sided weakness and slurred speech   Currently admitted to medicine with the primary diagnosis of Hypertensive emergency      HOSPITAL DAY: 2  ICU DAY: 2  SUMMARY OF HOSPITAL COURSE TO DATE : 74 y.o F with PMH of HTN, HLD, DM, hemorrhagic stroke,? EVD placement at that time ( about 8 years ago)  with residual left side weakness and slurred speech, ambulated with cane, on ASA daily.  Pt. brought to [facility] ED as a stroke code. At the time of presentation to [facility] ED A&Ox 2, baseline slurred speech with left sided weakness. /82, 55 HR, 18 RR, CTH showed acute right thalamic hemorrhage with extension to right lateral and third ventricle. At [facility] ED, Pt. Intubated 2/2 worsening of neuro exam for air way protection as per ED attending note. Pt.  became more drowsy with decreased responsiveness. Pt. was transferred to ED-[place]. At time of NSGY eval Pt intubated, sedated on Propofol and Fentanyl, radial arterial line placed, she received keppra, zofran and mannitol in ED. Repeat CT scan stable from prior study as per neurosurgery. Platelets one unit ordered as per neurosurgery    OVERNIGHT EVENTS: Patient had few sec of TdP  TODAY: Patient was seen this morning at bedside.     Review of systems is otherwise negative.    ================= PRESENT TODAY ==================    1-Mckeon Catheter: yes  2-Central Line: yes   3-IV Fluids: no  4-Drips:  4.1-Pressors: no  4.2-Sedation: no  4.3-Heparin: no    5-Intubated: yes  Oxygen: Sat:   Ventilator Settings: Tidal Volume: 450 RR: 12 PEEP:5  FiO2: 40     =================== OBJECTIVE ===================    VITAL SIGNS: Last 24 Hours  T(C): 38 (31 May 2019 10:00), Max: 38 (31 May 2019 10:00)  T(F): 100.4 (31 May 2019 10:00), Max: 100.4 (31 May 2019 10:00)  HR: 66 (31 May 2019 10:00) (52 - 73)  BP: --  BP(mean): --  RR: 13 (31 May 2019 10:00) (11 - 25)  SpO2: 100% (31 May 2019 10:00) (100% - 100%)    05-30-19 @ 07:01  -  05-31-19 @ 07:00  --------------------------------------------------------  IN: 2434.4 mL / OUT: 505 mL / NET: 1929.4 mL    05-31-19 @ 07:01  -  05-31-19 @ 12:00  --------------------------------------------------------  IN: 100 mL / OUT: 200 mL / NET: -100 mL      PHYSICAL EXAM:  GENERAL: NAD, well-developed  HEAD: Intubated. ET +  EYES: Closed.  NECK: Supple, No JVD  CHEST/LUNG: Clear to auscultation bilaterally; No wheeze  HEART: Regular rate and rhythm; No murmurs, rubs, or gallops  ABDOMEN: Soft, Nontender, Nondistended; Bowel sounds present  EXTREMITIES:  2+ Peripheral Pulses, No clubbing, cyanosis, or edema  PSYCH: Responds to verbal commands.  NEUROLOGY: Moves extremities  SKIN: No rashes or lesions    ===================== LABS =====================                        11.1   8.74  )-----------( 185      ( 31 May 2019 09:39 )             36.0     05-31    142  |  108  |  18  ----------------------------<  201<H>  3.9   |  23  |  0.9    Ca    9.4      31 May 2019 09:39  Phos  2.0     05-31  Mg     1.7     05-31    TPro  5.5<L>  /  Alb  3.4<L>  /  TBili  1.0  /  DBili  x   /  AST  8   /  ALT  11  /  AlkPhos  48  05-31    PT/INR - ( 29 May 2019 22:00 )   PT: 11.20 sec;   INR: 0.97 ratio         PTT - ( 29 May 2019 22:00 )  PTT:34.5 sec    ABG - ( 31 May 2019 03:49 )  pH, Arterial: 7.36  pH, Blood: x     /  pCO2: 44    /  pO2: 100   / HCO3: 25    / Base Excess: -0.5  /  SaO2: 98          CARDIAC MARKERS ( 29 May 2019 22:00 )  x     / <0.01 ng/mL / x     / x     / x          ================= MICROBIOLOGY ================    ================== IMAGING TO DATE ==================  < from: CT Head No Cont (05.30.19 @ 01:19) >  IMPRESSION:    Acute hemorrhage noted within the right thalamus with extension of   hemorrhage noted within the right lateral and third ventricles with mild   mass effect, not significantly changed since the prior study. A little   more hemorrhage is noted layering in the occipital horn of the right   lateral ventricle.    No acute midline shift. Essentially stable ventricular size without   definite evidence for hydrocephalus.    Patchy hypodensities in the periventricular and subcortical white matter   without mass effect compatible with chronic microvascular changes.    Chronic right basal ganglia lacunar infarct and chronic left external   capsule and thalamic infarcts.    There is no depressed skull fracture. Right frontal louie hole. There is   mild mucosal thickening of the bilateral maxillary sinuses. The bilateral   mastoid air cells are well-aerated.    < end of copied text >    < from: CT Cervical Spine No Cont (05.30.19 @ 01:19) >  IMPRESSION:    No evidence of acute cervical spine fracture or subluxation.    Multilevel degenerative changes.    Bilateral apical groundglass opacities.      < end of copied text >      ================== OTHER SIGNIFICANT FINDINGS ==================    ================== ALLERGIES ===================  No Known Allergies    ==================== MEDS =====================  amLODIPine   Tablet 10 milliGRAM(s) Oral daily  chlorhexidine 0.12% Liquid 15 milliLiter(s) Oral Mucosa two times a day  chlorhexidine 4% Liquid 1 Application(s) Topical <User Schedule>  levETIRAcetam  IVPB 500 milliGRAM(s) IV Intermittent every 12 hours    PRN MEDICATIONS  acetaminophen   Tablet .. 650 milliGRAM(s) Oral every 6 hours PRN      ================= CONSULTS ==================    ================= ASSESS/PLAN ==================  Patient is a 74y old Female who presents with a chief complaint of left sided weakness and slurred speech   Currently admitted to medicine with the primary diagnosis of Hypertensive emergency      PLAN    74 y.o F with PMH of HTN, HLD, DM, hemorrhagic stroke,? EVD placement at that time ( about 8 years ago)  with residual left side weakness and slurred speech, ambulated with cane, on ASA daily  Pt. brought to [facility] ED as a stroke code    #)Acute right thalamic hemorrhagic stroke  -Sudden onset slurred speech and left sided weakness   -Neurosurgery recs appreciated. Continue frequent neurochecks   -No acute neurosurgical intervention needed at this time   -Continue Keppra  -Monitor and control SBP between 140-160  -Patient is currently off sedation. Responding to commands   -GAG and corneal reflex present.  -f/u repeat CT head, CT chest and abdomen    #Prolonged Qtc  -Few sec of questionable TdP overnight  -Magnesium and potassium repleted     #)DM   -check FS will start insulin if FS>180    #)HTN  -Maintain BP between 120-140mm Hg    GI PPX: protonix  DVT PPX: sequentials    DIET: NPO for now  ACTIVITY: Bed rest    ================= CODE STATUS =================                  (X) FULL CODE     |     () DNR     |     () DNI ________________________________________________________________________________  DAILY PROGRESS NOTE:    ================== SUBJECTIVE ==================    HERNANDO ALICIARERE  /   74y  /  Female  /  MRN#: [id number]  Patient is a 74y old Female who presents with a chief complaint of left sided weakness and slurred speech   Currently admitted to medicine with the primary diagnosis of Hypertensive emergency      HOSPITAL DAY: 2  ICU DAY: 2  SUMMARY OF HOSPITAL COURSE TO DATE : 74 y.o F with PMH of HTN, HLD, DM, hemorrhagic stroke,? EVD placement at that time ( about 8 years ago)  with residual left side weakness and slurred speech, ambulated with cane, on ASA daily.  Pt. brought to [facility] ED as a stroke code. At the time of presentation to [facility] ED A&Ox 2, baseline slurred speech with left sided weakness. /82, 55 HR, 18 RR, CTH showed acute right thalamic hemorrhage with extension to right lateral and third ventricle. At [facility] ED, Pt. Intubated 2/2 worsening of neuro exam for air way protection as per ED attending note. Pt.  became more drowsy with decreased responsiveness. Pt. was transferred to [facility]-[place]. At time of NSGY eval Pt intubated, sedated on Propofol and Fentanyl, radial arterial line placed, she received keppra, zofran and mannitol in ED. Repeat CT scan stable from prior study as per neurosurgery. Platelets one unit ordered as per neurosurgery    OVERNIGHT EVENTS: Patient had few sec of TdP  TODAY: Patient was seen this morning at bedside.     Review of systems is otherwise negative.    ================= PRESENT TODAY ==================    1-Mckeon Catheter: yes  2-Central Line: yes   3-IV Fluids: no  4-Drips:  4.1-Pressors: no  4.2-Sedation: no  4.3-Heparin: no    5-Intubated: yes  Oxygen: Sat:   Ventilator Settings: Tidal Volume: 450 RR: 12 PEEP:5  FiO2: 40     =================== OBJECTIVE ===================    VITAL SIGNS: Last 24 Hours  T(C): 38 (31 May 2019 10:00), Max: 38 (31 May 2019 10:00)  T(F): 100.4 (31 May 2019 10:00), Max: 100.4 (31 May 2019 10:00)  HR: 66 (31 May 2019 10:00) (52 - 73)  BP: --  BP(mean): --  RR: 13 (31 May 2019 10:00) (11 - 25)  SpO2: 100% (31 May 2019 10:00) (100% - 100%)    05-30-19 @ 07:01  -  05-31-19 @ 07:00  --------------------------------------------------------  IN: 2434.4 mL / OUT: 505 mL / NET: 1929.4 mL    05-31-19 @ 07:01  -  05-31-19 @ 12:00  --------------------------------------------------------  IN: 100 mL / OUT: 200 mL / NET: -100 mL      PHYSICAL EXAM:  GENERAL: NAD, well-developed  HEAD: Intubated. ET +  EYES: Closed.  NECK: Supple, No JVD  CHEST/LUNG: Clear to auscultation bilaterally; No wheeze  HEART: Regular rate and rhythm; No murmurs, rubs, or gallops  ABDOMEN: Soft, Nontender, Nondistended; Bowel sounds present  EXTREMITIES:  2+ Peripheral Pulses, No clubbing, cyanosis, or edema  PSYCH: Responds to verbal commands.  NEUROLOGY: Moves extremities  SKIN: No rashes or lesions    ===================== LABS =====================                        11.1   8.74  )-----------( 185      ( 31 May 2019 09:39 )             36.0     05-31    142  |  108  |  18  ----------------------------<  201<H>  3.9   |  23  |  0.9    Ca    9.4      31 May 2019 09:39  Phos  2.0     05-31  Mg     1.7     05-31    TPro  5.5<L>  /  Alb  3.4<L>  /  TBili  1.0  /  DBili  x   /  AST  8   /  ALT  11  /  AlkPhos  48  05-31    PT/INR - ( 29 May 2019 22:00 )   PT: 11.20 sec;   INR: 0.97 ratio         PTT - ( 29 May 2019 22:00 )  PTT:34.5 sec    ABG - ( 31 May 2019 03:49 )  pH, Arterial: 7.36  pH, Blood: x     /  pCO2: 44    /  pO2: 100   / HCO3: 25    / Base Excess: -0.5  /  SaO2: 98          CARDIAC MARKERS ( 29 May 2019 22:00 )  x     / <0.01 ng/mL / x     / x     / x          ================= MICROBIOLOGY ================    ================== IMAGING TO DATE ==================  < from: CT Head No Cont (05.30.19 @ 01:19) >  IMPRESSION:    Acute hemorrhage noted within the right thalamus with extension of   hemorrhage noted within the right lateral and third ventricles with mild   mass effect, not significantly changed since the prior study. A little   more hemorrhage is noted layering in the occipital horn of the right   lateral ventricle.    No acute midline shift. Essentially stable ventricular size without   definite evidence for hydrocephalus.    Patchy hypodensities in the periventricular and subcortical white matter   without mass effect compatible with chronic microvascular changes.    Chronic right basal ganglia lacunar infarct and chronic left external   capsule and thalamic infarcts.    There is no depressed skull fracture. Right frontal louie hole. There is   mild mucosal thickening of the bilateral maxillary sinuses. The bilateral   mastoid air cells are well-aerated.    < end of copied text >    < from: CT Cervical Spine No Cont (05.30.19 @ 01:19) >  IMPRESSION:    No evidence of acute cervical spine fracture or subluxation.    Multilevel degenerative changes.    Bilateral apical groundglass opacities.      < end of copied text >      ================== OTHER SIGNIFICANT FINDINGS ==================    ================== ALLERGIES ===================  No Known Allergies    ==================== MEDS =====================  amLODIPine   Tablet 10 milliGRAM(s) Oral daily  chlorhexidine 0.12% Liquid 15 milliLiter(s) Oral Mucosa two times a day  chlorhexidine 4% Liquid 1 Application(s) Topical <User Schedule>  levETIRAcetam  IVPB 500 milliGRAM(s) IV Intermittent every 12 hours    PRN MEDICATIONS  acetaminophen   Tablet .. 650 milliGRAM(s) Oral every 6 hours PRN      ================= CONSULTS ==================    ================= ASSESS/PLAN ==================  Patient is a 74y old Female who presents with a chief complaint of left sided weakness and slurred speech   Currently admitted to medicine with the primary diagnosis of Hypertensive emergency      PLAN    74 y.o F with PMH of HTN, HLD, DM, hemorrhagic stroke,? EVD placement at that time ( about 8 years ago)  with residual left side weakness and slurred speech, ambulated with cane, on ASA daily  Pt. brought to [facility] ED as a stroke code    #)Acute right thalamic hemorrhagic stroke  -Sudden onset slurred speech and left sided weakness   -CT head showed right thalamic hemorrhage with extension into lateral and third ventricles  -Neurosurgery following.   -No acute neurosurgical intervention needed at this time since repeat CT head stable. No evidence of evolving hydrocephalus.  -Repeat CT head on sunday am  -Continue Keppra for seizure prophylaxis  -Monitor and control SBP between 140-160. Started on amlodipine to control BP  -Patient is currently off sedation. Responding to commands   -GAG and corneal reflex present.  -CT chest and abdomen did not show any evidence of bleed    #Prolonged Qtc 2/2 inc intracranial pressure 2/2 Intracranial bleed  -Few sec of polymorphic V tach overnight likely tdp  -Magnesium and potassium repleted   -BMP and magnesium q8h. f/u 4pm, 11:30pm  -If V tach recurs, give Magnesium 1gm STAT  -continue tele monitoring    #Hypophosphatemia  -repleted phosphorus  -f/u am phosphorus    #)DM   -check FS will start insulin if FS>180    #)HTN  -Maintain BP between 120-140mm Hg    GI PPX: protonix  DVT PPX: sequentials    DIET: Tube feeds  ACTIVITY: Bed rest    ================= CODE STATUS =================                  (X) FULL CODE     |     () DNR     |     () DNI ________________________________________________________________________________  DAILY PROGRESS NOTE:    ================== SUBJECTIVE ==================    HERNANDO ALICIARERE  /   74y  /  Female  /  MRN#: [id number]  Patient is a 74y old Female who presents with a chief complaint of left sided weakness and slurred speech   Currently admitted to medicine with the primary diagnosis of Hypertensive emergency      HOSPITAL DAY: 2  ICU DAY: 2  SUMMARY OF HOSPITAL COURSE TO DATE : 74 y.o F with PMH of HTN, HLD, DM, hemorrhagic stroke,? EVD placement at that time ( about 8 years ago)  with residual left side weakness and slurred speech, ambulated with cane, on ASA daily.  Pt. brought to [facility] ED as a stroke code. At the time of presentation to [facility] ED A&Ox 2, baseline slurred speech with left sided weakness. /82, 55 HR, 18 RR, CTH showed acute right thalamic hemorrhage with extension to right lateral and third ventricle. At [facility] ED, Pt. Intubated 2/2 worsening of neuro exam for air way protection as per ED attending note. Pt.  became more drowsy with decreased responsiveness. Pt. was transferred to ED-[place]. At time of NSGY eval Pt intubated, sedated on Propofol and Fentanyl, radial arterial line placed, she received keppra, zofran and mannitol in ED. Repeat CT scan stable from prior study as per neurosurgery. Platelets one unit ordered as per neurosurgery    OVERNIGHT EVENTS: Patient had few sec of TdP  TODAY: Patient was seen this morning at bedside.     Review of systems is otherwise negative.    ================= PRESENT TODAY ==================    1-Mckeon Catheter: yes  2-Central Line: yes   3-IV Fluids: no  4-Drips:  4.1-Pressors: no  4.2-Sedation: no  4.3-Heparin: no    5-Intubated: yes  Oxygen: Sat:   Ventilator Settings: Tidal Volume: 450 RR: 12 PEEP:5  FiO2: 40     =================== OBJECTIVE ===================    VITAL SIGNS: Last 24 Hours  T(C): 38 (31 May 2019 10:00), Max: 38 (31 May 2019 10:00)  T(F): 100.4 (31 May 2019 10:00), Max: 100.4 (31 May 2019 10:00)  HR: 66 (31 May 2019 10:00) (52 - 73)  BP: --  BP(mean): --  RR: 13 (31 May 2019 10:00) (11 - 25)  SpO2: 100% (31 May 2019 10:00) (100% - 100%)    05-30-19 @ 07:01  -  05-31-19 @ 07:00  --------------------------------------------------------  IN: 2434.4 mL / OUT: 505 mL / NET: 1929.4 mL    05-31-19 @ 07:01  -  05-31-19 @ 12:00  --------------------------------------------------------  IN: 100 mL / OUT: 200 mL / NET: -100 mL      PHYSICAL EXAM:  GENERAL: NAD, well-developed  HEAD: Intubated. ET +  EYES: Closed.  NECK: Supple, No JVD  CHEST/LUNG: Clear to auscultation bilaterally; No wheeze  HEART: Regular rate and rhythm; No murmurs, rubs, or gallops  ABDOMEN: Soft, Nontender, Nondistended; Bowel sounds present  EXTREMITIES:  2+ Peripheral Pulses, No clubbing, cyanosis, or edema  PSYCH: Responds to verbal commands.  NEUROLOGY: Moves extremities  SKIN: No rashes or lesions    ===================== LABS =====================                        11.1   8.74  )-----------( 185      ( 31 May 2019 09:39 )             36.0     05-31    142  |  108  |  18  ----------------------------<  201<H>  3.9   |  23  |  0.9    Ca    9.4      31 May 2019 09:39  Phos  2.0     05-31  Mg     1.7     05-31    TPro  5.5<L>  /  Alb  3.4<L>  /  TBili  1.0  /  DBili  x   /  AST  8   /  ALT  11  /  AlkPhos  48  05-31    PT/INR - ( 29 May 2019 22:00 )   PT: 11.20 sec;   INR: 0.97 ratio         PTT - ( 29 May 2019 22:00 )  PTT:34.5 sec    ABG - ( 31 May 2019 03:49 )  pH, Arterial: 7.36  pH, Blood: x     /  pCO2: 44    /  pO2: 100   / HCO3: 25    / Base Excess: -0.5  /  SaO2: 98          CARDIAC MARKERS ( 29 May 2019 22:00 )  x     / <0.01 ng/mL / x     / x     / x          ================= MICROBIOLOGY ================    ================== IMAGING TO DATE ==================  < from: CT Head No Cont (05.30.19 @ 01:19) >  IMPRESSION:    Acute hemorrhage noted within the right thalamus with extension of   hemorrhage noted within the right lateral and third ventricles with mild   mass effect, not significantly changed since the prior study. A little   more hemorrhage is noted layering in the occipital horn of the right   lateral ventricle.    No acute midline shift. Essentially stable ventricular size without   definite evidence for hydrocephalus.    Patchy hypodensities in the periventricular and subcortical white matter   without mass effect compatible with chronic microvascular changes.    Chronic right basal ganglia lacunar infarct and chronic left external   capsule and thalamic infarcts.    There is no depressed skull fracture. Right frontal louie hole. There is   mild mucosal thickening of the bilateral maxillary sinuses. The bilateral   mastoid air cells are well-aerated.    < end of copied text >    < from: CT Cervical Spine No Cont (05.30.19 @ 01:19) >  IMPRESSION:    No evidence of acute cervical spine fracture or subluxation.    Multilevel degenerative changes.    Bilateral apical groundglass opacities.      < end of copied text >      ================== OTHER SIGNIFICANT FINDINGS ==================    ================== ALLERGIES ===================  No Known Allergies    ==================== MEDS =====================  amLODIPine   Tablet 10 milliGRAM(s) Oral daily  chlorhexidine 0.12% Liquid 15 milliLiter(s) Oral Mucosa two times a day  chlorhexidine 4% Liquid 1 Application(s) Topical <User Schedule>  levETIRAcetam  IVPB 500 milliGRAM(s) IV Intermittent every 12 hours    PRN MEDICATIONS  acetaminophen   Tablet .. 650 milliGRAM(s) Oral every 6 hours PRN      ================= CONSULTS ==================    ================= ASSESS/PLAN ==================  Patient is a 74y old Female who presents with a chief complaint of left sided weakness and slurred speech   Currently admitted to medicine with the primary diagnosis of Hypertensive emergency      PLAN    74 y.o F with PMH of HTN, HLD, DM, hemorrhagic stroke,? EVD placement at that time ( about 8 years ago)  with residual left side weakness and slurred speech, ambulated with cane, on ASA daily  Pt. brought to [facility] ED as a stroke code    #)Acute right thalamic hemorrhagic stroke  -Sudden onset slurred speech and left sided weakness   -CT head showed right thalamic hemorrhage with extension into lateral and third ventricles  -Neurosurgery following.   -No acute neurosurgical intervention needed at this time since repeat CT head stable. No evidence of evolving hydrocephalus.  -Repeat CT head on sunday am  -Continue Keppra for seizure prophylaxis  -Monitor and control SBP between 140-160. Started on amlodipine to control BP  -Patient is currently off sedation. Responding to commands   -GAG and corneal reflex present.  -CT chest and abdomen did not show any evidence of bleed  -only versad pushes if patient is agitated. Keep patient off sedation if possible    #Prolonged Qtc 2/2 inc intracranial pressure 2/2 Intracranial bleed  -Few sec of polymorphic V tach overnight likely tdp  -Magnesium and potassium repleted   -BMP and magnesium q8h. f/u 4pm, 11:30pm  -If V tach recurs, give Magnesium 1gm STAT  -continue tele monitoring    #Hypophosphatemia  -repleted phosphorus  -f/u am phosphorus    #)DM   -check FS will start insulin if FS>180    #)HTN  -Maintain BP between 120-140mm Hg    GI PPX: protonix  DVT PPX: sequentials    DIET: Tube feeds  ACTIVITY: Bed rest    ================= CODE STATUS =================                  (X) FULL CODE     |     () DNR     |     () DNI

## 2019-05-31 NOTE — DIETITIAN INITIAL EVALUATION ADULT. - OTHER INFO
Initial assessment for new intubation. P/w: left sided weakness and slurred speech. Acute right thalamic hemorrhagic stroke-Neurosurgery following. Prolonged Qtc 2/2 inc intracranial pressure 2/2 Intracranial bleed-continue tele monitoring. DM; insulin if FS > 180.

## 2019-05-31 NOTE — CONSULT NOTE ADULT - ASSESSMENT
74 y.o F with PMH of HTN, HLD, DM, hemorrhagic stroke a/w new thalamic hemorrhage in setting of uncontrolled HTN.  Pt found to have prolonged qtc on tele.    Assessment:  - Prolonged Qtc  - Right thalamic hemorrhage  - hypertensive emergency  - DM  - HLD    Plan  - Keep pt on tele to monitor Qtc closely  - daily EKGs  - Replete Mg and K+ to Keep Mg >2.0 and K+> 4.0  - give Magnesium 1 gram stat if pt has further episodes of PVC's.  - check 2d echo

## 2019-05-31 NOTE — DIETITIAN INITIAL EVALUATION ADULT. - NS AS NUTRI INTERV ENTERAL NUTRITION
When medically feasible provide Vital HP at 45ml/h for 1080kcal, 93g protein, 907ml free H2O. Start feeding at 25ml/h and increase by 10ml q6h to goal rate of 45ml/h.

## 2019-05-31 NOTE — CONSULT NOTE ADULT - ASSESSMENT
Impression:  Prolonged Qt interval secondary to intra cranial bleed.  Intra cerebral Bleed  Hypertensive emergency  HO hemorrhagic stroke      PLAN:    12 lead EKG daily  Keep magnesium <2.5 and potassium <4.  If any PVC seen then bolus with 2gm Magnesium  When intracranial pressure normalizes: the Qt interval should normalize.   Will follow.   Poor prognosis Impression:  Prolonged Qt interval secondary to intra cranial bleed causing i increase  in intra cranial pressure.  Recurrent short runs of  non sustained polymorphic VT  R ON T (TDP)  PLAN:  Monitor  QT   IF DEVELOP PVCS  ADMINISTER 1 GM of Mg stat dose  repeat dose if PVCS recure  keep Mg above 2 and k>>4.  12 lead EKG daily  When intracranial pressure normalizes: the Qt interval should normalize.   Will follow.

## 2019-05-31 NOTE — PROGRESS NOTE ADULT - ASSESSMENT
Impression:  Intra cerebral Bleed  Hypertensive emergency  HO hemorrhagic stroke      PLAN:    CNS: Keep off sedation.     HEENT: Oral care    PULMONARY:  HOB @ 30 degrees.     CARDIOVASCULAR: i=o. Maintain BP <140.    GI: GI prophylaxis.  Feeding through OG tube.    RENAL:  Follow up lytes.  Correct as needed    INFECTIOUS DISEASE: Follow up cultures    HEMATOLOGICAL:  Seq DVT prophylaxis.    ENDOCRINE:  Follow up FS.     MUSCULOSKELETAL: Bed rest    Poor prognosis  Palliative care consult.

## 2019-05-31 NOTE — DIETITIAN INITIAL EVALUATION ADULT. - DIET TYPE
NPO with tube feedings/Glucerna 1.2 at 45ml/h via OGT- 1296kcal, 64g protein, 875ml free H2O- feeding has not been initiated as of time of visit

## 2019-06-01 NOTE — PROGRESS NOTE ADULT - SUBJECTIVE AND OBJECTIVE BOX
MARY HERNANDO    Chief Complaint:    Handed    HPI:  74 y.o F with PMH of HTN, HLD, DM, hemorrhagic stroke,? EVD placement at that time ( about 8 years ago)  with residual left side weakness and slurred speech, ambulated with cane, on ASA daily  Pt. brought to [facility] ED as a stroke code. At the time of presentation to [facility] ED A&Ox 2, baseline slurped speech with left sided weakness. /82, 55 HR, 18 RR, CTH prelim report findings with acute right thalamic hemorrhage with extension to right lateral and third ventricle. At [facility] ED Pt. Intubated 2/2 worsening of neuro exam for air way protection as per ED attending note Pt.  became more drowsy with decreased responsiveness. Pt. was transferred to ED-[place]. At time of NSGY eval Pt intubated, sedated on Propofol and Fentanyl, radial arterial line, she received keppra, zofran and mannitol in ED. Repeat CT scan stable from prior study as per neurosurgery. Platelets one unit ordered as per neurosurgery (30 May 2019 02:50)    Patient is seen and examined at the bedside. She is intubated, breathing over the vent. Patient is off sedation, she opens her eyes to loud voice and touch. She is able to follow one step commands.  had fever overnight 101.4, was given Tylenol. She is on tube feeds and antihypertensive meds and Keppra.  CTH from 5/30/2019 looks stable.    Relevant PMH:  [] Prior ischemic stroke/TIA  [] Afib  []CAD  []HTN  []DLD  []DM []PVD []Obesity [] Sedintary lifestyle []CHF  []CINDY  []Cancer Hx     Social History: [] Smoking []  Drug Use: []   Alcohol Use:   [] Other:      Possible Location of Stroke:    Possible Cause of Stroke:    Relevant Cerebral Imaging:    Relevant Cervicocerebral Imaging:    EXAM: CT BRAIN       PROCEDURE DATE: 05/30/2019       INTERPRETATION: Clinical History / Reason for exam: Intracranial hemorrhage   follow-up     Technique: Noncontrast head CT. Contiguous CT axial images of the head from   the base to the vertex.     Comparison: CT head 5/30/2019, MRI brain 4/21/2015     Findings:     There is stable appearance of a right thalamic hematoma measuring about 2.2   cm with intraventricular extension. The intraventricular hemorrhage is not   significantly changed allowing for redistribution.     There is stable mild ventricular prominence.     There is stable confluent hypoattenuation within the cerebral hemispheric   white matter consistent with chronic microvascular change. Stable chronic   hematoma cavity within the left basal ganglia, and chronic lacunar infarcts   within the bilateral basal ganglia and left thalamus.     Right frontal louie hole is noted.     The visualized paranasal sinuses and the mastoids are clear.     IMPRESSION:     1. Stable right thalamic hematoma measuring 2.2 cm with intraventricular   extension of hemorrhage. Stable mild ventricular prominence.     2. Stable severe chronic microvascular changes, chronic hematoma cavity in   the left basal ganglia and chronic bilateral deep lacunar infarcts.       WU KEY M.D., ATTENDING RADIOLOGIST   This document has been electronically signed. May 30 2019 5:12PM         Relevant blood tests:      Relevant cardiac rhythm monitoring:    Relevant Cardiac Structure:(TTE/JOELLE +/-):[]No intracardiac thrombus/[] no vegetation/[]no [place]/EF:      Home Medications:  amLODIPine 2.5 mg oral tablet: 1 tab(s) orally once a day (29 May 2019 22:02)  donepezil 10 mg oral tablet: 1 tab(s) orally once a day (at bedtime) (29 May 2019 22:02)  enalapril 10 mg oral tablet: 1 tab(s) orally once a day (29 May 2019 22:02)  enalapril 10 mg oral tablet: 1 tab(s) orally once a day (29 May 2019 22:02)  glimepiride 1 mg oral tablet: 1 tab(s) orally once a day (29 May 2019 22:02)  Norvasc 2.5 mg oral tablet: 1 tab(s) orally once a day (29 May 2019 22:02)  simvastatin 20 mg oral tablet: 1 tab(s) orally once a day (at bedtime) (29 May 2019 22:02)  Vitamin B12 1000 mcg oral tablet: 1 tab(s) orally once a day (29 May 2019 22:02)  Vitamin D3 1000 intl units oral tablet, chewable: 1 tab(s) orally once a day (29 May 2019 22:02)      MEDICATIONS  (STANDING):  amLODIPine   Tablet 10 milliGRAM(s) Oral daily  chlorhexidine 0.12% Liquid 15 milliLiter(s) Oral Mucosa two times a day  chlorhexidine 4% Liquid 1 Application(s) Topical <User Schedule>  hydrALAZINE 25 milliGRAM(s) Oral three times a day  levETIRAcetam  IVPB 500 milliGRAM(s) IV Intermittent every 12 hours      PT/OT/Speech/Rehab/S&Swr:    Exam:    Vital Signs Last 24 Hrs  T(C): 38 (01 Jun 2019 08:00), Max: 38.8 (31 May 2019 20:00)  T(F): 100.4 (01 Jun 2019 08:00), Max: 101.8 (31 May 2019 20:00)  HR: 66 (01 Jun 2019 09:00) (58 - 80)  BP: --  BP(mean): --  RR: 22 (01 Jun 2019 08:00) (12 - 37)  SpO2: 99% (01 Jun 2019 08:00) (94% - 100%)      Neuro exam:    patient is intubated, opens eyes  to loud stimuli and touch  Pinpint pupils BL, partial gaze palsy, BTT bl, follows one step commands  Moves all 4 extrem, withdraws to pain, -babinski    NIHSS      LOC:       1a: 2   1b(Questions): 2         1c(Instructions):  0           Best Gaze: 1  Visual:0  Motor:                 RUE: 1    RLE:  1   LUE: 2   LLE:  2   FACE:  0   Limb Ataxia:0  Sensory: 1      Language:   3    Dysarthria:  0        Extinction and Inattention:0    NIHSS on admission:          NIHSS yesterday:          NIHSS today:  15           m-RS: 4    Impression:      Suggestion:  Routine stroke workup including:    Disposition: MARY HERNANDO    Chief Complaint:    Handed    HPI:  74 y.o F with PMH of HTN, HLD, DM, hemorrhagic stroke,? EVD placement at that time ( about 8 years ago)  with residual left side weakness and slurred speech, ambulated with cane, on ASA daily  Pt. brought to [facility] ED as a stroke code. At the time of presentation to [facility] ED A&Ox 2, baseline slurped speech with left sided weakness. /82, 55 HR, 18 RR, CTH prelim report findings with acute right thalamic hemorrhage with extension to right lateral and third ventricle. At [facility] ED Pt. Intubated 2/2 worsening of neuro exam for air way protection as per ED attending note Pt.  became more drowsy with decreased responsiveness. Pt. was transferred to ED-[place]. At time of NSGY eval Pt intubated, sedated on Propofol and Fentanyl, radial arterial line, she received keppra, zofran and mannitol in ED. Repeat CT scan stable from prior study as per neurosurgery. Platelets one unit ordered as per neurosurgery (30 May 2019 02:50)    Patient is seen and examined at the bedside. She is intubated, breathing over the vent. Patient is off sedation, she opens her eyes to loud voice and touch. She is able to follow one step commands.  had fever overnight 101.4, was given Tylenol. She is on tube feeds and antihypertensive meds and Keppra.  CTH from 5/30/2019 looks stable.    Relevant PMH:  [] Prior ischemic stroke/TIA  [] Afib  []CAD  []HTN  []DLD  []DM []PVD []Obesity [] Sedintary lifestyle []CHF  []CINDY  []Cancer Hx     Social History: [] Smoking []  Drug Use: []   Alcohol Use:   [] Other:      Possible Location of Stroke:    Possible Cause of Stroke:    Relevant Cerebral Imaging:    Relevant Cervicocerebral Imaging:    EXAM: CT BRAIN       PROCEDURE DATE: 05/30/2019       INTERPRETATION: Clinical History / Reason for exam: Intracranial hemorrhage   follow-up     Technique: Noncontrast head CT. Contiguous CT axial images of the head from   the base to the vertex.     Comparison: CT head 5/30/2019, MRI brain 4/21/2015     Findings:     There is stable appearance of a right thalamic hematoma measuring about 2.2   cm with intraventricular extension. The intraventricular hemorrhage is not   significantly changed allowing for redistribution.     There is stable mild ventricular prominence.     There is stable confluent hypoattenuation within the cerebral hemispheric   white matter consistent with chronic microvascular change. Stable chronic   hematoma cavity within the left basal ganglia, and chronic lacunar infarcts   within the bilateral basal ganglia and left thalamus.     Right frontal louie hole is noted.     The visualized paranasal sinuses and the mastoids are clear.     IMPRESSION:     1. Stable right thalamic hematoma measuring 2.2 cm with intraventricular   extension of hemorrhage. Stable mild ventricular prominence.     2. Stable severe chronic microvascular changes, chronic hematoma cavity in   the left basal ganglia and chronic bilateral deep lacunar infarcts.       WU KEY M.D., ATTENDING RADIOLOGIST   This document has been electronically signed. May 30 2019 5:12PM         Relevant blood tests:      Relevant cardiac rhythm monitoring:    Relevant Cardiac Structure:(TTE/JEOLLE +/-):[]No intracardiac thrombus/[] no vegetation/[]no [place]/EF:      Home Medications:  amLODIPine 2.5 mg oral tablet: 1 tab(s) orally once a day (29 May 2019 22:02)  donepezil 10 mg oral tablet: 1 tab(s) orally once a day (at bedtime) (29 May 2019 22:02)  enalapril 10 mg oral tablet: 1 tab(s) orally once a day (29 May 2019 22:02)  enalapril 10 mg oral tablet: 1 tab(s) orally once a day (29 May 2019 22:02)  glimepiride 1 mg oral tablet: 1 tab(s) orally once a day (29 May 2019 22:02)  Norvasc 2.5 mg oral tablet: 1 tab(s) orally once a day (29 May 2019 22:02)  simvastatin 20 mg oral tablet: 1 tab(s) orally once a day (at bedtime) (29 May 2019 22:02)  Vitamin B12 1000 mcg oral tablet: 1 tab(s) orally once a day (29 May 2019 22:02)  Vitamin D3 1000 intl units oral tablet, chewable: 1 tab(s) orally once a day (29 May 2019 22:02)      MEDICATIONS  (STANDING):  amLODIPine   Tablet 10 milliGRAM(s) Oral daily  chlorhexidine 0.12% Liquid 15 milliLiter(s) Oral Mucosa two times a day  chlorhexidine 4% Liquid 1 Application(s) Topical <User Schedule>  hydrALAZINE 25 milliGRAM(s) Oral three times a day  levETIRAcetam  IVPB 500 milliGRAM(s) IV Intermittent every 12 hours      PT/OT/Speech/Rehab/S&Swr:    Exam:    Vital Signs Last 24 Hrs  T(C): 38 (01 Jun 2019 08:00), Max: 38.8 (31 May 2019 20:00)  T(F): 100.4 (01 Jun 2019 08:00), Max: 101.8 (31 May 2019 20:00)  HR: 66 (01 Jun 2019 09:00) (58 - 80)  BP: --  BP(mean): --  RR: 22 (01 Jun 2019 08:00) (12 - 37)  SpO2: 99% (01 Jun 2019 08:00) (94% - 100%)      Neuro exam:    patient is intubated, opens eyes  to loud stimuli and touch  Pinpint pupils BL, partial gaze palsy, BTT bl, follows one step commands  Moves all 4 extrem, withdraws to pain, -babinski    NIHSS      LOC:       1a: 2   1b(Questions): 2         1c(Instructions):  0           Best Gaze: 1  Visual:0  Motor:                 RUE: 1    RLE:  1   LUE: 2   LLE:  2   FACE:  0   Limb Ataxia:0  Sensory: 1      Language:   3    Dysarthria:  0        Extinction and Inattention:0    NIHSS on admission:          NIHSS yesterday:          NIHSS today:  15           m-RS: 4

## 2019-06-01 NOTE — PROGRESS NOTE ADULT - ASSESSMENT
75 yo female with pmh as stated above presents with acute R thalamic bleed with intraventricular extension. Currently intubated, off sedation, follows one step commands.    Plan:  Continue BP control <160 syst  Continue Keppra 500 BID  repeat CTH ?  Frequent neurochecks      Neuroattending note will follow

## 2019-06-01 NOTE — PROGRESS NOTE ADULT - ASSESSMENT
Impression:  Intra cerebral Bleed  Hypertensive emergency  HO hemorrhagic stroke  Prolonged QT.      PLAN:    CNS: Keep off sedation. Neuro checks Q1 hr    HEENT: Oral care    PULMONARY:  HOB @ 30 degrees. DTA    CARDIOVASCULAR: i=o. Maintain BP <140. Continue with blood pressure medicine.     GI: GI prophylaxis.  Feeding through OG tube.    RENAL:  Follow up lytes.  Correct as needed    INFECTIOUS DISEASE: Follow up cultures. PAN culture.    HEMATOLOGICAL:  Seq DVT prophylaxis.    ENDOCRINE:  Follow up FS.     MUSCULOSKELETAL: Bed rest    Poor prognosis  Palliative care consult.

## 2019-06-01 NOTE — PROGRESS NOTE ADULT - SUBJECTIVE AND OBJECTIVE BOX
Over Night Events: No events overnight. Still on MV. NO sedation.         ROS:  See HPI    PHYSICAL EXAM    ICU Vital Signs Last 24 Hrs  T(C): 38 (01 Jun 2019 08:00), Max: 38.8 (31 May 2019 20:00)  T(F): 100.4 (01 Jun 2019 08:00), Max: 101.8 (31 May 2019 20:00)  HR: 68 (01 Jun 2019 08:00) (58 - 80)  BP: --  BP(mean): --  ABP: 160/68 (01 Jun 2019 08:00) (115/58 - 186/86)  ABP(mean): 100 (01 Jun 2019 08:00) (78 - 122)  RR: 22 (01 Jun 2019 08:00) (12 - 37)  SpO2: 99% (01 Jun 2019 08:00) (94% - 100%)        05-31-19 @ 07:01  -  06-01-19 @ 07:00  --------------------------------------------------------  IN: 1390 mL / OUT: 1000 mL / NET: 390 mL        General: Opens eyes on Command  HEENT:                Lymph Nodes: NO cervical LN   Lungs: Bilateral BS  Cardiovascular: Regular   Abdomen: Soft, Positive BS  Extremities: No clubbing   Skin:   Neurological:       LABS:                          11.1   8.76  )-----------( 181      ( 01 Jun 2019 04:30 )             35.9                                               06-01    140  |  109  |  19  ----------------------------<  256<H>  4.6   |  22  |  0.8    Ca    9.3      01 Jun 2019 04:30  Phos  2.3     06-01  Mg     2.2     06-01    TPro  5.6<L>  /  Alb  3.2<L>  /  TBili  1.1  /  DBili  x   /  AST  31  /  ALT  33  /  AlkPhos  61  06-01                                              LIVER FUNCTIONS - ( 01 Jun 2019 04:30 )  Alb: 3.2 g/dL / Pro: 5.6 g/dL / ALK PHOS: 61 U/L / ALT: 33 U/L / AST: 31 U/L / GGT: x                                                              Mode: AC/ CMV (Assist Control/ Continuous Mandatory Ventilation)  RR (machine): 12  TV (machine): 450  FiO2: 40  PEEP: 5  ITime: 1  MAP: 11  PIP: 30                                      ABG - ( 01 Jun 2019 05:49 )  pH, Arterial: 7.39  pH, Blood: x     /  pCO2: 41    /  pO2: 92    / HCO3: 25    / Base Excess: -0.2  /  SaO2: 98          MEDICATIONS  (STANDING):  amLODIPine   Tablet 10 milliGRAM(s) Oral daily  chlorhexidine 0.12% Liquid 15 milliLiter(s) Oral Mucosa two times a day  chlorhexidine 4% Liquid 1 Application(s) Topical <User Schedule>  levETIRAcetam  IVPB 500 milliGRAM(s) IV Intermittent every 12 hours    MEDICATIONS  (PRN):  acetaminophen   Tablet .. 650 milliGRAM(s) Oral every 6 hours PRN Temp greater or equal to 38C (100.4F)      Xrays:                                                                                     ECHO

## 2019-06-02 NOTE — PROGRESS NOTE ADULT - SUBJECTIVE AND OBJECTIVE BOX
Over Night Events:        ROS:  See HPI    PHYSICAL EXAM    ICU Vital Signs Last 24 Hrs  T(C): 36.7 (2019 06:00), Max: 38.2 (2019 00:00)  T(F): 98.1 (2019 06:00), Max: 100.8 (2019 00:00)  HR: 64 (2019 06:00) (56 - 78)  BP: 126/53 (2019 06:00) (107/51 - 151/95)  BP(mean): 86 (2019 06:00) (70 - 122)  ABP: 154/58 (2019 23:00) (134/60 - 164/68)  ABP(mean): 90 (2019 23:00) (84 - 100)  RR: 14 (2019 06:00) (12 - 22)  SpO2: 98% (2019 06:00) (97% - 100%)        19 @ 07:01  -  06-- @ 07:00  --------------------------------------------------------  IN: 1056 mL / OUT: 1000 mL / NET: 56 mL        General:  HEENT:                Lymph Nodes: NO cervical LN   Lungs: Bilateral BS  Cardiovascular: Regular   Abdomen: Soft, Positive BS  Extremities: No clubbing   Skin:   Neurological:       LABS:                          11.2   9.08  )-----------( 181      ( 2019 04:29 )             35.9                                               06-02    142  |  108  |  22<H>  ----------------------------<  181<H>  4.6   |  23  |  0.7    Ca    9.8      2019 04:29  Phos  2.5     06-02  Mg     2.4     06-02    TPro  5.4<L>  /  Alb  3.1<L>  /  TBili  0.8  /  DBili  x   /  AST  18  /  ALT  32  /  AlkPhos  65  06-02                                     Urinalysis Basic - ( 2019 12:18 )    Color: Yellow / Appearance: Clear / S.020 / pH: x  Gluc: x / Ketone: Negative  / Bili: Negative / Urobili: 2.0 mg/dL   Blood: x / Protein: Negative mg/dL / Nitrite: Negative   Leuk Esterase: Negative / RBC: x / WBC x   Sq Epi: x / Non Sq Epi: x / Bacteria: x                                                  LIVER FUNCTIONS - ( 2019 04:29 )  Alb: 3.1 g/dL / Pro: 5.4 g/dL / ALK PHOS: 65 U/L / ALT: 32 U/L / AST: 18 U/L / GGT: x                                                                                    Mode: AC/ CMV (Assist Control/ Continuous Mandatory Ventilation)  RR (machine): 12  TV (machine): 450  FiO2: 40  PEEP: 5  ITime: 1  MAP: 10  PIP: 24                                      ABG - ( 2019 04:31 )  pH, Arterial: 7.42  pH, Blood: x     /  pCO2: 41    /  pO2: 79    / HCO3: 27    / Base Excess: 2.1   /  SaO2: 97          MEDICATIONS  (STANDING):  amLODIPine   Tablet 10 milliGRAM(s) Oral daily  chlorhexidine 0.12% Liquid 15 milliLiter(s) Oral Mucosa two times a day  chlorhexidine 4% Liquid 1 Application(s) Topical <User Schedule>  docusate sodium Liquid 100 milliGRAM(s) Oral three times a day  hydrALAZINE 50 milliGRAM(s) Oral two times a day  insulin regular Infusion 4 Unit(s)/Hr (4 mL/Hr) IV Continuous <Continuous>  levETIRAcetam  IVPB 500 milliGRAM(s) IV Intermittent every 12 hours  losartan 50 milliGRAM(s) Oral daily    MEDICATIONS  (PRN):  acetaminophen   Tablet .. 650 milliGRAM(s) Oral every 6 hours PRN Temp greater or equal to 38C (100.4F)      Xrays:                                                                                     ECHO

## 2019-06-02 NOTE — PROGRESS NOTE ADULT - ASSESSMENT
Impression:  Intra cerebral Bleed  Hypertensive emergency  HO hemorrhagic stroke  Prolonged QT.      PLAN:    CNS: Keep off sedation. Neuro checks Q1 hr. Neurosurgery follow up.     HEENT: Oral care    PULMONARY:  HOB @ 30 degrees. CPAP trial today.     CARDIOVASCULAR: i=o. Maintain BP <140. Continue with blood pressure medicine.     GI: GI prophylaxis.  Feeding through OG tube.    RENAL:  Follow up lytes.  Correct as needed    INFECTIOUS DISEASE: Follow up cultures. PAN culture.    HEMATOLOGICAL:  Seq DVT prophylaxis.    ENDOCRINE:  Follow up FS.     MUSCULOSKELETAL: Bed rest.     Poor prognosis  Palliative care consult.

## 2019-06-02 NOTE — PROGRESS NOTE ADULT - SUBJECTIVE AND OBJECTIVE BOX
________________________________________________________________________________  DAILY PROGRESS NOTE:    ================== SUBJECTIVE ==================    HERNANDO ALICIARERE  /   74y  /  Female  /  MRN#: [id number]  Patient is a 74y old Female who presents with a chief complaint of left sided weakness and slurred speech   Currently admitted to medicine with the primary diagnosis of Hypertensive emergency      HOSPITAL DAY: 3d   ICU DAY:3    SUMMARY OF HOSPITAL COURSE TO DATE 74 y.o F with PMH of HTN, HLD, DM, hemorrhagic stroke,? EVD placement at that time ( about 8 years ago)  with residual left side weakness and slurred speech, ambulated with cane, on ASA daily.  Pt. brought to [facility] ED as a stroke code. At the time of presentation to [facility] ED A&Ox 2, baseline slurred speech with left sided weakness. /82, 55 HR, 18 RR, CTH showed acute right thalamic hemorrhage with extension to right lateral and third ventricle. At [facility] ED, Pt. Intubated 2/2 worsening of neuro exam for air way protection as per ED attending note. Pt.  became more drowsy with decreased responsiveness. Pt. was transferred to ED-[place]. At time of NSGY eval Pt intubated, sedated on Propofol and Fentanyl, radial arterial line placed, she received keppra, zofran and mannitol in ED. Repeat CT scan stable from prior study as per neurosurgery. Platelets one unit ordered as per neurosurgery.    OVERNIGHT EVENTS: None    TODAY: Patient was seen this morning at bedside.   Review of systems is otherwise negative.    ================= PRESENT TODAY ==================    1-Mckeon Catheter: yes  2-Central Line: no  3-IV Fluids: no  4-Drips:  4.1-Pressors: no  4.2-Sedation: no  4.3-Heparin:   no  5-Intubated: yes  Oxygen: Sat:   Ventilator Settings: Tidal Volume: 450 RR: 12 PEEP: 5 FiO2:40     =================== OBJECTIVE ===================    VITAL SIGNS: Last 24 Hours  T(C): 37.2 (2019 08:00), Max: 38.2 (2019 00:00)  T(F): 99 (2019 08:00), Max: 100.8 (2019 00:00)  HR: 58 (2019 10:00) (56 - 78)  BP: 116/42 (2019 10:00) (99/42 - 151/95)  BP(mean): 60 (2019 10:00) (60 - 122)  RR: 13 (2019 10:00) (12 - 22)  SpO2: 99% (2019 10:00) (97% - 100%)    19 @ 07:  -  19 @ 07:00  --------------------------------------------------------  IN: 1056 mL / OUT: 1000 mL / NET: 56 mL    19 @ 07:01  -  19 @ 10:08  --------------------------------------------------------  IN: 6 mL / OUT: 0 mL / NET: 6 mL      PHYSICAL EXAM:  GENERAL: NAD, well-developed  HEAD: ET +  EYES: EOMI, PERRLA, conjunctiva and sclera clear  NECK: Supple, No JVD  CHEST/LUNG: Clear to auscultation bilaterally; No wheeze  HEART: Regular rate and rhythm; No murmurs, rubs, or gallops  ABDOMEN: Soft, Nontender, Nondistended; Bowel sounds present  EXTREMITIES:  2+ Peripheral Pulses, No clubbing, cyanosis, or edema  PSYCH: AAOx3  NEUROLOGY: Moves all extremities. Awake. Responds to commands  SKIN: No rashes or lesions    ===================== LABS =====================                        11.2   9.08  )-----------( 181      ( 2019 04:29 )             35.9     06-02    142  |  108  |  22<H>  ----------------------------<  181<H>  4.6   |  23  |  0.7    Ca    9.8      2019 04:29  Phos  2.5     06-  Mg     2.4     02    TPro  5.4<L>  /  Alb  3.1<L>  /  TBili  0.8  /  DBili  x   /  AST  18  /  ALT  32  /  AlkPhos  65  06-02      Urinalysis Basic - ( 2019 12:18 )    Color: Yellow / Appearance: Clear / S.020 / pH: x  Gluc: x / Ketone: Negative  / Bili: Negative / Urobili: 2.0 mg/dL   Blood: x / Protein: Negative mg/dL / Nitrite: Negative   Leuk Esterase: Negative / RBC: x / WBC x   Sq Epi: x / Non Sq Epi: x / Bacteria: x      ABG - ( 2019 04:31 )  pH, Arterial: 7.42  pH, Blood: x     /  pCO2: 41    /  pO2: 79    / HCO3: 27    / Base Excess: 2.1   /  SaO2: 97        ================= MICROBIOLOGY ================    ================== IMAGING TO DATE ==================    < from: CT Head No Cont (19 @ 01:19) >  IMPRESSION:    Acute hemorrhage noted within the right thalamus with extension of   hemorrhage noted within the right lateral and third ventricles with mild   mass effect, not significantly changed since the prior study. A little   more hemorrhage is noted layering in the occipital horn of the right   lateral ventricle.    No acute midline shift. Essentially stable ventricular size without   definite evidence for hydrocephalus.    Patchy hypodensities in the periventricular and subcortical white matter   without mass effect compatible with chronic microvascular changes.    Chronic right basal ganglia lacunar infarct and chronic left external   capsule and thalamic infarcts.    There is no depressed skull fracture. Right frontal louie hole. There is   mild mucosal thickening of the bilateral maxillary sinuses. The bilateral   mastoid air cells are well-aerated.    < end of copied text >    < from: CT Cervical Spine No Cont (19 @ 01:19) >  IMPRESSION:    No evidence of acute cervical spine fracture or subluxation.    Multilevel degenerative changes.    Bilateral apical groundglass opacities.      < end of copied text >    ================== OTHER SIGNIFICANT FINDINGS ==================    ================== ALLERGIES ===================  No Known Allergies    ==================== MEDS =====================  amLODIPine   Tablet 10 milliGRAM(s) Oral daily  chlorhexidine 0.12% Liquid 15 milliLiter(s) Oral Mucosa two times a day  chlorhexidine 4% Liquid 1 Application(s) Topical <User Schedule>  docusate sodium Liquid 100 milliGRAM(s) Oral three times a day  hydrALAZINE 25 milliGRAM(s) Oral three times a day  insulin regular Infusion 4 Unit(s)/Hr IV Continuous <Continuous>  levETIRAcetam  IVPB 500 milliGRAM(s) IV Intermittent every 12 hours  losartan 50 milliGRAM(s) Oral daily    PRN MEDICATIONS  acetaminophen   Tablet .. 650 milliGRAM(s) Oral every 6 hours PRN      ================= CONSULTS ==================    ================= ASSESS/PLAN ==================  Patient is a 74y old Female who presents with a chief complaint of left sided weakness and slurred speech   Currently admitted to medicine with the primary diagnosis of Hypertensive emergency      PLAN  74 y.o F with PMH of HTN, HLD, DM, hemorrhagic stroke,? EVD placement at that time ( about 8 years ago)  with residual left side weakness and slurred speech, ambulated with cane, on ASA daily  Pt. brought to [facility] ED as a stroke code    #)Acute right thalamic hemorrhagic stroke  -Sudden onset slurred speech and left sided weakness on presentation  -CT head showed right thalamic hemorrhage with extension into lateral and third ventricles  -Neurosurgery following.   -No acute neurosurgical intervention needed at this time since repeat CT head stable. No evidence of evolving hydrocephalus.  -Clinically patient is improving. Awake and responding to commands. Moves all extremities. No repeat CT head needed. Failed SBP in am. Trial of extubation tomorrow in am  -Continue Keppra for seizure prophylaxis  -Monitor and control SBP between 140-160. On amlodipine, hydralazine and losartan to control BP  -GAG and pupillary reflex present.  -CT chest and abdomen did not show any evidence of bleed  -only versad pushes if patient is agitated.     #Prolonged Qtc 2/2 inc intracranial pressure 2/2 Intracranial bleed  -Few sec of polymorphic V tach overnight on  likely tdp  -Magnesium and potassium repleted   -BMP and magnesium q8h. f/u 4pm  -If V tach recurs, give Magnesium 1gm STAT  -continue tele monitoring    #Hypophosphatemia  -repleted phosphorus  -f/u am phosphorus    #)DM   -check FS will start insulin if FS>180    #)HTN  -Maintain BP between 120-140mm Hg    GI PPX: protonix  DVT PPX: sequentials    DIET: Tube feeds  ACTIVITY: Bed rest    ================= CODE STATUS =================                  (X) FULL CODE     |     () DNR     |     () DNI ________________________________________________________________________________  DAILY PROGRESS NOTE:    ================== SUBJECTIVE ==================    HERNANDO ALICIARERE  /   74y  /  Female  /  MRN#: [id number]  Patient is a 74y old Female who presents with a chief complaint of left sided weakness and slurred speech   Currently admitted to medicine with the primary diagnosis of Hypertensive emergency      HOSPITAL DAY: 3d   ICU DAY:3    SUMMARY OF HOSPITAL COURSE TO DATE 74 y.o F with PMH of HTN, HLD, DM, hemorrhagic stroke,? EVD placement at that time ( about 8 years ago)  with residual left side weakness and slurred speech, ambulated with cane, on ASA daily.  Pt. brought to [facility] ED as a stroke code. At the time of presentation to [facility] ED A&Ox 2, baseline slurred speech with left sided weakness. /82, 55 HR, 18 RR, CTH showed acute right thalamic hemorrhage with extension to right lateral and third ventricle. At [facility] ED, Pt. Intubated 2/2 worsening of neuro exam for air way protection as per ED attending note. Pt.  became more drowsy with decreased responsiveness. Pt. was transferred to ED-[place]. At time of NSGY eval Pt intubated, sedated on Propofol and Fentanyl, radial arterial line placed, she received keppra, zofran and mannitol in ED. Repeat CT scan stable from prior study as per neurosurgery. Platelets one unit ordered as per neurosurgery.    OVERNIGHT EVENTS: None    TODAY: Patient was seen this morning at bedside.   Review of systems is otherwise negative.    ================= PRESENT TODAY ==================    1-Mckeon Catheter: yes  2-Central Line: no  3-IV Fluids: no  4-Drips:  4.1-Pressors: no  4.2-Sedation: no  4.3-Heparin:   no  5-Intubated: yes  Oxygen: Sat:   Ventilator Settings: Tidal Volume: 450 RR: 12 PEEP: 5 FiO2:40     =================== OBJECTIVE ===================    VITAL SIGNS: Last 24 Hours  T(C): 37.2 (2019 08:00), Max: 38.2 (2019 00:00)  T(F): 99 (2019 08:00), Max: 100.8 (2019 00:00)  HR: 58 (2019 10:00) (56 - 78)  BP: 116/42 (2019 10:00) (99/42 - 151/95)  BP(mean): 60 (2019 10:00) (60 - 122)  RR: 13 (2019 10:00) (12 - 22)  SpO2: 99% (2019 10:00) (97% - 100%)    19 @ 07:  -  19 @ 07:00  --------------------------------------------------------  IN: 1056 mL / OUT: 1000 mL / NET: 56 mL    19 @ 07:01  -  19 @ 10:08  --------------------------------------------------------  IN: 6 mL / OUT: 0 mL / NET: 6 mL      PHYSICAL EXAM:  GENERAL: NAD, well-developed  HEAD: ET +  EYES: EOMI, PERRLA, conjunctiva and sclera clear  NECK: Supple, No JVD  CHEST/LUNG: Clear to auscultation bilaterally; No wheeze  HEART: Regular rate and rhythm; No murmurs, rubs, or gallops  ABDOMEN: Soft, Nontender, Nondistended; Bowel sounds present  EXTREMITIES:  2+ Peripheral Pulses, No clubbing, cyanosis, or edema  PSYCH: AAOx3  NEUROLOGY: Moves all extremities. Awake. Responds to commands  SKIN: No rashes or lesions    ===================== LABS =====================                        11.2   9.08  )-----------( 181      ( 2019 04:29 )             35.9     06-02    142  |  108  |  22<H>  ----------------------------<  181<H>  4.6   |  23  |  0.7    Ca    9.8      2019 04:29  Phos  2.5     06-  Mg     2.4     02    TPro  5.4<L>  /  Alb  3.1<L>  /  TBili  0.8  /  DBili  x   /  AST  18  /  ALT  32  /  AlkPhos  65  06-02      Urinalysis Basic - ( 2019 12:18 )    Color: Yellow / Appearance: Clear / S.020 / pH: x  Gluc: x / Ketone: Negative  / Bili: Negative / Urobili: 2.0 mg/dL   Blood: x / Protein: Negative mg/dL / Nitrite: Negative   Leuk Esterase: Negative / RBC: x / WBC x   Sq Epi: x / Non Sq Epi: x / Bacteria: x      ABG - ( 2019 04:31 )  pH, Arterial: 7.42  pH, Blood: x     /  pCO2: 41    /  pO2: 79    / HCO3: 27    / Base Excess: 2.1   /  SaO2: 97        ================= MICROBIOLOGY ================    ================== IMAGING TO DATE ==================    < from: CT Head No Cont (19 @ 01:19) >  IMPRESSION:    Acute hemorrhage noted within the right thalamus with extension of   hemorrhage noted within the right lateral and third ventricles with mild   mass effect, not significantly changed since the prior study. A little   more hemorrhage is noted layering in the occipital horn of the right   lateral ventricle.    No acute midline shift. Essentially stable ventricular size without   definite evidence for hydrocephalus.    Patchy hypodensities in the periventricular and subcortical white matter   without mass effect compatible with chronic microvascular changes.    Chronic right basal ganglia lacunar infarct and chronic left external   capsule and thalamic infarcts.    There is no depressed skull fracture. Right frontal louie hole. There is   mild mucosal thickening of the bilateral maxillary sinuses. The bilateral   mastoid air cells are well-aerated.    < end of copied text >    < from: CT Cervical Spine No Cont (19 @ 01:19) >  IMPRESSION:    No evidence of acute cervical spine fracture or subluxation.    Multilevel degenerative changes.    Bilateral apical groundglass opacities.      < end of copied text >    ================== OTHER SIGNIFICANT FINDINGS ==================    ================== ALLERGIES ===================  No Known Allergies    ==================== MEDS =====================  amLODIPine   Tablet 10 milliGRAM(s) Oral daily  chlorhexidine 0.12% Liquid 15 milliLiter(s) Oral Mucosa two times a day  chlorhexidine 4% Liquid 1 Application(s) Topical <User Schedule>  docusate sodium Liquid 100 milliGRAM(s) Oral three times a day  hydrALAZINE 25 milliGRAM(s) Oral three times a day  insulin regular Infusion 4 Unit(s)/Hr IV Continuous <Continuous>  levETIRAcetam  IVPB 500 milliGRAM(s) IV Intermittent every 12 hours  losartan 50 milliGRAM(s) Oral daily    PRN MEDICATIONS  acetaminophen   Tablet .. 650 milliGRAM(s) Oral every 6 hours PRN      ================= CONSULTS ==================    ================= ASSESS/PLAN ==================  Patient is a 74y old Female who presents with a chief complaint of left sided weakness and slurred speech   Currently admitted to medicine with the primary diagnosis of Hypertensive emergency      PLAN  74 y.o F with PMH of HTN, HLD, DM, hemorrhagic stroke,? EVD placement at that time ( about 8 years ago)  with residual left side weakness and slurred speech, ambulated with cane, on ASA daily  Pt. brought to [facility] ED as a stroke code    #)Acute right thalamic hemorrhagic stroke  -Sudden onset slurred speech and left sided weakness on presentation  -CT head showed right thalamic hemorrhage with extension into lateral and third ventricles  -Neurosurgery following.   -No acute neurosurgical intervention needed at this time since repeat CT head stable. No evidence of evolving hydrocephalus.  -Clinically patient is improving. Awake and responding to commands. Moves all extremities. No repeat CT head needed. Failed SBP in am. Trial of extubation tomorrow in am  -Continue Keppra for seizure prophylaxis  -Monitor and control SBP between 140-160. On amlodipine, hydralazine and losartan to control BP  -GAG and pupillary reflex present.  -CT chest and abdomen did not show any evidence of bleed  -only versad pushes if patient is agitated.     #Prolonged Qtc 2/2 inc intracranial pressure 2/2 Intracranial bleed  -Few sec of polymorphic V tach overnight on  likely tdp  -Magnesium and potassium repleted   -BMP and magnesium q8h. f/u 4pm  -If V tach recurs, give Magnesium 1gm STAT  -continue tele monitoring    #Hypophosphatemia  -repleted phosphorus  -f/u am phosphorus    #)DM   -check FS will start insulin if FS>180    #)HTN  -On losartan, amlodipine and hydralazine  -BP controlled  -Maintain BP between 120-140mm Hg    GI PPX: protonix  DVT PPX: sequentials    DIET: Tube feeds  ACTIVITY: Bed rest    ================= CODE STATUS =================                  (X) FULL CODE     |     () DNR     |     () DNI

## 2019-06-03 NOTE — PROGRESS NOTE ADULT - SUBJECTIVE AND OBJECTIVE BOX
Neurology Follow up note      Name  HERNANDO HERNANDEZ    HPI:  74 y.o F with PMH of HTN, HLD, DM, hemorrhagic stroke,? EVD placement at that time ( about 8 years ago)  with residual left side weakness and slurred speech, ambulated with cane, on ASA daily  Pt. brought to [facility] ED as a stroke code. At the time of presentation to [facility] ED A&Ox 2, baseline slurped speech with left sided weakness. /82, 55 HR, 18 RR, CTH prelim report findings with acute right thalamic hemorrhage with extension to right lateral and third ventricle. At [facility] ED Pt. Intubated 2/2 worsening of neuro exam for air way protection as per ED attending note Pt.  became more drowsy with decreased responsiveness. Pt. was transferred to ED-[place]. At time of NSGY eval Pt intubated, sedated on Propofol and Fentanyl, radial arterial line, she received keppra, zofran and mannitol in ED. Repeat CT scan stable from prior study as per neurosurgery. Platelets one unit ordered as per neurosurgery (30 May 2019 02:50)      Interval History -          Vital Signs Last 24 Hrs  T(C): 37.9 (02 Jun 2019 20:00), Max: 38.1 (02 Jun 2019 12:00)  T(F): 100.2 (02 Jun 2019 20:00), Max: 100.6 (02 Jun 2019 12:00)  HR: 68 (02 Jun 2019 23:00) (54 - 70)  BP: 163/63 (02 Jun 2019 23:00) (99/42 - 163/63)  BP(mean): 94 (02 Jun 2019 23:00) (60 - 94)  RR: 23 (02 Jun 2019 23:00) (12 - 30)  SpO2: 99% (02 Jun 2019 23:00) (97% - 100%)          Neurological Exam:   Mental status:                Medications  acetaminophen   Tablet .. 650 milliGRAM(s) Oral every 6 hours PRN  amLODIPine   Tablet 10 milliGRAM(s) Oral daily  chlorhexidine 0.12% Liquid 15 milliLiter(s) Oral Mucosa two times a day  chlorhexidine 4% Liquid 1 Application(s) Topical <User Schedule>  docusate sodium 100 milliGRAM(s) Oral two times a day  docusate sodium Liquid 100 milliGRAM(s) Oral three times a day  hydrALAZINE 25 milliGRAM(s) Oral three times a day  insulin regular Infusion 4 Unit(s)/Hr IV Continuous <Continuous>  levETIRAcetam  IVPB 500 milliGRAM(s) IV Intermittent every 12 hours  losartan 50 milliGRAM(s) Oral daily  senna 2 Tablet(s) Oral at bedtime      Lab      Radiology Neurology Follow up note      Name  HERNANDO HERNANDEZ    HPI:  74 y.o F with PMH of HTN, HLD, DM, hemorrhagic stroke,? EVD placement at that time ( about 8 years ago)  with residual left side weakness and slurred speech, ambulated with cane, on ASA daily  Pt. brought to [facility] ED as a stroke code. At the time of presentation to [facility] ED A&Ox 2, baseline slurped speech with left sided weakness. /82, 55 HR, 18 RR, CTH prelim report findings with acute right thalamic hemorrhage with extension to right lateral and third ventricle. At [facility] ED Pt. Intubated 2/2 worsening of neuro exam for air way protection as per ED attending note Pt.  became more drowsy with decreased responsiveness. Pt. was transferred to ED-[place]. At time of NSGY eval Pt intubated, sedated on Propofol and Fentanyl, radial arterial line, she received keppra, zofran and mannitol in ED. Repeat CT scan stable from prior study as per neurosurgery. Platelets one unit ordered as per neurosurgery (30 May 2019 02:50)      Interval History - pt continues to be intubated no sedation noted. per nursing staff lots of secretions noted currently on AC. Also FSC per nusing..          Vital Signs Last 24 Hrs  T(C): 37.9 (02 Jun 2019 20:00), Max: 38.1 (02 Jun 2019 12:00)  T(F): 100.2 (02 Jun 2019 20:00), Max: 100.6 (02 Jun 2019 12:00)  HR: 68 (02 Jun 2019 23:00) (54 - 70)  BP: 163/63 (02 Jun 2019 23:00) (99/42 - 163/63)  BP(mean): 94 (02 Jun 2019 23:00) (60 - 94)  RR: 23 (02 Jun 2019 23:00) (12 - 30)  SpO2: 99% (02 Jun 2019 23:00) (97% - 100%)          Neurological Exam:   Mental status: Intubated Drowsy   No eye opening to [place]  Awakens with [place] - Intermittantly following commands  Lifts arms off bed b/l - wiggles all fingers on left when ask to show 2 fingers. Lifts hand on the Right side wiggling 2 fingers..  Does not wiggles toes but lifts LLE antigravity  RLE not lifting to commands ??                Medications  acetaminophen   Tablet .. 650 milliGRAM(s) Oral every 6 hours PRN  amLODIPine   Tablet 10 milliGRAM(s) Oral daily  chlorhexidine 0.12% Liquid 15 milliLiter(s) Oral Mucosa two times a day  chlorhexidine 4% Liquid 1 Application(s) Topical <User Schedule>  docusate sodium 100 milliGRAM(s) Oral two times a day  docusate sodium Liquid 100 milliGRAM(s) Oral three times a day  hydrALAZINE 25 milliGRAM(s) Oral three times a day  insulin regular Infusion 4 Unit(s)/Hr IV Continuous <Continuous>  levETIRAcetam  IVPB 500 milliGRAM(s) IV Intermittent every 12 hours  losartan 50 milliGRAM(s) Oral daily  senna 2 Tablet(s) Oral at bedtime      Lab      Radiology

## 2019-06-03 NOTE — PROGRESS NOTE ADULT - SUBJECTIVE AND OBJECTIVE BOX
Patient is a 74y old  Female who presents with a chief complaint of left sided weakness and slurred speech (03 Jun 2019 09:00)      INTERVAL HPI/OVERNIGHT EVENTS:  ICU Vital Signs Last 24 Hrs  T(C): 37.9 (03 Jun 2019 08:00), Max: 38.4 (03 Jun 2019 04:00)  T(F): 100.2 (03 Jun 2019 08:00), Max: 101.1 (03 Jun 2019 04:00)  HR: 70 (03 Jun 2019 12:00) (54 - 84)  BP: 125/87 (03 Jun 2019 12:00) (101/51 - 170/86)  BP(mean): 105 (03 Jun 2019 12:00) (71 - 134)  ABP: --  ABP(mean): --  RR: 31 (03 Jun 2019 12:00) (12 - 31)  SpO2: 99% (03 Jun 2019 12:00) (96% - 100%)    I&O's Summary    02 Jun 2019 07:01  -  03 Jun 2019 07:00  --------------------------------------------------------  IN: 1401 mL / OUT: 950 mL / NET: 451 mL      Mode: AC/ CMV (Assist Control/ Continuous Mandatory Ventilation)  RR (machine): 12  TV (machine): 450  FiO2: 40  PEEP: 5      LABS:                        10.9   7.79  )-----------( 209      ( 03 Jun 2019 04:33 )             36.0     06-03    145  |  106  |  23<H>  ----------------------------<  201<H>  4.5   |  24  |  0.7    Ca    10.0      03 Jun 2019 04:33  Phos  3.2     06-03  Mg     1.9     06-03    TPro  5.5<L>  /  Alb  3.1<L>  /  TBili  0.6  /  DBili  x   /  AST  19  /  ALT  33  /  AlkPhos  72  06-03        CAPILLARY BLOOD GLUCOSE      POCT Blood Glucose.: 182 mg/dL (03 Jun 2019 11:49)  POCT Blood Glucose.: 188 mg/dL (03 Jun 2019 10:14)  POCT Blood Glucose.: 190 mg/dL (03 Jun 2019 08:07)  POCT Blood Glucose.: 195 mg/dL (03 Jun 2019 05:31)  POCT Blood Glucose.: 138 mg/dL (03 Jun 2019 02:58)  POCT Blood Glucose.: 102 mg/dL (03 Jun 2019 01:59)  POCT Blood Glucose.: 173 mg/dL (02 Jun 2019 21:58)  POCT Blood Glucose.: 198 mg/dL (02 Jun 2019 19:55)  POCT Blood Glucose.: 199 mg/dL (02 Jun 2019 18:12)  POCT Blood Glucose.: 186 mg/dL (02 Jun 2019 16:45)  POCT Blood Glucose.: 189 mg/dL (02 Jun 2019 15:11)    ABG - ( 03 Jun 2019 04:30 )  pH, Arterial: 7.47  pH, Blood: x     /  pCO2: 40    /  pO2: 78    / HCO3: 29    / Base Excess: 4.6   /  SaO2: 97                  RADIOLOGY & ADDITIONAL TESTS:    Consultant(s) Notes Reviewed:  [x ] YES  [ ] NO    MEDICATIONS  (STANDING):  amLODIPine   Tablet 10 milliGRAM(s) Oral daily  chlorhexidine 0.12% Liquid 15 milliLiter(s) Oral Mucosa two times a day  chlorhexidine 4% Liquid 1 Application(s) Topical <User Schedule>  docusate sodium 100 milliGRAM(s) Oral two times a day  docusate sodium Liquid 100 milliGRAM(s) Oral three times a day  hydrALAZINE 25 milliGRAM(s) Oral three times a day  insulin regular Infusion 4 Unit(s)/Hr (4 mL/Hr) IV Continuous <Continuous>  levETIRAcetam  IVPB 500 milliGRAM(s) IV Intermittent every 12 hours  losartan 50 milliGRAM(s) Oral daily  senna 2 Tablet(s) Oral at bedtime    MEDICATIONS  (PRN):  acetaminophen   Tablet .. 650 milliGRAM(s) Oral every 6 hours PRN Temp greater or equal to 38C (100.4F)      PHYSICAL EXAM:  GENERAL: well built, well nourished  HEAD:  Atraumatic, Normocephalic  EYES: EOMI, PERRLA, conjunctiva and sclera clear  ENT: No tonsillar erythema, exudates, or enlargement; Moist mucous membranes, Good dentition, No lesions  NECK: Supple, No JVD, Normal thyroid, no enlarged nodes  NERVOUS SYSTEM:  Alert & Oriented X3, Good concentration; Motor Strength 5/5 B/L upper and lower extremities; DTRs 2+ intact and symmetric, sensory intact  CHEST/LUNG: B/L good air entry; No rales, rhonchi, or wheezing  HEART: S1S2 normal, no S3, Regular rate and rhythm; No murmurs, rubs, or gallops  ABDOMEN: Soft, Nontender, Nondistended; Bowel sounds present  EXTREMITIES:  2+ Peripheral Pulses, No clubbing, cyanosis, or edema  LYMPH: No lymphadenopathy noted  SKIN: No rashes or lesions    Care Discussed with Consultants/Other Providers [ x] YES  [ ] NO Patient is a 74y old  Female who presents with a chief complaint of left sided weakness and slurred speech (03 Jun 2019 09:00)  HOSPITAL DAY: 4d   ICU DAY: 4    SUMMARY OF HOSPITAL COURSE TO DATE 74 y.o F with PMH of HTN, HLD, DM, hemorrhagic stroke,? EVD placement at that time ( about 8 years ago)  with residual left side weakness and slurred speech, ambulated with cane, on ASA daily.  Pt. brought to [facility] ED as a stroke code. At the time of presentation to [facility] ED A&Ox 2, baseline slurred speech with left sided weakness. /82, 55 HR, 18 RR, CTH showed acute right thalamic hemorrhage with extension to right lateral and third ventricle. At [facility] ED, Pt. Intubated 2/2 worsening of neuro exam for air way protection as per ED attending note. Pt.  became more drowsy with decreased responsiveness. Pt. was transferred to ED-[place]. At time of NSGY eval Pt intubated, sedated on Propofol and Fentanyl, radial arterial line placed, she received keppra, zofran and mannitol in ED. Repeat CT scan stable from prior study as per neurosurgery. Platelets one unit ordered as per neurosurgery.    OVERNIGHT EVENTS: None    TODAY: Patient was seen this morning at bedside.   Febrile overnight.     ================= PRESENT TODAY ==================    1-Mckeon Catheter: yes  2-Central Line: no  3-IV Fluids: no  4-Drips:  4.1-Pressors: no  4.2-Sedation: no  4.3-Heparin:   no  5-Intubated: yes      INTERVAL HPI/OVERNIGHT EVENTS:  ICU Vital Signs Last 24 Hrs  T(C): 37.9 (03 Jun 2019 08:00), Max: 38.4 (03 Jun 2019 04:00)  T(F): 100.2 (03 Jun 2019 08:00), Max: 101.1 (03 Jun 2019 04:00)  HR: 70 (03 Jun 2019 12:00) (54 - 84)  BP: 125/87 (03 Jun 2019 12:00) (101/51 - 170/86)  BP(mean): 105 (03 Jun 2019 12:00) (71 - 134)  ABP: --  ABP(mean): --  RR: 31 (03 Jun 2019 12:00) (12 - 31)  SpO2: 99% (03 Jun 2019 12:00) (96% - 100%)    I&O's Summary    02 Jun 2019 07:01  -  03 Jun 2019 07:00  --------------------------------------------------------  IN: 1401 mL / OUT: 950 mL / NET: 451 mL      Mode: AC/ CMV (Assist Control/ Continuous Mandatory Ventilation)  RR (machine): 12  TV (machine): 450  FiO2: 40  PEEP: 5      LABS:                        10.9   7.79  )-----------( 209      ( 03 Jun 2019 04:33 )             36.0     06-03    145  |  106  |  23<H>  ----------------------------<  201<H>  4.5   |  24  |  0.7    Ca    10.0      03 Jun 2019 04:33  Phos  3.2     06-03  Mg     1.9     06-03    TPro  5.5<L>  /  Alb  3.1<L>  /  TBili  0.6  /  DBili  x   /  AST  19  /  ALT  33  /  AlkPhos  72  06-03        CAPILLARY BLOOD GLUCOSE      POCT Blood Glucose.: 182 mg/dL (03 Jun 2019 11:49)  POCT Blood Glucose.: 188 mg/dL (03 Jun 2019 10:14)  POCT Blood Glucose.: 190 mg/dL (03 Jun 2019 08:07)  POCT Blood Glucose.: 195 mg/dL (03 Jun 2019 05:31)  POCT Blood Glucose.: 138 mg/dL (03 Jun 2019 02:58)  POCT Blood Glucose.: 102 mg/dL (03 Jun 2019 01:59)  POCT Blood Glucose.: 173 mg/dL (02 Jun 2019 21:58)  POCT Blood Glucose.: 198 mg/dL (02 Jun 2019 19:55)  POCT Blood Glucose.: 199 mg/dL (02 Jun 2019 18:12)  POCT Blood Glucose.: 186 mg/dL (02 Jun 2019 16:45)  POCT Blood Glucose.: 189 mg/dL (02 Jun 2019 15:11)    ABG - ( 03 Jun 2019 04:30 )  pH, Arterial: 7.47  pH, Blood: x     /  pCO2: 40    /  pO2: 78    / HCO3: 29    / Base Excess: 4.6   /  SaO2: 97                  RADIOLOGY & ADDITIONAL TESTS:  < from: Xray Chest 1 View- PORTABLE-Routine (06.03.19 @ 05:02) >  Impression:      Increased bibasilar and perihilar airspace opacities/atelectasis.    Stable support devices as above.      < end of copied text >      < from: CT Head No Cont (06.02.19 @ 15:56) >  Findings/  impression:    Overall no significant changes since the prior exam. Right thalamic   parenchymal hematoma measuring approximately 2.2 cm with intraventricular   extension. No significant change in size of the hematoma. Stable   ventricular size with mild ventricular enlargement.    No new acute intracranial hemorrhage. No changes.      < end of copied text >    < from: CT Abdomen and Pelvis w/ IV Cont (05.30.19 @ 17:15) >  IMPRESSION:   1. Mild compression deformity of L2 of indeterminate age. Subacute   fracture of the left pubicbody.  2. Otherwise, no evidence for acute traumatic injury to the chest,   abdomen, or pelvis.  3. Mild interlobular septal thickening with patchy groundglass opacities   indicative of mild pulmonary edema.  4. Mildly dilated central pulmonary arteries indicative of pulmonary   artery hypertension    < end of copied text >    < from: CT Chest No Cont (05.30.19 @ 17:13) >    IMPRESSION:   1. Mild compression deformity of L2 of indeterminate age. Subacute   fracture of the left pubicbody.  2. Otherwise, no evidence for acute traumatic injury to the chest,   abdomen, or pelvis.  3. Mild interlobular septal thickening with patchy groundglass opacities   indicative of mild pulmonary edema.  4. Mildly dilated central pulmonary arteries indicative of pulmonary   artery hypertension      < end of copied text >  < from: CT Chest No Cont (05.30.19 @ 17:13) >    IMPRESSION:   1. Mild compression deformity of L2 of indeterminate age. Subacute   fracture of the left pubicbody.  2. Otherwise, no evidence for acute traumatic injury to the chest,   abdomen, or pelvis.  3. Mild interlobular septal thickening with patchy groundglass opacities   indicative of mild pulmonary edema.  4. Mildly dilated central pulmonary arteries indicative of pulmonary   artery hypertension    < end of copied text >            Consultant(s) Notes Reviewed:  [x ] YES  [ ] NO    MEDICATIONS  (STANDING):  amLODIPine   Tablet 10 milliGRAM(s) Oral daily  chlorhexidine 0.12% Liquid 15 milliLiter(s) Oral Mucosa two times a day  chlorhexidine 4% Liquid 1 Application(s) Topical <User Schedule>  docusate sodium 100 milliGRAM(s) Oral two times a day  docusate sodium Liquid 100 milliGRAM(s) Oral three times a day  hydrALAZINE 25 milliGRAM(s) Oral three times a day  insulin regular Infusion 4 Unit(s)/Hr (4 mL/Hr) IV Continuous <Continuous>  levETIRAcetam  IVPB 500 milliGRAM(s) IV Intermittent every 12 hours  losartan 50 milliGRAM(s) Oral daily  senna 2 Tablet(s) Oral at bedtime    MEDICATIONS  (PRN):  acetaminophen   Tablet .. 650 milliGRAM(s) Oral every 6 hours PRN Temp greater or equal to 38C (100.4F)      PHYSICAL EXAM:  GENERAL: well built, well nourished  HEAD:  Atraumatic, Normocephalic  EYES: does not open eyes.   ENT: dry mucous membranes.   NECK: Supple, No JVD.  NERVOUS SYSTEM:  intermittently responds to verbal stimuli and follows commands.   CHEST/LUNG: intubated; equal bilateral chest rise.   HEART: S1S2 normal, no S3, Regular rate and rhythm; No murmurs, rubs, or gallops.  ABDOMEN: Soft, Nondistended; Bowel sounds present  EXTREMITIES: No clubbing, cyanosis, or edema  SKIN: No rashes or lesions

## 2019-06-03 NOTE — PROGRESS NOTE ADULT - ASSESSMENT
Patient is a 74y old Female who presents with a chief complaint of left sided weakness and slurred speech   Currently admitted to medicine with the primary diagnosis of Hypertensive emergency      PLAN  74 y.o F with PMH of HTN, HLD, DM, hemorrhagic stroke,? EVD placement at that time ( about 8 years ago)  with residual left side weakness and slurred speech, ambulated with cane, on ASA daily  Pt. brought to [facility] ED as a stroke code    #)Acute right thalamic hemorrhagic stroke  -Sudden onset slurred speech and left sided weakness on presentation  -CT head showed right thalamic hemorrhage with extension into lateral and third ventricles  -Neurosurgery following.   -No acute neurosurgical intervention needed at this time since repeat CT head stable. No evidence of evolving hydrocephalus.  -Clinically patient is improving. Awake and responding to commands. Moves all extremities. No repeat CT head needed. Failed SBP in 6/2.   -Continue Keppra for seizure prophylaxis.  -Monitor and control SBP between 140-160. On amlodipine, hydralazine and losartan to control BP  -GAG and pupillary reflex present.  -CT chest and abdomen did not show any evidence of bleed  -only versad pushes if patient is agitated.     #febrile  - T-max 101.2 F overnight.  - Cultures sent- no growth to date.     #Prolonged Qtc 2/2 inc intracranial pressure 2/2 Intracranial bleed  -Few sec of polymorphic V tach overnight on 5/31 likely tdp  -Magnesium and potassium repleted   -BMP and magnesium q8h. f/u 4pm  -If V tach recurs, give Magnesium 1gm STAT  -continue tele monitoring  - QTc 507 6/3    #Hypophosphatemia  -f/u am phosphorus    #)DM   -insulin drip    #)HTN  -On losartan, amlodipine and hydralazine  -BP controlled  -Maintain BP between 120-140mm Hg    GI PPX: protonix  DVT PPX: sequentials    DIET: Tube feeds  ACTIVITY: Bed rest    ================= CODE STATUS =================                  (X) FULL CODE     |     () DNR     |     () DNI

## 2019-06-03 NOTE — PROGRESS NOTE ADULT - ASSESSMENT
IMP:          PLAN IMP: 80 y/o F with right BG bleed with  IVH ext          PLAN:   Neuro checks q 1H  Keep HOB at 30 degree  Keep SBp below 160  Continue keppra 500 mg q 12H

## 2019-06-03 NOTE — PROGRESS NOTE ADULT - ASSESSMENT
Impression:  Intra cerebral Bleed/ following simple commands  Hypertensive emergency  HO hemorrhagic stroke  Prolonged QT.  penumonia aspiration      PLAN:    CNS: Keep off sedation. neuro f/up    HEENT: Oral care    PULMONARY:  HOB @ 30 degrees. DTA, SBT    CARDIOVASCULAR: i=o. Maintain BP <140. Continue with blood pressure medicine.     GI: GI prophylaxis.  Feeding through OG tube.    RENAL:  Follow up lytes.  Correct as needed    INFECTIOUS DISEASE: Follow up cultures. PAN culture.    HEMATOLOGICAL:  Seq DVT prophylaxis.    ENDOCRINE:  Follow up FS.     MUSCULOSKELETAL: Bed rest    Poor prognosis

## 2019-06-03 NOTE — PROGRESS NOTE ADULT - SUBJECTIVE AND OBJECTIVE BOX
OVERNIGHT EVENTS: still intubated, sedated, on insulin drip, spiked T    Vital Signs Last 24 Hrs  T(C): 37.9 (2019 08:00), Max: 38.4 (2019 04:00)  T(F): 100.2 (2019 08:00), Max: 101.1 (2019 04:00)  HR: 70 (2019 08:00) (54 - 84)  BP: 132/63 (2019 08:00) (101/51 - 170/86)  BP(mean): 88 (2019 08:00) (60 - 134)  RR: 25 (2019 08:00) (12 - 30)  SpO2: 96% (2019 08:00) (96% - 100%)    PHYSICAL EXAMINATION:    GENERAL: follows simple commands    HEENT: Head is normocephalic and atraumatic. Extraocular muscles are intact. Mucous membranes are moist.    NECK: Supple.    LUNGS: DEC BS R SIDE    HEART: Regular rate and rhythm without murmur.    ABDOMEN: Soft, nontender, and nondistended.      EXTREMITIES: Without any cyanosis, clubbing, rash, lesions or edema.    NEUROLOGIC: unchanged    SKIN: No ulceration or induration present.      LABS:                        10.9   7.79  )-----------( 209      ( 2019 04:33 )             36.0     06-03    145  |  106  |  23<H>  ----------------------------<  201<H>  4.5   |  24  |  0.7    Ca    10.0      2019 04:33  Phos  3.2     06-03  Mg     1.9     06-03    TPro  5.5<L>  /  Alb  3.1<L>  /  TBili  0.6  /  DBili  x   /  AST  19  /  ALT  33  /  AlkPhos  72  06-03      Urinalysis Basic - ( 2019 12:18 )    Color: Yellow / Appearance: Clear / S.020 / pH: x  Gluc: x / Ketone: Negative  / Bili: Negative / Urobili: 2.0 mg/dL   Blood: x / Protein: Negative mg/dL / Nitrite: Negative   Leuk Esterase: Negative / RBC: x / WBC x   Sq Epi: x / Non Sq Epi: x / Bacteria: x      ABG - ( 2019 04:30 )  pH, Arterial: 7.47  pH, Blood: x     /  pCO2: 40    /  pO2: 78    / HCO3: 29    / Base Excess: 4.6   /  SaO2: 97          450/12/40/5                      19 @ 07:01  -  19 @ 07:00  --------------------------------------------------------  IN: 1401 mL / OUT: 950 mL / NET: 451 mL        MICROBIOLOGY:  Culture Results:   No growth ( @ 12:18)  Culture Results:   No growth to date. ( @ 12:10)      MEDICATIONS  (STANDING):  amLODIPine   Tablet 10 milliGRAM(s) Oral daily  chlorhexidine 0.12% Liquid 15 milliLiter(s) Oral Mucosa two times a day  chlorhexidine 4% Liquid 1 Application(s) Topical <User Schedule>  docusate sodium 100 milliGRAM(s) Oral two times a day  docusate sodium Liquid 100 milliGRAM(s) Oral three times a day  hydrALAZINE 25 milliGRAM(s) Oral three times a day  insulin regular Infusion 4 Unit(s)/Hr (4 mL/Hr) IV Continuous <Continuous>  levETIRAcetam  IVPB 500 milliGRAM(s) IV Intermittent every 12 hours  losartan 50 milliGRAM(s) Oral daily  magnesium sulfate  IVPB 2 Gram(s) IV Intermittent once  senna 2 Tablet(s) Oral at bedtime    MEDICATIONS  (PRN):  acetaminophen   Tablet .. 650 milliGRAM(s) Oral every 6 hours PRN Temp greater or equal to 38C (100.4F)      RADIOLOGY & ADDITIONAL STUDIES:

## 2019-06-03 NOTE — PROGRESS NOTE ADULT - SUBJECTIVE AND OBJECTIVE BOX
< from: CT Head No Cont (06.02.19 @ 15:56) >    Findings/  impression:    Overall no significant changes since the prior exam. Right thalamic   parenchymal hematoma measuring approximately 2.2 cm with intraventricular   extension. No significant change in size of the hematoma. Stable   ventricular size with mild ventricular enlargement.    No new acute intracranial hemorrhage. No changes.      KO HAWKINS M.D., ATTENDING RADIOLOGIST  This document has been electronically signed. Jun 2 2019  3:56PM        < end of copied text >      Plan from neurosurgery  no acute neurosurgical intervention  ok for heparin sub q  stable head ct  re-call PRN  d/w attending

## 2019-06-04 NOTE — CHART NOTE - NSCHARTNOTEFT_GEN_A_CORE
Registered Dietitian Follow-Up     Patient Profile Reviewed                           Yes [x]   No []     Nutrition History Previously Obtained        Yes [x]  No []       Pertinent Subjective Information: Pt. remains intubated, spoke to RN at bedside- pt. NPO temporarily for CT scan. Overall, good tolerance to enteral nutrition.     Pertinent Medical Interventions: Seizure last night s/p ativan 4 mg iv,  Sp neuro evaluation for VEEG. Started on Propofol. Intra cerebral Bleed/ following simple commands. Pulm: failed weaning. Poor prognosis.      Diet order: currently NPO, active TF order: Glucerna 1.2 at 45ml/h via OGT- 1296kcal, 64g protein, 875ml free H2O.      Anthropometrics:  - Ht. 160.02cm   - Wt. 79.3kg on 6/4 vs. 78.8kg on 5/30  - %wt change  - BMI 30.8  - IBW 115lbs      Pertinent Lab Data: (6/4) BUN 22, glu 184     Pertinent Meds: Propofol @16.8cc/h (443kcal), Colace      Physical Findings:  - Appearance: intubated  - GI function: still no BM's, on bowel regimen  - Tubes: OGT  - Oral/Mouth cavity: NPO  - Skin: intact (BS 14)      Nutrition Requirements (from RD note on 5/31)  Weight Used: dosing  78.8kg     Estimated Energy Needs    Continue [x]  Adjust [] ~968-1149kcal     887-1037kcal (60-70% PSE 2003b) vs. 867-1103kcal (11-14kcal/kg CBW) vs. 1150-1308kcal (22-25kcal/kg IBW)  Adjusted Energy Recommendations:   kcal/day        Estimated Protein Needs    Continue [x]  Adjust []  ~105g (2g/kg IBW)   Adjusted Protein Recommendations:   gm/day        Estimated Fluid Needs        Continue [x]  Adjust [] per CCU team  Adjusted Fluid Recommendations:   mL/day     Nutrient Intake: NPO        [] Previous Nutrition Diagnosis: Inadequate protein-energy intake            [x] Ongoing          [] Resolved       Nutrition Intervention: enteral and parenteral nutrition, vitamin and mineral supplement     Rec:  When enteral nutrition resumed provide Vital HP at 30ml/h with with Prosource TF TID. This regimen + Proprofol provides 1283kcal, 95g protein, 605ml free H2O. Add MVI daily.       Goal/Expected Outcome: In 3 days TF to provide >85% est energy needs, but not exceed 105%     Indicator/Monitoring: energy intake, body composition, NFPF, glucose profile Registered Dietitian Follow-Up     Patient Profile Reviewed                           Yes [x]   No []     Nutrition History Previously Obtained        Yes [x]  No []       Pertinent Subjective Information: Pt. remains intubated, spoke to RN at bedside- pt. NPO temporarily for CT scan. Overall, good tolerance to enteral nutrition. However, previous TF recommendations have not been implemented. Called residents spectra with new recs today.      Pertinent Medical Interventions: Seizure last night s/p ativan 4 mg iv,  Sp neuro evaluation for VEEG. Started on Propofol. Intra cerebral Bleed/ following simple commands. Pulm: failed weaning. Poor prognosis.      Diet order: currently NPO, active TF order: Glucerna 1.2 at 45ml/h via OGT- 1296kcal, 64g protein, 875ml free H2O.      Anthropometrics:  - Ht. 160.02cm   - Wt. 79.3kg on 6/4 vs. 78.8kg on 5/30  - %wt change  - BMI 30.8  - IBW 115lbs      Pertinent Lab Data: (6/4) BUN 22, glu 184     Pertinent Meds: Propofol @16.8cc/h (443kcal), Colace      Physical Findings:  - Appearance: intubated  - GI function: still no BM's, on bowel regimen  - Tubes: OGT  - Oral/Mouth cavity: NPO  - Skin: intact (BS 14)      Nutrition Requirements (from RD note on 5/31)  Weight Used: dosing  78.8kg     Estimated Energy Needs    Continue [x]  Adjust [] ~968-1149kcal     887-1037kcal (60-70% PSE 2003b) vs. 867-1103kcal (11-14kcal/kg CBW) vs. 1150-1308kcal (22-25kcal/kg IBW)  Adjusted Energy Recommendations:   kcal/day        Estimated Protein Needs    Continue [x]  Adjust []  ~105g (2g/kg IBW)   Adjusted Protein Recommendations:   gm/day        Estimated Fluid Needs        Continue [x]  Adjust [] per CCU team  Adjusted Fluid Recommendations:   mL/day     Nutrient Intake: NPO        [] Previous Nutrition Diagnosis: Inadequate protein-energy intake            [x] Ongoing          [] Resolved       Nutrition Intervention: enteral and parenteral nutrition, vitamin and mineral supplement     Rec:  When enteral nutrition resumed provide Vital HP at 30ml/h with with Prosource TF TID. This regimen + Proprofol provides 1283kcal, 95g protein, 605ml free H2O. Add MVI daily.       Goal/Expected Outcome: In 3 days TF to provide >85% est energy needs, but not exceed 105%     Indicator/Monitoring: energy intake, body composition, NFPF, glucose profile

## 2019-06-04 NOTE — PROGRESS NOTE ADULT - ASSESSMENT
IMP: 75 y/o F with Right thalamic hemmorrhage now in Status       Plan:  Needs veeg  Continue propofol   Increase keppra to 1000mg q 12H  neuro checks a 1  Continue current care

## 2019-06-04 NOTE — PROGRESS NOTE ADULT - SUBJECTIVE AND OBJECTIVE BOX
Neurology Follow up note      Name  HERNANDO HERNANDEZ    HPI:  74 y.o F with PMH of HTN, HLD, DM, hemorrhagic stroke,? EVD placement at that time ( about 8 years ago)  with residual left side weakness and slurred speech, ambulated with cane, on ASA daily  Pt. brought to [facility] ED as a stroke code. At the time of presentation to [facility] ED A&Ox 2, baseline slurped speech with left sided weakness. /82, 55 HR, 18 RR, CTH prelim report findings with acute right thalamic hemorrhage with extension to right lateral and third ventricle. At [facility] ED Pt. Intubated 2/2 worsening of neuro exam for air way protection as per ED attending note Pt.  became more drowsy with decreased responsiveness. Pt. was transferred to ED-[place]. At time of NSGY eval Pt intubated, sedated on Propofol and Fentanyl, radial arterial line, she received keppra, zofran and mannitol in ED. Repeat CT scan stable from prior study as per neurosurgery. Platelets one unit ordered as per neurosurgery (30 May 2019 02:50)      Interval History - Pt intubated and currently sedated. pt was off sedation during the night. As of 6;30am - pt was noted to have siezure tonic/clonic in nature for roughly 10minutes. Pt was given ativan 4mg and started on propofol @20mg. pt sedated not able to get exam.          Vital Signs Last 24 Hrs  T(C): 37.7 (04 Jun 2019 04:00), Max: 37.9 (03 Jun 2019 08:00)  T(F): 99.8 (04 Jun 2019 04:00), Max: 100.2 (03 Jun 2019 08:00)  HR: 88 (04 Jun 2019 07:00) (58 - 96)  BP: 141/66 (04 Jun 2019 07:00) (107/51 - 216/96)  BP(mean): 88 (04 Jun 2019 07:00) (72 - 136)  RR: 18 (04 Jun 2019 07:00) (12 - 38)  SpO2: 99% (04 Jun 2019 07:00) (95% - 100%)          Neurological Exam:   Mental status: Intubated Sedated   Pupils: 3mm sluggish  No response to noxious stimulation    Medications  acetaminophen   Tablet .. 650 milliGRAM(s) Oral every 6 hours PRN  amLODIPine   Tablet 10 milliGRAM(s) Oral daily  chlorhexidine 0.12% Liquid 15 milliLiter(s) Oral Mucosa two times a day  chlorhexidine 4% Liquid 1 Application(s) Topical <User Schedule>  docusate sodium 100 milliGRAM(s) Oral two times a day  docusate sodium Liquid 100 milliGRAM(s) Oral three times a day  hydrALAZINE 25 milliGRAM(s) Oral three times a day  insulin regular Infusion 4 Unit(s)/Hr IV Continuous <Continuous>  levETIRAcetam  IVPB 500 milliGRAM(s) IV Intermittent every 12 hours  losartan 50 milliGRAM(s) Oral daily  propofol Infusion. 1 MICROgram(s)/kG/Min IV Continuous <Continuous>  senna 2 Tablet(s) Oral at bedtime      Lab      Radiology

## 2019-06-04 NOTE — PROGRESS NOTE ADULT - ASSESSMENT
· Assessment		  Patient is a 74y old Female who presents with a chief complaint of left sided weakness and slurred speech   Currently admitted to medicine with the primary diagnosis of Hypertensive emergency      PLAN  74 y.o F with PMH of HTN, HLD, DM, hemorrhagic stroke,? EVD placement at that time ( about 8 years ago)  with residual left side weakness and slurred speech, ambulated with cane, on ASA daily  Pt. brought to [facility] ED as a stroke code    #)Acute right thalamic hemorrhagic stroke  -Sudden onset slurred speech and left sided weakness on presentation  -CT head showed right thalamic hemorrhage with extension into lateral and third ventricles  -Neurosurgery following.   -No acute neurosurgical intervention needed at this time since repeat CT head stable. No evidence of evolving hydrocephalus.  -WAS Clinically patient is improving. Awake and responding to commands. Moves all extremities. No repeat CT head needed. Failed SBP in 6/2.   - 6/4: Tonic clonic seizure lasting 10 min; 4mg ativan; on propofol  - CT Head rpt stable 6/4  - VEEG 24hr.   -Increase Keppra to 1000mg BID.   -Monitor and control SBP between 140-160. On amlodipine, hydralazine and losartan to control BP  -GAG and pupillary reflex present.  -CT chest and abdomen did not show any evidence of bleed      #febrile  - T-max 104 overnight.  - Cultures sent- no growth to date.   -Rpt cultures ordered.   - Cooling blanket; possible central fever.     #Prolonged Qtc 2/2 inc intracranial pressure 2/2 Intracranial bleed  -Few sec of polymorphic V tach overnight on 5/31 likely tdp  -Magnesium and potassium repleted   -BMP and magnesium q8h. f/u 4pm  -If V tach recurs, give Magnesium 1gm STAT  -continue tele monitoring  - QTc 507 6/3    #Hypophosphatemia  -f/u am phosphorus    #)DM   -insulin drip    #)HTN  -was on losartan, amlodipine and hydralazine  -BP low today; MAP <60. D/C losartan. Patient on propofol; responded to fluids.   -Maintain BP between 120-140mm Hg    GI PPX: protonix  DVT PPX: sequentials    DIET: Tube feeds  ACTIVITY: Bed rest    ================= CODE STATUS =================                  (X) FULL CODE     |     () DNR     |     () DNI

## 2019-06-04 NOTE — PROGRESS NOTE ADULT - SUBJECTIVE AND OBJECTIVE BOX
Patient is a 74y old  Female who presents with a chief complaint of left sided weakness and slurred speech (04 Jun 2019 09:16)  HOSPITAL DAY: 4d   ICU DAY: 4    SUMMARY OF HOSPITAL COURSE TO DATE 74 y.o F with PMH of HTN, HLD, DM, hemorrhagic stroke,? EVD placement at that time ( about 8 years ago)  with residual left side weakness and slurred speech, ambulated with cane, on ASA daily.  Pt. brought to [facility] ED as a stroke code. At the time of presentation to [facility] ED A&Ox 2, baseline slurred speech with left sided weakness. /82, 55 HR, 18 RR, CTH showed acute right thalamic hemorrhage with extension to right lateral and third ventricle. At [facility] ED, Pt. Intubated 2/2 worsening of neuro exam for air way protection as per ED attending note. Pt.  became more drowsy with decreased responsiveness. Pt. was transferred to ED-[place]. At time of NSGY eval Pt intubated, sedated on Propofol and Fentanyl, radial arterial line placed, she received keppra, zofran and mannitol in ED. Repeat CT scan stable from prior study as per neurosurgery. Platelets one unit ordered as per neurosurgery.    OVERNIGHT EVENTS: Patient had tonic-clonic seizure lasting 10 minutes around 6 am. 4mg Ativan given; started on propofol. Neuro NP saw her; VEEG, rpt CT head, and increase dose of keppra ordered.     TODAY: Patient was seen this morning at bedside.   Febrile overnight.     ================= PRESENT TODAY ==================    1-Mckoen Catheter: yes  2-Central Line: no  3-IV Fluids: no  4-Drips:  4.1-Pressors: no  4.2-Sedation: yes  4.3-Heparin:   no  5-Intubated: yes      INTERVAL HPI/OVERNIGHT EVENTS:  ICU Vital Signs Last 24 Hrs  T(C): 38.2 (04 Jun 2019 12:00), Max: 40.4 (04 Jun 2019 09:00)  T(F): 100.8 (04 Jun 2019 12:00), Max: 104.7 (04 Jun 2019 09:00)  HR: 70 (04 Jun 2019 12:00) (60 - 126)  BP: 85/50 (04 Jun 2019 12:00) (85/50 - 216/96)  BP(mean): 60 (04 Jun 2019 12:00) (60 - 136)  ABP: --  ABP(mean): --  RR: 15 (04 Jun 2019 12:00) (12 - 38)  SpO2: 96% (04 Jun 2019 12:00) (94% - 100%)    I&O's Summary    03 Jun 2019 07:01  -  04 Jun 2019 07:00  --------------------------------------------------------  IN: 1250.3 mL / OUT: 1000 mL / NET: 250.3 mL    04 Jun 2019 07:01  -  04 Jun 2019 15:04  --------------------------------------------------------  IN: 353.8 mL / OUT: 0 mL / NET: 353.8 mL      Mode: AC/ CMV (Assist Control/ Continuous Mandatory Ventilation)  RR (machine): 12  TV (machine): 450  FiO2: 40  PEEP: 5  ITime: 1  MAP: 11  PIP: 21      LABS:                        12.0   6.95  )-----------( 223      ( 04 Jun 2019 04:28 )             39.4     06-04    142  |  102  |  22<H>  ----------------------------<  184<H>  4.6   |  26  |  0.7    Ca    10.5<H>      04 Jun 2019 04:28  Phos  2.9     06-04  Mg     2.2     06-04    TPro  6.2  /  Alb  3.2<L>  /  TBili  0.8  /  DBili  x   /  AST  19  /  ALT  36  /  AlkPhos  93  06-04        CAPILLARY BLOOD GLUCOSE      POCT Blood Glucose.: 167 mg/dL (04 Jun 2019 14:18)  POCT Blood Glucose.: 161 mg/dL (04 Jun 2019 12:41)  POCT Blood Glucose.: 215 mg/dL (04 Jun 2019 09:57)  POCT Blood Glucose.: 177 mg/dL (04 Jun 2019 08:03)  POCT Blood Glucose.: 138 mg/dL (04 Jun 2019 06:11)  POCT Blood Glucose.: 163 mg/dL (04 Jun 2019 05:08)  POCT Blood Glucose.: 194 mg/dL (04 Jun 2019 03:03)  POCT Blood Glucose.: 171 mg/dL (04 Jun 2019 01:00)  POCT Blood Glucose.: 146 mg/dL (03 Jun 2019 23:00)  POCT Blood Glucose.: 151 mg/dL (03 Jun 2019 20:36)  POCT Blood Glucose.: 144 mg/dL (03 Jun 2019 18:16)    ABG - ( 04 Jun 2019 03:56 )  pH, Arterial: 7.47  pH, Blood: x     /  pCO2: 42    /  pO2: 105   / HCO3: 30    / Base Excess: 5.8   /  SaO2: 98          RADIOLOGY & ADDITIONAL TESTS:  < from: Xray Chest 1 View- PORTABLE-Routine (06.04.19 @ 05:15) >  Impression:      Decreased bibasilar opacities/atelectasis.    Lines and tubes as above.      < end of copied text >    < from: CT Head No Cont (06.04.19 @ 11:37) >  impression:    Overall no significant changes since the prior exam. Right thalamic   parenchymal hematoma measuring approximately 2.2 cm with intraventricular   extension. No significant change in size of the hematoma. Stable   ventricular size with mild ventricular enlargement.    No new acute intracranial hemorrhage. No changes.      < end of copied text >        Consultant(s) Notes Reviewed:  [x ] YES  [ ] NO    MEDICATIONS  (STANDING):  amLODIPine   Tablet 10 milliGRAM(s) Oral daily  chlorhexidine 0.12% Liquid 15 milliLiter(s) Oral Mucosa two times a day  chlorhexidine 4% Liquid 1 Application(s) Topical <User Schedule>  docusate sodium Liquid 100 milliGRAM(s) Oral three times a day  hydrALAZINE 25 milliGRAM(s) Oral three times a day  insulin regular Infusion 4 Unit(s)/Hr (4 mL/Hr) IV Continuous <Continuous>  levETIRAcetam 1000 milliGRAM(s) Oral two times a day  pantoprazole   Suspension 40 milliGRAM(s) Oral daily  propofol Infusion. 1 MICROgram(s)/kG/Min (0.473 mL/Hr) IV Continuous <Continuous>  senna 2 Tablet(s) Oral at bedtime    MEDICATIONS  (PRN):  acetaminophen   Tablet .. 650 milliGRAM(s) Oral every 6 hours PRN Temp greater or equal to 38C (100.4F)      PHYSICAL EXAM:  GENERAL: well built, well nourished  HEAD:  Atraumatic, Normocephalic  EYES: EOMI, PERRLA, conjunctiva and sclera clear  ENT: No tonsillar erythema, exudates, or enlargement; Moist mucous membranes, Good dentition, No lesions  NECK: Supple, No JVD, Normal thyroid, no enlarged nodes  NERVOUS SYSTEM:  Alert & Oriented X3, Good concentration; Motor Strength 5/5 B/L upper and lower extremities; DTRs 2+ intact and symmetric, sensory intact  CHEST/LUNG: B/L good air entry; No rales, rhonchi, or wheezing  HEART: S1S2 normal, no S3, Regular rate and rhythm; No murmurs, rubs, or gallops  ABDOMEN: Soft, Nontender, Nondistended; Bowel sounds present  EXTREMITIES:  2+ Peripheral Pulses, No clubbing, cyanosis, or edema  LYMPH: No lymphadenopathy noted  SKIN: No rashes or lesions    Care Discussed with Consultants/Other Providers [ x] YES  [ ] NO Patient is a 74y old  Female who presents with a chief complaint of left sided weakness and slurred speech (04 Jun 2019 09:16)  HOSPITAL DAY: 4d   ICU DAY: 4    SUMMARY OF HOSPITAL COURSE TO DATE 74 y.o F with PMH of HTN, HLD, DM, hemorrhagic stroke,? EVD placement at that time ( about 8 years ago)  with residual left side weakness and slurred speech, ambulated with cane, on ASA daily.  Pt. brought to [facility] ED as a stroke code. At the time of presentation to [facility] ED A&Ox 2, baseline slurred speech with left sided weakness. /82, 55 HR, 18 RR, CTH showed acute right thalamic hemorrhage with extension to right lateral and third ventricle. At [facility] ED, Pt. Intubated 2/2 worsening of neuro exam for air way protection as per ED attending note. Pt.  became more drowsy with decreased responsiveness. Pt. was transferred to ED-[place]. At time of NSGY eval Pt intubated, sedated on Propofol and Fentanyl, radial arterial line placed, she received keppra, zofran and mannitol in ED. Repeat CT scan stable from prior study as per neurosurgery. Platelets one unit ordered as per neurosurgery.    OVERNIGHT EVENTS: Patient had tonic-clonic seizure lasting 10 minutes around 6 am. 4mg Ativan given; started on propofol. Neuro NP saw her; VEEG, rpt CT head, and increase dose of keppra ordered.     TODAY: Patient was seen this morning at bedside.   Febrile overnight.     ================= PRESENT TODAY ==================    1-Mckeon Catheter: yes  2-Central Line: no  3-IV Fluids: no  4-Drips:  4.1-Pressors: no  4.2-Sedation: yes  4.3-Heparin:   no  5-Intubated: yes      INTERVAL HPI/OVERNIGHT EVENTS:  ICU Vital Signs Last 24 Hrs  T(C): 38.2 (04 Jun 2019 12:00), Max: 40.4 (04 Jun 2019 09:00)  T(F): 100.8 (04 Jun 2019 12:00), Max: 104.7 (04 Jun 2019 09:00)  HR: 70 (04 Jun 2019 12:00) (60 - 126)  BP: 85/50 (04 Jun 2019 12:00) (85/50 - 216/96)  BP(mean): 60 (04 Jun 2019 12:00) (60 - 136)  ABP: --  ABP(mean): --  RR: 15 (04 Jun 2019 12:00) (12 - 38)  SpO2: 96% (04 Jun 2019 12:00) (94% - 100%)    I&O's Summary    03 Jun 2019 07:01  -  04 Jun 2019 07:00  --------------------------------------------------------  IN: 1250.3 mL / OUT: 1000 mL / NET: 250.3 mL    04 Jun 2019 07:01  -  04 Jun 2019 15:04  --------------------------------------------------------  IN: 353.8 mL / OUT: 0 mL / NET: 353.8 mL      Mode: AC/ CMV (Assist Control/ Continuous Mandatory Ventilation)  RR (machine): 12  TV (machine): 450  FiO2: 40  PEEP: 5  ITime: 1  MAP: 11  PIP: 21      LABS:                        12.0   6.95  )-----------( 223      ( 04 Jun 2019 04:28 )             39.4     06-04    142  |  102  |  22<H>  ----------------------------<  184<H>  4.6   |  26  |  0.7    Ca    10.5<H>      04 Jun 2019 04:28  Phos  2.9     06-04  Mg     2.2     06-04    TPro  6.2  /  Alb  3.2<L>  /  TBili  0.8  /  DBili  x   /  AST  19  /  ALT  36  /  AlkPhos  93  06-04        CAPILLARY BLOOD GLUCOSE      POCT Blood Glucose.: 167 mg/dL (04 Jun 2019 14:18)  POCT Blood Glucose.: 161 mg/dL (04 Jun 2019 12:41)  POCT Blood Glucose.: 215 mg/dL (04 Jun 2019 09:57)  POCT Blood Glucose.: 177 mg/dL (04 Jun 2019 08:03)  POCT Blood Glucose.: 138 mg/dL (04 Jun 2019 06:11)  POCT Blood Glucose.: 163 mg/dL (04 Jun 2019 05:08)  POCT Blood Glucose.: 194 mg/dL (04 Jun 2019 03:03)  POCT Blood Glucose.: 171 mg/dL (04 Jun 2019 01:00)  POCT Blood Glucose.: 146 mg/dL (03 Jun 2019 23:00)  POCT Blood Glucose.: 151 mg/dL (03 Jun 2019 20:36)  POCT Blood Glucose.: 144 mg/dL (03 Jun 2019 18:16)    ABG - ( 04 Jun 2019 03:56 )  pH, Arterial: 7.47  pH, Blood: x     /  pCO2: 42    /  pO2: 105   / HCO3: 30    / Base Excess: 5.8   /  SaO2: 98          RADIOLOGY & ADDITIONAL TESTS:  < from: Xray Chest 1 View- PORTABLE-Routine (06.04.19 @ 05:15) >  Impression:      Decreased bibasilar opacities/atelectasis.    Lines and tubes as above.      < end of copied text >    < from: CT Head No Cont (06.04.19 @ 11:37) >  impression:    Overall no significant changes since the prior exam. Right thalamic   parenchymal hematoma measuring approximately 2.2 cm with intraventricular   extension. No significant change in size of the hematoma. Stable   ventricular size with mild ventricular enlargement.    No new acute intracranial hemorrhage. No changes.      < end of copied text >        Consultant(s) Notes Reviewed:  [x ] YES  [ ] NO    MEDICATIONS  (STANDING):  amLODIPine   Tablet 10 milliGRAM(s) Oral daily  chlorhexidine 0.12% Liquid 15 milliLiter(s) Oral Mucosa two times a day  chlorhexidine 4% Liquid 1 Application(s) Topical <User Schedule>  docusate sodium Liquid 100 milliGRAM(s) Oral three times a day  hydrALAZINE 25 milliGRAM(s) Oral three times a day  insulin regular Infusion 4 Unit(s)/Hr (4 mL/Hr) IV Continuous <Continuous>  levETIRAcetam 1000 milliGRAM(s) Oral two times a day  pantoprazole   Suspension 40 milliGRAM(s) Oral daily  propofol Infusion. 1 MICROgram(s)/kG/Min (0.473 mL/Hr) IV Continuous <Continuous>  senna 2 Tablet(s) Oral at bedtime    MEDICATIONS  (PRN):  acetaminophen   Tablet .. 650 milliGRAM(s) Oral every 6 hours PRN Temp greater or equal to 38C (100.4F)    PHYSICAL EXAM:  GENERAL: well built, well nourished  HEAD:  Atraumatic, Normocephalic  EYES: does not open eyes.   ENT: dry mucous membranes.   NECK: Supple, No JVD.  NERVOUS SYSTEM: Pupils pinpoint but reactive; sedated.  	  CHEST/LUNG: intubated; equal bilateral chest rise.   HEART: S1S2 normal, no S3, Regular rate and rhythm; No murmurs, rubs, or gallops.  ABDOMEN: Soft, Nondistended; Bowel sounds present  EXTREMITIES: No clubbing, cyanosis, or edema  SKIN: No rashes or lesions      Care Discussed with Consultants/Other Providers [ x] YES  [ ] NO Patient is a 74y old  Female who presents with a chief complaint of left sided weakness and slurred speech (04 Jun 2019 09:16)  HOSPITAL DAY: 5d   ICU DAY: 5    SUMMARY OF HOSPITAL COURSE TO DATE 74 y.o F with PMH of HTN, HLD, DM, hemorrhagic stroke,? EVD placement at that time ( about 8 years ago)  with residual left side weakness and slurred speech, ambulated with cane, on ASA daily.  Pt. brought to [facility] ED as a stroke code. At the time of presentation to [facility] ED A&Ox 2, baseline slurred speech with left sided weakness. /82, 55 HR, 18 RR, CTH showed acute right thalamic hemorrhage with extension to right lateral and third ventricle. At [facility] ED, Pt. Intubated 2/2 worsening of neuro exam for air way protection as per ED attending note. Pt.  became more drowsy with decreased responsiveness. Pt. was transferred to ED-[place]. At time of NSGY eval Pt intubated, sedated on Propofol and Fentanyl, radial arterial line placed, she received keppra, zofran and mannitol in ED. Repeat CT scan stable from prior study as per neurosurgery. Platelets one unit ordered as per neurosurgery.    OVERNIGHT EVENTS: Patient had tonic-clonic seizure lasting 10 minutes around 6 am. 4mg Ativan given; started on propofol. Neuro NP saw her; VEEG, rpt CT head, and increase dose of keppra ordered.     TODAY: Patient was seen this morning at bedside.   Febrile overnight.     ================= PRESENT TODAY ==================    1-Mckeon Catheter: yes  2-Central Line: no  3-IV Fluids: no  4-Drips:  4.1-Pressors: no  4.2-Sedation: yes  4.3-Heparin:   no  5-Intubated: yes      INTERVAL HPI/OVERNIGHT EVENTS:  ICU Vital Signs Last 24 Hrs  T(C): 38.2 (04 Jun 2019 12:00), Max: 40.4 (04 Jun 2019 09:00)  T(F): 100.8 (04 Jun 2019 12:00), Max: 104.7 (04 Jun 2019 09:00)  HR: 70 (04 Jun 2019 12:00) (60 - 126)  BP: 85/50 (04 Jun 2019 12:00) (85/50 - 216/96)  BP(mean): 60 (04 Jun 2019 12:00) (60 - 136)  ABP: --  ABP(mean): --  RR: 15 (04 Jun 2019 12:00) (12 - 38)  SpO2: 96% (04 Jun 2019 12:00) (94% - 100%)    I&O's Summary    03 Jun 2019 07:01  -  04 Jun 2019 07:00  --------------------------------------------------------  IN: 1250.3 mL / OUT: 1000 mL / NET: 250.3 mL    04 Jun 2019 07:01  -  04 Jun 2019 15:04  --------------------------------------------------------  IN: 353.8 mL / OUT: 0 mL / NET: 353.8 mL      Mode: AC/ CMV (Assist Control/ Continuous Mandatory Ventilation)  RR (machine): 12  TV (machine): 450  FiO2: 40  PEEP: 5  ITime: 1  MAP: 11  PIP: 21      LABS:                        12.0   6.95  )-----------( 223      ( 04 Jun 2019 04:28 )             39.4     06-04    142  |  102  |  22<H>  ----------------------------<  184<H>  4.6   |  26  |  0.7    Ca    10.5<H>      04 Jun 2019 04:28  Phos  2.9     06-04  Mg     2.2     06-04    TPro  6.2  /  Alb  3.2<L>  /  TBili  0.8  /  DBili  x   /  AST  19  /  ALT  36  /  AlkPhos  93  06-04        CAPILLARY BLOOD GLUCOSE      POCT Blood Glucose.: 167 mg/dL (04 Jun 2019 14:18)  POCT Blood Glucose.: 161 mg/dL (04 Jun 2019 12:41)  POCT Blood Glucose.: 215 mg/dL (04 Jun 2019 09:57)  POCT Blood Glucose.: 177 mg/dL (04 Jun 2019 08:03)  POCT Blood Glucose.: 138 mg/dL (04 Jun 2019 06:11)  POCT Blood Glucose.: 163 mg/dL (04 Jun 2019 05:08)  POCT Blood Glucose.: 194 mg/dL (04 Jun 2019 03:03)  POCT Blood Glucose.: 171 mg/dL (04 Jun 2019 01:00)  POCT Blood Glucose.: 146 mg/dL (03 Jun 2019 23:00)  POCT Blood Glucose.: 151 mg/dL (03 Jun 2019 20:36)  POCT Blood Glucose.: 144 mg/dL (03 Jun 2019 18:16)    ABG - ( 04 Jun 2019 03:56 )  pH, Arterial: 7.47  pH, Blood: x     /  pCO2: 42    /  pO2: 105   / HCO3: 30    / Base Excess: 5.8   /  SaO2: 98          RADIOLOGY & ADDITIONAL TESTS:  < from: Xray Chest 1 View- PORTABLE-Routine (06.04.19 @ 05:15) >  Impression:      Decreased bibasilar opacities/atelectasis.    Lines and tubes as above.      < end of copied text >    < from: CT Head No Cont (06.04.19 @ 11:37) >  impression:    Overall no significant changes since the prior exam. Right thalamic   parenchymal hematoma measuring approximately 2.2 cm with intraventricular   extension. No significant change in size of the hematoma. Stable   ventricular size with mild ventricular enlargement.    No new acute intracranial hemorrhage. No changes.      < end of copied text >        Consultant(s) Notes Reviewed:  [x ] YES  [ ] NO    MEDICATIONS  (STANDING):  amLODIPine   Tablet 10 milliGRAM(s) Oral daily  chlorhexidine 0.12% Liquid 15 milliLiter(s) Oral Mucosa two times a day  chlorhexidine 4% Liquid 1 Application(s) Topical <User Schedule>  docusate sodium Liquid 100 milliGRAM(s) Oral three times a day  hydrALAZINE 25 milliGRAM(s) Oral three times a day  insulin regular Infusion 4 Unit(s)/Hr (4 mL/Hr) IV Continuous <Continuous>  levETIRAcetam 1000 milliGRAM(s) Oral two times a day  pantoprazole   Suspension 40 milliGRAM(s) Oral daily  propofol Infusion. 1 MICROgram(s)/kG/Min (0.473 mL/Hr) IV Continuous <Continuous>  senna 2 Tablet(s) Oral at bedtime    MEDICATIONS  (PRN):  acetaminophen   Tablet .. 650 milliGRAM(s) Oral every 6 hours PRN Temp greater or equal to 38C (100.4F)    PHYSICAL EXAM:  GENERAL: well built, well nourished  HEAD:  Atraumatic, Normocephalic  EYES: does not open eyes.   ENT: dry mucous membranes.   NECK: Supple, No JVD.  NERVOUS SYSTEM: Pupils pinpoint but reactive; sedated.  	  CHEST/LUNG: intubated; equal bilateral chest rise.   HEART: S1S2 normal, no S3, Regular rate and rhythm; No murmurs, rubs, or gallops.  ABDOMEN: Soft, Nondistended; Bowel sounds present  EXTREMITIES: No clubbing, cyanosis, or edema  SKIN: No rashes or lesions      Care Discussed with Consultants/Other Providers [ x] YES  [ ] NO

## 2019-06-04 NOTE — PROGRESS NOTE ADULT - ASSESSMENT
Impression:  Intra cerebral Bleed/ following simple commands  Seizure  Hypertensive emergency  HO hemorrhagic stroke  Prolonged QT.  penumonia aspiration      PLAN:    CNS: Continue with propofol for now. Continue with Keppra for now. VEEG. Neurochecks Q1 hr.    HEENT: Oral care    PULMONARY:  HOB @ 30 degrees.     CARDIOVASCULAR: i=o. Maintain BP <140. Continue with blood pressure medicine.     GI: GI prophylaxis.  Feeding through OG tube.    RENAL:  Follow up lytes.  Correct as needed    INFECTIOUS DISEASE: Follow up cultures. PAN culture.    HEMATOLOGICAL:  Seq DVT prophylaxis.    ENDOCRINE:  Follow up FS.     MUSCULOSKELETAL: Bed rest    Poor prognosis Impression:  Intra cerebral Bleed/ following simple commands  Seizure  Hypertensive emergency  HO hemorrhagic stroke  Prolonged QT.  penumonia aspiration      PLAN:    CNS: Continue with propofol for now. Continue with Keppra for now. VEEG. Neurochecks Q1 hr. repeat head CT    HEENT: Oral care    PULMONARY:  HOB @ 30 degrees. Failed weaning    CARDIOVASCULAR: i=o. Maintain BP <140. Continue with blood pressure medicine.     GI: GI prophylaxis.  Feeding through OG tube.    RENAL:  Follow up lytes.  Correct as needed    INFECTIOUS DISEASE: Follow up cultures. PAN culture.    HEMATOLOGICAL:  Seq DVT prophylaxis.    ENDOCRINE:  Follow up FS.     MUSCULOSKELETAL: Bed rest    Poor prognosis

## 2019-06-04 NOTE — PROGRESS NOTE ADULT - SUBJECTIVE AND OBJECTIVE BOX
Patient is a 74y old  Female who presents with a chief complaint of left sided weakness and slurred speech (2019 07:06)        Over Night Events: Seizure last night. Sp neuro evaluation Hooked up to VEEG. Started on Propofol.         ROS:  See HPI    PHYSICAL EXAM    ICU Vital Signs Last 24 Hrs  T(C): 37.7 (2019 04:00), Max: 37.9 (2019 16:00)  T(F): 99.8 (2019 04:00), Max: 100.2 (2019 16:00)  HR: 126 (2019 07:30) (60 - 126)  BP: 141/66 (2019 07:00) (121/47 - 216/96)  BP(mean): 88 (2019 07:00) (72 - 136)  ABP: --  ABP(mean): --  RR: 18 (2019 07:00) (12 - 38)  SpO2: 100% (2019 07:30) (95% - 100%)      General: Sedated  HEENT: AGUSTÍN             Lymphatic system: No cervical LN   Lungs: Bilateral BS  Cardiovascular: Regular   Gastrointestinal: Soft, Positive BS  Extremities: No clubbing.  Moves extremities.  Full Range of motion   Skin: Warm, intact  Neurological: No motor or sensory deficit       19 @ 07:  -  19 @ 07:00  --------------------------------------------------------  IN:    Enteral Tube Flush: 100 mL    Glucerna: 1080 mL    insulin regular Infusion: 53 mL  Total IN: 1233 mL    OUT:    Incontinent per Collection Ba mL  Total OUT: 1000 mL    Total NET: 233 mL      19 @ 07:01  -  19 @ 09:16  --------------------------------------------------------  IN:    Enteral Tube Flush: 40 mL  Total IN: 40 mL    OUT:  Total OUT: 0 mL    Total NET: 40 mL        LABS:                            12.0   6.95  )-----------( 223      ( 2019 04:28 )             39.4                                               06-04    142  |  102  |  22<H>  ----------------------------<  184<H>  4.6   |  26  |  0.7    Ca    10.5<H>      2019 04:28  Phos  2.9     06-  Mg     2.2     06-04    TPro  6.2  /  Alb  3.2<L>  /  TBili  0.8  /  DBili  x   /  AST  19  /  ALT  36  /  AlkPhos  93  06-04                                            LIVER FUNCTIONS - ( 2019 04:28 )  Alb: 3.2 g/dL / Pro: 6.2 g/dL / ALK PHOS: 93 U/L / ALT: 36 U/L / AST: 19 U/L / GGT: x                                                  Culture - Sputum (collected 2019 06:24)  Source: .Sputum Sputum  Gram Stain (2019 23:40):    Numerous polymorphonuclear leukocytes per low power field    Rare Squamous epithelial cells per low power field    Few Gram positive cocci in pairs per oil power field    Few Gram Negative Rods per oil power field  Preliminary Report (2019 18:10):    Culture in progress    Culture - Urine (collected 2019 12:18)  Source: .Urine Catheterized  Final Report (2019 16:18):    No growth    Culture - Blood (collected 2019 12:10)  Source: .Blood None  Preliminary Report (2019 20:01):    No growth to date.                                                   Mode: AC/ CMV (Assist Control/ Continuous Mandatory Ventilation)  RR (machine): 12  TV (machine): 450  FiO2: 40  PEEP: 5  ITime: 1  MAP: 11  PIP: 21                                      ABG - ( 2019 03:56 )  pH, Arterial: 7.47  pH, Blood: x     /  pCO2: 42    /  pO2: 105   / HCO3: 30    / Base Excess: 5.8   /  SaO2: 98          MEDICATIONS  (STANDING):  amLODIPine   Tablet 10 milliGRAM(s) Oral daily  chlorhexidine 0.12% Liquid 15 milliLiter(s) Oral Mucosa two times a day  chlorhexidine 4% Liquid 1 Application(s) Topical <User Schedule>  docusate sodium 100 milliGRAM(s) Oral two times a day  docusate sodium Liquid 100 milliGRAM(s) Oral three times a day  hydrALAZINE 25 milliGRAM(s) Oral three times a day  insulin regular Infusion 4 Unit(s)/Hr (4 mL/Hr) IV Continuous <Continuous>  levETIRAcetam 1000 milliGRAM(s) Oral two times a day  losartan 50 milliGRAM(s) Oral daily  propofol Infusion. 1 MICROgram(s)/kG/Min (0.473 mL/Hr) IV Continuous <Continuous>  senna 2 Tablet(s) Oral at bedtime    MEDICATIONS  (PRN):  acetaminophen   Tablet .. 650 milliGRAM(s) Oral every 6 hours PRN Temp greater or equal to 38C (100.4F)      Xrays:                                                                                     ECHO Patient is a 74y old  Female who presents with a chief complaint of left sided weakness and slurred speech (2019 07:06)        Over Night Events: Seizure last night s/p ativan 4 mg iv,  Sp neuro evaluation for VEEG. Started on Propofol.         ROS:  See HPI    PHYSICAL EXAM    ICU Vital Signs Last 24 Hrs  T(C): 37.7 (2019 04:00), Max: 37.9 (2019 16:00)  T(F): 99.8 (2019 04:00), Max: 100.2 (2019 16:00)  HR: 126 (2019 07:30) (60 - 126)  BP: 141/66 (2019 07:00) (121/47 - 216/96)  BP(mean): 88 (2019 07:00) (72 - 136)  RR: 18 (2019 07:00) (12 - 38)  SpO2: 100% (2019 07:30) (95% - 100%)      General: Sedated  HEENT: AGUSTÍN             Lymphatic system: No cervical LN   Lungs: Bilateral BS, dec both bases  Cardiovascular: Regular   Gastrointestinal: Soft, Positive BS  Extremities: No clubbing.  Moves extremities.  Full Range of motion   Skin: Warm, intact  Neurological sedated not following commands      19 @ 07:  -  19 @ 07:00  --------------------------------------------------------  IN:    Enteral Tube Flush: 100 mL    Glucerna: 1080 mL    insulin regular Infusion: 53 mL  Total IN: 1233 mL    OUT:    Incontinent per Collection Ba mL  Total OUT: 1000 mL    Total NET: 233 mL      19 @ 07:  -  19 @ 09:16  --------------------------------------------------------  IN:    Enteral Tube Flush: 40 mL  Total IN: 40 mL    OUT:  Total OUT: 0 mL    Total NET: 40 mL        LABS:                            12.0   6.95  )-----------( 223      ( 2019 04:28 )             39.4                                               06-04    142  |  102  |  22<H>  ----------------------------<  184<H>  4.6   |  26  |  0.7    Ca    10.5<H>      2019 04:28  Phos  2.9     06-  Mg     2.2     06-04    TPro  6.2  /  Alb  3.2<L>  /  TBili  0.8  /  DBili  x   /  AST  19  /  ALT  36  /  AlkPhos  93  06-04                                            LIVER FUNCTIONS - ( 2019 04:28 )  Alb: 3.2 g/dL / Pro: 6.2 g/dL / ALK PHOS: 93 U/L / ALT: 36 U/L / AST: 19 U/L / GGT: x                                                  Culture - Sputum (collected 2019 06:24)  Source: .Sputum Sputum  Gram Stain (2019 23:40):    Numerous polymorphonuclear leukocytes per low power field    Rare Squamous epithelial cells per low power field    Few Gram positive cocci in pairs per oil power field    Few Gram Negative Rods per oil power field  Preliminary Report (2019 18:10):    Culture in progress    Culture - Urine (collected 2019 12:18)  Source: .Urine Catheterized  Final Report (2019 16:18):    No growth    Culture - Blood (collected 2019 12:10)  Source: .Blood None  Preliminary Report (2019 20:01):    No growth to date.                                                   Mode: AC/ CMV (Assist Control/ Continuous Mandatory Ventilation)  RR (machine): 12  TV (machine): 450  FiO2: 40  PEEP: 5  ITime: 1  MAP: 11  PIP: 21                                      ABG - ( 2019 03:56 )  pH, Arterial: 7.47  pH, Blood: x     /  pCO2: 42    /  pO2: 105   / HCO3: 30    / Base Excess: 5.8   /  SaO2: 98          MEDICATIONS  (STANDING):  amLODIPine   Tablet 10 milliGRAM(s) Oral daily  chlorhexidine 0.12% Liquid 15 milliLiter(s) Oral Mucosa two times a day  chlorhexidine 4% Liquid 1 Application(s) Topical <User Schedule>  docusate sodium 100 milliGRAM(s) Oral two times a day  docusate sodium Liquid 100 milliGRAM(s) Oral three times a day  hydrALAZINE 25 milliGRAM(s) Oral three times a day  insulin regular Infusion 4 Unit(s)/Hr (4 mL/Hr) IV Continuous <Continuous>  levETIRAcetam 1000 milliGRAM(s) Oral two times a day  losartan 50 milliGRAM(s) Oral daily  propofol Infusion. 1 MICROgram(s)/kG/Min (0.473 mL/Hr) IV Continuous <Continuous>  senna 2 Tablet(s) Oral at bedtime    MEDICATIONS  (PRN):  acetaminophen   Tablet .. 650 milliGRAM(s) Oral every 6 hours PRN Temp greater or equal to 38C (100.4F)      Xrays:   reviewed

## 2019-06-04 NOTE — CHART NOTE - NSCHARTNOTEFT_GEN_A_CORE
Pt had a reported generalized tonic clonic seizure for over 5-10 minutes.  Pt given 4mg IV Ativan and started on propofol infusion (already intubated)

## 2019-06-05 NOTE — PROGRESS NOTE ADULT - ASSESSMENT
IMP: 73 y/o F with Right thalamic hemmorrhage now in Status       Plan:  Follow up read of veeg  Continue propofol   Continue Keppra   neuro checks a 1  Continue current care IMP: 75 y/o F with Right thalamic hemmorrhage now in Status       Plan:  Follow up read of veeg  Continue propofol   Increase Keppra 1500mg BID and give an extra 500mg x1 this morning   neuro checks a 1  Continue current care  Repeat CTH or get MRI brain when extubated or in 24 hours

## 2019-06-05 NOTE — PROGRESS NOTE ADULT - SUBJECTIVE AND OBJECTIVE BOX
Neurology Follow up note      Name  HERNANDO HERNANDEZ    HPI:  74 y.o F with PMH of HTN, HLD, DM, hemorrhagic stroke,? EVD placement at that time ( about 8 years ago)  with residual left side weakness and slurred speech, ambulated with cane, on ASA daily  Pt. brought to [facility] ED as a stroke code. At the time of presentation to [facility] ED A&Ox 2, baseline slurped speech with left sided weakness. /82, 55 HR, 18 RR, CTH prelim report findings with acute right thalamic hemorrhage with extension to right lateral and third ventricle. At [facility] ED Pt. Intubated 2/2 worsening of neuro exam for air way protection as per ED attending note Pt.  became more drowsy with decreased responsiveness. Pt. was transferred to ED-[place]. At time of NSGY eval Pt intubated, sedated on Propofol and Fentanyl, radial arterial line, she received keppra, zofran and mannitol in ED. Repeat CT scan stable from prior study as per neurosurgery. Platelets one unit ordered as per neurosurgery (30 May 2019 02:50)      Interval History: No siezures noted overnight per staff. On Veeg. Noted RLE twitching in leg this am.          Vital Signs Last 24 Hrs  T(C): 38.2 (05 Jun 2019 06:00), Max: 40.4 (04 Jun 2019 09:00)  T(F): 100.8 (05 Jun 2019 06:00), Max: 104.7 (04 Jun 2019 09:00)  HR: 64 (05 Jun 2019 07:00) (56 - 114)  BP: 138/67 (05 Jun 2019 07:00) (75/44 - 140/60)  BP(mean): 101 (05 Jun 2019 07:00) (53 - 101)  RR: 12 (05 Jun 2019 07:00) (11 - 27)  SpO2: 98% (05 Jun 2019 07:00) (94% - 100%)          Neurological Exam:  Pt intubated and sedated on propofol  Not responsive to noxious - Not following commands  +Twitching on the LLE  SPonatneously opening and closing hands R>L    Medications  acetaminophen   Tablet .. 650 milliGRAM(s) Oral every 6 hours PRN  amLODIPine   Tablet 10 milliGRAM(s) Oral daily  chlorhexidine 0.12% Liquid 15 milliLiter(s) Oral Mucosa two times a day  chlorhexidine 4% Liquid 1 Application(s) Topical <User Schedule>  docusate sodium Liquid 100 milliGRAM(s) Oral three times a day  hydrALAZINE 25 milliGRAM(s) Oral three times a day  insulin regular Infusion 4 Unit(s)/Hr IV Continuous <Continuous>  levETIRAcetam  Solution 1000 milliGRAM(s) Oral two times a day  pantoprazole   Suspension 40 milliGRAM(s) Oral daily  propofol Infusion. 1 MICROgram(s)/kG/Min IV Continuous <Continuous>  senna 2 Tablet(s) Oral at bedtime      Lab      Radiology

## 2019-06-05 NOTE — PROGRESS NOTE ADULT - ASSESSMENT
· Assessment		  · Assessment		  Patient is a 74y old Female who presents with a chief complaint of left sided weakness and slurred speech   Currently admitted to medicine with the primary diagnosis of Hypertensive emergency      PLAN  74 y.o F with PMH of HTN, HLD, DM, hemorrhagic stroke,? EVD placement at that time ( about 8 years ago)  with residual left side weakness and slurred speech, ambulated with cane, on ASA daily  Pt. brought to [facility] ED as a stroke code    #)Acute right thalamic hemorrhagic stroke  -Sudden onset slurred speech and left sided weakness on presentation  -CT head showed right thalamic hemorrhage with extension into lateral and third ventricles  -Neurosurgery following.   -No acute neurosurgical intervention needed at this time since repeat CT head stable. No evidence of evolving hydrocephalus.  -WAS Clinically patient is improving. Awake and responding to commands. Moves all extremities. No repeat CT head needed. Failed SBP in 6/2.   - 6/4: Tonic clonic seizure lasting 10 min; 4mg ativan; on propofol  - CT Head rpt stable 6/4  - VEEG 24hr- no seizure.   -Increase Keppra to 1500mg BID.   -Monitor and control SBP between 140-160. On amlodipine, hydralazine and losartan to control BP  -GAG and pupillary reflex present.  -CT chest and abdomen did not show any evidence of bleed      #Streptococcus bacteremia.   - T-max 104 overnight.  - Cultures sent- no growth to date.   -Rpt cultures ordered.   - Cooling blanket; possible central fever.   -Blood cultures grew streptococcus 6/4  - On Vancomycin 6/4    #Prolonged Qtc 2/2 inc intracranial pressure 2/2 Intracranial bleed  -Few sec of polymorphic V tach overnight on 5/31 likely tdp  -Magnesium and potassium repleted   -BMP and magnesium q8h. f/u 4pm  -If V tach recurs, give Magnesium 1gm STAT  -continue tele monitoring  - QTc 507 6/3    #Hypophosphatemia  -f/u am phosphorus    #)DM   -insulin drip    #)HTN  -was on losartan, amlodipine and hydralazine  -BP low today; MAP <60. D/C losartan. Patient on propofol; responded to fluids.   -Maintain BP between 120-140mm Hg    GI PPX: protonix  DVT PPX: sequentials    DIET: Tube feeds  ACTIVITY: Bed rest    ================= CODE STATUS =================                  (X) FULL CODE     |     () DNR     |     () DNI

## 2019-06-05 NOTE — PROGRESS NOTE ADULT - SUBJECTIVE AND OBJECTIVE BOX
Patient is a 74y old  Female who presents with a chief complaint of left sided weakness and slurred speech (05 Jun 2019 10:55)    HOSPITAL DAY: 5d   ICU DAY: 5    SUMMARY OF HOSPITAL COURSE TO DATE 74 y.o F with PMH of HTN, HLD, DM, hemorrhagic stroke,? EVD placement at that time ( about 8 years ago)  with residual left side weakness and slurred speech, ambulated with cane, on ASA daily.  Pt. brought to [facility] ED as a stroke code. At the time of presentation to [facility] ED A&Ox 2, baseline slurred speech with left sided weakness. /82, 55 HR, 18 RR, CTH showed acute right thalamic hemorrhage with extension to right lateral and third ventricle. At [facility] ED, Pt. Intubated 2/2 worsening of neuro exam for air way protection as per ED attending note. Pt.  became more drowsy with decreased responsiveness. Pt. was transferred to ED-[place]. At time of NSGY eval Pt intubated, sedated on Propofol and Fentanyl, radial arterial line placed, she received keppra, zofran and mannitol in ED. Repeat CT scan stable from prior study as per neurosurgery. Platelets one unit ordered as per neurosurgery.    6/4: Patient had tonic-clonic seizure lasting 10 minutes around 6 am. 4mg Ativan given; started on propofol. Neuro NP saw her; VEEG, rpt CT head, and increase dose of keppra ordered.     OVERNIGHT EVENTS: VEEG yesterday. CT head unchanged.     TODAY: Patient was seen this morning at bedside.   Febrile overnight.     ================= PRESENT TODAY ==================    1-Mckeon Catheter: yes  2-Central Line: no  3-IV Fluids: no  4-Drips:  4.1-Pressors: no  4.2-Sedation: yes  4.3-Heparin:   no  5-Intubated: yes        INTERVAL HPI/OVERNIGHT EVENTS:  ICU Vital Signs Last 24 Hrs  T(C): 37.5 (05 Jun 2019 14:00), Max: 38.4 (04 Jun 2019 20:00)  T(F): 99.5 (05 Jun 2019 14:00), Max: 101.1 (04 Jun 2019 20:00)  HR: 72 (05 Jun 2019 14:00) (56 - 72)  BP: 147/68 (05 Jun 2019 14:00) (98/47 - 147/68)  BP(mean): 75 (05 Jun 2019 13:00) (56 - 101)  ABP: --  ABP(mean): --  RR: 17 (05 Jun 2019 14:00) (11 - 25)  SpO2: 99% (05 Jun 2019 14:00) (96% - 100%)    I&O's Summary    04 Jun 2019 07:01  -  05 Jun 2019 07:00  --------------------------------------------------------  IN: 1639.8 mL / OUT: 1250 mL / NET: 389.8 mL    05 Jun 2019 07:01  -  05 Jun 2019 15:06  --------------------------------------------------------  IN: 586.5 mL / OUT: 0 mL / NET: 586.5 mL      Mode: AC/ CMV (Assist Control/ Continuous Mandatory Ventilation)  RR (machine): 12  TV (machine): 450  FiO2: 40  PEEP: 5  ITime: 1  MAP: 10  PIP: 25      LABS:                        10.8   9.43  )-----------( 221      ( 05 Jun 2019 11:13 )             36.1     06-05    141  |  102  |  34<H>  ----------------------------<  184<H>  4.2   |  27  |  0.7    Ca    10.2<H>      05 Jun 2019 11:13  Phos  2.9     06-04  Mg     2.2     06-05    TPro  5.6<L>  /  Alb  2.9<L>  /  TBili  0.6  /  DBili  x   /  AST  14  /  ALT  29  /  AlkPhos  93  06-05        CAPILLARY BLOOD GLUCOSE      POCT Blood Glucose.: 181 mg/dL (05 Jun 2019 14:04)  POCT Blood Glucose.: 154 mg/dL (05 Jun 2019 11:59)  POCT Blood Glucose.: 186 mg/dL (05 Jun 2019 10:11)  POCT Blood Glucose.: 218 mg/dL (05 Jun 2019 07:44)  POCT Blood Glucose.: 231 mg/dL (05 Jun 2019 06:55)  POCT Blood Glucose.: 154 mg/dL (05 Jun 2019 03:57)  POCT Blood Glucose.: 143 mg/dL (05 Jun 2019 01:57)  POCT Blood Glucose.: 168 mg/dL (04 Jun 2019 23:40)  POCT Blood Glucose.: 140 mg/dL (04 Jun 2019 22:05)  POCT Blood Glucose.: 136 mg/dL (04 Jun 2019 19:47)  POCT Blood Glucose.: 137 mg/dL (04 Jun 2019 18:06)    ABG - ( 05 Jun 2019 04:40 )  pH, Arterial: 7.48  pH, Blood: x     /  pCO2: 43    /  pO2: 73    / HCO3: 32    / Base Excess: 7.5   /  SaO2: 96          RADIOLOGY & ADDITIONAL TESTS:  < from: Xray Chest 1 View- PORTABLE-Routine (06.05.19 @ 06:26) >  Impression:     Decreased bibasilar opacities.    Stable support devices.    < end of copied text >    < from: CT Head No Cont (06.04.19 @ 11:37) >  impression:    Overall no significant changes since the prior exam. Right thalamic   parenchymal hematoma measuring approximately 2.2 cm with intraventricular   extension. No significant change in size of the hematoma. Stable   ventricular size with mild ventricular enlargement.    No new acute intracranial hemorrhage. No changes.    < end of copied text >    Consultant(s) Notes Reviewed:  [x ] YES  [ ] NO    MEDICATIONS  (STANDING):  amLODIPine   Tablet 10 milliGRAM(s) Oral daily  chlorhexidine 0.12% Liquid 15 milliLiter(s) Oral Mucosa two times a day  chlorhexidine 4% Liquid 1 Application(s) Topical <User Schedule>  docusate sodium Liquid 100 milliGRAM(s) Oral three times a day  hydrALAZINE 25 milliGRAM(s) Oral three times a day  insulin regular Infusion 4 Unit(s)/Hr (4 mL/Hr) IV Continuous <Continuous>  levETIRAcetam  Solution 1500 milliGRAM(s) Oral two times a day  nystatin    Suspension [id number] Unit(s) Oral two times a day  pantoprazole   Suspension 40 milliGRAM(s) Oral daily  propofol Infusion. 1 MICROgram(s)/kG/Min (0.473 mL/Hr) IV Continuous <Continuous>  senna 2 Tablet(s) Oral at bedtime  vancomycin  IVPB 1000 milliGRAM(s) IV Intermittent every 12 hours    MEDICATIONS  (PRN):  acetaminophen   Tablet .. 650 milliGRAM(s) Oral every 6 hours PRN Temp greater or equal to 38C (100.4F)      PPHYSICAL EXAM:  GENERAL: well built, well nourished  HEAD:  Atraumatic, Normocephalic  EYES: does not open eyes.   ENT: dry mucous membranes.   NECK: Supple, No JVD.  NERVOUS SYSTEM: Pupils pinpoint but reactive; sedated.  	  CHEST/LUNG: intubated; equal bilateral chest rise.   HEART: S1S2 normal, no S3, Regular rate and rhythm; No murmurs, rubs, or gallops.  ABDOMEN: Soft, Nondistended; Bowel sounds present  EXTREMITIES: No clubbing, cyanosis, or edema  SKIN: No rashes or lesions  Care Discussed with Consultants/Other Providers [ x] YES  [ ] NO Patient is a 74y old  Female who presents with a chief complaint of left sided weakness and slurred speech (05 Jun 2019 10:55)    HOSPITAL DAY: 6d   ICU DAY: 6    SUMMARY OF HOSPITAL COURSE TO DATE 74 y.o F with PMH of HTN, HLD, DM, hemorrhagic stroke,? EVD placement at that time ( about 8 years ago)  with residual left side weakness and slurred speech, ambulated with cane, on ASA daily.  Pt. brought to [facility] ED as a stroke code. At the time of presentation to [facility] ED A&Ox 2, baseline slurred speech with left sided weakness. /82, 55 HR, 18 RR, CTH showed acute right thalamic hemorrhage with extension to right lateral and third ventricle. At [facility] ED, Pt. Intubated 2/2 worsening of neuro exam for air way protection as per ED attending note. Pt.  became more drowsy with decreased responsiveness. Pt. was transferred to ED-[place]. At time of NSGY eval Pt intubated, sedated on Propofol and Fentanyl, radial arterial line placed, she received keppra, zofran and mannitol in ED. Repeat CT scan stable from prior study as per neurosurgery. Platelets one unit ordered as per neurosurgery.    6/4: Patient had tonic-clonic seizure lasting 10 minutes around 6 am. 4mg Ativan given; started on propofol. Neuro NP saw her; VEEG, rpt CT head, and increase dose of keppra ordered.     OVERNIGHT EVENTS: VEEG yesterday. CT head unchanged.     TODAY: Patient was seen this morning at bedside.   Febrile overnight.     ================= PRESENT TODAY ==================    1-Mckeon Catheter: yes  2-Central Line: no  3-IV Fluids: no  4-Drips:  4.1-Pressors: no  4.2-Sedation: yes  4.3-Heparin:   no  5-Intubated: yes        INTERVAL HPI/OVERNIGHT EVENTS:  ICU Vital Signs Last 24 Hrs  T(C): 37.5 (05 Jun 2019 14:00), Max: 38.4 (04 Jun 2019 20:00)  T(F): 99.5 (05 Jun 2019 14:00), Max: 101.1 (04 Jun 2019 20:00)  HR: 72 (05 Jun 2019 14:00) (56 - 72)  BP: 147/68 (05 Jun 2019 14:00) (98/47 - 147/68)  BP(mean): 75 (05 Jun 2019 13:00) (56 - 101)  ABP: --  ABP(mean): --  RR: 17 (05 Jun 2019 14:00) (11 - 25)  SpO2: 99% (05 Jun 2019 14:00) (96% - 100%)    I&O's Summary    04 Jun 2019 07:01  -  05 Jun 2019 07:00  --------------------------------------------------------  IN: 1639.8 mL / OUT: 1250 mL / NET: 389.8 mL    05 Jun 2019 07:01  -  05 Jun 2019 15:06  --------------------------------------------------------  IN: 586.5 mL / OUT: 0 mL / NET: 586.5 mL      Mode: AC/ CMV (Assist Control/ Continuous Mandatory Ventilation)  RR (machine): 12  TV (machine): 450  FiO2: 40  PEEP: 5  ITime: 1  MAP: 10  PIP: 25      LABS:                        10.8   9.43  )-----------( 221      ( 05 Jun 2019 11:13 )             36.1     06-05    141  |  102  |  34<H>  ----------------------------<  184<H>  4.2   |  27  |  0.7    Ca    10.2<H>      05 Jun 2019 11:13  Phos  2.9     06-04  Mg     2.2     06-05    TPro  5.6<L>  /  Alb  2.9<L>  /  TBili  0.6  /  DBili  x   /  AST  14  /  ALT  29  /  AlkPhos  93  06-05        CAPILLARY BLOOD GLUCOSE      POCT Blood Glucose.: 181 mg/dL (05 Jun 2019 14:04)  POCT Blood Glucose.: 154 mg/dL (05 Jun 2019 11:59)  POCT Blood Glucose.: 186 mg/dL (05 Jun 2019 10:11)  POCT Blood Glucose.: 218 mg/dL (05 Jun 2019 07:44)  POCT Blood Glucose.: 231 mg/dL (05 Jun 2019 06:55)  POCT Blood Glucose.: 154 mg/dL (05 Jun 2019 03:57)  POCT Blood Glucose.: 143 mg/dL (05 Jun 2019 01:57)  POCT Blood Glucose.: 168 mg/dL (04 Jun 2019 23:40)  POCT Blood Glucose.: 140 mg/dL (04 Jun 2019 22:05)  POCT Blood Glucose.: 136 mg/dL (04 Jun 2019 19:47)  POCT Blood Glucose.: 137 mg/dL (04 Jun 2019 18:06)    ABG - ( 05 Jun 2019 04:40 )  pH, Arterial: 7.48  pH, Blood: x     /  pCO2: 43    /  pO2: 73    / HCO3: 32    / Base Excess: 7.5   /  SaO2: 96          RADIOLOGY & ADDITIONAL TESTS:  < from: Xray Chest 1 View- PORTABLE-Routine (06.05.19 @ 06:26) >  Impression:     Decreased bibasilar opacities.    Stable support devices.    < end of copied text >    < from: CT Head No Cont (06.04.19 @ 11:37) >  impression:    Overall no significant changes since the prior exam. Right thalamic   parenchymal hematoma measuring approximately 2.2 cm with intraventricular   extension. No significant change in size of the hematoma. Stable   ventricular size with mild ventricular enlargement.    No new acute intracranial hemorrhage. No changes.    < end of copied text >    Consultant(s) Notes Reviewed:  [x ] YES  [ ] NO    MEDICATIONS  (STANDING):  amLODIPine   Tablet 10 milliGRAM(s) Oral daily  chlorhexidine 0.12% Liquid 15 milliLiter(s) Oral Mucosa two times a day  chlorhexidine 4% Liquid 1 Application(s) Topical <User Schedule>  docusate sodium Liquid 100 milliGRAM(s) Oral three times a day  hydrALAZINE 25 milliGRAM(s) Oral three times a day  insulin regular Infusion 4 Unit(s)/Hr (4 mL/Hr) IV Continuous <Continuous>  levETIRAcetam  Solution 1500 milliGRAM(s) Oral two times a day  nystatin    Suspension [id number] Unit(s) Oral two times a day  pantoprazole   Suspension 40 milliGRAM(s) Oral daily  propofol Infusion. 1 MICROgram(s)/kG/Min (0.473 mL/Hr) IV Continuous <Continuous>  senna 2 Tablet(s) Oral at bedtime  vancomycin  IVPB 1000 milliGRAM(s) IV Intermittent every 12 hours    MEDICATIONS  (PRN):  acetaminophen   Tablet .. 650 milliGRAM(s) Oral every 6 hours PRN Temp greater or equal to 38C (100.4F)      PPHYSICAL EXAM:  GENERAL: well built, well nourished  HEAD:  Atraumatic, Normocephalic  EYES: does not open eyes.   ENT: dry mucous membranes.   NECK: Supple, No JVD.  NERVOUS SYSTEM: Pupils pinpoint but reactive; sedated.  	  CHEST/LUNG: intubated; equal bilateral chest rise.   HEART: S1S2 normal, no S3, Regular rate and rhythm; No murmurs, rubs, or gallops.  ABDOMEN: Soft, Nondistended; Bowel sounds present  EXTREMITIES: No clubbing, cyanosis, or edema  SKIN: No rashes or lesions  Care Discussed with Consultants/Other Providers [ x] YES  [ ] NO

## 2019-06-05 NOTE — PROGRESS NOTE ADULT - ASSESSMENT
Impression:  Intra cerebral Bleed/ following simple commands  Seizure  Hypertensive emergency  HO hemorrhagic stroke  Prolonged QT.  penumonia aspiration      PLAN:    CNS: Continue with propofol for now. Continue with Keppra for now. Pending VEEG. If no seizure activity stop propofol.  Neurochecks Q2 hr.     HEENT: Oral care    PULMONARY:  HOB @ 30 degrees. CPAP after propofol is stopped.     CARDIOVASCULAR: i=o. Maintain BP <140. Continue with blood pressure medicine.     GI: GI prophylaxis.  Feeding through OG tube.    RENAL:  Follow up lytes.  Correct as needed    INFECTIOUS DISEASE: Follow up cultures. PAN culture.    HEMATOLOGICAL:  Seq DVT prophylaxis.    ENDOCRINE:  Follow up FS.     MUSCULOSKELETAL: Bed rest    Poor prognosis Impression:  Intra cerebral Bleed/ following simple commands  Seizure  Hypertensive emergency  HO hemorrhagic stroke  penumonia aspiration?      PLAN:    CNS: Continue with propofol for now. Continue with Keppra for now. Pending VEEG. If no seizure activity stop propofol.  Neurochecks Q2 hr.     HEENT: Oral care    PULMONARY:  HOB @ 30 degrees.  SBT    CARDIOVASCULAR: i=o. Maintain BP <140. Continue with blood pressure medicine.     GI: GI prophylaxis.  Feeding through OG tube.    RENAL:  Follow up lytes.  Correct as needed    INFECTIOUS DISEASE: Follow up cultures.    HEMATOLOGICAL:  Seq DVT prophylaxis.    ENDOCRINE:  Follow up FS.     MUSCULOSKELETAL: Bed rest    Poor prognosis

## 2019-06-05 NOTE — PROGRESS NOTE ADULT - SUBJECTIVE AND OBJECTIVE BOX
Patient is a 74y old  Female who presents with a chief complaint of left sided weakness and slurred speech (2019 07:46)        Over Night Events: No events overnight. On MV and on Propofol drip. Follows simple commands.         ROS:  See HPI    PHYSICAL EXAM    ICU Vital Signs Last 24 Hrs  T(C): 38.2 (2019 06:00), Max: 40.4 (2019 09:00)  T(F): 100.8 (2019 06:00), Max: 104.7 (2019 09:00)  HR: 64 (2019 07:00) (56 - 96)  BP: 138/67 (2019 07:00) (75/44 - 138/67)  BP(mean): 101 (:00) (53 - 101)  ABP: --  ABP(mean): --  RR: 12 (2019 07:00) (11 - 27)  SpO2: 98% (:00) (94% - 100%)      General: Sedated  HEENT: ET +           Lymphatic system: No cervical LN   Lungs: Bilateral BS  Cardiovascular: Regular   Gastrointestinal: Soft, Positive BS  Extremities: No clubbing.  Moves extremities.  Full Range of motion   Skin: Warm, intact  Neurological: No motor or sensory deficit       19 @ 07:01  -  19 @ 07:00  --------------------------------------------------------  IN:    Enteral Tube Flush: 190 mL    Glucerna: 337.5 mL    insulin regular Infusion: 57 mL    propofol Infusion: 105.3 mL    Sodium Chloride 0.9% IV Bolus: 500 mL    Vital High Protein: 450 mL  Total IN: 1639.8 mL    OUT:    Incontinent per Collection Ba mL  Total OUT: 1250 mL    Total NET: 389.8 mL          LABS:                            12.0   6.95  )-----------( 223      ( 2019 04:28 )             39.4                                               06-04    142  |  102  |  22<H>  ----------------------------<  184<H>  4.6   |  26  |  0.7    Ca    10.5<H>      2019 04:28  Phos  2.9     06-04  Mg     2.2     06-04    TPro  6.2  /  Alb  3.2<L>  /  TBili  0.8  /  DBili  x   /  AST  19  /  ALT  36  /  AlkPhos  93  06-04                                                                                           LIVER FUNCTIONS - ( 2019 04:28 )  Alb: 3.2 g/dL / Pro: 6.2 g/dL / ALK PHOS: 93 U/L / ALT: 36 U/L / AST: 19 U/L / GGT: x                                                  Culture - Sputum (collected 2019 16:13)  Source: .Sputum Sputum  Gram Stain (2019 07:43):    Numerous polymorphonuclear leukocytes seen per low power field    Few Squamous epithelial cells seen per low power field    No organisms seen                                                   Mode: AC/ CMV (Assist Control/ Continuous Mandatory Ventilation)  RR (machine): 12  TV (machine): 450  FiO2: 40  PEEP: 5  ITime: 1  MAP: 9  PIP: 24                                      ABG - ( 2019 04:40 )  pH, Arterial: 7.48  pH, Blood: x     /  pCO2: 43    /  pO2: 73    / HCO3: 32    / Base Excess: 7.5   /  SaO2: 96            MEDICATIONS  (STANDING):  amLODIPine   Tablet 10 milliGRAM(s) Oral daily  chlorhexidine 0.12% Liquid 15 milliLiter(s) Oral Mucosa two times a day  chlorhexidine 4% Liquid 1 Application(s) Topical <User Schedule>  docusate sodium Liquid 100 milliGRAM(s) Oral three times a day  hydrALAZINE 25 milliGRAM(s) Oral three times a day  insulin regular Infusion 4 Unit(s)/Hr (4 mL/Hr) IV Continuous <Continuous>  levETIRAcetam  Solution 1000 milliGRAM(s) Oral two times a day  pantoprazole   Suspension 40 milliGRAM(s) Oral daily  propofol Infusion. 1 MICROgram(s)/kG/Min (0.473 mL/Hr) IV Continuous <Continuous>  senna 2 Tablet(s) Oral at bedtime    MEDICATIONS  (PRN):  acetaminophen   Tablet .. 650 milliGRAM(s) Oral every 6 hours PRN Temp greater or equal to 38C (100.4F)      Xrays:                                                                                     ECHO Patient is a 74y old  Female who presents with a chief complaint of left sided weakness and slurred speech (2019 07:46)        Over Night Events: No events overnight. On MV and on Propofol drip. Follows simple commands. s/p head CT. VEEG        ROS:  See HPI    PHYSICAL EXAM    ICU Vital Signs Last 24 Hrs  T(C): 38.2 (2019 06:00), Max: 40.4 (2019 09:00)  T(F): 100.8 (2019 06:00), Max: 104.7 (2019 09:00)  HR: 64 (2019 07:00) (56 - 96)  BP: 138/67 (:00) (75/44 - 138/67)  BP(mean): 101 (:00) (53 - 101)  RR: 12 (:00) (11 - 27)  SpO2: 98% (:00) (94% - 100%)      General: Sedated  HEENT: ET +           Lymphatic system: No cervical LN   Lungs: Bilateral BS, DEC both bases  Cardiovascular: Regular   Gastrointestinal: Soft, Positive BS  Extremities: No clubbing.  Moves extremities.  Full Range of motion   Skin: Warm, intact  Neurological: No motor or sensory deficit       19 @ 07:01  -  19 @ 07:00  --------------------------------------------------------  IN:    Enteral Tube Flush: 190 mL    Glucerna: 337.5 mL    insulin regular Infusion: 57 mL    propofol Infusion: 105.3 mL    Sodium Chloride 0.9% IV Bolus: 500 mL    Vital High Protein: 450 mL  Total IN: 1639.8 mL    OUT:    Incontinent per Collection Ba mL  Total OUT: 1250 mL    Total NET: 389.8 mL          LABS:                            12.0   6.95  )-----------( 223      ( 2019 04:28 )             39.4                                               06-04    142  |  102  |  22<H>  ----------------------------<  184<H>  4.6   |  26  |  0.7    Ca    10.5<H>      2019 04:28  Phos  2.9     06-04  Mg     2.2     06-04    TPro  6.2  /  Alb  3.2<L>  /  TBili  0.8  /  DBili  x   /  AST  19  /  ALT  36  /  AlkPhos  93  06-04                                                                                           LIVER FUNCTIONS - ( 2019 04:28 )  Alb: 3.2 g/dL / Pro: 6.2 g/dL / ALK PHOS: 93 U/L / ALT: 36 U/L / AST: 19 U/L / GGT: x                                                  Culture - Sputum (collected 2019 16:13)  Source: .Sputum Sputum  Gram Stain (2019 07:43):    Numerous polymorphonuclear leukocytes seen per low power field    Few Squamous epithelial cells seen per low power field    No organisms seen                                                   Mode: AC/ CMV (Assist Control/ Continuous Mandatory Ventilation)  RR (machine): 12  TV (machine): 450  FiO2: 40  PEEP: 5  ITime: 1  MAP: 9  PIP: 24                                      ABG - ( 2019 04:40 )  pH, Arterial: 7.48  pH, Blood: x     /  pCO2: 43    /  pO2: 73    / HCO3: 32    / Base Excess: 7.5   /  SaO2: 96            MEDICATIONS  (STANDING):  amLODIPine   Tablet 10 milliGRAM(s) Oral daily  chlorhexidine 0.12% Liquid 15 milliLiter(s) Oral Mucosa two times a day  chlorhexidine 4% Liquid 1 Application(s) Topical <User Schedule>  docusate sodium Liquid 100 milliGRAM(s) Oral three times a day  hydrALAZINE 25 milliGRAM(s) Oral three times a day  insulin regular Infusion 4 Unit(s)/Hr (4 mL/Hr) IV Continuous <Continuous>  levETIRAcetam  Solution 1000 milliGRAM(s) Oral two times a day  pantoprazole   Suspension 40 milliGRAM(s) Oral daily  propofol Infusion. 1 MICROgram(s)/kG/Min (0.473 mL/Hr) IV Continuous <Continuous>  senna 2 Tablet(s) Oral at bedtime    MEDICATIONS  (PRN):  acetaminophen   Tablet .. 650 milliGRAM(s) Oral every 6 hours PRN Temp greater or equal to 38C (100.4F)      Xrays:  reviewed

## 2019-06-05 NOTE — PROGRESS NOTE ADULT - SUBJECTIVE AND OBJECTIVE BOX
Epilepsy Attending Note:     HERNANDO HERNANDEZ    74y Female  MRN [id number]    Vital Signs Last 24 Hrs  T(C): 37.3 (05 Jun 2019 08:00), Max: 38.8 (04 Jun 2019 11:00)  T(F): 99.1 (05 Jun 2019 08:00), Max: 101.9 (04 Jun 2019 11:00)  HR: 56 (05 Jun 2019 10:00) (56 - 90)  BP: 117/53 (05 Jun 2019 10:00) (75/44 - 138/67)  BP(mean): 78 (05 Jun 2019 10:00) (53 - 101)  RR: 12 (05 Jun 2019 10:00) (11 - 27)  SpO2: 98% (05 Jun 2019 10:00) (96% - 100%)                          12.0   6.95  )-----------( 223      ( 04 Jun 2019 04:28 )             39.4       06-04    142  |  102  |  22<H>  ----------------------------<  184<H>  4.6   |  26  |  0.7    Ca    10.5<H>      04 Jun 2019 04:28  Phos  2.9     06-04  Mg     2.2     06-04    TPro  6.2  /  Alb  3.2<L>  /  TBili  0.8  /  DBili  x   /  AST  19  /  ALT  36  /  AlkPhos  93  06-04      MEDICATIONS  (STANDING):  amLODIPine   Tablet 10 milliGRAM(s) Oral daily  chlorhexidine 0.12% Liquid 15 milliLiter(s) Oral Mucosa two times a day  chlorhexidine 4% Liquid 1 Application(s) Topical <User Schedule>  docusate sodium Liquid 100 milliGRAM(s) Oral three times a day  hydrALAZINE 25 milliGRAM(s) Oral three times a day  insulin regular Infusion 4 Unit(s)/Hr (4 mL/Hr) IV Continuous <Continuous>  levETIRAcetam  Solution 1000 milliGRAM(s) Oral two times a day  pantoprazole   Suspension 40 milliGRAM(s) Oral daily  propofol Infusion. 1 MICROgram(s)/kG/Min (0.473 mL/Hr) IV Continuous <Continuous>  senna 2 Tablet(s) Oral at bedtime  vancomycin  IVPB 1000 milliGRAM(s) IV Intermittent every 12 hours    MEDICATIONS  (PRN):  acetaminophen   Tablet .. 650 milliGRAM(s) Oral every 6 hours PRN Temp greater or equal to 38C (100.4F)            VEEG in the last 24 hours:    Background------7-8 hz superimposed by small amount of lower range theta    Focal and generalized slowing----mild generalized slowing                                                  bilateral posterior quadrants  focal slowing  right more than left    Interictal activity---large number of right posterior quadrant (PO), sharps and spikes at times presented as bi-occipital sharps    Events-------none    Seizures-----none    Impression:---  abnormal as above      Plan - discussed with the neurology

## 2019-06-06 NOTE — PROGRESS NOTE ADULT - SUBJECTIVE AND OBJECTIVE BOX
Patient is a 74y old  Female who presents with a chief complaint of left sided weakness and slurred speech (06 Jun 2019 08:58)    HOSPITAL DAY: 5d   ICU DAY: 5    SUMMARY OF HOSPITAL COURSE TO DATE 74 y.o F with PMH of HTN, HLD, DM, hemorrhagic stroke,? EVD placement at that time ( about 8 years ago)  with residual left side weakness and slurred speech, ambulated with cane, on ASA daily.  Pt. brought to [facility] ED as a stroke code. At the time of presentation to [facility] ED A&Ox 2, baseline slurred speech with left sided weakness. /82, 55 HR, 18 RR, CTH showed acute right thalamic hemorrhage with extension to right lateral and third ventricle. At [facility] ED, Pt. Intubated 2/2 worsening of neuro exam for air way protection as per ED attending note. Pt.  became more drowsy with decreased responsiveness. Pt. was transferred to ED-[place]. At time of NSGY eval Pt intubated, sedated on Propofol and Fentanyl, radial arterial line placed, she received keppra, zofran and mannitol in ED. Repeat CT scan stable from prior study as per neurosurgery. Platelets one unit ordered as per neurosurgery.    6/4: Patient had tonic-clonic seizure lasting 10 minutes around 6 am. 4mg Ativan given; started on propofol. Neuro NP saw her; VEEG ongoing, rpt CT head unchanged, and increased dose of keppra ordered.     Streptococcus bacteremia and gram negative rods in urine culture.     OVERNIGHT EVENTS: VEEG ongoing. Off sedation.     TODAY: Patient was seen this morning at bedside.   Febrile overnight. Failed SBT.     ================= PRESENT TODAY ==================    1-Mckeon Catheter: yes  2-Central Line: no  3-IV Fluids: no  4-Drips:  4.1-Pressors: no  4.2-Sedation: no  4.3-Heparin:   no  5-Intubated: yes    INTERVAL HPI/OVERNIGHT EVENTS:  ICU Vital Signs Last 24 Hrs  T(C): 37.2 (06 Jun 2019 10:00), Max: 38.7 (05 Jun 2019 16:00)  T(F): 99 (06 Jun 2019 10:00), Max: 101.7 (05 Jun 2019 16:00)  HR: 84 (06 Jun 2019 10:00) (56 - 86)  BP: 126/63 (06 Jun 2019 10:00) (103/64 - 154/60)  BP(mean): 82 (06 Jun 2019 10:00) (67 - 98)  ABP: --  ABP(mean): --  RR: 16 (06 Jun 2019 10:00) (11 - 21)  SpO2: 95% (06 Jun 2019 10:00) (95% - 100%)    I&O's Summary    05 Jun 2019 07:01  -  06 Jun 2019 07:00  --------------------------------------------------------  IN: 1423 mL / OUT: 1000 mL / NET: 423 mL    06 Jun 2019 07:01  -  06 Jun 2019 10:48  --------------------------------------------------------  IN: 388 mL / OUT: 0 mL / NET: 388 mL      Mode: AC/ CMV (Assist Control/ Continuous Mandatory Ventilation)  RR (machine): 12  TV (machine): 450  FiO2: 40  PEEP: 5      LABS:                        10.8   7.63  )-----------( 244      ( 06 Jun 2019 04:45 )             34.7     06-06    140  |  100  |  27<H>  ----------------------------<  207<H>  3.7   |  26  |  0.7    Ca    9.7      06 Jun 2019 04:45  Mg     2.1     06-06    TPro  5.6<L>  /  Alb  3.0<L>  /  TBili  0.5  /  DBili  x   /  AST  9   /  ALT  23  /  AlkPhos  88  06-06        CAPILLARY BLOOD GLUCOSE      POCT Blood Glucose.: 140 mg/dL (06 Jun 2019 10:24)  POCT Blood Glucose.: 149 mg/dL (06 Jun 2019 08:01)  POCT Blood Glucose.: 178 mg/dL (06 Jun 2019 06:20)  POCT Blood Glucose.: 199 mg/dL (06 Jun 2019 04:22)  POCT Blood Glucose.: 207 mg/dL (06 Jun 2019 02:09)  POCT Blood Glucose.: 201 mg/dL (06 Jun 2019 00:09)  POCT Blood Glucose.: 171 mg/dL (05 Jun 2019 23:22)  POCT Blood Glucose.: 136 mg/dL (05 Jun 2019 21:58)  POCT Blood Glucose.: 130 mg/dL (05 Jun 2019 20:00)  POCT Blood Glucose.: 132 mg/dL (05 Jun 2019 18:11)  POCT Blood Glucose.: 143 mg/dL (05 Jun 2019 15:56)  POCT Blood Glucose.: 181 mg/dL (05 Jun 2019 14:04)  POCT Blood Glucose.: 154 mg/dL (05 Jun 2019 11:59)    ABG - ( 06 Jun 2019 05:08 )  pH, Arterial: 7.46  pH, Blood: x     /  pCO2: 46    /  pO2: 78    / HCO3: 32    / Base Excess: 7.6   /  SaO2: 96          RADIOLOGY & ADDITIONAL TESTS:  < from: Xray Chest 1 View- PORTABLE-Routine (06.05.19 @ 06:26) >  Impression:     Decreased bibasilar opacities.    Stable support devices.      < end of copied text >    < from: CT Head No Cont (06.04.19 @ 11:37) >  impression:    Overall no significant changes since the prior exam. Right thalamic   parenchymal hematoma measuring approximately 2.2 cm with intraventricular   extension. No significant change in size of the hematoma. Stable   ventricular size with mild ventricular enlargement.    No new acute intracranial hemorrhage. No changes.      < end of copied text >        Consultant(s) Notes Reviewed:  [x ] YES  [ ] NO    MEDICATIONS  (STANDING):  amLODIPine   Tablet 10 milliGRAM(s) Oral daily  cefTRIAXone   IVPB      chlorhexidine 0.12% Liquid 15 milliLiter(s) Oral Mucosa two times a day  chlorhexidine 4% Liquid 1 Application(s) Topical <User Schedule>  docusate sodium Liquid 100 milliGRAM(s) Oral three times a day  hydrALAZINE 25 milliGRAM(s) Oral three times a day  insulin regular Infusion 4 Unit(s)/Hr (4 mL/Hr) IV Continuous <Continuous>  levETIRAcetam  Solution 1500 milliGRAM(s) Oral two times a day  nystatin    Suspension 346750 Unit(s) Oral two times a day  pantoprazole   Suspension 40 milliGRAM(s) Oral daily  propofol Infusion. 1 MICROgram(s)/kG/Min (0.473 mL/Hr) IV Continuous <Continuous>  senna 2 Tablet(s) Oral at bedtime    MEDICATIONS  (PRN):  acetaminophen   Tablet .. 650 milliGRAM(s) Oral every 6 hours PRN Temp greater or equal to 38C (100.4F)      PPHYSICAL EXAM:  GENERAL: well built, well nourished.  HEAD:  Atraumatic, Normocephalic.  EYES: spontaneously opens eyes. Pupils reactive but sluggish.  ENT: dry mucous membranes.   NECK: Supple, No JVD.  NERVOUS SYSTEM: Pupils pinpoint but reactive; moves extremities; intermittently follows commands.  	  CHEST/LUNG: intubated; equal bilateral chest rise.   HEART: S1S2 normal, no S3, Regular rate and rhythm; No murmurs, rubs, or gallops.  ABDOMEN: Soft, Nondistended; Bowel sounds present  EXTREMITIES: No clubbing, cyanosis, or edema  SKIN: No rashes or lesions      Care Discussed with Consultants/Other Providers [ x] YES  [ ] NO Patient is a 74y old  Female who presents with a chief complaint of left sided weakness and slurred speech (06 Jun 2019 08:58)    HOSPITAL DAY: 7   ICU DAY: 7    SUMMARY OF HOSPITAL COURSE TO DATE 74 y.o F with PMH of HTN, HLD, DM, hemorrhagic stroke,? EVD placement at that time ( about 8 years ago)  with residual left side weakness and slurred speech, ambulated with cane, on ASA daily.  Pt. brought to [facility] ED as a stroke code. At the time of presentation to [facility] ED A&Ox 2, baseline slurred speech with left sided weakness. /82, 55 HR, 18 RR, CTH showed acute right thalamic hemorrhage with extension to right lateral and third ventricle. At [facility] ED, Pt. Intubated 2/2 worsening of neuro exam for air way protection as per ED attending note. Pt.  became more drowsy with decreased responsiveness. Pt. was transferred to ED-[place]. At time of NSGY eval Pt intubated, sedated on Propofol and Fentanyl, radial arterial line placed, she received keppra, zofran and mannitol in ED. Repeat CT scan stable from prior study as per neurosurgery. Platelets one unit ordered as per neurosurgery.    6/4: Patient had tonic-clonic seizure lasting 10 minutes around 6 am. 4mg Ativan given; started on propofol. Neuro NP saw her; VEEG ongoing, rpt CT head unchanged, and increased dose of keppra ordered.     Streptococcus bacteremia and gram negative rods in urine culture.     OVERNIGHT EVENTS: VEEG ongoing. Off sedation.     TODAY: Patient was seen this morning at bedside.   Febrile overnight. Failed SBT.     ================= PRESENT TODAY ==================    1-Mckeon Catheter: yes  2-Central Line: no  3-IV Fluids: no  4-Drips:  4.1-Pressors: no  4.2-Sedation: no  4.3-Heparin:   no  5-Intubated: yes    INTERVAL HPI/OVERNIGHT EVENTS:  ICU Vital Signs Last 24 Hrs  T(C): 37.2 (06 Jun 2019 10:00), Max: 38.7 (05 Jun 2019 16:00)  T(F): 99 (06 Jun 2019 10:00), Max: 101.7 (05 Jun 2019 16:00)  HR: 84 (06 Jun 2019 10:00) (56 - 86)  BP: 126/63 (06 Jun 2019 10:00) (103/64 - 154/60)  BP(mean): 82 (06 Jun 2019 10:00) (67 - 98)  ABP: --  ABP(mean): --  RR: 16 (06 Jun 2019 10:00) (11 - 21)  SpO2: 95% (06 Jun 2019 10:00) (95% - 100%)    I&O's Summary    05 Jun 2019 07:01  -  06 Jun 2019 07:00  --------------------------------------------------------  IN: 1423 mL / OUT: 1000 mL / NET: 423 mL    06 Jun 2019 07:01  -  06 Jun 2019 10:48  --------------------------------------------------------  IN: 388 mL / OUT: 0 mL / NET: 388 mL      Mode: AC/ CMV (Assist Control/ Continuous Mandatory Ventilation)  RR (machine): 12  TV (machine): 450  FiO2: 40  PEEP: 5      LABS:                        10.8   7.63  )-----------( 244      ( 06 Jun 2019 04:45 )             34.7     06-06    140  |  100  |  27<H>  ----------------------------<  207<H>  3.7   |  26  |  0.7    Ca    9.7      06 Jun 2019 04:45  Mg     2.1     06-06    TPro  5.6<L>  /  Alb  3.0<L>  /  TBili  0.5  /  DBili  x   /  AST  9   /  ALT  23  /  AlkPhos  88  06-06        CAPILLARY BLOOD GLUCOSE      POCT Blood Glucose.: 140 mg/dL (06 Jun 2019 10:24)  POCT Blood Glucose.: 149 mg/dL (06 Jun 2019 08:01)  POCT Blood Glucose.: 178 mg/dL (06 Jun 2019 06:20)  POCT Blood Glucose.: 199 mg/dL (06 Jun 2019 04:22)  POCT Blood Glucose.: 207 mg/dL (06 Jun 2019 02:09)  POCT Blood Glucose.: 201 mg/dL (06 Jun 2019 00:09)  POCT Blood Glucose.: 171 mg/dL (05 Jun 2019 23:22)  POCT Blood Glucose.: 136 mg/dL (05 Jun 2019 21:58)  POCT Blood Glucose.: 130 mg/dL (05 Jun 2019 20:00)  POCT Blood Glucose.: 132 mg/dL (05 Jun 2019 18:11)  POCT Blood Glucose.: 143 mg/dL (05 Jun 2019 15:56)  POCT Blood Glucose.: 181 mg/dL (05 Jun 2019 14:04)  POCT Blood Glucose.: 154 mg/dL (05 Jun 2019 11:59)    ABG - ( 06 Jun 2019 05:08 )  pH, Arterial: 7.46  pH, Blood: x     /  pCO2: 46    /  pO2: 78    / HCO3: 32    / Base Excess: 7.6   /  SaO2: 96          RADIOLOGY & ADDITIONAL TESTS:  < from: Xray Chest 1 View- PORTABLE-Routine (06.05.19 @ 06:26) >  Impression:     Decreased bibasilar opacities.    Stable support devices.      < end of copied text >    < from: CT Head No Cont (06.04.19 @ 11:37) >  impression:    Overall no significant changes since the prior exam. Right thalamic   parenchymal hematoma measuring approximately 2.2 cm with intraventricular   extension. No significant change in size of the hematoma. Stable   ventricular size with mild ventricular enlargement.    No new acute intracranial hemorrhage. No changes.      < end of copied text >        Consultant(s) Notes Reviewed:  [x ] YES  [ ] NO    MEDICATIONS  (STANDING):  amLODIPine   Tablet 10 milliGRAM(s) Oral daily  cefTRIAXone   IVPB      chlorhexidine 0.12% Liquid 15 milliLiter(s) Oral Mucosa two times a day  chlorhexidine 4% Liquid 1 Application(s) Topical <User Schedule>  docusate sodium Liquid 100 milliGRAM(s) Oral three times a day  hydrALAZINE 25 milliGRAM(s) Oral three times a day  insulin regular Infusion 4 Unit(s)/Hr (4 mL/Hr) IV Continuous <Continuous>  levETIRAcetam  Solution 1500 milliGRAM(s) Oral two times a day  nystatin    Suspension [id number] Unit(s) Oral two times a day  pantoprazole   Suspension 40 milliGRAM(s) Oral daily  propofol Infusion. 1 MICROgram(s)/kG/Min (0.473 mL/Hr) IV Continuous <Continuous>  senna 2 Tablet(s) Oral at bedtime    MEDICATIONS  (PRN):  acetaminophen   Tablet .. 650 milliGRAM(s) Oral every 6 hours PRN Temp greater or equal to 38C (100.4F)      PPHYSICAL EXAM:  GENERAL: well built, well nourished.  HEAD:  Atraumatic, Normocephalic.  EYES: spontaneously opens eyes. Pupils reactive but sluggish.  ENT: dry mucous membranes.   NECK: Supple, No JVD.  NERVOUS SYSTEM: Pupils pinpoint but reactive; moves extremities; intermittently follows commands.  	  CHEST/LUNG: intubated; equal bilateral chest rise.   HEART: S1S2 normal, no S3, Regular rate and rhythm; No murmurs, rubs, or gallops.  ABDOMEN: Soft, Nondistended; Bowel sounds present  EXTREMITIES: No clubbing, cyanosis, or edema  SKIN: No rashes or lesions      Care Discussed with Consultants/Other Providers [ x] YES  [ ] NO

## 2019-06-06 NOTE — PROGRESS NOTE ADULT - ASSESSMENT
Impression:  Intra cerebral Bleed/ following simple commands  Seizure  Hypertensive emergency  HO hemorrhagic stroke  penumonia aspiration?  UTI  Strep Bacteremia      PLAN:    CNS: SAT. Continue with keppra for now.    HEENT: Oral care    PULMONARY:  HOB @ 30 degrees.  SBT    CARDIOVASCULAR: i=o. Maintain BP <140. Continue with blood pressure medicine.     GI: GI prophylaxis.  Feeding through OG tube.    RENAL:  Follow up lytes.  Correct as needed    INFECTIOUS DISEASE: Follow up cultures. Start on Rocephin. Repeat Blood and urine culture.     HEMATOLOGICAL:   DVT prophylaxis.    ENDOCRINE:  Follow up FS.     MUSCULOSKELETAL: Bed rest    Poor prognosis Impression:  Intra cerebral Bleed/ following simple commands  Seizure  Hypertensive emergency  HO hemorrhagic stroke  penumonia aspiration?  UTI  Strep Bacteremia      PLAN:    CNS: SAT. Continue with keppra for now.    HEENT: Oral care    PULMONARY:  HOB @ 30 degrees.  SBT    CARDIOVASCULAR: i=o. Maintain BP <140. Continue with blood pressure medicine.     GI: GI prophylaxis.  Feeding through OG tube.    RENAL:  Follow up lytes.  Correct as needed    INFECTIOUS DISEASE: Follow up cultures. Start on cefepime Repeat Blood and urine culture. ID eval     HEMATOLOGICAL:   DVT prophylaxis.    ENDOCRINE:  Follow up FS.     MUSCULOSKELETAL: Bed rest    Poor prognosis    might need tracheo

## 2019-06-06 NOTE — PROGRESS NOTE ADULT - SUBJECTIVE AND OBJECTIVE BOX
Neurology Follow up note  Patient seen and examined at bedside , remains intubated has been off sedation since earlier last evening she randomly follows commands . Left side is moving lesser than the right side. On veeg monitoring was noted to have few episodes jerky movements of the left leg. Patient febrile with a timax of 101.7.       Vital Signs Last 24 Hrs  T(C): 37.8 (05 Jun 2019 23:00), Max: 38.7 (05 Jun 2019 16:00)  T(F): 100 (05 Jun 2019 23:00), Max: 101.7 (05 Jun 2019 16:00)  HR: 70 (06 Jun 2019 03:00) (56 - 84)  BP: 134/56 (06 Jun 2019 03:00) (112/57 - 147/68)  BP(mean): 76 (06 Jun 2019 03:00) (67 - 101)  RR: 15 (06 Jun 2019 03:00) (11 - 19)  SpO2: 97% (06 Jun 2019 03:00) (96% - 100%)          Neurological Exam:   Mental status: intubated sedated not follows commands randomly  Cranial nerves: Pupils 2mm reactive to light, eyes midline + blink +gag , breathing over the vent   Motor:  Moving all 4 extremities decreased mvmt on the left arm >leg  Sensation: Intact   bilateral upgoing toes      Medications  acetaminophen   Tablet .. 650 milliGRAM(s) Oral every 6 hours PRN  amLODIPine   Tablet 10 milliGRAM(s) Oral daily  chlorhexidine 0.12% Liquid 15 milliLiter(s) Oral Mucosa two times a day  chlorhexidine 4% Liquid 1 Application(s) Topical <User Schedule>  docusate sodium Liquid 100 milliGRAM(s) Oral three times a day  hydrALAZINE 25 milliGRAM(s) Oral three times a day  insulin regular Infusion 4 Unit(s)/Hr IV Continuous <Continuous>  levETIRAcetam  Solution 1500 milliGRAM(s) Oral two times a day  nystatin    Suspension 908297 Unit(s) Oral two times a day  pantoprazole   Suspension 40 milliGRAM(s) Oral daily  propofol Infusion. 1 MICROgram(s)/kG/Min IV Continuous <Continuous>  senna 2 Tablet(s) Oral at bedtime  vancomycin  IVPB 1000 milliGRAM(s) IV Intermittent every 12 hours      Lab  Magnesium, Serum (06.05.19 @ 11:13)    Magnesium, Serum: 2.2 mg/dL    Comprehensive Metabolic Panel (06.05.19 @ 11:13)    Sodium, Serum: 141 mmol/L    Potassium, Serum: 4.2 mmol/L    Chloride, Serum: 102 mmol/L    Carbon Dioxide, Serum: 27 mmol/L    Anion Gap, Serum: 12 mmol/L    Blood Urea Nitrogen, Serum: 34 mg/dL    Creatinine, Serum: 0.7 mg/dL    Glucose, Serum: 184 mg/dL    Calcium, Total Serum: 10.2 mg/dL    Protein Total, Serum: 5.6 g/dL    Albumin, Serum: 2.9 g/dL    Bilirubin Total, Serum: 0.6 mg/dL    Alkaline Phosphatase, Serum: 93 U/L    Aspartate Aminotransferase (AST/SGOT): 14 U/L    Alanine Aminotransferase (ALT/SGPT): 29 U/L    eGFR if Non : 87: Interpretative comment    Complete Blood Count (06.05.19 @ 11:13)    Nucleated RBC: 0 /100 WBCs    WBC Count: 9.43 K/uL    RBC Count: 4.31 M/uL    Hemoglobin: 10.8 g/dL    Hematocrit: 36.1 %    Mean Cell Volume: 83.8 fL    Mean Cell Hemoglobin: 25.1 pg    Mean Cell Hemoglobin Conc: 29.9 g/dL    Red Cell Distrib Width: 14.6 %    Platelet Count - Automated: 221 K/uL      Culture - Blood in AM (06.04.19 @ 04:28)    Gram Stain:   Growth in aerobic and anaerobic bottles: Gram Positive Cocci in Pairs and  Chains    -  Streptococcus sp. (Not Grp A, B or S pneumoniae): Detec    Specimen Source: .Blood None    Organism: Blood Culture PCR    Culture Results:   Growth in aerobic and anaerobic bottles: Gram Positive Cocci in Pairs and  Chains        Radiology

## 2019-06-06 NOTE — PROGRESS NOTE ADULT - SUBJECTIVE AND OBJECTIVE BOX
Epilepsy Attending Note:     HERNANDO HERNANDEZ    74y Female  MRN [id number]    Vital Signs Last 24 Hrs  T(C): 37 (06 Jun 2019 11:00), Max: 38.7 (05 Jun 2019 16:00)  T(F): 98.6 (06 Jun 2019 11:00), Max: 101.7 (05 Jun 2019 16:00)  HR: 66 (06 Jun 2019 11:00) (56 - 86)  BP: 116/56 (06 Jun 2019 11:00) (103/64 - 154/60)  BP(mean): 85 (06 Jun 2019 11:00) (75 - 98)  RR: 12 (06 Jun 2019 11:00) (11 - 21)  SpO2: 95% (06 Jun 2019 11:00) (95% - 100%)                          10.8   7.63  )-----------( 244      ( 06 Jun 2019 04:45 )             34.7       06-06    140  |  100  |  27<H>  ----------------------------<  207<H>  3.7   |  26  |  0.7    Ca    9.7      06 Jun 2019 04:45  Mg     2.1     06-06    TPro  5.6<L>  /  Alb  3.0<L>  /  TBili  0.5  /  DBili  x   /  AST  9   /  ALT  23  /  AlkPhos  88  06-06      MEDICATIONS  (STANDING):  amLODIPine   Tablet 10 milliGRAM(s) Oral daily  cefepime   IVPB 2000 milliGRAM(s) IV Intermittent every 8 hours  chlorhexidine 0.12% Liquid 15 milliLiter(s) Oral Mucosa two times a day  chlorhexidine 4% Liquid 1 Application(s) Topical <User Schedule>  docusate sodium Liquid 100 milliGRAM(s) Oral three times a day  hydrALAZINE 25 milliGRAM(s) Oral three times a day  insulin regular Infusion 4 Unit(s)/Hr (4 mL/Hr) IV Continuous <Continuous>  levETIRAcetam  Solution 1500 milliGRAM(s) Oral two times a day  nystatin    Suspension [id number] Unit(s) Oral two times a day  pantoprazole   Suspension 40 milliGRAM(s) Oral daily  propofol Infusion. 1 MICROgram(s)/kG/Min (0.473 mL/Hr) IV Continuous <Continuous>  senna 2 Tablet(s) Oral at bedtime    MEDICATIONS  (PRN):  acetaminophen   Tablet .. 650 milliGRAM(s) Oral every 6 hours PRN Temp greater or equal to 38C (100.4F)            VEEG in the last 24 hours:    Background-------7-8 hz superimposed with  lower range theta    Focal and generalized slowing----bilateral posterior quadrants focal slowing.over the posterior quadrants right >left    Interictal activity------right posterior quadrants sharps and at times diffusely expressed sharps that are also stimulus dependent    Events---leg movements were reported around 8 pm              patient was seen having stretching like activity of the legs independently and intermittently.              This was not associated with epileptic EEG changes    Seizures--none    Impression: abnormal as above    Plan - discussed with the neurology

## 2019-06-06 NOTE — PROGRESS NOTE ADULT - ASSESSMENT
74 y.o F with PMH of HTN, HLD, DM, p.w left sided weakness and slurred speech and was found to have a Right thalamic hemorrhage and seizure like activity. VEEG In progress and patient had few brief episodes of jerky mvmts of the left leg. VEEG initial read revealed bi- occipital sharps. Patient is currently intubated and has been off sedation for >12 hrs she follows commands at random. Patient is febrile with TIMAX .7 . Blood cx +streptococcus and is currently on vancomycin.    Plan  continue VEEG Monitoring  continue Keppra 1500mg BID   neuro checks q 1  Continue current care  Repeat CTH or obtain MRI brain after VEEG 74 y.o F with PMH of HTN, HLD, DM, p.w left sided weakness and slurred speech and was found to have a Right thalamic hemorrhage and seizure like activity. VEEG In progress and patient had few brief episodes of jerky mvmts of the left leg. VEEG initial read revealed bi- occipital sharps. Patient is currently intubated and has been off sedation for >12 hrs she follows commands at random. Patient is febrile with TIMAX .7 . Blood cx +streptococcus and is currently on vancomycin.    Plan  DC VEEG  continue Keppra 1500mg BID   neuro checks q 1  Continue current care  Repeat CTH or obtain MRI brain after VEEG

## 2019-06-06 NOTE — PROGRESS NOTE ADULT - ASSESSMENT
· Assessment		  Patient is a 74y old Female who presents with a chief complaint of left sided weakness and slurred speech   Currently admitted to medicine with the primary diagnosis of Hypertensive emergency      PLAN  74 y.o F with PMH of HTN, HLD, DM, hemorrhagic stroke,? EVD placement at that time ( about 8 years ago)  with residual left side weakness and slurred speech, ambulated with cane, on ASA daily  Pt. brought to [facility] ED as a stroke code    #)Acute right thalamic hemorrhagic stroke  -Sudden onset slurred speech and left sided weakness on presentation  -CT head showed right thalamic hemorrhage with extension into lateral and third ventricles  -Neurosurgery following.   -No acute neurosurgical intervention needed at this time since repeat CT head stable. No evidence of evolving hydrocephalus.  -WAS Clinically patient is improving. Awake and responding to commands. Moves all extremities. No repeat CT head needed. Failed SBP in 6/2.   - 6/4: Tonic clonic seizure lasting 10 min; 4mg ativan; on propofol  - CT Head rpt stable 6/4  - VEEG 24hr- no seizure. Bi-occipital sharps.   -Increase Keppra to 1500mg BID.   -Monitor and control SBP between 140-160. On amlodipine and hydralazine to control BP  -GAG and pupillary reflex present.  -CT chest and abdomen did not show any evidence of bleed      #Streptococcus bacteremia  - T-max 100.8 overnight.  - Cultures sent- no growth to date.   -Rpt cultures ordered.   - Cooling blanket PRN; possible central fever.   -Blood cultures grew streptococcus 6/4  - D/C Vancomycin (6/5)  - Ceftriaxone 6/6    #UTI  - Gram negative rods on culture  - Rpt. cultures ordered.  - No villatoro  - Ceftriaxone 6/6    #Prolonged Qtc 2/2 inc intracranial pressure 2/2 Intracranial bleed  -Few sec of polymorphic V tach overnight on 5/31 likely tdp  -Magnesium and potassium repleted   -BMP and magnesium q8h. f/u 4pm  -If V tach recurs, give Magnesium 1gm STAT  -continue tele monitoring  - QTc 507 6/3.  - QTc normal 6/6.    #Hypophosphatemia  -f/u am phosphorus    #)DM   -insulin drip    #)HTN  -was on losartan, amlodipine and hydralazine  - BP on low side. D/C losartan.   -Maintain BP between 120-140mm Hg    GI PPX: protonix  DVT PPX: sequentials    DIET: Tube feeds  ACTIVITY: Bed rest    ================= CODE STATUS =================                  (X) FULL CODE     |     () DNR     |     () DNI

## 2019-06-06 NOTE — PROGRESS NOTE ADULT - SUBJECTIVE AND OBJECTIVE BOX
Patient is a 74y old  Female who presents with a chief complaint of left sided weakness and slurred speech (2019 04:37)        Over Night Events: NO events overnight. Failed Weaning trial.        ROS:  See HPI    PHYSICAL EXAM    ICU Vital Signs Last 24 Hrs  T(C): 37.7 (2019 04:00), Max: 38.7 (2019 16:00)  T(F): 99.9 (2019 04:00), Max: 101.7 (2019 16:00)  HR: 72 (2019 08:00) (56 - 84)  BP: 103/64 (2019 08:00) (103/64 - 147/68)  BP(mean): 83 (2019 08:00) (67 - 98)  ABP: --  ABP(mean): --  RR: 19 (2019 08:00) (11 - 21)  SpO2: 97% (2019 08:00) (97% - 100%)      General: follows commands on and off  HEENT: AGUSTÍN             Lymphatic system: No cervical LN   Lungs: Bilateral BS  Cardiovascular: Regular   Gastrointestinal: Soft, Positive BS  Extremities: No clubbing.  Moves extremities.  Full Range of motion   Skin: Warm, intact  Neurological: No motor or sensory deficit       19 @ 07:  -  19 @ 07:00  --------------------------------------------------------  IN:    Enteral Tube Flush: 270 mL    insulin regular Infusion: 72.5 mL    IV PiggyBack: 500 mL    propofol Infusion: 18 mL    Vital High Protein: 562.5 mL  Total IN: 1423 mL    OUT:    Incontinent per Collection Ba mL  Total OUT: 1000 mL    Total NET: 423 mL      19 @ 07:01  -  19 @ 08:58  --------------------------------------------------------  IN:    insulin regular Infusion: 6 mL    Vital High Protein: 30 mL  Total IN: 36 mL    OUT:  Total OUT: 0 mL    Total NET: 36 mL    LABS:                        10.8   7.63  )-----------( 244      ( 2019 04:45 )             34.7                                               -    140  |  100  |  27<H>  ----------------------------<  207<H>  3.7   |  26  |  0.7    Ca    9.7      2019 04:45  Mg     2.1         TPro  5.6<L>  /  Alb  3.0<L>  /  TBili  0.5  /  DBili  x   /  AST  9   /  ALT  23  /  AlkPhos  88                                                LIVER FUNCTIONS - ( 2019 04:45 )  Alb: 3.0 g/dL / Pro: 5.6 g/dL / ALK PHOS: 88 U/L / ALT: 23 U/L / AST: 9 U/L / GGT: x                                                Culture - Sputum (collected 2019 16:13)  Source: .Sputum Sputum  Gram Stain (2019 07:43):    Numerous polymorphonuclear leukocytes seen per low power field    Few Squamous epithelial cells seen per low power field    No organisms seen  Preliminary Report (2019 18:14):    Normal Respiratory Alyssa present    Culture - Urine (collected 2019 04:30)  Source: .Urine Catheterized  Preliminary Report (2019 21:54):    >100,000 CFU/ml Gram Negative Rods    Culture - Blood (collected 2019 04:28)  Source: .Blood None  Gram Stain (2019 13:29):    Growth in aerobic and anaerobic bottles: Gram Positive Cocci in Pairs and    Chains  Preliminary Report (2019 13:30):    Growth in aerobic and anaerobic bottles: Gram Positive Cocci in Pairs and    Chains    "Due to technical problems, Proteus sp. will Not be reported as part of    the BCID panel until further notice"    ***Blood Panel PCR results on this specimen are available    approximately 3 hours after the Gram stain result.***    Gram stain, PCR, and/or culture results may not always    correspond due to difference in methodologies.    ************************************************************    This PCR assay was performed using [employer].    The following targets are tested for: Enterococcus,    vancomycin resistant enterococci, Listeria monocytogenes,    coagulase negative staphylococci, S. aureus,    methicillin resistant S. aureus, Streptococcus agalactiae    (Group B), S. pneumoniae, S. pyogenes (Group A),    Acinetobacter baumannii, Enterobacter cloacae, E. coli,    Klebsiella oxytoca, K. pneumoniae, Proteus sp.,    Serratia marcescens, Haemophilus influenzae,    Neisseria meningitidis, Pseudomonas aeruginosa, Candida    albicans, C. glabrata, C krusei, C parapsilosis,    C. tropicalis and the KPC resistance gene.  Organism: Blood Culture PCR (2019 10:37)  Organism: Blood Culture PCR (2019 10:37)                                                   Mode: AC/ CMV (Assist Control/ Continuous Mandatory Ventilation)  RR (machine): 12  TV (machine): 450  FiO2: 40  PEEP: 5  ITime: 1  MAP: 14  PIP: 27                                      ABG - ( 2019 05:08 )  pH, Arterial: 7.46  pH, Blood: x     /  pCO2: 46    /  pO2: 78    / HCO3: 32    / Base Excess: 7.6   /  SaO2: 96          MEDICATIONS  (STANDING):  amLODIPine   Tablet 10 milliGRAM(s) Oral daily  chlorhexidine 0.12% Liquid 15 milliLiter(s) Oral Mucosa two times a day  chlorhexidine 4% Liquid 1 Application(s) Topical <User Schedule>  docusate sodium Liquid 100 milliGRAM(s) Oral three times a day  hydrALAZINE 25 milliGRAM(s) Oral three times a day  insulin regular Infusion 4 Unit(s)/Hr (4 mL/Hr) IV Continuous <Continuous>  levETIRAcetam  Solution 1500 milliGRAM(s) Oral two times a day  nystatin    Suspension 824560 Unit(s) Oral two times a day  pantoprazole   Suspension 40 milliGRAM(s) Oral daily  propofol Infusion. 1 MICROgram(s)/kG/Min (0.473 mL/Hr) IV Continuous <Continuous>  senna 2 Tablet(s) Oral at bedtime  vancomycin  IVPB 1000 milliGRAM(s) IV Intermittent every 12 hours    MEDICATIONS  (PRN):  acetaminophen   Tablet .. 650 milliGRAM(s) Oral every 6 hours PRN Temp greater or equal to 38C (100.4F)      Xrays:                                                                                     ECHO Patient is a 74y old  Female who presents with a chief complaint of left sided weakness and slurred speech (2019 04:37)        Over Night Events: NO events overnight. Failed Weaning trial. EEG noted        ROS:  See HPI    PHYSICAL EXAM    ICU Vital Signs Last 24 Hrs  T(C): 37.7 (2019 04:00), Max: 38.7 (2019 16:00)  T(F): 99.9 (2019 04:00), Max: 101.7 (2019 16:00)  HR: 72 (2019 08:00) (56 - 84)  BP: 103/64 (2019 08:00) (103/64 - 147/68)  BP(mean): 83 (2019 08:00) (67 - 98)  RR: 19 (2019 08:00) (11 - 21)  SpO2: 97% (2019 08:00) (97% - 100%)      General: follows commands on and off  HEENT: AGUSTÍN             Lymphatic system: No cervical LN   Lungs: Bilateral BS  Cardiovascular: Regular   Gastrointestinal: Soft, Positive BS  Extremities: No clubbing.  Moves extremities.  Full Range of motion   Skin: Warm, intact  Neurological : unchanged      19 @ 07:  -  19 @ 07:00  --------------------------------------------------------  IN:    Enteral Tube Flush: 270 mL    insulin regular Infusion: 72.5 mL    IV PiggyBack: 500 mL    propofol Infusion: 18 mL    Vital High Protein: 562.5 mL  Total IN: 1423 mL    OUT:    Incontinent per Collection Ba mL  Total OUT: 1000 mL    Total NET: 423 mL      19 @ 07:01  -  19 @ 08:58  --------------------------------------------------------  IN:    insulin regular Infusion: 6 mL    Vital High Protein: 30 mL  Total IN: 36 mL    OUT:  Total OUT: 0 mL    Total NET: 36 mL    LABS:                        10.8   7.63  )-----------( 244      ( 2019 04:45 )             34.7                                               06-    140  |  100  |  27<H>  ----------------------------<  207<H>  3.7   |  26  |  0.7    Ca    9.7      2019 04:45  Mg     2.1     -    TPro  5.6<L>  /  Alb  3.0<L>  /  TBili  0.5  /  DBili  x   /  AST  9   /  ALT  23  /  AlkPhos  88  06-                                              LIVER FUNCTIONS - ( 2019 04:45 )  Alb: 3.0 g/dL / Pro: 5.6 g/dL / ALK PHOS: 88 U/L / ALT: 23 U/L / AST: 9 U/L / GGT: x                                                Culture - Sputum (collected 2019 16:13)  Source: .Sputum Sputum  Gram Stain (2019 07:43):    Numerous polymorphonuclear leukocytes seen per low power field    Few Squamous epithelial cells seen per low power field    No organisms seen  Preliminary Report (2019 18:14):    Normal Respiratory Alyssa present    Culture - Urine (collected 2019 04:30)  Source: .Urine Catheterized  Preliminary Report (2019 21:54):    >100,000 CFU/ml Gram Negative Rods    Culture - Blood (collected 2019 04:28)  Source: .Blood None  Gram Stain (2019 13:29):    Growth in aerobic and anaerobic bottles: Gram Positive Cocci in Pairs and    Chains  Preliminary Report (2019 13:30):    Growth in aerobic and anaerobic bottles: Gram Positive Cocci in Pairs and    Chains    "Due to technical problems, Proteus sp. will Not be reported as part of    the BCID panel until further notice"    ***Blood Panel PCR results on this specimen are available    approximately 3 hours after the Gram stain result.***    Gram stain, PCR, and/or culture results may not always    correspond due to difference in methodologies.    ************************************************************    This PCR assay was performed using [employer].    The following targets are tested for: Enterococcus,    vancomycin resistant enterococci, Listeria monocytogenes,    coagulase negative staphylococci, S. aureus,    methicillin resistant S. aureus, Streptococcus agalactiae    (Group B), S. pneumoniae, S. pyogenes (Group A),    Acinetobacter baumannii, Enterobacter cloacae, E. coli,    Klebsiella oxytoca, K. pneumoniae, Proteus sp.,    Serratia marcescens, Haemophilus influenzae,    Neisseria meningitidis, Pseudomonas aeruginosa, Candida    albicans, C. glabrata, C krusei, C parapsilosis,    C. tropicalis and the KPC resistance gene.  Organism: Blood Culture PCR (2019 10:37)  Organism: Blood Culture PCR (2019 10:37)                                                   Mode: AC/ CMV (Assist Control/ Continuous Mandatory Ventilation)  RR (machine): 12  TV (machine): 450  FiO2: 40  PEEP: 5  ITime: 1  MAP: 14  PIP: 27                                      ABG - ( 2019 05:08 )  pH, Arterial: 7.46  pH, Blood: x     /  pCO2: 46    /  pO2: 78    / HCO3: 32    / Base Excess: 7.6   /  SaO2: 96          MEDICATIONS  (STANDING):  amLODIPine   Tablet 10 milliGRAM(s) Oral daily  chlorhexidine 0.12% Liquid 15 milliLiter(s) Oral Mucosa two times a day  chlorhexidine 4% Liquid 1 Application(s) Topical <User Schedule>  docusate sodium Liquid 100 milliGRAM(s) Oral three times a day  hydrALAZINE 25 milliGRAM(s) Oral three times a day  insulin regular Infusion 4 Unit(s)/Hr (4 mL/Hr) IV Continuous <Continuous>  levETIRAcetam  Solution 1500 milliGRAM(s) Oral two times a day  nystatin    Suspension 850018 Unit(s) Oral two times a day  pantoprazole   Suspension 40 milliGRAM(s) Oral daily  propofol Infusion. 1 MICROgram(s)/kG/Min (0.473 mL/Hr) IV Continuous <Continuous>  senna 2 Tablet(s) Oral at bedtime  vancomycin  IVPB 1000 milliGRAM(s) IV Intermittent every 12 hours    MEDICATIONS  (PRN):  acetaminophen   Tablet .. 650 milliGRAM(s) Oral every 6 hours PRN Temp greater or equal to 38C (100.4F)      Xrays:  reviewed

## 2019-06-06 NOTE — PROGRESS NOTE ADULT - ASSESSMENT
HERNANDO HERNANDEZ 73yo W  Female BIBA to S side from home for elevated BP, slurred speech and L sided weakness.  The pt was noted to have a hypertensive emergency with R thalamic hemorrhagic  CVA. 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    Hospital course:   The pt underwent a stroke code and was intubated for airway protection and transferred [place] for further care. She remains in CCU and cont to be on respirator support.  The hospital course is complicated by seizures, prolonged QT,streptococcus bacteremia with positive blood cultures and positive GM negative rods in the urine.  She is on Ceftriaxone and on anti seizure medications.  She is being fed via OG tube.  When off sedation she follows simple commands.      INTERVAL HPI/OVERNIGHT EVENTS: remains intubated, + oral feeding tube, ff simple commands when taken off sedation, O Ceftriaxone, + blood and urine cultures, low grade temp    MEDICATIONS  (STANDING):  amLODIPine   Tablet 10 milliGRAM(s) Oral daily  cefepime   IVPB 2000 milliGRAM(s) IV Intermittent every 8 hours  chlorhexidine 0.12% Liquid 15 milliLiter(s) Oral Mucosa two times a day  chlorhexidine 4% Liquid 1 Application(s) Topical <User Schedule>  docusate sodium Liquid 100 milliGRAM(s) Oral three times a day  hydrALAZINE 25 milliGRAM(s) Oral three times a day  insulin regular Infusion 4 Unit(s)/Hr (4 mL/Hr) IV Continuous <Continuous>  levETIRAcetam  Solution 1500 milliGRAM(s) Oral two times a day  nystatin    Suspension 116155 Unit(s) Oral two times a day  pantoprazole   Suspension 40 milliGRAM(s) Oral daily  propofol Infusion. 1 MICROgram(s)/kG/Min (0.473 mL/Hr) IV Continuous <Continuous>  senna 2 Tablet(s) Oral at bedtime    MEDICATIONS  (PRN):  acetaminophen   Tablet .. 650 milliGRAM(s) Oral every 6 hours PRN Temp greater or equal to 38C (100.4F)      Allergies    No Known Allergies    Vital Signs Last 24 Hrs  T(C): 38.1 (06 Jun 2019 16:00), Max: 38.6 (05 Jun 2019 17:00)  T(F): 100.6 (06 Jun 2019 16:00), Max: 101.5 (05 Jun 2019 17:00)  HR: 58 (06 Jun 2019 16:00) (56 - 86)  BP: 136/62 (06 Jun 2019 15:00) (103/64 - 154/60)  BP(mean): 86 (06 Jun 2019 15:00) (75 - 98)  RR: 12 (06 Jun 2019 15:00) (11 - 24)  SpO2: 99% (06 Jun 2019 15:00) (95% - 100%)    PHYSICAL EXAM:      Constitutional: pt seen in CCU, sedated and intubated    Eyes: non icteric, closed    ENMT: +ET tube and + feeding tube    Neck:  short and supple    Respiratory:  harsh respirator BS    Cardiovascular:  S1S2    Gastrointestinal: globose soft and benign    Genitourinary:  no villatoro    Extremities: arthritic changes,     LABS:                        10.8   7.63  )-----------( 244      ( 06 Jun 2019 04:45 )             34.7     06-06    140  |  100  |  27<H>  ----------------------------<  207<H>  3.7   |  26  |  0.7  GFR 87  Ca    9.7      06 Jun 2019 04:45  Mg     2.1     06-06    TPro  5.6<L>  /  Alb  3.0<L>  /  TBili  0.5  /  DBili  x   /  AST  9   /  ALT  23  /  AlkPhos  88  06-06          RADIOLOGY & ADDITIONAL TESTS:                        Stroke code, R thalamic hemorrhagic CVA with extension to ventricles  sp Hypertensive emergency  [facility]  Streptococcal Bacteremia  Marcio negative UTI  Hx of HTN, ASHD  Hx of DLD  Hx of old hemorrhagic CVA about 8 yrs ago, dysarthria, gait instability  Hx of OA, DDD, DJD  Hx of GERD, HH, Diverticulosis  Hx of anxiety    pt remains in CCU, critically ill on respirator  Pt is being fed via oral feeding tube  pt is on ceftriaxone, blood culture 6/4 + for strept, urine + for Gm neg rods  pt on Keppra, VEEG show focal and generalized slowing  monitor electrolytes  pt is ff by pul critical care and neurology HERNANDO HERNANDEZ 73yo W  Female BIBA to S side from home for elevated BP, slurred speech and L sided weakness.  The pt was noted to have a hypertensive emergency with R thalamic hemorrhagic  CVA. 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    Hospital course:   The pt underwent a stroke code and was intubated for airway protection and transferred North for further care. She remains in CCU and cont to be on respirator support.  The hospital course is complicated by seizures, prolonged QT,streptococcus bacteremia with positive blood cultures and positive GM negative rods in the urine.  She is on Ceftriaxone and on anti seizure medications.  She is being fed via OG tube.  When off sedation she follows simple commands.      INTERVAL HPI/OVERNIGHT EVENTS: remains intubated, + oral feeding tube, ff simple commands when taken off sedation, O Ceftriaxone, + blood and urine cultures, low grade temp    MEDICATIONS  (STANDING):  amLODIPine   Tablet 10 milliGRAM(s) Oral daily  cefepime   IVPB 2000 milliGRAM(s) IV Intermittent every 8 hours  chlorhexidine 0.12% Liquid 15 milliLiter(s) Oral Mucosa two times a day  chlorhexidine 4% Liquid 1 Application(s) Topical <User Schedule>  docusate sodium Liquid 100 milliGRAM(s) Oral three times a day  hydrALAZINE 25 milliGRAM(s) Oral three times a day  insulin regular Infusion 4 Unit(s)/Hr (4 mL/Hr) IV Continuous <Continuous>  levETIRAcetam  Solution 1500 milliGRAM(s) Oral two times a day  nystatin    Suspension 282807 Unit(s) Oral two times a day  pantoprazole   Suspension 40 milliGRAM(s) Oral daily  propofol Infusion. 1 MICROgram(s)/kG/Min (0.473 mL/Hr) IV Continuous <Continuous>  senna 2 Tablet(s) Oral at bedtime    MEDICATIONS  (PRN):  acetaminophen   Tablet .. 650 milliGRAM(s) Oral every 6 hours PRN Temp greater or equal to 38C (100.4F)      Allergies    No Known Allergies    Vital Signs Last 24 Hrs  T(C): 38.1 (06 Jun 2019 16:00), Max: 38.6 (05 Jun 2019 17:00)  T(F): 100.6 (06 Jun 2019 16:00), Max: 101.5 (05 Jun 2019 17:00)  HR: 58 (06 Jun 2019 16:00) (56 - 86)  BP: 136/62 (06 Jun 2019 15:00) (103/64 - 154/60)  BP(mean): 86 (06 Jun 2019 15:00) (75 - 98)  RR: 12 (06 Jun 2019 15:00) (11 - 24)  SpO2: 99% (06 Jun 2019 15:00) (95% - 100%)    PHYSICAL EXAM:      Constitutional: pt seen in CCU, sedated and intubated    Eyes: non icteric, closed    ENMT: +ET tube and + feeding tube    Neck:  short and supple    Respiratory:  harsh respirator BS    Cardiovascular:  S1S2    Gastrointestinal: globose soft and benign    Genitourinary:  no villatoro    Extremities: arthritic changes,     LABS:                        10.8   7.63  )-----------( 244      ( 06 Jun 2019 04:45 )             34.7     06-06    140  |  100  |  27<H>  ----------------------------<  207<H>  3.7   |  26  |  0.7  GFR 87  Ca    9.7      06 Jun 2019 04:45  Mg     2.1     06-06    TPro  5.6<L>  /  Alb  3.0<L>  /  TBili  0.5  /  DBili  x   /  AST  9   /  ALT  23  /  AlkPhos  88  06-06    urine GM- rods  Blood streptococcus  sputum streptococcus      RADIOLOGY & ADDITIONAL TESTS:     EKG NSR 66/min   CXR  +ET tube, + feeding tube  CT of head R thalamic parenchymal hematoma 2.2 cm with intraventricular extension, mild ventricular enlargement, white mater changes                        Stroke code, R thalamic hemorrhagic CVA with extension to ventricles  sp Hypertensive emergency  [place]  Streptococcal Bacteremia  Marcio negative UTI  Hx of HTN, ASHD  Hx of DLD  Hx of old hemorrhagic CVA about 8 yrs ago, dysarthria, gait instability  Hx of OA, DDD, DJD  Hx of GERD, HH, Diverticulosis  Hx of anxiety    pt remains in CCU, critically ill on respirator  Pt is being fed via oral feeding tube  pt is on ceftriaxone, blood culture 6/4 + for strept, urine + for Gm neg rods  pt on Keppra, VEEG show focal and generalized slowing  monitor electrolytes  pt is ff by pul critical care and neurology

## 2019-06-07 NOTE — PROGRESS NOTE ADULT - SUBJECTIVE AND OBJECTIVE BOX
Patient is a 74y old  Female who presents with a chief complaint of left sided weakness and slurred speech (2019 08:25)        Over Night Events: No events overnight. Failed weaning yesterday. Not sedated now. Follows simple commands         ROS:  See HPI    PHYSICAL EXAM    ICU Vital Signs Last 24 Hrs  T(C): 37.7 (2019 07:00), Max: 38.2 (2019 15:00)  T(F): 99.9 (2019 07:00), Max: 100.8 (2019 15:00)  HR: 58 (2019 07:00) (50 - 86)  BP: 124/52 (2019 07:00) (95/45 - 159/78)  BP(mean): 74 (2019 07:00) (62 - 124)  ABP: --  ABP(mean): --  RR: 12 (2019 07:00) (11 - 24)  SpO2: 99% (2019 07:00) (95% - 100%)      General: Follows simple commnads  HEENT:          +ET  Lymphatic system: No cervical LN   Lungs: Bilateral BS  Cardiovascular: Regular   Gastrointestinal: Soft, Positive BS  Extremities: No clubbing.    Skin: Warm, intact      19 @ 07:01  -  19 @ 07:00  --------------------------------------------------------  IN:    Enteral Tube Flush: 380 mL    insulin regular Infusion: 68 mL    IV PiggyBack: 450 mL    propofol Infusion: 7.2 mL    Vital High Protein: 705 mL  Total IN: 1610.2 mL    OUT:    Incontinent per Collection Ba mL  Total OUT: 400 mL    Total NET: 1210.2 mL    LABS:                            9.9    6.77  )-----------( 262      ( 2019 04:27 )             32.2                                                   140  |  101  |  25<H>  ----------------------------<  186<H>  4.0   |  27  |  0.6<L>    Ca    9.6      2019 04:27  Mg     2.1         TPro  5.4<L>  /  Alb  2.8<L>  /  TBili  0.4  /  DBili  x   /  AST  10  /  ALT  18  /  AlkPhos  75  0607                                            LIVER FUNCTIONS - ( 2019 04:27 )  Alb: 2.8 g/dL / Pro: 5.4 g/dL / ALK PHOS: 75 U/L / ALT: 18 U/L / AST: 10 U/L / GGT: x           Culture - Blood (collected 2019 16:34)  Source: .Blood None  Preliminary Report (2019 03:01):    No growth to date.    Culture - Blood (collected 2019 11:13)  Source: .Blood None  Preliminary Report (2019 18:01):    No growth to date.    Culture - Sputum (collected 2019 16:13)  Source: .Sputum Sputum  Gram Stain (2019 07:43):    Numerous polymorphonuclear leukocytes seen per low power field    Few Squamous epithelial cells seen per low power field    No organisms seen  Final Report (2019 17:25):    Normal Respiratory Alyssa present                                     Mode: AC/ CMV (Assist Control/ Continuous Mandatory Ventilation)  RR (machine): 12  TV (machine): 450  FiO2: 40  PEEP: 5  ITime: 1  MAP: 11  PIP: 24                                      ABG - ( 2019 05:08 )  pH, Arterial: 7.55  pH, Blood: x     /  pCO2: 36    /  pO2: 86    / HCO3: 31    / Base Excess: 8.5   /  SaO2: 98          MEDICATIONS  (STANDING):  amLODIPine   Tablet 10 milliGRAM(s) Oral daily  cefepime   IVPB 2000 milliGRAM(s) IV Intermittent every 8 hours  chlorhexidine 0.12% Liquid 15 milliLiter(s) Oral Mucosa two times a day  chlorhexidine 4% Liquid 1 Application(s) Topical <User Schedule>  docusate sodium Liquid 100 milliGRAM(s) Oral three times a day  hydrALAZINE 25 milliGRAM(s) Oral three times a day  insulin regular Infusion 4 Unit(s)/Hr (4 mL/Hr) IV Continuous <Continuous>  levETIRAcetam  Solution 1500 milliGRAM(s) Oral two times a day  nystatin    Suspension 469614 Unit(s) Oral two times a day  pantoprazole   Suspension 40 milliGRAM(s) Oral daily  propofol Infusion 1 MICROgram(s)/kG/Min (0.473 mL/Hr) IV Continuous <Continuous>  senna 2 Tablet(s) Oral at bedtime    MEDICATIONS  (PRN):  acetaminophen   Tablet .. 650 milliGRAM(s) Oral every 6 hours PRN Temp greater or equal to 38C (100.4F)      Xrays:                                                                                     ECHO Patient is a 74y old  Female who presents with a chief complaint of left sided weakness and slurred speech (2019 08:25)        Over Night Events: No events overnight. Failed weaning yesterday. Not sedated now. Follows simple commands , s/p ID meena        ROS:  See HPI    PHYSICAL EXAM    ICU Vital Signs Last 24 Hrs  T(C): 37.7 (2019 07:00), Max: 38.2 (2019 15:00)  T(F): 99.9 (2019 07:00), Max: 100.8 (2019 15:00)  HR: 58 (2019 07:00) (50 - 86)  BP: 124/52 (2019 07:00) (95/45 - 159/78)  BP(mean): 74 (2019 07:00) (62 - 124)  RR: 12 (2019 07:00) (11 - 24)  SpO2: 99% (2019 07:00) (95% - 100%)      General: Follows simple commnads  HEENT:          +ET  Lymphatic system: No cervical LN   Lungs: Bilateral BS, dec both bases  Cardiovascular: Regular   Gastrointestinal: Soft, Positive BS  Extremities: No clubbing.    Skin: Warm, intact      19 @ 07:01  -  19 @ 07:00  --------------------------------------------------------  IN:    Enteral Tube Flush: 380 mL    insulin regular Infusion: 68 mL    IV PiggyBack: 450 mL    propofol Infusion: 7.2 mL    Vital High Protein: 705 mL  Total IN: 1610.2 mL    OUT:    Incontinent per Collection Ba mL  Total OUT: 400 mL    Total NET: 1210.2 mL    LABS:                            9.9    6.77  )-----------( 262      ( 2019 04:27 )             32.2                                                   140  |  101  |  25<H>  ----------------------------<  186<H>  4.0   |  27  |  0.6<L>    Ca    9.6      2019 04:27  Mg     2.1         TPro  5.4<L>  /  Alb  2.8<L>  /  TBili  0.4  /  DBili  x   /  AST  10  /  ALT  18  /  AlkPhos  75  0607                                            LIVER FUNCTIONS - ( 2019 04:27 )  Alb: 2.8 g/dL / Pro: 5.4 g/dL / ALK PHOS: 75 U/L / ALT: 18 U/L / AST: 10 U/L / GGT: x           Culture - Blood (collected 2019 16:34)  Source: .Blood None  Preliminary Report (2019 03:01):    No growth to date.    Culture - Blood (collected 2019 11:13)  Source: .Blood None  Preliminary Report (2019 18:01):    No growth to date.    Culture - Sputum (collected 2019 16:13)  Source: .Sputum Sputum  Gram Stain (2019 07:43):    Numerous polymorphonuclear leukocytes seen per low power field    Few Squamous epithelial cells seen per low power field    No organisms seen  Final Report (2019 17:25):    Normal Respiratory Alyssa present                                     Mode: AC/ CMV (Assist Control/ Continuous Mandatory Ventilation)  RR (machine): 12  TV (machine): 450  FiO2: 40  PEEP: 5  ITime: 1  MAP: 11  PIP: 24                                      ABG - ( 2019 05:08 )  pH, Arterial: 7.55  pH, Blood: x     /  pCO2: 36    /  pO2: 86    / HCO3: 31    / Base Excess: 8.5   /  SaO2: 98          MEDICATIONS  (STANDING):  amLODIPine   Tablet 10 milliGRAM(s) Oral daily  cefepime   IVPB 2000 milliGRAM(s) IV Intermittent every 8 hours  chlorhexidine 0.12% Liquid 15 milliLiter(s) Oral Mucosa two times a day  chlorhexidine 4% Liquid 1 Application(s) Topical <User Schedule>  docusate sodium Liquid 100 milliGRAM(s) Oral three times a day  hydrALAZINE 25 milliGRAM(s) Oral three times a day  insulin regular Infusion 4 Unit(s)/Hr (4 mL/Hr) IV Continuous <Continuous>  levETIRAcetam  Solution 1500 milliGRAM(s) Oral two times a day  nystatin    Suspension 455485 Unit(s) Oral two times a day  pantoprazole   Suspension 40 milliGRAM(s) Oral daily  propofol Infusion 1 MICROgram(s)/kG/Min (0.473 mL/Hr) IV Continuous <Continuous>  senna 2 Tablet(s) Oral at bedtime    MEDICATIONS  (PRN):  acetaminophen   Tablet .. 650 milliGRAM(s) Oral every 6 hours PRN Temp greater or equal to 38C (100.4F)      Xrays:  reviewed

## 2019-06-07 NOTE — CHART NOTE - NSCHARTNOTEFT_GEN_A_CORE
Registered Dietitian Follow-Up     Patient Profile Reviewed                           Yes [x]   No []     Nutrition History Previously Obtained        Yes [x]  No []       Pertinent Subjective Information:      Pertinent Medical Interventions: Intra cerebral Bleed/ following simple commands. Pulm: Daily SBT. If fails will be candidate for trach. ID: on abx. Poor prognosis.      Diet order: Vital HP at 30ml/h with Prosource TF TID via OGT      Anthropometrics:  - Ht. 160.02cm   - Wt. 79.2kg on 6/7 vs.  79.3kg on 6/4 vs. 78.8kg on 5/30  - %wt change  - BMI 30.8  - IBW 115lbs      Pertinent Lab Data: (6/7) RBC 3.96, Hg 9.9, Hct 32.2, BUN 25, creat 0.6, glu 186      Pertinent Meds: Protonix, Senna, Colace, Propofol      Physical Findings:  - Appearance: intubated, 1+ edema to L, R arm   - GI function: no symptoms noted, last BM 6/6  - Tubes: OGT  - Oral/Mouth cavity: NPO  - Skin: pressure ulcer stg II to upper midline lip      Nutrition Requirements (from RD note on 5/31)  Weight Used:  dosing  78.8kg     Estimated Energy Needs    Continue [x]  Adjust [] vs. 867-1103kcal (11-14kcal/kg CBW) vs. 1150-1308kcal (22-25kcal/kg IBW)  Adjusted Energy Recommendations:   kcal/day        Estimated Protein Needs    Continue [x]  Adjust []  ~105g (2g/kg IBW)     Adjusted Protein Recommendations:   gm/day        Estimated Fluid Needs        Continue []  Adjust [] per CCU team  Adjusted Fluid Recommendations:   mL/day     Nutrient Intake: NPO         [] Previous Nutrition Diagnosis:  Inadequate protein-energy intake            [] Ongoing          [] Resolved    [] No active nutrition diagnosis identified at this time        Nutrition Intervention: enteral and parenteral nutrition, vitamin and mineral supplement    Rec:      Goal/Expected Outcome: In 3 days TF to provide >85% est energy needs, but not exceed 105%     Indicator/Monitoring: energy intake, body composition, NFPF, glucose profile Registered Dietitian Follow-Up     Patient Profile Reviewed                           Yes [x]   No []     Nutrition History Previously Obtained        Yes [x]  No []       Pertinent Subjective Information: Per RN good tolerance to enteral nutrition at this time. Pt. off Propofol.      Pertinent Medical Interventions: Intra cerebral Bleed/ following simple commands. Pulm: Daily SBT. If fails will be candidate for trach. ID: on abx. Poor prognosis.      Diet order: Vital HP at 30ml/h with Prosource TF TID via OGT      Anthropometrics:  - Ht. 160.02cm   - Wt. 79.2kg on 6/7 vs.  79.3kg on 6/4 vs. 78.8kg on 5/30  - %wt change  - BMI 30.8  - IBW 115lbs      Pertinent Lab Data: (6/7) RBC 3.96, Hg 9.9, Hct 32.2, BUN 25, creat 0.6, glu 186      Pertinent Meds: Protonix, Senna, Colace      Physical Findings:  - Appearance: intubated, 1+ edema to L, R arm   - GI function: no symptoms noted, last BM 6/6  - Tubes: OGT  - Oral/Mouth cavity: NPO  - Skin: pressure ulcer stg II to upper midline lip      Nutrition Requirements (from RD note on 5/31)  Weight Used:  dosing  78.8kg     Estimated Energy Needs    Continue [x]  Adjust []  ~973-1154kcal  902-1053kcal (60-70% PSE 2010)+ new calculation today vs. 867-1103kcal (11-14kcal/kg CBW) vs. 1150-1308kcal (22-25kcal/kg IBW)  Adjusted Energy Recommendations:   kcal/day        Estimated Protein Needs    Continue [x]  Adjust []  ~105g (2g/kg IBW)     Adjusted Protein Recommendations:   gm/day        Estimated Fluid Needs        Continue []  Adjust [] per CCU team  Adjusted Fluid Recommendations:   mL/day     Nutrient Intake: Current TF regimen provides 840kcal, 95g protein- still meeting >85% estimated energy needs, however pt. with stg II pressure ulcer now        [] Previous Nutrition Diagnosis:  Inadequate protein-energy intake            [] Ongoing          [x] Resolved    However, pt. remains intuabted and with stg II pressure ulcer now        Nutrition Intervention: enteral and parenteral nutrition, vitamin and mineral supplement    Rec: Increase Vital HP to 40ml/h with Prosource TF BID for 1040kcal, 105g protein, 778ml free H2O (100% est calorie needs, 100% est protein needs)      Goal/Expected Outcome: In 3 days TF to provide >85% est energy needs, but not exceed 105%     Indicator/Monitoring: energy intake, body composition, NFPF, glucose profile

## 2019-06-07 NOTE — CONSULT NOTE ADULT - COMMENTS
on vent, sedated, does respond, FiO2 40%, whitish ET secretions, no diarrhea  no central lines, no villatoro catheter

## 2019-06-07 NOTE — PROGRESS NOTE ADULT - SUBJECTIVE AND OBJECTIVE BOX
HPI:  74 y.o F with PMH of HTN, HLD, DM, hemorrhagic stroke,? EVD placement at that time ( about 8 years ago)  with residual left side weakness and slurred speech, ambulated with cane, on ASA daily  Pt. brought to [facility] ED as a stroke code. At the time of presentation to [facility] ED A&Ox 2, baseline slurped speech with left sided weakness. /82, 55 HR, 18 RR, CTH prelim report findings with acute right thalamic hemorrhage with extension to right lateral and third ventricle. At [facility] ED Pt. Intubated 2/2 worsening of neuro exam for air way protection as per ED attending note Pt.  became more drowsy with decreased responsiveness. Pt. was transferred to ED-[place]. At time of NSGY eval Pt intubated, sedated on Propofol and Fentanyl, radial arterial line, she received keppra, zofran and mannitol in ED. Repeat CT scan stable from prior study as per neurosurgery. Platelets one unit ordered as per neurosurgery (30 May 2019 02:50)    Stroke Neurology Follow-Up:    Pt seen at bedside this AM, more alert and interactive.    Follows commands  Moving all four ext    Allergies    No Known Allergies    Intolerances      MEDICATIONS  (STANDING):  amLODIPine   Tablet 10 milliGRAM(s) Oral daily  cefepime   IVPB 2000 milliGRAM(s) IV Intermittent every 8 hours  chlorhexidine 0.12% Liquid 15 milliLiter(s) Oral Mucosa two times a day  chlorhexidine 4% Liquid 1 Application(s) Topical <User Schedule>  docusate sodium Liquid 100 milliGRAM(s) Oral three times a day  hydrALAZINE 25 milliGRAM(s) Oral three times a day  insulin regular Infusion 4 Unit(s)/Hr (4 mL/Hr) IV Continuous <Continuous>  levETIRAcetam  Solution 1500 milliGRAM(s) Oral two times a day  nystatin    Suspension 184070 Unit(s) Oral two times a day  pantoprazole   Suspension 40 milliGRAM(s) Oral daily  propofol Infusion 1 MICROgram(s)/kG/Min (0.473 mL/Hr) IV Continuous <Continuous>  senna 2 Tablet(s) Oral at bedtime    MEDICATIONS  (PRN):  acetaminophen   Tablet .. 650 milliGRAM(s) Oral every 6 hours PRN Temp greater or equal to 38C (100.4F)      LABS:                        9.9    6.77  )-----------( 262      ( 07 Jun 2019 04:27 )             32.2     06-07    140  |  101  |  25<H>  ----------------------------<  186<H>  4.0   |  27  |  0.6<L>    Ca    9.6      07 Jun 2019 04:27  Mg     2.1     06-07    TPro  5.4<L>  /  Alb  2.8<L>  /  TBili  0.4  /  DBili  x   /  AST  10  /  ALT  18  /  AlkPhos  75  06-07            Neuro Imaging:    EEG:     Echo:   Carotid Doppler: N/A  Telemetry: HPI:  74 y.o F with PMH of HTN, HLD, DM, hemorrhagic stroke,? EVD placement at that time ( about 8 years ago)  with residual left side weakness and slurred speech, ambulated with cane, on ASA daily  Pt. brought to [facility] ED as a stroke code. At the time of presentation to [facility] ED A&Ox 2, baseline slurped speech with left sided weakness. /82, 55 HR, 18 RR, CTH prelim report findings with acute right thalamic hemorrhage with extension to right lateral and third ventricle. At [facility] ED Pt. Intubated 2/2 worsening of neuro exam for air way protection as per ED attending note Pt.  became more drowsy with decreased responsiveness. Pt. was transferred to ED-[place]. At time of NSGY eval Pt intubated, sedated on Propofol and Fentanyl, radial arterial line, she received keppra, zofran and mannitol in ED. Repeat CT scan stable from prior study as per neurosurgery. Platelets one unit ordered as per neurosurgery (30 May 2019 02:50)    Stroke Neurology Follow-Up:    Pt seen at bedside this AM, more alert and interactive.    Follows commands  PERRL/EOMI  Moving all four ext but weaker on left  sensory grossly intact    Allergies    No Known Allergies    Intolerances      MEDICATIONS  (STANDING):  amLODIPine   Tablet 10 milliGRAM(s) Oral daily  cefepime   IVPB 2000 milliGRAM(s) IV Intermittent every 8 hours  chlorhexidine 0.12% Liquid 15 milliLiter(s) Oral Mucosa two times a day  chlorhexidine 4% Liquid 1 Application(s) Topical <User Schedule>  docusate sodium Liquid 100 milliGRAM(s) Oral three times a day  hydrALAZINE 25 milliGRAM(s) Oral three times a day  insulin regular Infusion 4 Unit(s)/Hr (4 mL/Hr) IV Continuous <Continuous>  levETIRAcetam  Solution 1500 milliGRAM(s) Oral two times a day  nystatin    Suspension 842166 Unit(s) Oral two times a day  pantoprazole   Suspension 40 milliGRAM(s) Oral daily  propofol Infusion 1 MICROgram(s)/kG/Min (0.473 mL/Hr) IV Continuous <Continuous>  senna 2 Tablet(s) Oral at bedtime    MEDICATIONS  (PRN):  acetaminophen   Tablet .. 650 milliGRAM(s) Oral every 6 hours PRN Temp greater or equal to 38C (100.4F)      LABS:                        9.9    6.77  )-----------( 262      ( 07 Jun 2019 04:27 )             32.2     06-07    140  |  101  |  25<H>  ----------------------------<  186<H>  4.0   |  27  |  0.6<L>    Ca    9.6      07 Jun 2019 04:27  Mg     2.1     06-07    TPro  5.4<L>  /  Alb  2.8<L>  /  TBili  0.4  /  DBili  x   /  AST  10  /  ALT  18  /  AlkPhos  75  06-07 HPI:  74 y.o F with PMH of HTN, HLD, DM, hemorrhagic stroke,? EVD placement at that time ( about 8 years ago)  with residual left side weakness and slurred speech, ambulated with cane, on ASA daily  Pt. brought to [facility] ED as a stroke code. At the time of presentation to [facility] ED A&Ox 2, baseline slurped speech with left sided weakness. /82, 55 HR, 18 RR, CTH prelim report findings with acute right thalamic hemorrhage with extension to right lateral and third ventricle. At [facility] ED Pt. Intubated 2/2 worsening of neuro exam for air way protection as per ED attending note Pt.  became more drowsy with decreased responsiveness. Pt. was transferred to ED-[place]. At time of NSGY eval Pt intubated, sedated on Propofol and Fentanyl, radial arterial line, she received keppra, zofran and mannitol in ED. Repeat CT scan stable from prior study as per neurosurgery. Platelets one unit ordered as per neurosurgery (30 May 2019 02:50)    Stroke Neurology Follow-Up:    Pt seen at bedside this AM, more alert and interactive.    Vital Signs Last 24 Hrs  T(C): 37.8 (07 Jun 2019 08:00), Max: 38.2 (06 Jun 2019 15:00)  T(F): 100 (07 Jun 2019 08:00), Max: 100.8 (06 Jun 2019 15:00)  HR: 64 (07 Jun 2019 08:00) (50 - 84)  BP: 133/70 (07 Jun 2019 08:00) (95/45 - 159/78)  BP(mean): 108 (07 Jun 2019 08:00) (62 - 124)  RR: 24 (07 Jun 2019 08:00) (11 - 24)  SpO2: 99% (07 Jun 2019 08:00) (95% - 100%)    Follows commands  PERRL/EOMI  Moving all four ext but weaker on left  sensory grossly intact    Allergies    No Known Allergies    Intolerances      MEDICATIONS  (STANDING):  amLODIPine   Tablet 10 milliGRAM(s) Oral daily  cefepime   IVPB 2000 milliGRAM(s) IV Intermittent every 8 hours  chlorhexidine 0.12% Liquid 15 milliLiter(s) Oral Mucosa two times a day  chlorhexidine 4% Liquid 1 Application(s) Topical <User Schedule>  docusate sodium Liquid 100 milliGRAM(s) Oral three times a day  hydrALAZINE 25 milliGRAM(s) Oral three times a day  insulin regular Infusion 4 Unit(s)/Hr (4 mL/Hr) IV Continuous <Continuous>  levETIRAcetam  Solution 1500 milliGRAM(s) Oral two times a day  nystatin    Suspension 281462 Unit(s) Oral two times a day  pantoprazole   Suspension 40 milliGRAM(s) Oral daily  propofol Infusion 1 MICROgram(s)/kG/Min (0.473 mL/Hr) IV Continuous <Continuous>  senna 2 Tablet(s) Oral at bedtime    MEDICATIONS  (PRN):  acetaminophen   Tablet .. 650 milliGRAM(s) Oral every 6 hours PRN Temp greater or equal to 38C (100.4F)      LABS:                        9.9    6.77  )-----------( 262      ( 07 Jun 2019 04:27 )             32.2     06-07    140  |  101  |  25<H>  ----------------------------<  186<H>  4.0   |  27  |  0.6<L>    Ca    9.6      07 Jun 2019 04:27  Mg     2.1     06-07    TPro  5.4<L>  /  Alb  2.8<L>  /  TBili  0.4  /  DBili  x   /  AST  10  /  ALT  18  /  AlkPhos  75  06-07

## 2019-06-07 NOTE — PROGRESS NOTE ADULT - ASSESSMENT
· Assessment		  Patient is a 74y old Female who presents with a chief complaint of left sided weakness and slurred speech   Currently admitted to medicine with the primary diagnosis of Hypertensive emergency      PLAN  74 y.o F with PMH of HTN, HLD, DM, hemorrhagic stroke,? EVD placement at that time ( about 8 years ago)  with residual left side weakness and slurred speech, ambulated with cane, on ASA daily  Pt. brought to [facility] ED as a stroke code    #)Acute right thalamic hemorrhagic stroke  -Sudden onset slurred speech and left sided weakness on presentation  -CT head showed right thalamic hemorrhage with extension into lateral and third ventricles  -Neurosurgery following.   -No acute neurosurgical intervention needed at this time since repeat CT head stable. No evidence of evolving hydrocephalus.  -WAS Clinically patient is improving. Awake and responding to commands. Moves all extremities. No repeat CT head needed. Failed SBP in 6/2.   - 6/4: Tonic clonic seizure lasting 10 min; 4mg ativan; on propofol  - CT Head rpt stable 6/4  - VEEG 24hr- no seizure. Bi-occipital sharps.   -Increase Keppra to 1500mg BID.   -Monitor and control SBP between 140-160. On amlodipine and hydralazine to control BP  -GAG and pupillary reflex present.  -CT chest and abdomen did not show any evidence of bleed  - RPT CT HEAD 6/7      #Streptococcus bacteremia 2/2 LLL pneumonia.   - T-max 100.8 overnight.  - Cultures sent- no growth to date.   -Rpt cultures 6/5 NO GROWTH TO DATE  - Cooling blanket PRN; possible central fever.   -Blood cultures grew streptococcus 6/4  - D/C Vancomycin (6/5)  - Cefepime 6/6    #UTI  - Gram negative rods on culture  - Rpt. cultures ordered.  - No villatoro  - Ceftriaxone 6/6    #Prolonged Qtc 2/2 inc intracranial pressure 2/2 Intracranial bleed  -Few sec of polymorphic V tach overnight on 5/31 likely tdp  -Magnesium and potassium repleted   -BMP and magnesium q8h. f/u 4pm  -If V tach recurs, give Magnesium 1gm STAT  -continue tele monitoring  - QTc 507 6/3.  - QTc normal 6/6.    #Hypophosphatemia  -f/u am phosphorus    #)DM   -insulin drip    #)HTN  -was on losartan, amlodipine and hydralazine  - BP on low side. D/C losartan.   -Maintain BP between 120-140mm Hg    GI PPX: protonix  DVT PPX: sequentials    DIET: Tube feeds  ACTIVITY: Bed rest    ================= CODE STATUS =================                  (X) FULL CODE     |     () DNR     |     () DNI

## 2019-06-07 NOTE — CONSULT NOTE ADULT - SUBJECTIVE AND OBJECTIVE BOX
HERNANDO HERNANDEZ  74y, Female  Allergy: No Known Allergies      HPI:  74 y.o F with PMH of HTN, HLD, DM, hemorrhagic stroke,? EVD placement at that time ( about 8 years ago)  with residual left side weakness and slurred speech, ambulated with cane, on ASA daily  Pt. brought to [facility] ED as a stroke code. At the time of presentation to [facility] ED A&Ox 2, baseline slurped speech with left sided weakness. /82, 55 HR, 18 RR, CTH prelim report findings with acute right thalamic hemorrhage with extension to right lateral and third ventricle. At [facility] ED Pt. Intubated 2/2 worsening of neuro exam for air way protection as per ED attending note Pt.  became more drowsy with decreased responsiveness. Pt. was transferred to ED-[place]. At time of NSGY eval Pt intubated, sedated on Propofol and Fentanyl, radial arterial line, she received keppra, zofran and mannitol in ED. Repeat CT scan stable from prior study as per neurosurgery.    ID called for BSI    FAMILY HISTORY:    PAST MEDICAL & SURGICAL HISTORY:  Diabetes mellitus  Stroke  Hypertension  No significant past surgical history        VITALS:  T(F): 99.7, Max: 100.8 (06-06-19 @ 15:00)  HR: 62  BP: 141/48  RR: 17Vital Signs Last 24 Hrs  T(C): 37.6 (07 Jun 2019 04:00), Max: 38.2 (06 Jun 2019 15:00)  T(F): 99.7 (07 Jun 2019 04:00), Max: 100.8 (06 Jun 2019 15:00)  HR: 62 (07 Jun 2019 05:00) (50 - 86)  BP: 141/48 (07 Jun 2019 05:00) (95/45 - 159/78)  BP(mean): 80 (07 Jun 2019 05:00) (62 - 124)  RR: 17 (07 Jun 2019 05:00) (11 - 24)  SpO2: 99% (07 Jun 2019 05:00) (95% - 100%)    TESTS & MEASUREMENTS:                        9.9    6.77  )-----------( 262      ( 07 Jun 2019 04:27 )             32.2     06-07    140  |  101  |  25<H>  ----------------------------<  186<H>  4.0   |  27  |  0.6<L>    Ca    9.6      07 Jun 2019 04:27  Mg     2.1     06-07    TPro  5.4<L>  /  Alb  2.8<L>  /  TBili  0.4  /  DBili  x   /  AST  10  /  ALT  18  /  AlkPhos  75  06-07    LIVER FUNCTIONS - ( 07 Jun 2019 04:27 )  Alb: 2.8 g/dL / Pro: 5.4 g/dL / ALK PHOS: 75 U/L / ALT: 18 U/L / AST: 10 U/L / GGT: x             Culture - Blood (collected 06-05-19 @ 16:34)  Source: .Blood None  Preliminary Report (06-07-19 @ 03:01):    No growth to date.    Culture - Blood (collected 06-05-19 @ 11:13)  Source: .Blood None  Preliminary Report (06-06-19 @ 18:01):    No growth to date.    Culture - Sputum (collected 06-04-19 @ 16:13)  Source: .Sputum Sputum  Gram Stain (06-05-19 @ 07:43):    Numerous polymorphonuclear leukocytes seen per low power field    Few Squamous epithelial cells seen per low power field    No organisms seen  Final Report (06-06-19 @ 17:25):    Normal Respiratory Alyssa present    Culture - Urine (collected 06-04-19 @ 04:30)  Source: .Urine Catheterized  Final Report (06-06-19 @ 17:12):    >100,000 CFU/ml Escherichia coli  Organism: Escherichia coli (06-06-19 @ 17:12)  Organism: Escherichia coli (06-06-19 @ 17:12)      -  Amikacin: S <=16      -  Ampicillin: R >16 These ampicillin results predict results for amoxicillin      -  Ampicillin/Sulbactam: R >16/8      -  Aztreonam: S <=4      -  Cefepime: S <=4      -  Cefoxitin: S <=8      -  Ceftriaxone: S <=1 Enterobacter, Citrobacter, and Serratia may develop resistance during prolonged therapy      -  Ciprofloxacin: S <=1      -  Ertapenem: S <=1      -  Gentamicin: S <=4      -  Imipenem: S <=1      -  Levofloxacin: S <=2      -  Meropenem: S <=1      -  Nitrofurantoin: S <=32 Should not be used to treat pyelonephritis      -  Piperacillin/Tazobactam: S <=16      -  Tigecycline: S <=2      -  Tobramycin: S <=4      -  Trimethoprim/Sulfamethoxazole: R >2/38      Method Type: MARI    Culture - Blood (collected 06-04-19 @ 04:28)  Source: .Blood None  Gram Stain (06-05-19 @ 13:29):    Growth in aerobic and anaerobic bottles: Gram Positive Cocci in Pairs and    Chains  Final Report (06-06-19 @ 13:02):    Growth in aerobic and anaerobic bottles: Alpha hemolytic strep    (not Strep. pneumoniae or Enterococcus)    Single set isolate, possible contaminant. Contact    Microbiology if susceptibility testing clinically    indicated.    "Due to technical problems, Proteus sp. will Not be reported as part of    the BCID panel until further notice"    ***Blood Panel PCR results on this specimen are available    approximately 3 hours after the Gram stain result.***    Gram stain, PCR, and/or culture results may not always    correspond due to difference in methodologies.    ************************************************************    This PCR assay was performed using [employer].    The following targets are tested for: Enterococcus,    vancomycin resistant enterococci, Listeria monocytogenes,    coagulase negative staphylococci, S. aureus,    methicillin resistant S. aureus, Streptococcus agalactiae    (Group B), S. pneumoniae, S. pyogenes (Group A),    Acinetobacter baumannii, Enterobacter cloacae, E. coli,    Klebsiella oxytoca, K. pneumoniae, Proteus sp.,    Serratia marcescens, Haemophilus influenzae,    Neisseria meningitidis, Pseudomonas aeruginosa, Candida    albicans, C. glabrata, C krusei, C parapsilosis,    C. tropicalis and the KPC resistance gene.  Organism: Blood Culture PCR (06-06-19 @ 13:02)  Organism: Blood Culture PCR (06-06-19 @ 13:02)      -  Streptococcus sp. (Not Grp A, B or S pneumoniae): Detec      Method Type: PCR    Culture - Sputum (collected 06-02-19 @ 06:24)  Source: .Sputum Sputum  Gram Stain (06-02-19 @ 23:40):    Numerous polymorphonuclear leukocytes per low power field    Rare Squamous epithelial cells per low power field    Few Gram positive cocci in pairs per oil power field    Few Gram Negative Rods per oil power field  Final Report (06-04-19 @ 21:03):    Moderate Streptococcus constellatus "Susceptibilities not performed"    Normal Respiratory Alyssa present    Culture - Urine (collected 06-01-19 @ 12:18)  Source: .Urine Catheterized  Final Report (06-02-19 @ 16:18):    No growth    Culture - Blood (collected 06-01-19 @ 12:10)  Source: .Blood None  Final Report (06-06-19 @ 20:00):    No growth at 5 days.            RADIOLOGY & ADDITIONAL TESTS:    ANTIBIOTICS:  cefepime   IVPB 2000 milliGRAM(s) IV Intermittent every 8 hours  nystatin    Suspension 907986 Unit(s) Oral two times a day

## 2019-06-07 NOTE — PROGRESS NOTE ADULT - ASSESSMENT
Impression:  Intra cerebral Bleed/ following simple commands  Seizure  Hypertensive emergency  HO hemorrhagic stroke  penumonia aspiration?  UTI  Strep Bacteremia: VAP      PLAN:    CNS: SAT. Continue with keppra for now.    HEENT: Oral care    PULMONARY:  HOB @ 30 degrees. Daily SBT. If fails will be candidate for trach     CARDIOVASCULAR: i=o. Maintain BP <140. Continue with blood pressure medicine.     GI: GI prophylaxis.  Feeding through OG tube.    RENAL:  Follow up lytes.  Correct as needed    INFECTIOUS DISEASE: Follow up cultures. Continue with cefepime for now. Repeat Blood culture. ID follow up.     HEMATOLOGICAL:   DVT prophylaxis.    ENDOCRINE:  Follow up FS.     MUSCULOSKELETAL: Bed rest    Poor prognosis Impression:    Intra cerebral Bleed/ following simple commands  Seizure  Hypertensive emergency  HO hemorrhagic stroke  penumonia aspiration?  UTI  Strep Bacteremia      PLAN:    CNS: SAT. Continue with keppra for now.    HEENT: Oral care    PULMONARY:  HOB @ 30 degrees. Daily SBT. If fails will be candidate for trach     CARDIOVASCULAR: i=o. Maintain BP <140. Continue with blood pressure medicine.     GI: GI prophylaxis.  Feeding through OG tube.    RENAL:  Follow up lytes.  Correct as needed    INFECTIOUS DISEASE: abx per ID    HEMATOLOGICAL:   DVT prophylaxis.    ENDOCRINE:  Follow up FS.     MUSCULOSKELETAL: Bed rest    Poor prognosis

## 2019-06-07 NOTE — PROGRESS NOTE ADULT - ASSESSMENT
HERNANDO HERNANDEZ 73yo W  Female BIBA to S side from home for elevated BP, slurred speech and L sided weakness.  The pt was noted to have a hypertensive emergency with R thalamic hemorrhagic  CVA. 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    Hospital course:   The pt underwent a stroke code and was intubated for airway protection and transferred [place] for further care. She remains in CCU and cont to be on respirator support.  The hospital course is complicated by seizures, prolonged QT,streptococcus bacteremia with positive blood cultures and positive GM negative rods in the urine.  She is on Ceftriaxone and on anti seizure medications.  She is being fed via OG tube.  When off sedation she follows simple commands.      INTERVAL HPI/OVERNIGHT EVENTS: remains intubated, + oral feeding tube, ff simple commands when taken off sedation, on Ceftriaxone, + blood and urine cultures, low grade temp, ff by neuro and pul-critical care and ID    MEDICATIONS  (STANDING):  amLODIPine   Tablet 10 milliGRAM(s) Oral daily  cefepime   IVPB 2000 milliGRAM(s) IV Intermittent every 8 hours D/C  Rocephine 2 gms q 24  chlorhexidine 0.12% Liquid 15 milliLiter(s) Oral Mucosa two times a day  chlorhexidine 4% Liquid 1 Application(s) Topical <User Schedule>  docusate sodium Liquid 100 milliGRAM(s) Oral three times a day  hydrALAZINE 25 milliGRAM(s) Oral three times a day  insulin regular Infusion 4 Unit(s)/Hr (4 mL/Hr) IV Continuous <Continuous>  levETIRAcetam  Solution 1500 milliGRAM(s) Oral two times a day  nystatin    Suspension 900520 Unit(s) Oral two times a day  pantoprazole   Suspension 40 milliGRAM(s) Oral daily  propofol Infusion. 1 MICROgram(s)/kG/Min (0.473 mL/Hr) IV Continuous <Continuous>  senna 2 Tablet(s) Oral at bedtime    MEDICATIONS  (PRN):  acetaminophen   Tablet .. 650 milliGRAM(s) Oral every 6 hours PRN Temp greater or equal to 38C (100.4F)      Allergies  NKDA     T(F): 99.7  HR: 74  BP: 131/87    RR: 26  SpO2: 97%     PHYSICAL EXAM:      Constitutional: pt seen in CCU, sedated and intubated    Eyes: non icteric, closed, but attempts to open eyes    ENMT: +ET tube and + feeding tube    Neck:  short and supple    Respiratory:  harsh respirator BS    Cardiovascular:  S1S2    Gastrointestinal: globose soft and benign    Genitourinary:  no villatoro    Extremities: arthritic changes,     LABS:                        9.9   6.7  )-----------( 262             32    140  |  101  |  25  ----------------------------<  186  4     |  267 |  0.6  GFR 87, 91  Ca    9.7       Mg     2.1         TPro  5.6<L>  /  Alb  3.0<L>  /  TBili  0.5  /  DBili  x   /  AST  9   /  ALT  23  /  AlkPhos  88  06-06    urine GM- rods  Blood streptococcus  sputum streptococcus      RADIOLOGY & ADDITIONAL TESTS:     EKG NSR 66/min   CXR  +ET tube, + feeding tube  CT of head R thalamic parenchymal hematoma 2.2 cm with intraventricular extension, mild ventricular enlargement, white mater changes                        Stroke code, R thalamic hemorrhagic CVA with extension to ventricles  sp Hypertensive emergency  Szeizu  Streptococcal Bacteremia  Marcio negative UTI  Hx of HTN, ASHD  Hx of DLD  Hx of old hemorrhagic CVA about 8 yrs ago, dysarthria, gait instability  Hx of OA, DDD, DJD  Hx of GERD, HH, Diverticulosis  Hx of anxiety    pt remains in CCU, critically ill on respirator  Pt is being fed via oral feeding tube  pt is on ceftriaxone, blood culture 6/4 + for strept, source probable  LLL  strept PNA, + sputa for strept and CT shows infiltrate,  urine + for Gm neg rods/asymptomatic pyuria  pt on Keppra 1500mg q12, VEEG show focal and generalized slowing  monitor electrolytes  pt is ff by pul critical care, neurology, ID  had extensive talk with daughter and son in law at the bedside yesterday

## 2019-06-07 NOTE — PROGRESS NOTE ADULT - ASSESSMENT
Impression:  74 y.o F with PMH of HTN, HLD, DM, p.w left sided weakness and slurred speech and was found to have a Right thalamic hemorrhage and seizure like activity    Plan:  Continue Keppra 1500 mg BID  repeat CTH today  Airway management per CCU team  keep mag >2 Impression:  74 y.o F with PMH of HTN, HLD, DM, p.w left sided weakness and slurred speech and was found to have a Right thalamic hemorrhage and seizure like activity    Plan:  Continue Keppra 1500 mg BID  repeat CTH today  Airway management per CCU team  keep mag >2  keep SBP <160

## 2019-06-07 NOTE — CONSULT NOTE ADULT - ASSESSMENT
74 y.o F with PMH of HTN, HLD, DM, hemorrhagic stroke,? EVD placement at that time ( about 8 years ago)  with residual left side weakness and slurred speech, ambulated with cane, on ASA daily  Pt. brought to [facility] ED as a stroke code.    WBC 6.7  BCx 6/1 NG  BCx6/4 Strep  BCx 6/5 NG  No pyuria  UCx  6/1 NG  UCx 6/4 E coli  Sputa 6/2 moderate PMNs, Strep constellatus  Sputa 6/4 normal  CXR bibasilar opacities    IMPRESSION:  Transient Strep bacteremia secondary to possibly LLL Strep PNA ( as Sputa and BCx positive for Strep constellatus ). Although CXR not impressive for focal consolidation, CT from 5/30 revealed a LLL consolidation  No endocarditis secondary to Strep with septic emboli to CNS with the thalamic bleed  No pyelonephritis ( no pyuria ). Asymptomatic bacteruria secondary to E coli ( will not betsy it)  Acute right thalamic hemorrhagic stroke with mild shift with repeat CT head showing an improvement  Seizures    RECOMMENDATIONS:  Rocephin 2 gm iv q24h  D/c Cefepime

## 2019-06-08 NOTE — PROGRESS NOTE ADULT - SUBJECTIVE AND OBJECTIVE BOX
Neurology Follow up note  Patient seen and examined at bedside, she remains mechanically vented and has been off sedation for >48 hrs. She is now following commands     Vital Signs Last 24 Hrs  T(C): 37.6 (08 Jun 2019 00:00), Max: 38.1 (07 Jun 2019 12:00)  T(F): 99.6 (08 Jun 2019 00:00), Max: 100.6 (07 Jun 2019 12:00)  HR: 78 (08 Jun 2019 00:00) (52 - 79)  BP: 166/85 (08 Jun 2019 00:00) (94/51 - 166/85)  BP(mean): 101 (08 Jun 2019 00:00) (62 - 112)  RR: 26 (08 Jun 2019 00:00) (11 - 27)  SpO2: 98% (08 Jun 2019 00:00) (95% - 100%)          Neurological Exam:   Mental status: Awake, alert and oriented x3.  Recent and remote memory intact.  Naming, repetition and comprehension intact.  Attention/concentration intact.  No dysarthria, no aphasia.  Fund of knowledge appropriate.    Cranial nerves: Fundoscopic exam demonstrated no abnormalities, pupils equally round and reactive to light, visual fields full, no nystagmus, extraocular muscles intact, V1 through V3 intact bilaterally and symmetric, face symmetric, hearing intact to finger rub, palate elevation symmetric, tongue was midline, sternocleidomastoid/shoulder shrug strength bilaterally 5/5.    Motor:  Normal bulk and tone, strength 5/5 in bilateral upper and lower extremities.   strength 5/5.  Rapid alternating movements intact and symmetric.   Sensation: Intact to light touch, proprioception, and pinprick.  No neglect.   Coordination: No dysmetria on finger-to-nose and heel-to-shin.  No clumsiness.  Reflexes: 2+ in upper and lower extremities, downgoing toes bilaterally  Gait: Narrow and steady. No ataxia.  Romberg negative    Medications  acetaminophen   Tablet .. 650 milliGRAM(s) Oral every 6 hours PRN  amLODIPine   Tablet 10 milliGRAM(s) Oral daily  cefepime   IVPB 2000 milliGRAM(s) IV Intermittent every 8 hours  chlorhexidine 0.12% Liquid 15 milliLiter(s) Oral Mucosa two times a day  chlorhexidine 4% Liquid 1 Application(s) Topical <User Schedule>  docusate sodium Liquid 100 milliGRAM(s) Oral three times a day  hydrALAZINE 25 milliGRAM(s) Oral three times a day  insulin regular Infusion 4 Unit(s)/Hr IV Continuous <Continuous>  levETIRAcetam  Solution 1500 milliGRAM(s) Oral two times a day  nystatin    Suspension 302552 Unit(s) Oral two times a day  pantoprazole   Suspension 40 milliGRAM(s) Oral daily  propofol Infusion 1 MICROgram(s)/kG/Min IV Continuous <Continuous>  senna 2 Tablet(s) Oral at bedtime      Lab      Radiology Neurology Follow up note  Patient seen and examined at bedside, she remains mechanically vented and has been off sedation for >48 hrs. Her mental status has improved she is now following commands .Noted improvement in the left sided weakness since prior exam. No acute overnight events. No reported clinical seizure overnight.    Vital Signs Last 24 Hrs  T(C): 37.6 (08 Jun 2019 00:00), Max: 38.1 (07 Jun 2019 12:00)  T(F): 99.6 (08 Jun 2019 00:00), Max: 100.6 (07 Jun 2019 12:00)  HR: 78 (08 Jun 2019 00:00) (52 - 79)  BP: 166/85 (08 Jun 2019 00:00) (94/51 - 166/85)  BP(mean): 101 (08 Jun 2019 00:00) (62 - 112)  RR: 26 (08 Jun 2019 00:00) (11 - 27)  SpO2: 98% (08 Jun 2019 00:00) (95% - 100%)          Neurological Exam:   Mental status: Intubated not sedated , she is awake, following commands  Cranial nerves: pupils 2mm sluggish, eyes midline, able to track +gag breathing over the vent  Motor:  moving all 4 extremities  right >left side  Sensation: responds to pain all 4 extremities  B/L upgoing toes      Medications  acetaminophen   Tablet .. 650 milliGRAM(s) Oral every 6 hours PRN  amLODIPine   Tablet 10 milliGRAM(s) Oral daily  cefepime   IVPB 2000 milliGRAM(s) IV Intermittent every 8 hours  chlorhexidine 0.12% Liquid 15 milliLiter(s) Oral Mucosa two times a day  chlorhexidine 4% Liquid 1 Application(s) Topical <User Schedule>  docusate sodium Liquid 100 milliGRAM(s) Oral three times a day  hydrALAZINE 25 milliGRAM(s) Oral three times a day  insulin regular Infusion 4 Unit(s)/Hr IV Continuous <Continuous>  levETIRAcetam  Solution 1500 milliGRAM(s) Oral two times a day  nystatin    Suspension 972422 Unit(s) Oral two times a day  pantoprazole   Suspension 40 milliGRAM(s) Oral daily  propofol Infusion 1 MICROgram(s)/kG/Min IV Continuous <Continuous>  senna 2 Tablet(s) Oral at bedtime      Lab  Magnesium, Serum in AM (06.07.19 @ 04:27)    Magnesium, Serum: 2.1 mg/dL  Comprehensive Metabolic Panel in AM (06.07.19 @ 04:27)    Sodium, Serum: 140 mmol/L    Potassium, Serum: 4.0 mmol/L    Chloride, Serum: 101 mmol/L    Carbon Dioxide, Serum: 27 mmol/L    Anion Gap, Serum: 12 mmol/L    Blood Urea Nitrogen, Serum: 25 mg/dL    Creatinine, Serum: 0.6 mg/dL    Glucose, Serum: 186 mg/dL    Calcium, Total Serum: 9.6 mg/dL    Protein Total, Serum: 5.4 g/dL    Albumin, Serum: 2.8 g/dL    Bilirubin Total, Serum: 0.4 mg/dL    Alkaline Phosphatase, Serum: 75 U/L    Aspartate Aminotransferase (AST/SGOT): 10 U/L    Alanine Aminotransferase (ALT/SGPT): 18 U/L    eGFR if Non : 91: Interpretative comment    Complete Blood Count + Automated Diff in AM (06.07.19 @ 04:27)    WBC Count: 6.77 K/uL    RBC Count: 3.96 M/uL    Hemoglobin: 9.9 g/dL    Hematocrit: 32.2 %    Mean Cell Volume: 81.3 fL    Mean Cell Hemoglobin: 25.0 pg    Mean Cell Hemoglobin Conc: 30.7 g/dL    Red Cell Distrib Width: 14.3 %    Platelet Count - Automated: 262 K/uL    Auto Neutrophil #: 4.79 K/uL    Auto Lymphocyte #: 0.95 K/uL    Auto Monocyte #: 0.66 K/uL    Auto Eosinophil #: 0.12 K/uL    Auto Basophil #: 0.06 K/uL    Auto Neutrophil %: 70.8: Differential percentages must be correlated with absolute numbers for  clinical significance. %    Auto Lymphocyte %: 14.1 %    Auto Monocyte %: 9.7 %    Auto Eosinophil %: 1.8 %    Auto Basophil %: 0.9 %      Radiology  < from: CT Head No Cont (06.07.19 @ 17:01) >  INTERPRETATION:  Clinical History / Reason for exam: Follow-up   intracranial hemorrhage.    TECHNIQUE: Noncontrast head CT. Contiguous CT axial images of the head   from the base tothe vertex.    COMPARISON: CT head 6/4/2019    FINDINGS/  IMPRESSION:    1.  Slight interval decreased size of the right thalamic parenchymal   hematoma measuring about 2 cm, previously 2.2 cm.    2.  Slight interval decrease in the layering intraventricular hemorrhage   and the mild ventricular enlargement.    3.  Remainder of exam is stable. No new intracranial hemorrhage. Neurology Follow up note  Patient seen and examined at bedside, she remains mechanically vented and has been off sedation for >48 hrs. Her mental status has improved she is now following commands .Noted improvement in the left sided weakness since prior exam. No acute overnight events. No reported clinical seizure overnight.    Vital Signs Last 24 Hrs  T(C): 37.6 (08 Jun 2019 00:00), Max: 38.1 (07 Jun 2019 12:00)  T(F): 99.6 (08 Jun 2019 00:00), Max: 100.6 (07 Jun 2019 12:00)  HR: 78 (08 Jun 2019 00:00) (52 - 79)  BP: 166/85 (08 Jun 2019 00:00) (94/51 - 166/85)  BP(mean): 101 (08 Jun 2019 00:00) (62 - 112)  RR: 26 (08 Jun 2019 00:00) (11 - 27)  SpO2: 98% (08 Jun 2019 00:00) (95% - 100%)    Neurological Exam:   Mental status: Intubated not sedated , she is awake, following commands  Cranial nerves: pupils 2mm sluggish, eyes midline, able to track +gag breathing over the vent  Motor:  moving all 4 extremities  right >left side  Sensation: responds to pain all 4 extremities  B/L upgoing toes    Medications  acetaminophen   Tablet .. 650 milliGRAM(s) Oral every 6 hours PRN  amLODIPine   Tablet 10 milliGRAM(s) Oral daily  cefepime   IVPB 2000 milliGRAM(s) IV Intermittent every 8 hours  chlorhexidine 0.12% Liquid 15 milliLiter(s) Oral Mucosa two times a day  chlorhexidine 4% Liquid 1 Application(s) Topical <User Schedule>  docusate sodium Liquid 100 milliGRAM(s) Oral three times a day  hydrALAZINE 25 milliGRAM(s) Oral three times a day  insulin regular Infusion 4 Unit(s)/Hr IV Continuous <Continuous>  levETIRAcetam  Solution 1500 milliGRAM(s) Oral two times a day  nystatin    Suspension 626074 Unit(s) Oral two times a day  pantoprazole   Suspension 40 milliGRAM(s) Oral daily  propofol Infusion 1 MICROgram(s)/kG/Min IV Continuous <Continuous>  senna 2 Tablet(s) Oral at bedtime      Lab  Magnesium, Serum in AM (06.07.19 @ 04:27)    Magnesium, Serum: 2.1 mg/dL  Comprehensive Metabolic Panel in AM (06.07.19 @ 04:27)    Sodium, Serum: 140 mmol/L    Potassium, Serum: 4.0 mmol/L    Chloride, Serum: 101 mmol/L    Carbon Dioxide, Serum: 27 mmol/L    Anion Gap, Serum: 12 mmol/L    Blood Urea Nitrogen, Serum: 25 mg/dL    Creatinine, Serum: 0.6 mg/dL    Glucose, Serum: 186 mg/dL    Calcium, Total Serum: 9.6 mg/dL    Protein Total, Serum: 5.4 g/dL    Albumin, Serum: 2.8 g/dL    Bilirubin Total, Serum: 0.4 mg/dL    Alkaline Phosphatase, Serum: 75 U/L    Aspartate Aminotransferase (AST/SGOT): 10 U/L    Alanine Aminotransferase (ALT/SGPT): 18 U/L    eGFR if Non : 91: Interpretative comment    Complete Blood Count + Automated Diff in AM (06.07.19 @ 04:27)    WBC Count: 6.77 K/uL    RBC Count: 3.96 M/uL    Hemoglobin: 9.9 g/dL    Hematocrit: 32.2 %    Mean Cell Volume: 81.3 fL    Mean Cell Hemoglobin: 25.0 pg    Mean Cell Hemoglobin Conc: 30.7 g/dL    Red Cell Distrib Width: 14.3 %    Platelet Count - Automated: 262 K/uL    Auto Neutrophil #: 4.79 K/uL    Auto Lymphocyte #: 0.95 K/uL    Auto Monocyte #: 0.66 K/uL    Auto Eosinophil #: 0.12 K/uL    Auto Basophil #: 0.06 K/uL    Auto Neutrophil %: 70.8: Differential percentages must be correlated with absolute numbers for  clinical significance. %    Auto Lymphocyte %: 14.1 %    Auto Monocyte %: 9.7 %    Auto Eosinophil %: 1.8 %    Auto Basophil %: 0.9 %      Radiology  < from: CT Head No Cont (06.07.19 @ 17:01) >  INTERPRETATION:  Clinical History / Reason for exam: Follow-up   intracranial hemorrhage.    TECHNIQUE: Noncontrast head CT. Contiguous CT axial images of the head   from the base tothe vertex.    COMPARISON: CT head 6/4/2019    FINDINGS/  IMPRESSION:    1.  Slight interval decreased size of the right thalamic parenchymal   hematoma measuring about 2 cm, previously 2.2 cm.    2.  Slight interval decrease in the layering intraventricular hemorrhage   and the mild ventricular enlargement.    3.  Remainder of exam is stable. No new intracranial hemorrhage.

## 2019-06-08 NOTE — PROGRESS NOTE ADULT - ASSESSMENT
74 y.o F with PMH of HTN, HLD, DM, p.w left sided weakness and slurred speech and was found to have a Right thalamic hemorrhage and seizure like activity. Patient remains mechanically vented with improved mental status , now following commands. Left sided weakness persists but improved from prior exam. S/p VEEG which was negative for seizures but was abnormal due to right posterior quadrant  sharps. Repeat CTH revealed  improving right thalamic hematoma and IVH. No reported clinical seizure.    Plan:  Airway management per CCU team  Continue Keppra 1500 mg BID  Airway management per CCU team  keep mag >2  keep SBP <160  Neuro attending note will follow 74 y.o F with PMH of HTN, HLD, DM, p.w left sided weakness and slurred speech and was found to have a Right thalamic hemorrhage and seizure like activity. Patient remains mechanically vented with improved mental status , now following commands. Left sided weakness persists but improved from prior exam. S/p VEEG which was negative for seizures but was abnormal due to right posterior quadrant  sharps. Repeat CTH revealed  improving right thalamic hematoma and IVH. No reported clinical seizure.    Plan:  Airway management per CCU team  Continue Keppra 1500 mg BID  Airway management per CCU team  keep mag >2  keep SBP <160  consider vent weaning trial if possible

## 2019-06-08 NOTE — PROGRESS NOTE ADULT - ASSESSMENT
HERNANDO HERNANDEZ 75yo W  Female BIBA to S side from home for elevated BP, slurred speech and L sided weakness.  The pt was noted to have a hypertensive emergency with R thalamic hemorrhagic  CVA. 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    Hospital course:   The pt underwent a stroke code and was intubated for airway protection and transferred North for further care. She remains in CCU and cont to be on respirator support.  The hospital course is complicated by seizures, prolonged QT,streptococcus bacteremia with positive blood cultures and positive GM negative rods in the urine.  She is on Ceftriaxone and on anti seizure medications.  She is being fed via OG tube.  When off sedation she follows simple commands.      INTERVAL HPI/OVERNIGHT EVENTS: remains intubated, diff in weaning, may need trach next week if unable to extubate,  but off sedation x 48 hrs, ff simple commands, seems to have min improvement of L sided weakness, no further seizures noted, + oral tube feedings    MEDICATIONS  (STANDING):  amLODIPine   Tablet 10 milliGRAM(s) Oral daily  cefepime   IVPB 2000 milliGRAM(s) IV Intermittent every 8 hours D/C  Rocephine 2 gms q 24  chlorhexidine 0.12% Liquid 15 milliLiter(s) Oral Mucosa two times a day  chlorhexidine 4% Liquid 1 Application(s) Topical <User Schedule>  docusate sodium Liquid 100 milliGRAM(s) Oral three times a day  hydrALAZINE 25 milliGRAM(s) Oral three times a day  insulin regular Infusion 4 Unit(s)/Hr (4 mL/Hr) IV Continuous <Continuous>  levETIRAcetam  Solution 1500 milliGRAM(s) Oral two times a day  nystatin    Suspension 383303 Unit(s) Oral two times a day  pantoprazole   Suspension 40 milliGRAM(s) Oral daily  propofol Infusion. 1 MICROgram(s)/kG/Min (0.473 mL/Hr) IV Continuous <Continuous>  senna 2 Tablet(s) Oral at bedtime    MEDICATIONS  (PRN):  acetaminophen   Tablet .. 650 milliGRAM(s) Oral every 6 hours PRN Temp greater or equal to 38C (100.4F)      Allergies  NKDA     T(F): afebrile  HR: 70  BP: 137/71    RR: 26  SpO2: 98%     PHYSICAL EXAM:      Constitutional: pt seen in CCU, intubated but off sedation,+ simple commands  Eyes: non icteric, opens eyes    ENMT: +ET tube and + feeding tube    Neck:  short and supple    Respiratory:  harsh respirator BS    Cardiovascular:  S1S2    Gastrointestinal: globose soft and benign    Genitourinary:  no villatoro    Extremities: arthritic changes,     LABS:                        10.9   9.7 )-----------( 362             35    143  |  104  |  24  ----------------------------<  144  4.1   |  25 |  0.7  GFR 87, 91  Ca    9.7       Mg     2.2         TPro  5.6<L>  /  Alb  3.0<L>  /  TBili  0.5  /  DBili  x   /  AST  9   /  ALT  23  /  AlkPhos  88  06-06    urine GM- rods  Blood streptococcus  sputum streptococcus      RADIOLOGY & ADDITIONAL TESTS:     EKG NSR 66/min   CXR  +ET tube, + feeding tube  CT of head R thalamic parenchymal hematoma 2.2 cm with intraventricular extension, mild ventricular enlargement, white mater changes                        Stroke code, R thalamic hemorrhagic CVA with extension to ventricles, repeat studies show no progression and some improvement of the hematoma  sp Hypertensive emergency  [facility], controlled on Keppra  Streptococcal Bacteremia probably from transient PNA  Marcio negative UTI  Hx of HTN, ASHD  Hx of DLD  Hx of old hemorrhagic CVA about 8 yrs ago, dysarthria, gait instability  Hx of OA, DDD, DJD  Hx of GERD, HH, Diverticulosis  Hx of anxiety    pt remains in CCU, remains intubated, diff weaning, may need trach if unable to extubate by early next week  pt off sedation x 48 hrs, opens eyes, ff simple commands, min improvement of L sided weakness  Pt is being fed via oral feeding tube  pt is on ceftriaxone, blood culture 6/4 + for strept, source probable  LLL  strept PNA, + sputa for strept and CT shows infiltrate,  urine + for Gm neg rods/asymptomatic pyuria  seizures  controlled on Keppra 1500mg q12, VEEG shows focal and generalized slowing  monitor electrolytes  pt is ff by pul critical care, neurology, ID

## 2019-06-08 NOTE — PROGRESS NOTE ADULT - SUBJECTIVE AND OBJECTIVE BOX
Patient is a 74y old  Female who presents with a chief complaint of left sided weakness and slurred speech (2019 01:21)      Over Night Events:  Patient seen and examined. still vented , failed weaning trial     ROS:  See HPI    PHYSICAL EXAM    ICU Vital Signs Last 24 Hrs  T(C): 37.9 (2019 08:00), Max: 38.2 (2019 06:00)  T(F): 100.2 (2019 08:00), Max: 100.7 (2019 06:00)  HR: 48 (2019 08:10) (48 - 79)  BP: 90/44 (2019 08:00) (90/44 - 173/86)  BP(mean): 59 (2019 08:00) (59 - 134)  ABP: --  ABP(mean): --  RR: 11 (2019 08:00) (11 - 27)  SpO2: 98% (2019 08:10) (95% - 99%)      General: awak  HEENT:                Lymph Nodes: NO cervical LN   Lungs: Bilateral BS  Cardiovascular: Regular   Abdomen: Soft, Positive BS  Extremities: No clubbing   Skin: warm   Neurological:   Musculoskeletal: move all ext     I&O's Detail    2019 07:01  -  2019 07:00  --------------------------------------------------------  IN:    Enteral Tube Flush: 160 mL    insulin regular Infusion: 73 mL    IV PiggyBack: 150 mL    Vital High Protein: 690 mL  Total IN: 1073 mL    OUT:    Incontinent per Collection Ba mL  Total OUT: 700 mL    Total NET: 373 mL      2019 07:01  -  2019 09:29  --------------------------------------------------------  IN:    insulin regular Infusion: 2 mL    Vital High Protein: 60 mL  Total IN: 62 mL    OUT:  Total OUT: 0 mL    Total NET: 62 mL          LABS:                          10.9   9.72  )-----------( 362      ( 2019 05:34 )             35.2         2019 05:34    143    |  104    |  24     ----------------------------<  144    4.1     |  25     |  0.7      Ca    9.6        2019 05:34  Mg     2.2       2019 05:34    TPro  6.0    /  Alb  3.0    /  TBili  0.5    /  DBili  x      /  AST  17     /  ALT  21     /  AlkPhos  77     2019 05:34  Amylase x     lipase x                                                                                                                                                    Culture - Urine (collected 2019 12:35)  Source: .Urine Catheterized  Preliminary Report (2019 08:22):    50,000 - 99,000 CFU/mL Gram Negative Rods    Culture - Blood (collected 2019 11:04)  Source: .Blood None  Preliminary Report (2019 17:01):    No growth to date.    Culture - Blood (collected 2019 04:45)  Source: .Blood Blood  Preliminary Report (2019 14:01):    No growth to date.    Culture - Blood (collected 2019 16:34)  Source: .Blood None  Preliminary Report (2019 03:01):    No growth to date.    Culture - Blood (collected 2019 11:13)  Source: .Blood None  Preliminary Report (2019 18:01):    No growth to date.                                                   Mode: AC/ CMV (Assist Control/ Continuous Mandatory Ventilation)  RR (machine): 12  TV (machine): 450  FiO2: 40  PEEP: 5  ITime: 1  MAP: 9  PIP: 23                                      ABG - ( 2019 04:51 )  pH, Arterial: 7.55  pH, Blood: x     /  pCO2: 36    /  pO2: 65    / HCO3: 31    / Base Excess: 8.6   /  SaO2: 95                  MEDICATIONS  (STANDING):  amLODIPine   Tablet 10 milliGRAM(s) Oral daily  cefepime   IVPB 2000 milliGRAM(s) IV Intermittent every 8 hours  chlorhexidine 0.12% Liquid 15 milliLiter(s) Oral Mucosa two times a day  chlorhexidine 4% Liquid 1 Application(s) Topical <User Schedule>  docusate sodium Liquid 100 milliGRAM(s) Oral three times a day  hydrALAZINE 25 milliGRAM(s) Oral three times a day  insulin regular Infusion 2 Unit(s)/Hr (2 mL/Hr) IV Continuous <Continuous>  levETIRAcetam  Solution 1500 milliGRAM(s) Oral two times a day  nystatin    Suspension 404109 Unit(s) Oral two times a day  pantoprazole   Suspension 40 milliGRAM(s) Oral daily  propofol Infusion 1 MICROgram(s)/kG/Min (0.473 mL/Hr) IV Continuous <Continuous>  senna 2 Tablet(s) Oral at bedtime    MEDICATIONS  (PRN):  acetaminophen   Tablet .. 650 milliGRAM(s) Oral every 6 hours PRN Temp greater or equal to 38C (100.4F)          Xrays:  TLC:  OG:  ET tube:                                                                                    no infiltrate      ECHO:  [facility] ICU:

## 2019-06-08 NOTE — PROGRESS NOTE ADULT - ASSESSMENT
Impression:    Intra cerebral Bleed/ following simple commands  Seizure  Hypertensive emergency  HO hemorrhagic stroke  penumonia aspiration?  UTI  Strep Bacteremia      PLAN:    CNS: SAT. Continue with keppra for now.    HEENT: Oral care    PULMONARY:  HOB @ 30 degrees. Daily SBT. If fails will be candidate for trach     CARDIOVASCULAR: i=o. Maintain BP <140. Continue with blood pressure medicine.     GI: GI prophylaxis.  Feeding through OG tube.    RENAL:  Follow up lytes.  Correct as needed    INFECTIOUS DISEASE: abx per ID    HEMATOLOGICAL:   DVT prophylaxis.    ENDOCRINE:  Follow up FS.     MUSCULOSKELETAL: Bed rest    Poor prognosis  case discussed with family told about trach but next week if not extubate

## 2019-06-09 NOTE — PROGRESS NOTE ADULT - ASSESSMENT
· Assessment		  Patient is a 74y old Female who presents with a chief complaint of left sided weakness and slurred speech   Currently admitted to medicine with the primary diagnosis of Hypertensive emergency.      PLAN  74 y.o F with PMH of HTN, HLD, DM, hemorrhagic stroke,? EVD placement at that time ( about 8 years ago)  with residual left side weakness and slurred speech, ambulated with cane, on ASA daily  Pt. brought to [facility] ED as a stroke code    #)Acute right thalamic hemorrhagic stroke  -Sudden onset slurred speech and left sided weakness on presentation  -CT head showed right thalamic hemorrhage with extension into lateral and third ventricles  -Neurosurgery following.   -No acute neurosurgical intervention needed at this time since repeat CT head stable. No evidence of evolving hydrocephalus.  -WAS Clinically patient is improving. Awake and responding to commands. Moves all extremities. No repeat CT head needed. Failed SBP in 6/2.   - 6/4: Tonic clonic seizure lasting 10 min; 4mg ativan; on propofol  - CT Head rpt stable 6/4  - VEEG 24hr- no seizure. Bi-occipital sharps.   -Increase Keppra to 1500mg BID.   -Monitor and control SBP between 140-160. On amlodipine and hydralazine to control BP  -GAG and pupillary reflex present.  -CT chest and abdomen did not show any evidence of bleed  - RPT CT HEAD 6/7 No interval change.       #Streptococcus bacteremia 2/2 LLL pneumonia.   - T-max 100.8 overnight.  - Cultures sent- no growth to date.   - Cooling blanket PRN; possible central fever.   -Blood cultures grew streptococcus 6/4  - Rpt cultures NO GROWTH TO DATE  - D/C Vancomycin (6/5)  - Cefepime 6/6    #UTI  - Gram negative rods on culture  - Rpt. cultures- gram negative rods.  - No villatoro    #Prolonged Qtc 2/2 inc intracranial pressure 2/2 Intracranial bleed  -Few sec of polymorphic V tach overnight on 5/31 likely tdp  -Magnesium and potassium repleted   -BMP and magnesium q8h. f/u 4pm  -If V tach recurs, give Magnesium 1gm STAT  -continue tele monitoring  - QTc 507 6/3.  - QTc normal 6/6.    #Hypophosphatemia  -f/u am phosphorus    #)DM   -insulin drip    #)HTN  -was on losartan, amlodipine and hydralazine  - BP on low side. D/C losartan.   -Maintain BP between 120-140mm Hg    GI PPX: protonix  DVT PPX: sequentials    DIET: Tube feeds  ACTIVITY: Bed rest    ================= CODE STATUS =================                  (X) FULL CODE     |     () DNR     |     () DNI

## 2019-06-09 NOTE — PROGRESS NOTE ADULT - ASSESSMENT
HERNANDO HERNANDEZ 73yo W  Female BIBA to S side from home for elevated BP, slurred speech and L sided weakness.  The pt was noted to have a hypertensive emergency with R thalamic hemorrhagic  CVA. 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    Hospital course:   The pt underwent a stroke code and was intubated for airway protection and transferred North for further care. She remains in CCU and cont to be on respirator support.  The hospital course is complicated by seizures, prolonged QT,streptococcus bacteremia with positive blood cultures and positive GM negative rods in the urine.  She is on Ceftriaxone and on anti seizure medications.  She is being fed via OG tube.  When off sedation she follows simple commands.      INTERVAL HPI/OVERNIGHT EVENTS: remains intubated, off sedation > 48 hrs, ff simple commands, seems to have some  improvement of  neuro status, no further seizures noted, + oral tube feedings    MEDICATIONS  (STANDING):  amLODIPine   Tablet 10 milliGRAM(s) Oral daily  cefepime   IVPB 2000 milliGRAM(s) IV Intermittent every 8 hours D/C  Rocephine 2 gms q 24  chlorhexidine 0.12% Liquid 15 milliLiter(s) Oral Mucosa two times a day  chlorhexidine 4% Liquid 1 Application(s) Topical <User Schedule>  docusate sodium Liquid 100 milliGRAM(s) Oral three times a day  hydrALAZINE 25 milliGRAM(s) Oral three times a day  insulin regular Infusion 4 Unit(s)/Hr (4 mL/Hr) IV Continuous <Continuous>  levETIRAcetam  Solution 1500 milliGRAM(s) Oral two times a day  nystatin    Suspension 876292 Unit(s) Oral two times a day  pantoprazole   Suspension 40 milliGRAM(s) Oral daily  propofol Infusion. 1 MICROgram(s)/kG/Min (0.473 mL/Hr) IV Continuous <Continuous>  senna 2 Tablet(s) Oral at bedtime    MEDICATIONS  (PRN):  acetaminophen   Tablet .. 650 milliGRAM(s) Oral every 6 hours PRN Temp greater or equal to 38C (100.4F)      Allergies  NKDA     T(F): 99  HR: 70  BP: 121/59  RR: 14-16  SpO2: 100%     PHYSICAL EXAM:      Constitutional: pt seen in CCU, intubated but off sedation,ff  simple commands, raises head off pillow  Eyes: non icteric, opens eyes    ENMT: +ET tube and + feeding tube    Neck:  short and supple    Respiratory:  harsh respirator BS    Cardiovascular:  S1S2    Gastrointestinal: globose soft and benign    Genitourinary:  no villatoro    Extremities: arthritic changes, exhibits motor strength in limbs with improvement of L sided weakness    LABS:                        10  89 )-----------( 357             33    142  |  101  |  21  ----------------------------<  120  3.9   |  26 |  0.6  GFR 87, 91  Ca    9.7       Mg     2.2, 2.1         TPro  5.6<L>  /  Alb  3.0<L>  /  TBili  0.5  /  DBili  x   /  AST  9   /  ALT  23  /  AlkPhos  88  06-06    urine GM- rods  Blood streptococcus  sputum streptococcus      RADIOLOGY & ADDITIONAL TESTS:     EKG NSR 66/min   CXR  +ET tube, + feeding tube  CT of head R thalamic parenchymal hematoma 2.2 cm with intraventricular extension, mild ventricular enlargement, white mater changes                        Stroke code, R thalamic hemorrhagic CVA with extension to ventricles, repeat studies show no progression and some improvement of the hematoma  sp Hypertensive emergency  Seizures, controlled on Keppra  Streptococcal Bacteremia probably from transient PNA  Marcio negative UTI  Hx of HTN, ASHD  Hx of DLD  Hx of old hemorrhagic CVA about 8 yrs ago, dysarthria, gait instability  Hx of OA, DDD, DJD  Hx of GERD, HH, Diverticulosis  Hx of anxiety    pt remains in CCU, remains intubated  pt off sedation > 48 hrs, opens eyes, ff simple commands, min improvement of L sided weakness  Pt is being fed via oral feeding tube  pt is on ceftriaxone, blood culture 6/4 + for strept, source probable  LLL  strept PNA, + sputa for strept and CT shows infiltrate,  urine + for Gm neg rods/asymptomatic pyuria  seizures  controlled on Keppra 1500mg q12, VEEG shows focal and generalized slowing  monitor electrolytes  pt is ff by pul critical care, neurology, ID

## 2019-06-09 NOTE — PROGRESS NOTE ADULT - SUBJECTIVE AND OBJECTIVE BOX
Patient is a 74y old  Female who presents with a chief complaint of left sided weakness and slurred speech, R thalamic hemorrhagic CVA, seizures (08 Jun 2019 14:43)    HOSPITAL DAY: 9d   ICU DAY: 9    SUMMARY OF HOSPITAL COURSE TO DATE 74 y.o F with PMH of HTN, HLD, DM, hemorrhagic stroke,? EVD placement at that time ( about 8 years ago)  with residual left side weakness and slurred speech, ambulated with cane, on ASA daily.  Pt. brought to [facility] ED as a stroke code. At the time of presentation to [facility] ED A&Ox 2, baseline slurred speech with left sided weakness. /82, 55 HR, 18 RR, CTH showed acute right thalamic hemorrhage with extension to right lateral and third ventricle. At [facility] ED, Pt. Intubated 2/2 worsening of neuro exam for air way protection as per ED attending note. Pt.  became more drowsy with decreased responsiveness. Pt. was transferred to ED-[place]. At time of NSGY eval Pt intubated, sedated on Propofol and Fentanyl, radial arterial line placed, she received keppra, zofran and mannitol in ED. Repeat CT scan stable from prior study as per neurosurgery. Platelets one unit ordered as per neurosurgery.    6/4: Patient had tonic-clonic seizure lasting 10 minutes around 6 am. 4mg Ativan given; started on propofol. Neuro NP saw her; VEEG ongoing, rpt CT head unchanged, and increased dose of keppra ordered.     Streptococcus bacteremia and gram negative rods in urine culture.     OVERNIGHT EVENTS: Off sedation. No events.     TODAY: Patient was seen this morning at bedside.  Multiple failed SBT.     ================= PRESENT TODAY ==================    1-Mckeon Catheter: yes  2-Central Line: no  3-IV Fluids: no  4-Drips:  4.1-Pressors: no  4.2-Sedation: no  4.3-Heparin:   no  5-Intubated: yes    INTERVAL HPI/OVERNIGHT EVENTS:  ICU Vital Signs Last 24 Hrs  T(C): 37.6 (09 Jun 2019 00:00), Max: 38.2 (08 Jun 2019 06:00)  T(F): 99.6 (09 Jun 2019 00:00), Max: 100.7 (08 Jun 2019 06:00)  HR: 64 (09 Jun 2019 00:00) (46 - 74)  BP: 141/78 (09 Jun 2019 00:00) (85/41 - 179/65)  BP(mean): 109 (09 Jun 2019 00:00) (57 - 134)  ABP: --  ABP(mean): --  RR: 16 (09 Jun 2019 00:00) (11 - 32)  SpO2: 98% (09 Jun 2019 00:00) (96% - 99%)    I&O's Summary    07 Jun 2019 07:01  -  08 Jun 2019 07:00  --------------------------------------------------------  IN: 1073 mL / OUT: 700 mL / NET: 373 mL    08 Jun 2019 07:01  -  09 Jun 2019 00:52  --------------------------------------------------------  IN: 833 mL / OUT: 1000 mL / NET: -167 mL      Mode: AC/ CMV (Assist Control/ Continuous Mandatory Ventilation)  RR (machine): 12  TV (machine): 450  FiO2: 40  PEEP: 5  ITime: 1  MAP: 12  PIP: 28      LABS:                        10.9   9.72  )-----------( 362      ( 08 Jun 2019 05:34 )             35.2     06-08    143  |  104  |  24<H>  ----------------------------<  144<H>  4.1   |  25  |  0.7    Ca    9.6      08 Jun 2019 05:34  Mg     2.2     06-08    TPro  6.0  /  Alb  3.0<L>  /  TBili  0.5  /  DBili  x   /  AST  17  /  ALT  21  /  AlkPhos  77  06-08        CAPILLARY BLOOD GLUCOSE      POCT Blood Glucose.: 141 mg/dL (08 Jun 2019 23:32)  POCT Blood Glucose.: 144 mg/dL (08 Jun 2019 20:46)  POCT Blood Glucose.: 150 mg/dL (08 Jun 2019 18:51)  POCT Blood Glucose.: 186 mg/dL (08 Jun 2019 15:08)  POCT Blood Glucose.: 166 mg/dL (08 Jun 2019 12:06)  POCT Blood Glucose.: 152 mg/dL (08 Jun 2019 08:04)  POCT Blood Glucose.: 170 mg/dL (08 Jun 2019 07:09)  POCT Blood Glucose.: 145 mg/dL (08 Jun 2019 04:32)    ABG - ( 08 Jun 2019 04:51 )  pH, Arterial: 7.55  pH, Blood: x     /  pCO2: 36    /  pO2: 65    / HCO3: 31    / Base Excess: 8.6   /  SaO2: 95        RADIOLOGY & ADDITIONAL TESTS:  < from: Xray Chest 1 View- PORTABLE-Routine (06.08.19 @ 03:20) >  Impression:      No radiographic evidence of acute cardiopulmonary disease.      < from: CT Head No Cont (06.07.19 @ 17:01) >  IMPRESSION:    1.  Slight interval decreased size of the right thalamic parenchymal   hematoma measuring about 2 cm, previously 2.2 cm.    2.  Slight interval decrease in the layering intraventricular hemorrhage   and the mild ventricular enlargement.    3.  Remainder of exam is stable. No new intracranial hemorrhage.        Consultant(s) Notes Reviewed:  [x ] YES  [ ] NO    MEDICATIONS  (STANDING):  amLODIPine   Tablet 10 milliGRAM(s) Oral daily  cefepime   IVPB 2000 milliGRAM(s) IV Intermittent every 8 hours  chlorhexidine 0.12% Liquid 15 milliLiter(s) Oral Mucosa two times a day  chlorhexidine 4% Liquid 1 Application(s) Topical <User Schedule>  docusate sodium Liquid 100 milliGRAM(s) Oral three times a day  hydrALAZINE 25 milliGRAM(s) Oral three times a day  insulin regular Infusion 2 Unit(s)/Hr (2 mL/Hr) IV Continuous <Continuous>  levETIRAcetam  Solution 1500 milliGRAM(s) Oral two times a day  nystatin    Suspension [id number] Unit(s) Oral two times a day  pantoprazole   Suspension 40 milliGRAM(s) Oral daily  propofol Infusion 1 MICROgram(s)/kG/Min (0.473 mL/Hr) IV Continuous <Continuous>  senna 2 Tablet(s) Oral at bedtime    MEDICATIONS  (PRN):  acetaminophen   Tablet .. 650 milliGRAM(s) Oral every 6 hours PRN Temp greater or equal to 38C (100.4F)      PHYSICAL EXAM:  GENERAL: well built, well nourished.  HEAD:  Atraumatic, Normocephalic.  EYES: spontaneously opens eyes. Pupils reactive but sluggish.  ENT: dry mucous membranes.   NECK: Supple, No JVD.  NERVOUS SYSTEM: Pupils pinpoint but reactive; moves extremities; intermittently follows commands.    CHEST/LUNG: intubated; equal bilateral chest rise.   HEART: S1S2 normal, no S3, Regular rate and rhythm; No murmurs, rubs, or gallops.  ABDOMEN: Soft, Nondistended; Bowel sounds present  EXTREMITIES: No clubbing, cyanosis, or edema  SKIN: No rashes or lesions

## 2019-06-09 NOTE — PROGRESS NOTE ADULT - SUBJECTIVE AND OBJECTIVE BOX
Patient is a 74y old  Female who presents with a chief complaint of left sided weakness and slurred speech (2019 00:52)      Over Night Events:  Patient seen and examined. still vented not on distress failed weaning trial yesterday       ROS:  See HPI    PHYSICAL EXAM    ICU Vital Signs Last 24 Hrs  T(C): 37.7 (2019 08:00), Max: 37.7 (2019 11:00)  T(F): 99.9 (2019 08:00), Max: 99.9 (2019 08:00)  HR: 96 (2019 08:00) (48 - 96)  BP: 167/92 (2019 08:00) (104/49 - 181/69)  BP(mean): 135 (2019 08:00) (71 - 135)  ABP: --  ABP(mean): --  RR: 31 (2019 08:00) (12 - 32)  SpO2: 96% (2019 08:00) (96% - 99%)      General: awake   HEENT:  et tube              Lymph Nodes: NO cervical LN   Lungs: Bilateral BS  Cardiovascular: Regular   Abdomen: Soft, Positive BS  Extremities: No clubbing   Skin: warm   Neurological: no focal deficit   Musculoskeletal: move all ext     I&O's Detail    2019 07:01  -  2019 07:00  --------------------------------------------------------  IN:    Enteral Tube Flush: 300 mL    insulin regular Infusion: 2 mL    insulin regular Infusion: 70 mL    IV PiggyBack: 100 mL    Vital High Protein: 720 mL  Total IN: 1192 mL    OUT:    Incontinent per Collection Ba mL  Total OUT: 1550 mL    Total NET: -358 mL          LABS:                          10.7   8.93  )-----------( 357      ( 2019 04:46 )             33.9         2019 04:46    142    |  101    |  21     ----------------------------<  120    3.9     |  26     |  0.6      Ca    9.9        2019 04:46  Mg     2.1       2019 04:46    TPro  5.8    /  Alb  3.0    /  TBili  0.5    /  DBili  x      /  AST  20     /  ALT  24     /  AlkPhos  73     2019 04:46  Amylase x     lipase x                                                                                                                                                    Culture - Blood (collected 2019 04:27)  Source: .Blood None  Preliminary Report (2019 14:01):    No growth to date.    Culture - Urine (collected 2019 12:35)  Source: .Urine Catheterized  Final Report (2019 09:34):    50,000 - 99,000 CFU/mL Escherichia coli  Organism: Escherichia coli (2019 09:34)  Organism: Escherichia coli (2019 09:34)    Culture - Blood (collected 2019 11:04)  Source: .Blood None  Preliminary Report (2019 17:01):    No growth to date.                                                   Mode: AC/ CMV (Assist Control/ Continuous Mandatory Ventilation)  RR (machine): 12  TV (machine): 450  FiO2: 40  PEEP: 5  ITime: 1  MAP: 12  PIP: 28                                      ABG - ( 2019 03:49 )  pH, Arterial: 7.54  pH, Blood: x     /  pCO2: 37    /  pO2: 80    / HCO3: 32    / Base Excess: 8.9   /  SaO2: 96                  MEDICATIONS  (STANDING):  amLODIPine   Tablet 10 milliGRAM(s) Oral daily  cefepime   IVPB 2000 milliGRAM(s) IV Intermittent every 8 hours  chlorhexidine 0.12% Liquid 15 milliLiter(s) Oral Mucosa two times a day  chlorhexidine 4% Liquid 1 Application(s) Topical <User Schedule>  docusate sodium Liquid 100 milliGRAM(s) Oral three times a day  hydrALAZINE 25 milliGRAM(s) Oral three times a day  insulin regular Infusion 2 Unit(s)/Hr (2 mL/Hr) IV Continuous <Continuous>  levETIRAcetam  Solution 1500 milliGRAM(s) Oral two times a day  nystatin    Suspension 956942 Unit(s) Oral two times a day  pantoprazole   Suspension 40 milliGRAM(s) Oral daily  propofol Infusion 1 MICROgram(s)/kG/Min (0.473 mL/Hr) IV Continuous <Continuous>  senna 2 Tablet(s) Oral at bedtime    MEDICATIONS  (PRN):  acetaminophen   Tablet .. 650 milliGRAM(s) Oral every 6 hours PRN Temp greater or equal to 38C (100.4F)          Xrays:  TLC:  OG:  ET tube:                                                                                    stable    ECHO:  CAM ICU:

## 2019-06-10 NOTE — PACU DISCHARGE NOTE - NSPTMEETSDISCHCRITERIADT_GEN_A_CORE
10-Bryce-2019 16:29 Consent 2/Introductory Paragraph: Mohs surgery was explained to the patient and consent was obtained. The risks, benefits and alternatives to therapy were discussed in detail. Specifically, the risks of infection, scarring, bleeding, prolonged wound healing, incomplete removal, allergy to anesthesia, nerve injury and recurrence were addressed. Prior to the procedure, the treatment site was clearly identified and confirmed by the patient. All components of Universal Protocol/PAUSE Rule completed.

## 2019-06-10 NOTE — PROGRESS NOTE ADULT - SUBJECTIVE AND OBJECTIVE BOX
HERNANDO HERNANDEZ  74y, Female      OVERNIGHT EVENTS:    no fevers, off pressors, follows commands  loose BMs ( presently off colace/senna )  FiO2 40 %  no central lines    VITALS:  T(F): 99.6, Max: 99.9 (06-09-19 @ 08:00)  HR: 54  BP: 114/50  RR: 12Vital Signs Last 24 Hrs  T(C): 37.6 (10 Bryce 2019 00:00), Max: 37.7 (09 Jun 2019 08:00)  T(F): 99.6 (10 Bryce 2019 00:00), Max: 99.9 (09 Jun 2019 08:00)  HR: 54 (10 Bryce 2019 06:00) (50 - 96)  BP: 114/50 (10 Bryce 2019 06:00) (99/53 - 178/91)  BP(mean): 75 (10 Bryce 2019 06:00) (68 - 135)  RR: 12 (10 Bryce 2019 06:00) (11 - 39)  SpO2: 99% (10 Bryce 2019 06:00) (96% - 100%)    TESTS & MEASUREMENTS:                        10.3   8.67  )-----------( 389      ( 10 Bryce 2019 04:25 )             33.2     06-10    141  |  102  |  25<H>  ----------------------------<  145<H>  3.9   |  26  |  0.7    Ca    9.7      10 Bryce 2019 04:25  Mg     2.1     06-09    TPro  5.5<L>  /  Alb  2.9<L>  /  TBili  0.5  /  DBili  x   /  AST  18  /  ALT  23  /  AlkPhos  64  06-10    LIVER FUNCTIONS - ( 10 Bryce 2019 04:25 )  Alb: 2.9 g/dL / Pro: 5.5 g/dL / ALK PHOS: 64 U/L / ALT: 23 U/L / AST: 18 U/L / GGT: x             Culture - Blood (collected 06-07-19 @ 04:27)  Source: .Blood None  Preliminary Report (06-08-19 @ 14:01):    No growth to date.    Culture - Urine (collected 06-06-19 @ 12:35)  Source: .Urine Catheterized  Final Report (06-09-19 @ 09:34):    50,000 - 99,000 CFU/mL Escherichia coli  Organism: Escherichia coli (06-09-19 @ 09:34)  Organism: Escherichia coli (06-09-19 @ 09:34)      -  Amikacin: S <=16      -  Ampicillin: R >16 These ampicillin results predict results for amoxicillin      -  Ampicillin/Sulbactam: R >16/8      -  Aztreonam: S <=4      -  Cefazolin: I 16 For uncomplicated UTI with K. pneumoniae, E. coli, or P. mirablis: MARI <=16 is sensitive and MARI >=32 is resistant. This also predicts results for oral agents cefaclor, cefdinir, cefpodoxime, cefprozil, cefuroxime axetil, cephalexin and locarbeffor uncomplicated UTI. Note that some isolates may be susceptible to these agents while testing resistant to cefazolin.      -  Cefepime: S <=4      -  Cefoxitin: S <=8      -  Ceftriaxone: S <=1 Enterobacter, Citrobacter, and Serratia may develop resistance during prolonged therapy      -  Ciprofloxacin: S <=1      -  Ertapenem: S <=1      -  Gentamicin: S <=4      -  Imipenem: S <=1      -  Levofloxacin: S <=2      -  Meropenem: S <=1      -  Nitrofurantoin: S <=32 Should not be used to treat pyelonephritis      -  Piperacillin/Tazobactam: S <=16      -  Tigecycline: S <=2      -  Tobramycin: S <=4      -  Trimethoprim/Sulfamethoxazole: R >2/38      Method Type: MARI    Culture - Blood (collected 06-06-19 @ 11:04)  Source: .Blood None  Preliminary Report (06-07-19 @ 17:01):    No growth to date.    Culture - Blood (collected 06-06-19 @ 04:45)  Source: .Blood Blood  Preliminary Report (06-07-19 @ 14:01):    No growth to date.    Culture - Blood (collected 06-05-19 @ 16:34)  Source: .Blood None  Preliminary Report (06-07-19 @ 03:01):    No growth to date.    Culture - Blood (collected 06-05-19 @ 11:13)  Source: .Blood None  Preliminary Report (06-06-19 @ 18:01):    No growth to date.    Culture - Sputum (collected 06-04-19 @ 16:13)  Source: .Sputum Sputum  Gram Stain (06-05-19 @ 07:43):    Numerous polymorphonuclear leukocytes seen per low power field    Few Squamous epithelial cells seen per low power field    No organisms seen  Final Report (06-06-19 @ 17:25):    Normal Respiratory Alyssa present    Culture - Urine (collected 06-04-19 @ 04:30)  Source: .Urine Catheterized  Final Report (06-06-19 @ 17:12):    >100,000 CFU/ml Escherichia coli  Organism: Escherichia coli (06-06-19 @ 17:12)  Organism: Escherichia coli (06-06-19 @ 17:12)      -  Amikacin: S <=16      -  Ampicillin: R >16 These ampicillin results predict results for amoxicillin      -  Ampicillin/Sulbactam: R >16/8      -  Aztreonam: S <=4      -  Cefepime: S <=4      -  Cefoxitin: S <=8      -  Ceftriaxone: S <=1 Enterobacter, Citrobacter, and Serratia may develop resistance during prolonged therapy      -  Ciprofloxacin: S <=1      -  Ertapenem: S <=1      -  Gentamicin: S <=4      -  Imipenem: S <=1      -  Levofloxacin: S <=2      -  Meropenem: S <=1      -  Nitrofurantoin: S <=32 Should not be used to treat pyelonephritis      -  Piperacillin/Tazobactam: S <=16      -  Tigecycline: S <=2      -  Tobramycin: S <=4      -  Trimethoprim/Sulfamethoxazole: R >2/38      Method Type: MARI    Culture - Blood (collected 06-04-19 @ 04:28)  Source: .Blood None  Gram Stain (06-05-19 @ 13:29):    Growth in aerobic and anaerobic bottles: Gram Positive Cocci in Pairs and    Chains  Final Report (06-06-19 @ 13:02):    Growth in aerobic and anaerobic bottles: Alpha hemolytic strep    (not Strep. pneumoniae or Enterococcus)    Single set isolate, possible contaminant. Contact    Microbiology if susceptibility testing clinically    indicated.    "Due to technical problems, Proteus sp. will Not be reported as part of    the BCID panel until further notice"    ***Blood Panel PCR results on this specimen are available    approximately 3 hours after the Gram stain result.***    Gram stain, PCR, and/or culture results may not always    correspond due to difference in methodologies.    ************************************************************    This PCR assay was performed using [employer].    The following targets are tested for: Enterococcus,    vancomycin resistant enterococci, Listeria monocytogenes,    coagulase negative staphylococci, S. aureus,    methicillin resistant S. aureus, Streptococcus agalactiae    (Group B), S. pneumoniae, S. pyogenes (Group A),    Acinetobacter baumannii, Enterobacter cloacae, E. coli,    Klebsiella oxytoca, K. pneumoniae, Proteus sp.,    Serratia marcescens, Haemophilus influenzae,    Neisseria meningitidis, Pseudomonas aeruginosa, Candida    albicans, C. glabrata, C krusei, C parapsilosis,    C. tropicalis and the KPC resistance gene.  Organism: Blood Culture PCR (06-06-19 @ 13:02)  Organism: Blood Culture PCR (06-06-19 @ 13:02)      -  Streptococcus sp. (Not Grp A, B or S pneumoniae): Detec      Method Type: PCR            RADIOLOGY & ADDITIONAL TESTS:    ANTIBIOTICS:  cefepime   IVPB 2000 milliGRAM(s) IV Intermittent every 8 hours  nystatin    Suspension 101460 Unit(s) Oral two times a day

## 2019-06-10 NOTE — PROGRESS NOTE ADULT - ASSESSMENT
· Assessment		  Patient is a 74y old Female who presents with a chief complaint of left sided weakness and slurred speech   Currently admitted to medicine with the primary diagnosis of Hypertensive emergency.      PLAN  74 y.o F with PMH of HTN, HLD, DM, hemorrhagic stroke,? EVD placement at that time ( about 8 years ago)  with residual left side weakness and slurred speech, ambulated with cane, on ASA daily  Pt. brought to [facility] ED as a stroke code    #)Acute right thalamic hemorrhagic stroke  -Sudden onset slurred speech and left sided weakness on presentation  -CT head showed right thalamic hemorrhage with extension into lateral and third ventricles  -Neurosurgery following.   -No acute neurosurgical intervention needed at this time since repeat CT head stable. No evidence of evolving hydrocephalus.  -WAS Clinically patient is improving. Awake and responding to commands. Moves all extremities. No repeat CT head needed. Failed SBP in 6/2.   - 6/4: Tonic clonic seizure lasting 10 min; 4mg ativan; on propofol  - CT Head rpt stable 6/4  - VEEG 24hr- no seizure. Bi-occipital sharps.   -Increase Keppra to 1500mg BID.   -Monitor and control SBP between 140-160. On amlodipine and hydralazine to control BP  -GAG and pupillary reflex present.  -CT chest and abdomen did not show any evidence of bleed  - RPT CT HEAD 6/7 No interval change.       #Streptococcus bacteremia 2/2 LLL pneumonia.   - T-max 100.8 overnight.  - Cultures sent- no growth to date.   - Cooling blanket PRN; possible central fever.   -Blood cultures grew streptococcus 6/4  - Rpt cultures NO GROWTH TO DATE  - D/C Vancomycin (6/5)  - Cefepime 6/6    #UTI  - Gram negative rods on culture  - Rpt. cultures- gram negative rods.  - No villatoro    #Prolonged Qtc 2/2 inc intracranial pressure 2/2 Intracranial bleed  -Few sec of polymorphic V tach overnight on 5/31 likely tdp  -Magnesium and potassium repleted   -BMP and magnesium q8h. f/u 4pm  -If V tach recurs, give Magnesium 1gm STAT  -continue tele monitoring  - QTc 507 6/3.  - QTc normal 6/6.    # Intubation dependent   - Multiple failed SBTs  - Tracheostomy placement 6/10   	   #Hypophosphatemia  -f/u am phosphorus    #)DM   -insulin drip    #)HTN  -was on losartan, amlodipine and hydralazine  - BP on low side. D/C losartan.   -Maintain BP between 120-140mm Hg    GI PPX: protonix  DVT PPX: sequentials    DIET: Tube feeds  ACTIVITY: Bed rest    ================= CODE STATUS =================                  (X) FULL CODE     |     () DNR     |     () DNI

## 2019-06-10 NOTE — PROGRESS NOTE ADULT - ASSESSMENT
HERNANDO HERNANDEZ 75yo W  Female BIBA to S side from home for elevated BP, slurred speech and L sided weakness.  The pt was noted to have a hypertensive emergency with R thalamic hemorrhagic  CVA. 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    Hospital course:   The pt underwent a stroke code and was intubated for airway protection and transferred [place] for further care. She remains in CCU and cont to be on respirator support.  The hospital course is complicated by seizures, prolonged QT,streptococcus bacteremia with positive blood cultures and positive GM negative rods in the urine.  She is on Ceftriaxone which was changed to Cefepime.   She was fed via OG tube.  When off sedation she followed simple commands but failed weaning and required tracheostomy.      INTERVAL HPI/OVERNIGHT EVENTS: pt underwent trach today    MEDICATIONS  (STANDING):  amLODIPine   Tablet 10 milliGRAM(s) Oral daily  cefepime   IVPB 2000 milliGRAM(s) IV Intermittent every 8 hours D/C  Rocephine 2 gms q 24  chlorhexidine 0.12% Liquid 15 milliLiter(s) Oral Mucosa two times a day  chlorhexidine 4% Liquid 1 Application(s) Topical <User Schedule>  docusate sodium Liquid 100 milliGRAM(s) Oral three times a day  hydrALAZINE 25 milliGRAM(s) Oral three times a day  insulin regular Infusion 4 Unit(s)/Hr (4 mL/Hr) IV Continuous <Continuous>  levETIRAcetam  Solution 1500 milliGRAM(s) Oral two times a day  nystatin    Suspension 586693 Unit(s) Oral two times a day  pantoprazole   Suspension 40 milliGRAM(s) Oral daily  propofol Infusion. 1 MICROgram(s)/kG/Min (0.473 mL/Hr) IV Continuous <Continuous>  senna 2 Tablet(s) Oral at bedtime    MEDICATIONS  (PRN):  acetaminophen   Tablet .. 650 milliGRAM(s) Oral every 6 hours PRN Temp greater or equal to 38C (100.4F)      Allergies  NKDA     T(F): 98.9  HR: 63  BP: 118/52  RR: 12  SpO2: 100%     PHYSICAL EXAM:      Constitutional: pt seen in CCU, + trach    Eyes: non icteric, opens eyes    ENMT: + feeding tube    Neck:  short and supple    Respiratory:  harsh respirator BS    Cardiovascular:  S1S2    Gastrointestinal: globose soft and benign    Genitourinary:  no villatoro    Extremities: arthritic changes, exhibits motor strength in limbs with improvement of L sided weakness    LABS:                        10.2  8.6 )-----------( 389             33    141  |  102  |  25  ----------------------------<  145  3.9   |  26 |  0.7    GFR 87, 91  Ca    9.7       Mg     2.2, 2.1         TPro  5.6<L>  /  Alb  3.0<L>  /  TBili  0.5  /  DBili  x   /  AST  9   /  ALT  23  /  AlkPhos  88  06-06    urine GM- rods  Blood streptococcus  sputum streptococcus      RADIOLOGY & ADDITIONAL TESTS:     EKG NSR 66/min   CXR  +ET tube, + feeding tube  CT of head R thalamic parenchymal hematoma 2.2 cm with intraventricular extension, mild ventricular enlargement, white mater changes                        Stroke code, R thalamic hemorrhagic CVA with extension to ventricles, repeat studies show no progression and some improvement of the hematoma  sp Hypertensive emergency  Seizures, controlled on Keppra  Streptococcal Bacteremia probably from transient PNA  Marcio negative UTI    Hx of HTN, ASHD  Hx of DLD  Hx of old hemorrhagic CVA about 8 yrs ago, dysarthria, gait instability  Hx of OA, DDD, DJD  Hx of GERD, HH, Diverticulosis  Hx of anxiety    pt remains in CCU, sp trach today 6/10  pt off sedation, opens eyes, ff simple commands, min improvement of L sided weakness  Pt is being fed via oral feeding tube  pt is on ceftriaxone, blood culture 6/4 + for strept, source probable  LLL  strept PNA, + sputa for strept and CT shows infiltrate,  urine + for Gm neg rods/asymptomatic pyuria  seizures  controlled on Keppra 1500mg q12, VEEG shows focal and generalized slowing  monitor electrolytes  pt is ff by pul critical care, neurology, ID

## 2019-06-10 NOTE — BRIEF OPERATIVE NOTE - OPERATION/FINDINGS
somewhat boggy thyroid with dilated veins across    OF NOTE, OG removed and NGT replaced.  Not to be used until position confirmed by xray

## 2019-06-10 NOTE — CONSULT NOTE ADULT - SUBJECTIVE AND OBJECTIVE BOX
Surgery Consultation Note  =====================================================  HPI: 74y Female  HPI:  74 y.o F with PMH of HTN, HLD, DM, hemorrhagic stroke,? EVD placement at that time ( about 8 years ago)  with residual left side weakness and slurred speech, ambulated with cane, on ASA daily  Pt. brought to [facility] ED as a stroke code. At the time of presentation to [facility] ED A&Ox 2, baseline slurped speech with left sided weakness. /82, 55 HR, 18 RR, CTH prelim report findings with acute right thalamic hemorrhage with extension to right lateral and third ventricle. At [facility] ED Pt. Intubated 2/2 worsening of neuro exam for air way protection as per ED attending note Pt.  became more drowsy with decreased responsiveness. Pt. was transferred to [facility]. At time of NSGY eval Pt intubated, sedated on Propofol and Fentanyl, radial arterial line, she received keppra, zofran and mannitol in ED. Repeat CT scan stable from prior study as per neurosurgery. Platelets one unit ordered as per neurosurgery (30 May 2019 02:50)        PAST MEDICAL & SURGICAL HISTORY:  Diabetes mellitus  Stroke  Hypertension  No significant past surgical history    Home Meds: Home Medications:  amLODIPine 2.5 mg oral tablet: 1 tab(s) orally once a day (29 May 2019 22:02)  donepezil 10 mg oral tablet: 1 tab(s) orally once a day (at bedtime) (29 May 2019 22:02)  enalapril 10 mg oral tablet: 1 tab(s) orally once a day (29 May 2019 22:02)  enalapril 10 mg oral tablet: 1 tab(s) orally once a day (29 May 2019 22:02)  glimepiride 1 mg oral tablet: 1 tab(s) orally once a day (29 May 2019 22:02)  Norvasc 2.5 mg oral tablet: 1 tab(s) orally once a day (29 May 2019 22:02)  simvastatin 20 mg oral tablet: 1 tab(s) orally once a day (at bedtime) (29 May 2019 22:02)  Vitamin B12 1000 mcg oral tablet: 1 tab(s) orally once a day (29 May 2019 22:02)  Vitamin D3 1000 intl units oral tablet, chewable: 1 tab(s) orally once a day (29 May 2019 22:02)    Allergies: Allergies    No Known Allergies    Intolerances      Soc:   Advanced Directives: Presumed Full Code     ROS:    REVIEW OF SYSTEMS    [x] A ten-point review of systems was otherwise negative except as noted.  [ ] Due to altered mental status/intubation, subjective information were not able to be obtained from the patient. History was obtained, to the extent possible, from review of the chart and collateral sources of information.      CURRENT MEDICATIONS:   --------------------------------------------------------------------------------------  Neurologic Medications  acetaminophen   Tablet .. 650 milliGRAM(s) Oral every 6 hours PRN Temp greater or equal to 38C (100.4F)  levETIRAcetam  Solution 1500 milliGRAM(s) Oral two times a day  propofol Infusion 1 MICROgram(s)/kG/Min IV Continuous <Continuous>    Respiratory Medications    Cardiovascular Medications  amLODIPine   Tablet 10 milliGRAM(s) Oral daily  hydrALAZINE 25 milliGRAM(s) Oral three times a day    Gastrointestinal Medications  docusate sodium Liquid 100 milliGRAM(s) Oral three times a day  pantoprazole   Suspension 40 milliGRAM(s) Oral daily  senna 2 Tablet(s) Oral at bedtime    Genitourinary Medications    Hematologic/Oncologic Medications    Antimicrobial/Immunologic Medications  cefepime   IVPB 2000 milliGRAM(s) IV Intermittent every 8 hours  nystatin    Suspension 389664 Unit(s) Oral two times a day    Endocrine/Metabolic Medications  insulin regular Infusion 2 Unit(s)/Hr IV Continuous <Continuous>    Topical/Other Medications  chlorhexidine 0.12% Liquid 15 milliLiter(s) Oral Mucosa two times a day  chlorhexidine 4% Liquid 1 Application(s) Topical <User Schedule>    --------------------------------------------------------------------------------------    VITAL SIGNS, INS/OUTS (last 24 hours):  --------------------------------------------------------------------------------------  ICU Vital Signs Last 24 Hrs  T(C): 37.2 (10 Bryce 2019 06:00), Max: 37.7 (09 Jun 2019 16:00)  T(F): 99 (10 Bryce 2019 06:00), Max: 99.8 (09 Jun 2019 16:00)  HR: 76 (10 Bryce 2019 10:00) (50 - 95)  BP: 153/85 (10 Bryce 2019 08:00) (99/53 - 161/75)  BP(mean): 122 (10 Bryce 2019 08:00) (73 - 122)  ABP: --  ABP(mean): --  RR: 22 (10 Bryce 2019 10:00) (12 - 39)  SpO2: 99% (10 Bryce 2019 10:00) (97% - 100%)    I&O's Summary    09 Jun 2019 07:01  -  10 Bryce 2019 07:00  --------------------------------------------------------  IN: 1128 mL / OUT: 2200 mL / NET: -1072 mL      --------------------------------------------------------------------------------------    EXAM:    Respiratory  Exam: Lungs clear to auscultation, Normal expansion/effort.    [] Tracheostomy   [] Intubated  Mechanical Ventilation: Mode: AC/ CMV (Assist Control/ Continuous Mandatory Ventilation)  RR (machine): 12  TV (machine): 450  FiO2: 40  PEEP: 5  ITime: 1  MAP: 13  PIP: 24      Cardiovascular  Exam: S1, S2.  Regular rate and rhythm.  Peripheral edema    Cardiac Rhythm: Normal Sinus Rhythm  ECHO:     GI  Exam: Abdomen soft, Non-tender, Non-distended.    Current Diet:  NPO      Tubes/Lines/Drains    [x] Peripheral IV  [] Central Venous Line     	[] R	[] L	[] IJ	[] Fem	[] SC        Type:	    Date Placed:   [] Arterial Line		[] R	[] L	[] Fem	[] Rad	[] Ax	Date Placed:   [] PICC:         	[] Midline		[] Mediport           [] Urinary Catheter		Date Placed:     Extremities  Exam: Extremities warm, pink, well-perfused.      Derm:  Exam: Good skin turgor, no skin breakdown.      :   Exam: Mckeon catheter in place.     LABS  --------------------------------------------------------------------------------------  Labs:  CAPILLARY BLOOD GLUCOSE      POCT Blood Glucose.: 141 mg/dL (10 Bryce 2019 10:23)  POCT Blood Glucose.: 162 mg/dL (10 Bryce 2019 06:19)  POCT Blood Glucose.: 128 mg/dL (10 Bryce 2019 03:54)  POCT Blood Glucose.: 80 mg/dL (10 Bryce 2019 01:34)  POCT Blood Glucose.: 126 mg/dL (09 Jun 2019 20:06)  POCT Blood Glucose.: 140 mg/dL (09 Jun 2019 17:08)  POCT Blood Glucose.: 157 mg/dL (09 Jun 2019 12:44)                          10.3   8.67  )-----------( 389      ( 10 Bryce 2019 04:25 )             33.2       Auto Neutrophil %: 72.4 % (06-10-19 @ 04:25)  Auto Immature Granulocyte %: 1.6 % (06-10-19 @ 04:25)    06-10    141  |  102  |  25<H>  ----------------------------<  145<H>  3.9   |  26  |  0.7      Calcium, Total Serum: 9.7 mg/dL (06-10-19 @ 04:25)      LFTs:             5.5  | 0.5  | 18       ------------------[64      ( 10 Bryce 2019 04:25 )  2.9  | x    | 23          Lipase:x      Amylase:x         Blood Gas Arterial, Lactate: 0.7 mmoL/L (06-10-19 @ 05:18)  Blood Gas Arterial, Lactate: 0.6 mmoL/L (06-09-19 @ 03:49)  Blood Gas Arterial, Lactate: 0.5 mmoL/L (06-08-19 @ 04:51)    ABG - ( 10 Bryce 2019 05:18 )  pH: 7.46  /  pCO2: 42    /  pO2: 86    / HCO3: 30    / Base Excess: 5.5   /  SaO2: 97              ABG - ( 09 Jun 2019 03:49 )  pH: 7.54  /  pCO2: 37    /  pO2: 80    / HCO3: 32    / Base Excess: 8.9   /  SaO2: 96              ABG - ( 08 Jun 2019 04:51 )  pH: 7.55  /  pCO2: 36    /  pO2: 65    / HCO3: 31    / Base Excess: 8.6   /  SaO2: 95                Coags:  --------------------------------------------------------------------------------------    OTHER LABS    IMAGING RESULTS      ASSESSMENT:  74y Female with respiratory failure    PLAN:   - Preop   - Trach placement today in CT OR

## 2019-06-10 NOTE — CONSULT NOTE ADULT - ATTENDING COMMENTS
General Thoracic Surgery Attestation    I have seen and examined the patient.  Where appropriate I have updated, edited, or corrected the resident's or PA's note with regard to findings, values, and plan.    request for tracheostomy after multiple failed vent weans.  discussed with son in detail.  To OR after tube feeds off 6-8 hours (turned off 830am) for tracheostomy

## 2019-06-10 NOTE — PROGRESS NOTE ADULT - SUBJECTIVE AND OBJECTIVE BOX
Patient is a 74y old  Female who presents with a chief complaint of left sided weakness and slurred speech (10 Bryce 2019 06:26)      Over Night Events:  Patient seen and examined failed weaning trial yesterday not able to pull TV       ROS:  See HPI    PHYSICAL EXAM    ICU Vital Signs Last 24 Hrs  T(C): 37.2 (10 Bryce 2019 06:00), Max: 37.7 (2019 16:00)  T(F): 99 (10 Bryce 2019 06:00), Max: 99.8 (2019 16:00)  HR: 73 (10 Bryce 2019 08:00) (50 - 95)  BP: 153/85 (10 Bryce 2019 08:00) (99/53 - 178/91)  BP(mean): 122 (10 Bryce 2019 08:00) (68 - 122)  ABP: --  ABP(mean): --  RR: 24 (10 Bryce 2019 08:00) (11 - 39)  SpO2: 99% (10 Bryce 2019 08:00) (96% - 100%)      General: awake   HEENT:          et tube       Lymph Nodes: NO cervical LN   Lungs: Bilateral BS  Cardiovascular: Regular   Abdomen: Soft, Positive BS  Extremities: No clubbing   Skin: warm   Neurological: follow simple command   Musculoskeletal: move all ext     I&O's Detail    2019 07:01  -  10 Bryce 2019 07:00  --------------------------------------------------------  IN:    Enteral Tube Flush: 240 mL    insulin regular Infusion: 68 mL    IV PiggyBack: 100 mL    Vital High Protein: 720 mL  Total IN: 1128 mL    OUT:    Incontinent per Collection Ba mL  Total OUT: 2200 mL    Total NET: -1072 mL          LABS:                          10.3   8.67  )-----------( 389      ( 10 Bryce 2019 04:25 )             33.2         10 Bryce 2019 04:25    141    |  102    |  25     ----------------------------<  145    3.9     |  26     |  0.7      Ca    9.7        10 Bryce 2019 04:25  Mg     2.1       2019 04:46    TPro  5.5    /  Alb  2.9    /  TBili  0.5    /  DBili  x      /  AST  18     /  ALT  23     /  AlkPhos  64     10 Bryce 2019 04:25  Amylase x     lipase x                                                                                                                                                                                                 Mode: AC/ CMV (Assist Control/ Continuous Mandatory Ventilation)  RR (machine): 12  TV (machine): 450  FiO2: 40  PEEP: 5  ITime: 1  MAP: 13  PIP: 24                                      ABG - ( 10 Bryce 2019 05:18 )  pH, Arterial: 7.46  pH, Blood: x     /  pCO2: 42    /  pO2: 86    / HCO3: 30    / Base Excess: 5.5   /  SaO2: 97                  MEDICATIONS  (STANDING):  amLODIPine   Tablet 10 milliGRAM(s) Oral daily  cefepime   IVPB 2000 milliGRAM(s) IV Intermittent every 8 hours  chlorhexidine 0.12% Liquid 15 milliLiter(s) Oral Mucosa two times a day  chlorhexidine 4% Liquid 1 Application(s) Topical <User Schedule>  docusate sodium Liquid 100 milliGRAM(s) Oral three times a day  hydrALAZINE 25 milliGRAM(s) Oral three times a day  insulin regular Infusion 2 Unit(s)/Hr (2 mL/Hr) IV Continuous <Continuous>  levETIRAcetam  Solution 1500 milliGRAM(s) Oral two times a day  nystatin    Suspension 748181 Unit(s) Oral two times a day  pantoprazole   Suspension 40 milliGRAM(s) Oral daily  potassium chloride  20 mEq/100 mL IVPB 20 milliEquivalent(s) IV Intermittent once  propofol Infusion 1 MICROgram(s)/kG/Min (0.473 mL/Hr) IV Continuous <Continuous>  senna 2 Tablet(s) Oral at bedtime    MEDICATIONS  (PRN):  acetaminophen   Tablet .. 650 milliGRAM(s) Oral every 6 hours PRN Temp greater or equal to 38C (100.4F)          Xrays:  TLC:  OG:  ET tube: low                                                                                     ECHO:  CAM ICU:

## 2019-06-10 NOTE — PROGRESS NOTE ADULT - ASSESSMENT
Impression:    Intra cerebral Bleed/ following simple commands  Seizure  Hypertensive emergency  HO hemorrhagic stroke  penumonia aspiration?  UTI  Strep Bacteremia      PLAN:    CNS: SAT. Continue with keppra for now.    HEENT: Oral care    PULMONARY:  HOB @ 30 degrees.  been failing weaning trial will proceed with tracheostomy     CARDIOVASCULAR: i=o. Maintain BP <140. Continue with blood pressure medicine.     GI: GI prophylaxis.  Feeding through OG tube.    RENAL:  Follow up lytes.  Correct as needed    INFECTIOUS DISEASE: abx per ID    HEMATOLOGICAL:   DVT prophylaxis.    ENDOCRINE:  Follow up FS.     MUSCULOSKELETAL: Bed rest    Poor prognosis Impression:    Intra cerebral Bleed/ following simple commands  Seizure  Hypertensive emergency  HO hemorrhagic stroke  penumonia aspiration?  UTI  Strep Bacteremia      PLAN:    CNS: SAT. Continue with keppra for now.    HEENT: Oral care    PULMONARY:  HOB @ 30 degrees.  been failing weaning trial will proceed with tracheostomy     CARDIOVASCULAR: i=o. Maintain BP <140. Continue with blood pressure medicine.     GI: GI prophylaxis.  Feeding through OG tube.    RENAL:  Follow up lytes.  Correct as needed    INFECTIOUS DISEASE: abx per ID follow ID     HEMATOLOGICAL:   DVT prophylaxis.    ENDOCRINE:  Follow up FS.     MUSCULOSKELETAL: Bed rest    Poor prognosis

## 2019-06-10 NOTE — PROGRESS NOTE ADULT - ASSESSMENT
· Assessment		  74 y.o F with PMH of HTN, HLD, DM, hemorrhagic stroke,? EVD placement at that time ( about 8 years ago)  with residual left side weakness and slurred speech, ambulated with cane, on ASA daily  Pt. brought to [facility] ED as a stroke code.    WBC 6.7>8.6  BCx 6/1 NG  BCx6/4 Strep  BCx 6/5 , 6, 7 NG  No pyuria  UCx  6/1 NG  UCx 6/4 E coli  Sputa 6/2 moderate PMNs, Strep constellatus  Sputa 6/4 normal  CXR bibasilar opacities    IMPRESSION:  Transient Strep bacteremia secondary to possibly LLL Strep PNA ( as Sputa and BCx positive for Strep constellatus ). Although CXR not impressive for focal consolidation, CT from 5/30 revealed a LLL consolidation  No endocarditis secondary to Strep with septic emboli to CNS with the thalamic bleed  No pyelonephritis ( no pyuria ). Asymptomatic bacteruria secondary to E coli ( will not betsy it)  Acute right thalamic hemorrhagic stroke with mild shift with repeat CT head showing an improvement  Seizures  Clinically much improved and follows commands.    RECOMMENDATIONS:  Cefepime 2 gm iv q8h

## 2019-06-10 NOTE — PROGRESS NOTE ADULT - SUBJECTIVE AND OBJECTIVE BOX
Patient is a 74y old  Female who presents with a chief complaint of left sided weakness and slurred speech (10 Bryce 2019 10:23)    HOSPITAL DAY: 11d   ICU DAY: 11    SUMMARY OF HOSPITAL COURSE TO DATE 74 y.o F with PMH of HTN, HLD, DM, hemorrhagic stroke,? EVD placement at that time ( about 8 years ago)  with residual left side weakness and slurred speech, ambulated with cane, on ASA daily.  Pt. brought to [facility] ED as a stroke code. At the time of presentation to [facility] ED A&Ox 2, baseline slurred speech with left sided weakness. /82, 55 HR, 18 RR, CTH showed acute right thalamic hemorrhage with extension to right lateral and third ventricle. At [facility] ED, Pt. Intubated 2/2 worsening of neuro exam for air way protection as per ED attending note. Pt.  became more drowsy with decreased responsiveness. Pt. was transferred to ED-[place]. At time of NSGY eval Pt intubated, sedated on Propofol and Fentanyl, radial arterial line placed, she received keppra, zofran and mannitol in ED. Repeat CT scan stable from prior study as per neurosurgery. Platelets one unit ordered as per neurosurgery.    6/4: Patient had tonic-clonic seizure lasting 10 minutes around 6 am. 4mg Ativan given; started on propofol. Neuro NP saw her; VEEG ongoing, rpt CT head unchanged, and increased dose of keppra ordered.     Streptococcus bacteremia and gram negative rods in urine culture.     OVERNIGHT EVENTS: Off sedation. No events.     TODAY: Patient was seen this morning at bedside.  Multiple failed SBT. Tracheostomy placement today.     ================= PRESENT TODAY ==================    1-Mckeon Catheter: yes  2-Central Line: no  3-IV Fluids: no  4-Drips:  4.1-Pressors: no  4.2-Sedation: no  4.3-Heparin:   no  5-Intubated: yes    INTERVAL HPI/OVERNIGHT EVENTS:  ICU Vital Signs Last 24 Hrs  T(C): 36.8 (10 Bryce 2019 13:30), Max: 37.7 (09 Jun 2019 16:00)  T(F): 98.2 (10 Bryce 2019 13:30), Max: 99.8 (09 Jun 2019 16:00)  HR: 71 (10 Bryce 2019 13:14) (50 - 95)  BP: 161/66 (10 Bryce 2019 13:30) (105/51 - 161/75)  BP(mean): 93 (10 Bryce 2019 13:14) (73 - 122)  ABP: --  ABP(mean): --  RR: 22 (10 Bryce 2019 13:30) (12 - 39)  SpO2: 99% (10 Bryce 2019 13:30) (97% - 100%)    I&O's Summary    09 Jun 2019 07:01  -  10 Bryce 2019 07:00  --------------------------------------------------------  IN: 1128 mL / OUT: 2200 mL / NET: -1072 mL      Mode: AC/ CMV (Assist Control/ Continuous Mandatory Ventilation)  RR (machine): 12  TV (machine): 450  FiO2: 40  PEEP: 5  ITime: 1  MAP: 13  PIP: 24      LABS:                        10.3   8.67  )-----------( 389      ( 10 Bryce 2019 04:25 )             33.2     06-10    141  |  102  |  25<H>  ----------------------------<  145<H>  3.9   |  26  |  0.7    Ca    9.7      10 Bryce 2019 04:25  Mg     2.1     06-09    TPro  5.5<L>  /  Alb  2.9<L>  /  TBili  0.5  /  DBili  x   /  AST  18  /  ALT  23  /  AlkPhos  64  06-10        CAPILLARY BLOOD GLUCOSE      POCT Blood Glucose.: 141 mg/dL (10 Bryce 2019 10:23)  POCT Blood Glucose.: 162 mg/dL (10 Bryce 2019 06:19)  POCT Blood Glucose.: 128 mg/dL (10 Bryce 2019 03:54)  POCT Blood Glucose.: 80 mg/dL (10 Bryce 2019 01:34)  POCT Blood Glucose.: 126 mg/dL (09 Jun 2019 20:06)  POCT Blood Glucose.: 140 mg/dL (09 Jun 2019 17:08)    ABG - ( 10 Bryce 2019 05:18 )  pH, Arterial: 7.46  pH, Blood: x     /  pCO2: 42    /  pO2: 86    / HCO3: 30    / Base Excess: 5.5   /  SaO2: 97                  RADIOLOGY & ADDITIONAL TESTS:    Consultant(s) Notes Reviewed:  [x ] YES  [ ] NO    MEDICATIONS  (STANDING):  amLODIPine   Tablet 10 milliGRAM(s) Oral daily  cefepime   IVPB 2000 milliGRAM(s) IV Intermittent every 8 hours  chlorhexidine 0.12% Liquid 15 milliLiter(s) Oral Mucosa two times a day  chlorhexidine 4% Liquid 1 Application(s) Topical <User Schedule>  docusate sodium Liquid 100 milliGRAM(s) Oral three times a day  hydrALAZINE 25 milliGRAM(s) Oral three times a day  insulin regular Infusion 2 Unit(s)/Hr (2 mL/Hr) IV Continuous <Continuous>  levETIRAcetam  Solution 1500 milliGRAM(s) Oral two times a day  nystatin    Suspension [id number] Unit(s) Oral two times a day  pantoprazole   Suspension 40 milliGRAM(s) Oral daily  propofol Infusion 1 MICROgram(s)/kG/Min (0.473 mL/Hr) IV Continuous <Continuous>  senna 2 Tablet(s) Oral at bedtime    MEDICATIONS  (PRN):  acetaminophen   Tablet .. 650 milliGRAM(s) Oral every 6 hours PRN Temp greater or equal to 38C (100.4F)      PHYSICAL EXAM:  GENERAL: well built, well nourished  HEAD:  Atraumatic, Normocephalic  EYES: EOMI, PERRLA, conjunctiva and sclera clear  ENT: No tonsillar erythema, exudates, or enlargement; Moist mucous membranes, Good dentition, No lesions  NECK: Supple, No JVD, Normal thyroid, no enlarged nodes  NERVOUS SYSTEM:  Alert & Oriented X3, Good concentration; Motor Strength 5/5 B/L upper and lower extremities; DTRs 2+ intact and symmetric, sensory intact  CHEST/LUNG: B/L good air entry; No rales, rhonchi, or wheezing  HEART: S1S2 normal, no S3, Regular rate and rhythm; No murmurs, rubs, or gallops  ABDOMEN: Soft, Nontender, Nondistended; Bowel sounds present  EXTREMITIES:  2+ Peripheral Pulses, No clubbing, cyanosis, or edema  LYMPH: No lymphadenopathy noted  SKIN: No rashes or lesions    Care Discussed with Consultants/Other Providers [ x] YES  [ ] NO Patient is a 74y old  Female who presents with a chief complaint of left sided weakness and slurred speech (10 Bryce 2019 10:23)    HOSPITAL DAY: 11d   ICU DAY: 11    SUMMARY OF HOSPITAL COURSE TO DATE 74 y.o F with PMH of HTN, HLD, DM, hemorrhagic stroke,? EVD placement at that time ( about 8 years ago)  with residual left side weakness and slurred speech, ambulated with cane, on ASA daily.  Pt. brought to [facility] ED as a stroke code. At the time of presentation to [facility] ED A&Ox 2, baseline slurred speech with left sided weakness. /82, 55 HR, 18 RR, CTH showed acute right thalamic hemorrhage with extension to right lateral and third ventricle. At [facility] ED, Pt. Intubated 2/2 worsening of neuro exam for air way protection as per ED attending note. Pt.  became more drowsy with decreased responsiveness. Pt. was transferred to ED-[place]. At time of NSGY eval Pt intubated, sedated on Propofol and Fentanyl, radial arterial line placed, she received keppra, zofran and mannitol in ED. Repeat CT scan stable from prior study as per neurosurgery. Platelets one unit ordered as per neurosurgery.    6/4: Patient had tonic-clonic seizure lasting 10 minutes around 6 am. 4mg Ativan given; started on propofol. Neuro NP saw her; VEEG ongoing, rpt CT head unchanged, and increased dose of keppra ordered.     Streptococcus bacteremia and gram negative rods in urine culture.     OVERNIGHT EVENTS: Off sedation. No events.     TODAY: Patient was seen this morning at bedside.  Multiple failed SBT. Tracheostomy placement today.     ================= PRESENT TODAY ==================    1-Mckeon Catheter: yes  2-Central Line: no  3-IV Fluids: no  4-Drips:  4.1-Pressors: no  4.2-Sedation: no  4.3-Heparin:   no  5-Intubated: yes    ICU Vital Signs Last 24 Hrs  T(C): 36.8 (10 Bryce 2019 13:30), Max: 37.7 (09 Jun 2019 16:00)  T(F): 98.2 (10 Bryce 2019 13:30), Max: 99.8 (09 Jun 2019 16:00)  HR: 71 (10 Bryce 2019 13:14) (50 - 95)  BP: 161/66 (10 Bryce 2019 13:30) (105/51 - 161/75)  BP(mean): 93 (10 Bryce 2019 13:14) (73 - 122)  ABP: --  ABP(mean): --  RR: 22 (10 Bryce 2019 13:30) (12 - 39)  SpO2: 99% (10 Bryce 2019 13:30) (97% - 100%)    I&O's Summary    09 Jun 2019 07:01  -  10 Bryce 2019 07:00  --------------------------------------------------------  IN: 1128 mL / OUT: 2200 mL / NET: -1072 mL      Mode: AC/ CMV (Assist Control/ Continuous Mandatory Ventilation)  RR (machine): 12  TV (machine): 450  FiO2: 40  PEEP: 5  ITime: 1  MAP: 13  PIP: 24      LABS:                        10.3   8.67  )-----------( 389      ( 10 Bryce 2019 04:25 )             33.2     06-10    141  |  102  |  25<H>  ----------------------------<  145<H>  3.9   |  26  |  0.7    Ca    9.7      10 Bryce 2019 04:25  Mg     2.1     06-09    TPro  5.5<L>  /  Alb  2.9<L>  /  TBili  0.5  /  DBili  x   /  AST  18  /  ALT  23  /  AlkPhos  64  06-10        CAPILLARY BLOOD GLUCOSE      POCT Blood Glucose.: 141 mg/dL (10 Bryce 2019 10:23)  POCT Blood Glucose.: 162 mg/dL (10 Bryce 2019 06:19)  POCT Blood Glucose.: 128 mg/dL (10 Bryce 2019 03:54)  POCT Blood Glucose.: 80 mg/dL (10 Bryce 2019 01:34)  POCT Blood Glucose.: 126 mg/dL (09 Jun 2019 20:06)  POCT Blood Glucose.: 140 mg/dL (09 Jun 2019 17:08)    ABG - ( 10 Bryce 2019 05:18 )  pH, Arterial: 7.46  pH, Blood: x     /  pCO2: 42    /  pO2: 86    / HCO3: 30    / Base Excess: 5.5   /  SaO2: 97        RADIOLOGY & ADDITIONAL TESTS:  < from: Xray Chest 1 View- PORTABLE-Routine (06.10.19 @ 05:17) >    Impression:      No focal consolidation.      < end of copied text >    < from: CT Head No Cont (06.07.19 @ 17:01) >  IMPRESSION:    1.  Slight interval decreased size of the right thalamic parenchymal   hematoma measuring about 2 cm, previously 2.2 cm.    2.  Slight interval decrease in the layering intraventricular hemorrhage   and the mild ventricular enlargement.    3.  Remainder of exam is stable. No new intracranial hemorrhage.        < end of copied text >    Consultant(s) Notes Reviewed:  [x ] YES  [ ] NO    MEDICATIONS  (STANDING):  amLODIPine   Tablet 10 milliGRAM(s) Oral daily  cefepime   IVPB 2000 milliGRAM(s) IV Intermittent every 8 hours  chlorhexidine 0.12% Liquid 15 milliLiter(s) Oral Mucosa two times a day  chlorhexidine 4% Liquid 1 Application(s) Topical <User Schedule>  docusate sodium Liquid 100 milliGRAM(s) Oral three times a day  hydrALAZINE 25 milliGRAM(s) Oral three times a day  insulin regular Infusion 2 Unit(s)/Hr (2 mL/Hr) IV Continuous <Continuous>  levETIRAcetam  Solution 1500 milliGRAM(s) Oral two times a day  nystatin    Suspension [id number] Unit(s) Oral two times a day  pantoprazole   Suspension 40 milliGRAM(s) Oral daily  propofol Infusion 1 MICROgram(s)/kG/Min (0.473 mL/Hr) IV Continuous <Continuous>  senna 2 Tablet(s) Oral at bedtime    MEDICATIONS  (PRN):  acetaminophen   Tablet .. 650 milliGRAM(s) Oral every 6 hours PRN Temp greater or equal to 38C (100.4F)    PHYSICAL EXAM:  GENERAL: well built, well nourished.  HEAD:  Atraumatic, Normocephalic.  EYES: spontaneously opens eyes. Pupils reactive but sluggish.  ENT: dry mucous membranes.   NECK: Supple, No JVD.  NERVOUS SYSTEM: Pupils pinpoint but reactive; moves extremities; intermittently follows commands.    CHEST/LUNG: intubated; equal bilateral chest rise.   HEART: S1S2 normal, no S3, Regular rate and rhythm; No murmurs, rubs, or gallops.  ABDOMEN: Soft, Nondistended; Bowel sounds present  EXTREMITIES: No clubbing, cyanosis, or edema  SKIN: No rashes or lesions  Care Discussed with Consultants/Other Providers [ x] YES  [ ] NO

## 2019-06-11 NOTE — CHART NOTE - NSCHARTNOTEFT_GEN_A_CORE
ICU DOWNGRADE NOTE:    74y Female transferred to floor from ICU    Patient is a 74y old Female who presents with a chief complaint of left sided weakness and slurred speech (11 Jun 2019 07:59)    The patient is currently admitted for the primary diagnosis of Hypertensive emergency    The patient was admitted to the unit for 12 Days.    The patient was intubated for 11 days, and was never on pressors.    Indwelling vascular catheters: Peripheral IVs    Urinary Catheter: None    Disposition: ?    Code Status: FULL CODE    ICU COURSE OF EVENTS:  -------------------------------------------------------------------------------------------  HOSPITAL DAY: 12d   ICU DAY: 12    SUMMARY OF HOSPITAL COURSE TO DATE 74 y.o F with PMH of HTN, HLD, DM, hemorrhagic stroke,? EVD placement at that time ( about 8 years ago)  with residual left side weakness and slurred speech, ambulated with cane, on ASA daily.  Pt. brought to [facility] ED as a stroke code. At the time of presentation to [facility] ED A&Ox 2, baseline slurred speech with left sided weakness. /82, 55 HR, 18 RR, CTH showed acute right thalamic hemorrhage with extension to right lateral and third ventricle. At [facility] ED, Pt. Intubated 2/2 worsening of neuro exam for air way protection as per ED attending note. Pt.  became more drowsy with decreased responsiveness. Pt. was transferred to ED-[place]. At time of NSGY eval Pt intubated, sedated on Propofol and Fentanyl, radial arterial line placed, she received keppra, zofran and mannitol in ED. Repeat CT scan stable from prior study as per neurosurgery. Platelets one unit ordered as per neurosurgery.    6/4: Patient had tonic-clonic seizure lasting 10 minutes around 6 am. 4mg Ativan given; started on propofol. Neuro NP saw her; VEEG ongoing, rpt CT head unchanged, and increased dose of keppra ordered.     Streptococcus bacteremia and gram negative rods in urine culture; bacteremia resolved.   Patient failed multiple SBTs; had tracheostomy performed 6/10.    ICU Vital Signs Last 24 Hrs  T(C): 37.3 (11 Jun 2019 08:01), Max: 37.7 (11 Jun 2019 06:00)  T(F): 99.2 (11 Jun 2019 08:01), Max: 99.9 (11 Jun 2019 06:00)  HR: 75 (11 Jun 2019 10:00) (58 - 92)  BP: 130/65 (11 Jun 2019 10:00) (96/48 - 176/77)  BP(mean): 88 (11 Jun 2019 10:00) (67 - 116)  ABP: --  ABP(mean): --  RR: 15 (11 Jun 2019 10:00) (12 - 25)  SpO2: 100% (11 Jun 2019 10:00) (98% - 100%)    · Assessment    Patient is a 74y old Female who presents with a chief complaint of left sided weakness and slurred speech   Currently admitted to medicine with the primary diagnosis of Hypertensive emergency.      PLAN  74 y.o F with PMH of HTN, HLD, DM, hemorrhagic stroke,? EVD placement at that time ( about 8 years ago)  with residual left side weakness and slurred speech, ambulated with cane, on ASA daily  Pt. brought to [facility] ED as a stroke code    #)Acute right thalamic hemorrhagic stroke  -Sudden onset slurred speech and left sided weakness on presentation  -CT head showed right thalamic hemorrhage with extension into lateral and third ventricles  -Neurosurgery following.   -No acute neurosurgical intervention needed at this time since repeat CT head stable. No evidence of evolving hydrocephalus.  -WAS Clinically patient is improving. Awake and responding to commands. Moves all extremities. No repeat CT head needed. Failed SBP in 6/2.   - 6/4: Tonic clonic seizure lasting 10 min; 4mg ativan; on propofol  - CT Head rpt stable 6/4  - VEEG 24hr- no seizure. Bi-occipital sharps.   -Increase Keppra to 1500mg BID.   -Monitor and control SBP between 140-160. On amlodipine and hydralazine to control BP  -GAG and pupillary reflex present.  -CT chest and abdomen did not show any evidence of bleed  - RPT CT HEAD 6/7 No interval change.       #Streptococcus bacteremia 2/2 LLL pneumonia: resolved.   - T-max 100.8 overnight.  -Blood cultures grew streptococcus 6/4  - Rpt cultures NO GROWTH TO DATE  - D/C Vancomycin (6/5)  - Cefepime 6/6-> continue for 10 days.     #UTI  - Gram negative rods on culture  - Rpt. cultures- gram negative rods.  - No villatoro    #Prolonged Qtc 2/2 inc intracranial pressure 2/2 Intracranial bleed  -Few sec of polymorphic V tach overnight on 5/31   -Monitor Magnesium and potassium daily  -If V tach recurs, give Magnesium 1gm STAT  - QTc 507 6/3.  - QTc normal 6/6.    # Intubation dependent   - Multiple failed SBTs  - Tracheostomy placement 6/10   	   #Hypophosphatemia  -f/u am phosphorus    #)DM   - basal/bolus  - monitor fingerstick    #)HTN  -On losartan, amlodipine and hydralazine  -Maintain BP between 120-140mm Hg    GI PPX: protonix  DVT PPX: subQ Heparin    DIET: Tube feeds  ACTIVITY: Bed rest    ================= CODE STATUS =================                  (X) FULL CODE     |     () DNR     |     () DNI        -------------------------------------------------------------------------------------------    Current workup in progress: follow up ID; possible need for Peg tube.     SIGN OUT AT 06-11-19 @ 11:39 GIVEN TO: ICU DOWNGRADE NOTE:    74y Female transferred to floor from ICU    Patient is a 74y old Female who presents with a chief complaint of left sided weakness and slurred speech (11 Jun 2019 07:59)    The patient is currently admitted for the primary diagnosis of Hypertensive emergency    The patient was admitted to the unit for 12 Days.    The patient was intubated for 11 days, and was never on pressors.    Indwelling vascular catheters: Peripheral IVs    Urinary Catheter: None    Disposition: ?    Code Status: FULL CODE    ICU COURSE OF EVENTS:  -------------------------------------------------------------------------------------------  HOSPITAL DAY: 12d   ICU DAY: 12    SUMMARY OF HOSPITAL COURSE TO DATE 74 y.o F with PMH of HTN, HLD, DM, hemorrhagic stroke,? EVD placement at that time ( about 8 years ago)  with residual left side weakness and slurred speech, ambulated with cane, on ASA daily.  Pt. brought to [facility] ED as a stroke code. At the time of presentation to [facility] ED A&Ox 2, baseline slurred speech with left sided weakness. /82, 55 HR, 18 RR, CTH showed acute right thalamic hemorrhage with extension to right lateral and third ventricle. At [facility] ED, Pt. Intubated 2/2 worsening of neuro exam for air way protection as per ED attending note. Pt.  became more drowsy with decreased responsiveness. Pt. was transferred to ED-[place]. At time of NSGY eval Pt intubated, sedated on Propofol and Fentanyl, radial arterial line placed, she received keppra, zofran and mannitol in ED. Repeat CT scan stable from prior study as per neurosurgery. Platelets one unit ordered as per neurosurgery.     In unit, patient was on cardene drip; switched to PO medication soon after.     6/4: Patient had tonic-clonic seizure lasting 10 minutes around 6 am. 4mg Ativan given; started on propofol. Neuro NP saw her; VEEG ongoing, rpt CT head unchanged, and increased dose of keppra ordered.     Streptococcus bacteremia and gram negative rods in urine culture; bacteremia resolved.   Patient failed multiple SBTs; had tracheostomy performed 6/10.    ICU Vital Signs Last 24 Hrs  T(C): 37.3 (11 Jun 2019 08:01), Max: 37.7 (11 Jun 2019 06:00)  T(F): 99.2 (11 Jun 2019 08:01), Max: 99.9 (11 Jun 2019 06:00)  HR: 75 (11 Jun 2019 10:00) (58 - 92)  BP: 130/65 (11 Jun 2019 10:00) (96/48 - 176/77)  BP(mean): 88 (11 Jun 2019 10:00) (67 - 116)  ABP: --  ABP(mean): --  RR: 15 (11 Jun 2019 10:00) (12 - 25)  SpO2: 100% (11 Jun 2019 10:00) (98% - 100%)    · Assessment    Patient is a 74y old Female who presents with a chief complaint of left sided weakness and slurred speech   Currently admitted to medicine with the primary diagnosis of Hypertensive emergency.      PLAN  74 y.o F with PMH of HTN, HLD, DM, hemorrhagic stroke,? EVD placement at that time ( about 8 years ago)  with residual left side weakness and slurred speech, ambulated with cane, on ASA daily  Pt. brought to [facility] ED as a stroke code    #)Acute right thalamic hemorrhagic stroke  -Sudden onset slurred speech and left sided weakness on presentation  -CT head showed right thalamic hemorrhage with extension into lateral and third ventricles  -Neurosurgery following.   -No acute neurosurgical intervention needed at this time since repeat CT head stable. No evidence of evolving hydrocephalus.  -WAS Clinically patient is improving. Awake and responding to commands. Moves all extremities. No repeat CT head needed. Failed SBP in 6/2.   - 6/4: Tonic clonic seizure lasting 10 min; 4mg ativan; on propofol  - CT Head rpt stable 6/4  - VEEG 24hr- no seizure. Bi-occipital sharps.   -Increase Keppra to 1500mg BID.   -Monitor and control SBP between 140-160. On amlodipine and hydralazine to control BP  -GAG and pupillary reflex present.  -CT chest and abdomen did not show any evidence of bleed  - RPT CT HEAD 6/7 No interval change.       #Streptococcus bacteremia 2/2 LLL pneumonia: resolved.   - T-max 100.8 overnight.  -Blood cultures grew streptococcus 6/4  - Rpt cultures NO GROWTH TO DATE  - D/C Vancomycin (6/5)  - Cefepime 6/6-> continue for 10 days.     #UTI  - Gram negative rods on culture  - Rpt. cultures- gram negative rods.  - No villatoro    #Prolonged Qtc 2/2 inc intracranial pressure 2/2 Intracranial bleed  -Few sec of polymorphic V tach overnight on 5/31   -Monitor Magnesium and potassium daily  -If V tach recurs, give Magnesium 1gm STAT  - QTc 507 6/3.  - QTc normal 6/6.    # Intubation dependent   - Multiple failed SBTs  - Tracheostomy placement 6/10   	   #Hypophosphatemia  -f/u am phosphorus    #)DM   - basal/bolus  - monitor fingerstick    #)HTN  -On losartan, amlodipine and hydralazine  -Maintain BP between 120-140mm Hg    GI PPX: protonix  DVT PPX: subQ Heparin    DIET: Tube feeds  ACTIVITY: Bed rest    ================= CODE STATUS =================                  (X) FULL CODE     |     () DNR     |     () DNI        -------------------------------------------------------------------------------------------    Current workup in progress: follow up ID; possible need for Peg tube.     SIGN OUT AT 06-11-19 @ 11:39 GIVEN TO: ICU DOWNGRADE NOTE:    74y Female transferred to floor from ICU    Patient is a 74y old Female who presents with a chief complaint of left sided weakness and slurred speech (11 Jun 2019 07:59)    The patient is currently admitted for the primary diagnosis of Hypertensive emergency    The patient was admitted to the unit for 12 Days.    The patient was intubated for 11 days, and was never on pressors.    Indwelling vascular catheters: Peripheral IVs    Urinary Catheter: None    Disposition: ?    Code Status: FULL CODE    ICU COURSE OF EVENTS:  -------------------------------------------------------------------------------------------  HOSPITAL DAY: 12d   ICU DAY: 12    SUMMARY OF HOSPITAL COURSE TO DATE 74 y.o F with PMH of HTN, HLD, DM, hemorrhagic stroke,? EVD placement at that time ( about 8 years ago)  with residual left side weakness and slurred speech, ambulated with cane, on ASA daily.  Pt. brought to [facility] ED as a stroke code. At the time of presentation to [facility] ED A&Ox 2, baseline slurred speech with left sided weakness. /82, 55 HR, 18 RR, CTH showed acute right thalamic hemorrhage with extension to right lateral and third ventricle. At [facility] ED, Pt. Intubated 2/2 worsening of neuro exam for air way protection as per ED attending note. Pt.  became more drowsy with decreased responsiveness. Pt. was transferred to ED-[place]. At time of NSGY eval Pt intubated, sedated on Propofol and Fentanyl, radial arterial line placed, she received keppra, zofran and mannitol in ED. Repeat CT scan stable from prior study as per neurosurgery. Platelets one unit ordered as per neurosurgery.     In unit, patient was on cardene drip; switched to PO medication soon after.     6/4: Patient had tonic-clonic seizure lasting 10 minutes around 6 am. 4mg Ativan given; started on propofol. Neuro NP saw her; VEEG ongoing, rpt CT head unchanged, and increased dose of keppra ordered.     Streptococcus bacteremia and gram negative rods in urine culture; bacteremia resolved.   Patient failed multiple SBTs; had tracheostomy performed 6/10.    ICU Vital Signs Last 24 Hrs  T(C): 37.3 (11 Jun 2019 08:01), Max: 37.7 (11 Jun 2019 06:00)  T(F): 99.2 (11 Jun 2019 08:01), Max: 99.9 (11 Jun 2019 06:00)  HR: 75 (11 Jun 2019 10:00) (58 - 92)  BP: 130/65 (11 Jun 2019 10:00) (96/48 - 176/77)  BP(mean): 88 (11 Jun 2019 10:00) (67 - 116)  ABP: --  ABP(mean): --  RR: 15 (11 Jun 2019 10:00) (12 - 25)  SpO2: 100% (11 Jun 2019 10:00) (98% - 100%)    · Assessment    Patient is a 74y old Female who presents with a chief complaint of left sided weakness and slurred speech   Currently admitted to medicine with the primary diagnosis of Hypertensive emergency.      PLAN  74 y.o F with PMH of HTN, HLD, DM, hemorrhagic stroke,? EVD placement at that time ( about 8 years ago)  with residual left side weakness and slurred speech, ambulated with cane, on ASA daily  Pt. brought to [facility] ED as a stroke code    #)Acute right thalamic hemorrhagic stroke  -Sudden onset slurred speech and left sided weakness on presentation  -CT head showed right thalamic hemorrhage with extension into lateral and third ventricles  -Neurosurgery following.   -No acute neurosurgical intervention needed at this time since repeat CT head stable. No evidence of evolving hydrocephalus.  -WAS Clinically patient is improving. Awake and responding to commands. Moves all extremities. No repeat CT head needed. Failed SBP in 6/2.   - 6/4: Tonic clonic seizure lasting 10 min; 4mg ativan; on propofol  - CT Head rpt stable 6/4  - VEEG 24hr- no seizure. Bi-occipital sharps.   -Increase Keppra to 1500mg BID.   -Monitor and control SBP between 140-160. On amlodipine and hydralazine to control BP  -GAG and pupillary reflex present.  -CT chest and abdomen did not show any evidence of bleed  - RPT CT HEAD 6/7 No interval change.       #Streptococcus bacteremia 2/2 LLL pneumonia: resolved.   - T-max 100.8 overnight.  -Blood cultures grew streptococcus 6/4  - Rpt cultures NO GROWTH TO DATE  - D/C Vancomycin (6/5)  - Cefepime 6/6-> continue for 10 days.     #UTI  - Gram negative rods on culture  - Rpt. cultures- gram negative rods.  - No villatoro    #Prolonged Qtc 2/2 inc intracranial pressure 2/2 Intracranial bleed  -Few sec of polymorphic V tach overnight on 5/31   -Monitor Magnesium and potassium daily  -If V tach recurs, give Magnesium 1gm STAT  - QTc 507 6/3.  - QTc normal 6/6.    # Intubation dependent   - Multiple failed SBTs  - Tracheostomy placement 6/10   	   #Hypophosphatemia  -f/u am phosphorus    #)DM   - basal/bolus  - monitor fingerstick    #)HTN  -On losartan, amlodipine and hydralazine  -Maintain BP between 120-140mm Hg    GI PPX: protonix  DVT PPX: subQ Heparin    DIET: Tube feeds  ACTIVITY: Bed rest    ================= CODE STATUS =================                  (X) FULL CODE     |     () DNR     |     () DNI        -------------------------------------------------------------------------------------------    Current workup in progress: follow up ID; possible need for Peg tube.     SIGN OUT AT 06-11-19 @ 13:28 GIVEN TO: Dr. Heller

## 2019-06-11 NOTE — PROGRESS NOTE ADULT - ASSESSMENT
Impression:    Intra cerebral Bleed/ following simple commands  Seizure  Hypertensive emergency  HO hemorrhagic stroke  penumonia aspiration?  UTI  Strep Bacteremia      PLAN:    CNS: SAT. Continue with keppra for now.    HEENT: Oral care    PULMONARY:  HOB @ 30 degrees.  keep same vent setting cpap trial     CARDIOVASCULAR: i=o. Maintain BP <140. Continue with blood pressure medicine.     GI: GI prophylaxis.  Feeding through OG tube.    RENAL:  Follow up lytes.  Correct as needed    INFECTIOUS DISEASE: abx per ID follow ID cefepime     HEMATOLOGICAL:   DVT prophylaxis.    ENDOCRINE:  Follow up FS.     MUSCULOSKELETAL: Bed rest    Poor prognosis  transfer to vent unit

## 2019-06-11 NOTE — PROGRESS NOTE ADULT - SUBJECTIVE AND OBJECTIVE BOX
Procedure: Status post tracheostomy placement   Post Operative day #1      pmh:  Diabetes mellitus  Stroke  Hypertension  Failure respiratory  Failure respiratory  Hypertensive emergency  Open tracheostomy  No significant past surgical history  FALL  Intracranial hemorrhage    No Known Allergies    acetaminophen   Tablet .. 650 milliGRAM(s) Oral every 6 hours PRN  amLODIPine   Tablet 10 milliGRAM(s) Oral daily  cefepime   IVPB 2000 milliGRAM(s) IV Intermittent every 8 hours  chlorhexidine 0.12% Liquid 15 milliLiter(s) Oral Mucosa two times a day  chlorhexidine 4% Liquid 1 Application(s) Topical <User Schedule>  dextrose 40% Gel 15 Gram(s) Oral once PRN  dextrose 50% Injectable 12.5 Gram(s) IV Push once  dextrose 50% Injectable 25 Gram(s) IV Push once  dextrose 50% Injectable 25 Gram(s) IV Push once  docusate sodium Liquid 100 milliGRAM(s) Oral three times a day  glucagon  Injectable 1 milliGRAM(s) IntraMuscular once PRN  hydrALAZINE 25 milliGRAM(s) Oral three times a day  insulin glargine Injectable (LANTUS) 18 Unit(s) SubCutaneous every morning  insulin lispro (HumaLOG) corrective regimen sliding scale   SubCutaneous three times a day before meals  insulin lispro Injectable (HumaLOG) 6 Unit(s) SubCutaneous three times a day before meals  levETIRAcetam  Solution 1500 milliGRAM(s) Oral two times a day  losartan 50 milliGRAM(s) Oral daily  morphine  - Injectable 2 milliGRAM(s) IV Push every 4 hours PRN  nystatin    Suspension [id number] Unit(s) Oral two times a day  pantoprazole   Suspension 40 milliGRAM(s) Oral daily  senna 2 Tablet(s) Oral at bedtime          T(C): 37.2 (06-11-19 @ 00:00), Max: 37.3 (06-10-19 @ 16:23)  HR: 61 (06-11-19 @ 00:00) (54 - 92)  BP: 111/50 (06-11-19 @ 00:00) (96/48 - 176/77)  RR: 12 (06-11-19 @ 00:00) (12 - 25)  SpO2: 100% (06-11-19 @ 00:00) (98% - 100%)    06-09-19 @ 07:01  -  06-10-19 @ 07:00  --------------------------------------------------------  IN: 1128 mL / OUT: 2200 mL / NET: -1072 mL    06-10-19 @ 07:01  -  06-11-19 @ 00:31  --------------------------------------------------------  IN: 354 mL / OUT: 0 mL / NET: 354 mL        General: not in acute distress   Neck: trach in place no hemtoma no bleeding  Heart: Regular rate and rhythm, no rubs , murmurs or gallops  Lungs: Clear to auscultation bilaterally, no wheezes, rales, rhonci appreciated  Abdomen: Soft , positive bowel sounds, no tenderness, no distention, no peritoneal signs   Exremites: warm extremities,  good color, no swelling, motor and sensation , pulses                10.3   8.67  )-----------( 389      ( 06-10 @ 04:25 )             33.2                10.7   8.93  )-----------( 357      ( 06-09 @ 04:46 )             33.9                    141   |  102   |  25                 Ca: 9.7    BMP:   ----------------------------< 145    Mg: x     (06-10-19 @ 04:25)             3.9    |  26    | 0.7                Ph: x        LFT:     TPro: 5.5 / Alb: 2.9 / TBili: 0.5 / DBili: x / AST: 18 / ALT: 23 / AlkPhos: 64   (06-10-19 @ 04:25)                        ABG - ( 10 Bryce 2019 05:18 )  pH: 7.46  /  pCO2: 42    /  pO2: 86    / HCO3: 30    / Base Excess: 5.5   /  SaO2: 97            < from: Xray Chest 1 View- PORTABLE-Routine (06.10.19 @ 05:17) >  Support devices: ET tube is 3 cm above the elin. Enteric tube coursing   below the left hemidiaphragm. Overlying telemetry leads.    Cardiac/mediastinum/hilum: Stable.    Lung parenchyma/Pleura: Low lung volumes. Focal consolidation or   pneumothorax.    Skeleton/soft tissues: Stable.    Impression:      No focal consolidation.      < end of copied text >                  Plan and assessment  Pt. 74yFemale status post tracheostomy      -monitor vent setting and tidal volume  -continue abx  -continue npo order with tube feeds  -home meds  -call ct surgery team as needed    SLADE Avelar   06-11-19 @ 00:31  # [id number]/ [id number]

## 2019-06-11 NOTE — PROGRESS NOTE ADULT - ASSESSMENT
HERNANDO HERNANDEZ 75yo W  Female BIBA to S side from home for elevated BP, slurred speech and L sided weakness.  The pt was noted to have a hypertensive emergency with R thalamic hemorrhagic  CVA. 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    Hospital course:   The pt underwent a stroke code and was intubated for airway protection and transferred North for further care. She remains in CCU and cont to be on respirator support.  The hospital course is complicated by seizures, prolonged QT,streptococcus bacteremia with positive blood cultures and positive GM negative rods in the urine.  She is on Ceftriaxone which was changed to Cefepime.   She was fed via OG tube.  When off sedation she followed simple commands but failed weaning and required tracheostomy.      INTERVAL HPI/OVERNIGHT EVENTS: pt underwent trach 6/10, downgraded to Vent unit     MEDICATIONS  (STANDING):  amLODIPine   Tablet 10 milliGRAM(s) Oral daily  cefepime   IVPB 2000 milliGRAM(s) IV Intermittent every 8 hours D/C  Rocephine 2 gms q 24  chlorhexidine 0.12% Liquid 15 milliLiter(s) Oral Mucosa two times a day  chlorhexidine 4% Liquid 1 Application(s) Topical <User Schedule>  docusate sodium Liquid 100 milliGRAM(s) Oral three times a day  hydrALAZINE 25 milliGRAM(s) Oral three times a day  insulin regular Infusion 4 Unit(s)/Hr (4 mL/Hr) IV Continuous <Continuous>  levETIRAcetam  Solution 1500 milliGRAM(s) Oral two times a day  nystatin    Suspension 704851 Unit(s) Oral two times a day  pantoprazole   Suspension 40 milliGRAM(s) Oral daily  propofol Infusion. 1 MICROgram(s)/kG/Min (0.473 mL/Hr) IV Continuous <Continuous>  senna 2 Tablet(s) Oral at bedtime    MEDICATIONS  (PRN):  acetaminophen   Tablet .. 650 milliGRAM(s) Oral every 6 hours PRN Temp greater or equal to 38C (100.4F)      Allergies  NKDA     T(F): 99.8  HR: 60  BP: 126/60  RR: 20  SpO2: 100%     PHYSICAL EXAM:      Constitutional: pt downgraded to Vent unit, more alert, NAD    Eyes: non icteric, opens eyes    ENMT: + feeding tube    Neck:  short and supple, + trach    Respiratory:  harsh respirator BS    Cardiovascular:  S1S2    Gastrointestinal: globose soft and benign    Genitourinary:  no villatoro    Extremities: arthritic changes, exhibits motor strength in limbs with improvement of L sided weakness    LABS:                        11 12 )-----------( 429             35    140 |  99  |  24  ----------------------------<  180  5.0   |  23 |  0.7    GFR 87  Ca    9.7       Mg     2.2, 2.1         TPro  5.6<L>  /  Alb  3.0<L>  /  TBili  0.5  /  DBili  x   /  AST  9   /  ALT  23  /  AlkPhos  88  06-06    urine GM- rods  Blood streptococcus  sputum streptococcus      RADIOLOGY & ADDITIONAL TESTS:     EKG NSR 66/min   CXR  +ET tube, + feeding tube  CT of head R thalamic parenchymal hematoma 2.2 cm with intraventricular extension, mild ventricular enlargement, white mater changes                        Stroke code, R thalamic hemorrhagic CVA with extension to ventricles, repeat studies show no progression and some improvement of the hematoma  sp Hypertensive emergency  Seizures, controlled on Keppra  Streptococcal Bacteremia probably from transient PNA  Marcio negative UTI    Hx of HTN, ASHD  Hx of DLD  Hx of old hemorrhagic CVA about 8 yrs ago, dysarthria, gait instability  Hx of OA, DDD, DJD  Hx of GERD, HH, Diverticulosis  Hx of anxiety    pt is sp trach  6/10  pt downgraded to Vent unit  pt more alert, ff simple comands  Pt is being fed via oral feeding tube  pt is on ceftriaxone, blood culture 6/4 + for strept, source probable  LLL  strept PNA, + sputa for strept and CT shows infiltrate,  urine + for Gm neg rods/asymptomatic pyuria  seizures  controlled on Keppra 1500mg q12, VEEG shows focal and generalized slowing  monitor electrolytes  pt is ff by pul critical care, neurology, ID  oral care, skin care and decubiti prevention

## 2019-06-11 NOTE — PROGRESS NOTE ADULT - ASSESSMENT
· Assessment		  74 y.o F with PMH of HTN, HLD, DM, hemorrhagic stroke,? EVD placement at that time ( about 8 years ago)  with residual left side weakness and slurred speech, ambulated with cane, on ASA daily  Pt. brought to [facility] ED as a stroke code.    WBC 6.7>8.6>12.7  BCx 6/1 NG  BCx6/4 Strep  BCx 6/5 , 6, 7 NG  No pyuria  UCx  6/1 NG  UCx 6/4 E coli  Sputa 6/2 moderate PMNs, Strep constellatus  Sputa 6/4 normal  CXR bibasilar opacities which have improved  S/p trach 6/10    IMPRESSION:  Transient Strep bacteremia secondary to possibly LLL Strep PNA ( as Sputa and BCx positive for Strep constellatus ). CXR decreased opacities , CT from 5/30 revealed a LLL consolidation  No endocarditis secondary to Strep with septic emboli to CNS with the thalamic bleed.    No pyelonephritis ( no pyuria ). Asymptomatic bacteruria secondary to E coli ( will not betsy it)    Acute right thalamic hemorrhagic stroke with mild shift with repeat CT head showing an improvement  Seizures: resolved    Clinically much improved and follows commands.    RECOMMENDATIONS:  Cefepime 2 gm iv q8h

## 2019-06-11 NOTE — PROGRESS NOTE ADULT - SUBJECTIVE AND OBJECTIVE BOX
Patient is a 74y old  Female who presents with a chief complaint of left sided weakness and slurred speech (11 Jun 2019 05:48)      Over Night Events:  Patient seen and examined s/p tracheostomy did well       ROS:  See HPI    PHYSICAL EXAM    ICU Vital Signs Last 24 Hrs  T(C): 37.7 (11 Jun 2019 06:00), Max: 37.7 (11 Jun 2019 06:00)  T(F): 99.9 (11 Jun 2019 06:00), Max: 99.9 (11 Jun 2019 06:00)  HR: 91 (11 Jun 2019 07:00) (58 - 92)  BP: 123/65 (11 Jun 2019 07:00) (96/48 - 176/77)  BP(mean): 82 (11 Jun 2019 07:00) (72 - 116)  ABP: --  ABP(mean): --  RR: 17 (11 Jun 2019 07:00) (12 - 25)  SpO2: 99% (11 Jun 2019 07:00) (98% - 100%)      General:  Awake   HEENT:              tracheostomy   Lymph Nodes: NO cervical LN   Lungs: Bilateral BS  Cardiovascular: Regular   Abdomen: Soft, Positive BS  Extremities: No clubbing   Skin: warm   Neurological:  follow simple command   Musculoskeletal: move all ext     I&O's Detail    10 Bryce 2019 07:01  -  11 Jun 2019 07:00  --------------------------------------------------------  IN:    insulin regular Infusion: 4 mL    IV PiggyBack: 100 mL    Vital High Protein: 540 mL  Total IN: 644 mL    OUT:  Total OUT: 0 mL    Total NET: 644 mL          LABS:                          11.0   12.72 )-----------( 425      ( 11 Jun 2019 05:01 )             35.5         11 Jun 2019 05:01    140    |  99     |  24     ----------------------------<  180    5.0     |  23     |  0.7      Ca    10.0       11 Jun 2019 05:01  Mg     2.1       11 Jun 2019 05:01    TPro  6.2    /  Alb  3.2    /  TBili  0.7    /  DBili  x      /  AST  17     /  ALT  24     /  AlkPhos  70     11 Jun 2019 05:01  Amylase x     lipase x                                                                                                                                                                                                 Mode: AC/ CMV (Assist Control/ Continuous Mandatory Ventilation)  RR (machine): 12  TV (machine): 450  FiO2: 40  PEEP: 5  ITime: 1  MAP: 8  PIP: 19                                      ABG - ( 11 Jun 2019 04:54 )  pH, Arterial: 7.52  pH, Blood: x     /  pCO2: 35    /  pO2: 114   / HCO3: 29    / Base Excess: 5.5   /  SaO2: 99                  MEDICATIONS  (STANDING):  amLODIPine   Tablet 10 milliGRAM(s) Oral daily  cefepime   IVPB 2000 milliGRAM(s) IV Intermittent every 8 hours  chlorhexidine 0.12% Liquid 15 milliLiter(s) Oral Mucosa two times a day  chlorhexidine 4% Liquid 1 Application(s) Topical <User Schedule>  dextrose 50% Injectable 12.5 Gram(s) IV Push once  dextrose 50% Injectable 25 Gram(s) IV Push once  dextrose 50% Injectable 25 Gram(s) IV Push once  docusate sodium Liquid 100 milliGRAM(s) Oral three times a day  hydrALAZINE 25 milliGRAM(s) Oral three times a day  insulin glargine Injectable (LANTUS) 18 Unit(s) SubCutaneous every morning  insulin lispro (HumaLOG) corrective regimen sliding scale   SubCutaneous three times a day before meals  insulin lispro Injectable (HumaLOG) 6 Unit(s) SubCutaneous three times a day before meals  levETIRAcetam  Solution 1500 milliGRAM(s) Oral two times a day  losartan 50 milliGRAM(s) Oral daily  nystatin    Suspension 179920 Unit(s) Oral two times a day  pantoprazole   Suspension 40 milliGRAM(s) Oral daily  senna 2 Tablet(s) Oral at bedtime    MEDICATIONS  (PRN):  acetaminophen   Tablet .. 650 milliGRAM(s) Oral every 6 hours PRN Temp greater or equal to 38C (100.4F)  dextrose 40% Gel 15 Gram(s) Oral once PRN Blood Glucose LESS THAN 70 milliGRAM(s)/deciliter  glucagon  Injectable 1 milliGRAM(s) IntraMuscular once PRN Glucose LESS THAN 70 milligrams/deciliter  morphine  - Injectable 2 milliGRAM(s) IV Push every 4 hours PRN Moderate Pain (4 - 6)          Xrays:  TLC:  OG:  ET tube:                                                                                    small RLL atelectasis opacity    ECHO:  [facility] ICU:

## 2019-06-11 NOTE — CHART NOTE - NSCHARTNOTEFT_GEN_A_CORE
Registered Dietitian Follow-Up     Patient Profile Reviewed                           Yes [x]   No []     Nutrition History Previously Obtained        Yes [x]  No []       Pertinent Subjective Information:      Pertinent Medical Interventions: Status post tracheostomy placement. Pt off sedation, opens eyes, ff simple commands, min improvement of L sided weakness. Seizures, controlled on Keppra.      Diet order: Vital HP 240ml q6h via OGT     Anthropometrics:  - Ht. 157.4cm    - Wt. 80.9kg on 6/11 vs.  79.3kg on 6/4- relatively stable   - %wt change  - BMI 31.9  - IBW 115lbs      Pertinent Lab Data: (6/11) WBC 12.72, Hg 11.0, Hct 35.5, BUN 24, glu 180     Pertinent Meds: Glargine, Lispro, Colace, Protonix, Senna      Physical Findings:  - Appearance: trached, alert  - GI function: no symptoms noted, last BM 6/10  - Tubes: OGT  - Oral/Mouth cavity: NPO  - Skin: DTI to upper lip     Nutrition Requirements (from RD note 5/31)   Weight Used:  ideal 52.3kg      Estimated Energy Needs    Continue []  Adjust [x] 1307-1569kcal (25-30kcal/kg IBW)   Adjusted Energy Recommendations:   kcal/day        Estimated Protein Needs    Continue []  Adjust [x] 52-68g (1.1-1.3g/kg IBW)   Adjusted Protein Recommendations:   gm/day        Estimated Fluid Needs        Continue [x]  Adjust [] per CCU team   Adjusted Fluid Recommendations:   mL/day     Nutrient Intake: current TF regimen provides 960kcal, 83g protein, 806ml free H2O        [] Previous Nutrition Diagnosis: Inadequate protein-energy intake- excessive at this time            [] Ongoing          [] Resolved    Current TF regimen provides 132% est protein needs        Nutrition Intervention: enteral and parenteral nutrition    Rec: Change TF order to Glucerna 1.2 at 240ml q6h (skip 2AM feed for 5 feeds daily- 1200ml total volume daily). This TF regimen provides 1440kcal, 71g protein (100% est calorie needs, 1-4% est protein needs)     Goal/Expected Outcome: In 3 days TF to provide >85% est energy needs, but not exceed 105%     Indicator/Monitoring: energy intake, body composition, NFPF, glucose profile Registered Dietitian Follow-Up     Patient Profile Reviewed                           Yes [x]   No []     Nutrition History Previously Obtained        Yes [x]  No []       Pertinent Subjective Information: Pt. extubated, trached to vent. TF switched to bolus- good tolerance per RN. Pt. to be transferred to Vent unit.      Pertinent Medical Interventions: Status post tracheostomy placement. Pt off sedation, opens eyes, ff simple commands, min improvement of L sided weakness. Seizures, controlled on Keppra.      Diet order: Vital HP 240ml q6h via OGT     Anthropometrics:  - Ht. 157.4cm    - Wt. 80.9kg on 6/11 vs.  79.3kg on 6/4- relatively stable   - %wt change  - BMI 31.9  - IBW 115lbs      Pertinent Lab Data: (6/11) WBC 12.72, Hg 11.0, Hct 35.5, BUN 24, glu 180     Pertinent Meds: Glargine, Lispro, Colace, Protonix, Senna      Physical Findings:  - Appearance: trached, alert  - GI function: no symptoms noted, last BM 6/10  - Tubes: OGT  - Oral/Mouth cavity: NPO  - Skin: DTI to upper lip     Nutrition Requirements (from RD note 5/31)   Weight Used:  ideal 52.3kg      Estimated Energy Needs    Continue []  Adjust [x] 1307-1569kcal (25-30kcal/kg IBW)   Adjusted Energy Recommendations:   kcal/day        Estimated Protein Needs    Continue []  Adjust [x] 52-68g (1.1-1.3g/kg IBW)   Adjusted Protein Recommendations:   gm/day        Estimated Fluid Needs        Continue [x]  Adjust [] per CCU team   Adjusted Fluid Recommendations:   mL/day     Nutrient Intake: current TF regimen provides 960kcal, 83g protein, 806ml free H2O        [] Previous Nutrition Diagnosis: Inadequate protein-energy intake- excessive at this time            [] Ongoing          [] Resolved    Current TF regimen provides 132% est protein needs        Nutrition Intervention: enteral and parenteral nutrition    Rec: Change TF order to Glucerna 1.2 at 240ml q6h (skip 2AM feed for 5 feeds daily- 1200ml total volume daily). This TF regimen provides 1440kcal, 71g protein (100% est calorie needs, 104% est protein needs)     Goal/Expected Outcome: In 3 days TF to provide >85% est energy needs, but not exceed 105%     Indicator/Monitoring: energy intake, body composition, NFPF, glucose profile Registered Dietitian Follow-Up     Patient Profile Reviewed                           Yes [x]   No []     Nutrition History Previously Obtained        Yes [x]  No []       Pertinent Subjective Information: Pt. extubated, trached to vent. TF switched to bolus- good tolerance per RN. Pt. to be transferred to Vent unit.      Pertinent Medical Interventions: Status post tracheostomy placement. Pt off sedation, opens eyes, ff simple commands, min improvement of L sided weakness. Seizures, controlled on Keppra.      Diet order: Vital HP 240ml q6h via NGT     Anthropometrics:  - Ht. 157.4cm    - Wt. 80.9kg on 6/11 vs.  79.3kg on 6/4- relatively stable   - %wt change  - BMI 31.9  - IBW 115lbs      Pertinent Lab Data: (6/11) WBC 12.72, Hg 11.0, Hct 35.5, BUN 24, glu 180     Pertinent Meds: Glargine, Lispro, Colace, Protonix, Senna      Physical Findings:  - Appearance: trached, alert  - GI function: no symptoms noted, last BM 6/10  - Tubes: OGT  - Oral/Mouth cavity: NPO  - Skin: DTI to upper lip     Nutrition Requirements (from RD note 5/31)   Weight Used:  ideal 52.3kg      Estimated Energy Needs    Continue []  Adjust [x] 1307-1569kcal (25-30kcal/kg IBW)   Adjusted Energy Recommendations:   kcal/day        Estimated Protein Needs    Continue []  Adjust [x] 52-68g (1.1-1.3g/kg IBW)   Adjusted Protein Recommendations:   gm/day        Estimated Fluid Needs        Continue [x]  Adjust [] per CCU team   Adjusted Fluid Recommendations:   mL/day     Nutrient Intake: current TF regimen provides 960kcal, 83g protein, 806ml free H2O        [] Previous Nutrition Diagnosis: Inadequate protein-energy intake- excessive at this time            [] Ongoing          [] Resolved    Current TF regimen provides 132% est protein needs        Nutrition Intervention: enteral and parenteral nutrition    Rec: Change TF order to Glucerna 1.2 at 240ml q4h (skip 2AM feed for 5 feeds daily- 1200ml total volume daily). This TF regimen provides 1440kcal, 71g protein (100% est calorie needs, 104% est protein needs)     Goal/Expected Outcome: In 3 days TF to provide >85% est energy needs, but not exceed 105%     Indicator/Monitoring: energy intake, body composition, NFPF, glucose profile

## 2019-06-11 NOTE — PROGRESS NOTE ADULT - SUBJECTIVE AND OBJECTIVE BOX
HERNANDO HERNANDEZ  74y, Female      OVERNIGHT EVENTS:    S/p trach.  No fevers, no pressors, alert, responsive. NAD  FiO2 40%  No central lines, loose BMs  no abdominal pain/ nausea/ emesis    VITALS:  T(F): 99, Max: 99.2 (06-10-19 @ 16:23)  HR: 70  BP: 126/58  RR: 17Vital Signs Last 24 Hrs  T(C): 37.2 (11 Jun 2019 00:00), Max: 37.3 (10 Bryce 2019 16:23)  T(F): 99 (11 Jun 2019 00:00), Max: 99.2 (10 Bryce 2019 16:23)  HR: 70 (11 Jun 2019 04:00) (54 - 92)  BP: 126/58 (11 Jun 2019 04:00) (96/48 - 176/77)  BP(mean): 77 (11 Jun 2019 04:00) (72 - 122)  RR: 17 (11 Jun 2019 04:00) (12 - 25)  SpO2: 99% (11 Jun 2019 04:00) (99% - 100%)    TESTS & MEASUREMENTS:                        11.0   12.72 )-----------( 425      ( 11 Jun 2019 05:01 )             35.5     06-10    141  |  102  |  25<H>  ----------------------------<  145<H>  3.9   |  26  |  0.7    Ca    9.7      10 Bryce 2019 04:25    TPro  5.5<L>  /  Alb  2.9<L>  /  TBili  0.5  /  DBili  x   /  AST  18  /  ALT  23  /  AlkPhos  64  06-10    LIVER FUNCTIONS - ( 10 Bryce 2019 04:25 )  Alb: 2.9 g/dL / Pro: 5.5 g/dL / ALK PHOS: 64 U/L / ALT: 23 U/L / AST: 18 U/L / GGT: x             Culture - Blood (collected 06-07-19 @ 04:27)  Source: .Blood None  Preliminary Report (06-08-19 @ 14:01):    No growth to date.    Culture - Urine (collected 06-06-19 @ 12:35)  Source: .Urine Catheterized  Final Report (06-09-19 @ 09:34):    50,000 - 99,000 CFU/mL Escherichia coli  Organism: Escherichia coli (06-09-19 @ 09:34)  Organism: Escherichia coli (06-09-19 @ 09:34)      -  Amikacin: S <=16      -  Ampicillin: R >16 These ampicillin results predict results for amoxicillin      -  Ampicillin/Sulbactam: R >16/8      -  Aztreonam: S <=4      -  Cefazolin: I 16 For uncomplicated UTI with K. pneumoniae, E. coli, or P. mirablis: MARI <=16 is sensitive and MARI >=32 is resistant. This also predicts results for oral agents cefaclor, cefdinir, cefpodoxime, cefprozil, cefuroxime axetil, cephalexin and locarbeffor uncomplicated UTI. Note that some isolates may be susceptible to these agents while testing resistant to cefazolin.      -  Cefepime: S <=4      -  Cefoxitin: S <=8      -  Ceftriaxone: S <=1 Enterobacter, Citrobacter, and Serratia may develop resistance during prolonged therapy      -  Ciprofloxacin: S <=1      -  Ertapenem: S <=1      -  Gentamicin: S <=4      -  Imipenem: S <=1      -  Levofloxacin: S <=2      -  Meropenem: S <=1      -  Nitrofurantoin: S <=32 Should not be used to treat pyelonephritis      -  Piperacillin/Tazobactam: S <=16      -  Tigecycline: S <=2      -  Tobramycin: S <=4      -  Trimethoprim/Sulfamethoxazole: R >2/38      Method Type: MARI    Culture - Blood (collected 06-06-19 @ 11:04)  Source: .Blood None  Preliminary Report (06-07-19 @ 17:01):    No growth to date.    Culture - Blood (collected 06-06-19 @ 04:45)  Source: .Blood Blood  Preliminary Report (06-07-19 @ 14:01):    No growth to date.    Culture - Blood (collected 06-05-19 @ 16:34)  Source: .Blood None  Final Report (06-11-19 @ 03:00):    No growth at 5 days.    Culture - Blood (collected 06-05-19 @ 11:13)  Source: .Blood None  Final Report (06-10-19 @ 18:00):    No growth at 5 days.    Culture - Sputum (collected 06-04-19 @ 16:13)  Source: .Sputum Sputum  Gram Stain (06-05-19 @ 07:43):    Numerous polymorphonuclear leukocytes seen per low power field    Few Squamous epithelial cells seen per low power field    No organisms seen  Final Report (06-06-19 @ 17:25):    Normal Respiratory Alyssa present            RADIOLOGY & ADDITIONAL TESTS:    ANTIBIOTICS:  cefepime   IVPB 2000 milliGRAM(s) IV Intermittent every 8 hours  nystatin    Suspension 884680 Unit(s) Oral two times a day

## 2019-06-12 NOTE — PROGRESS NOTE ADULT - ASSESSMENT
HERNANDO HERNANDEZ 75yo W  Female BIBA to S side from home for elevated BP, slurred speech and L sided weakness.  The pt was noted to have a hypertensive emergency with R thalamic hemorrhagic  CVA. 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    Hospital course:   The pt underwent a stroke code and was intubated for airway protection and transferred North for further care. She remains in CCU and cont to be on respirator support.  The hospital course is complicated by seizures, prolonged QT,streptococcus bacteremia with positive blood cultures and positive GM negative rods in the urine.  She is on Ceftriaxone which was changed to Cefepime.   She was fed via OG tube.  When off sedation she followed simple commands but failed weaning and required tracheostomy.      INTERVAL HPI/OVERNIGHT EVENTS: pt underwent trach 6/10, downgraded to Vent unit, today more lethargic , unable to do S&S, fed via feeding tube     MEDICATIONS  (STANDING):  amLODIPine   Tablet 10 milliGRAM(s) Oral daily  cefepime   IVPB 2000 milliGRAM(s) IV Intermittent every 8 hours D/C  Rocephine 2 gms q 24  chlorhexidine 0.12% Liquid 15 milliLiter(s) Oral Mucosa two times a day  chlorhexidine 4% Liquid 1 Application(s) Topical <User Schedule>  docusate sodium Liquid 100 milliGRAM(s) Oral three times a day  hydrALAZINE 25 milliGRAM(s) Oral three times a day  insulin regular Infusion 4 Unit(s)/Hr (4 mL/Hr) IV Continuous <Continuous>  levETIRAcetam  Solution 1500 milliGRAM(s) Oral two times a day  nystatin    Suspension 074236 Unit(s) Oral two times a day  pantoprazole   Suspension 40 milliGRAM(s) Oral daily  propofol Infusion. 1 MICROgram(s)/kG/Min (0.473 mL/Hr) IV Continuous <Continuous>  senna 2 Tablet(s) Oral at bedtime    MEDICATIONS  (PRN):  acetaminophen   Tablet .. 650 milliGRAM(s) Oral every 6 hours PRN Temp greater or equal to 38C (100.4F)      Allergies  NKDA     T(F): 98.9  HR: 88  BP: 126/60  RR: 22  SpO2: 98% via trach/ ,/FIO2 40%, rate 12    PHYSICAL EXAM:      Constitutional: pt seen in vent unit, on respirator via trach, more lethargic tdday    Eyes: non icteric, eyes opened    ENMT: + feeding tube    Neck:  short and supple, + trach    Respiratory:  harsh respirator BS    Cardiovascular:  S1S2    Gastrointestinal: globose soft and benign    Genitourinary:  no villatoro    Extremities: arthritic changes, exhibits motor strength in limbs with improvement of L sided weakness    LABS:                        11  10.7 )-----------( 489             36    138 |  102  |  27  ----------------------------<  223  4.3   |  23 |  0.8    GFR 87, 74  Ca    9.7       Mg     2.2, 2.1, 2.4         TPro  5.6<L>  /  Alb  3.0<L>  /  TBili  0.5  /  DBili  x   /  AST  9   /  ALT  23  /  AlkPhos  88  06-06    urine GM- rods  Blood streptococcus  sputum streptococcus      RADIOLOGY & ADDITIONAL TESTS:     EKG NSR 66/min   CXR  +ET tube, + feeding tube    CT of head R thalamic parenchymal hematoma 2.2 cm with intraventricular extension, mild ventricular enlargement, white mater changes      Stroke code, R thalamic hemorrhagic CVA with extension to ventricles, repeat studies show no progression and some improvement of the hematoma  sp Hypertensive emergency  Seizures, controlled on Keppra  Streptococcal Bacteremia probably from transient PNA  Marcio negative UTI    Hx of HTN, ASHD  Hx of DLD  Hx of old hemorrhagic CVA about 8 yrs ago, dysarthria, gait instability  Hx of OA, DDD, DJD  Hx of GERD, HH, Diverticulosis  Hx of anxiety    pt is sp trach  6/10  pt downgraded to Vent unit  pt more lethargic today  will recall neurology for ff up  pt unable to do S&S  Pt is being fed via oral feeding tube  pt is on ceftriaxone, blood culture 6/4 + for strept, source probable  LLL  strept PNA, + sputa for strept and CT shows infiltrate,  urine + for Gm neg rods/asymptomatic pyuria  seizures  controlled on Keppra 1500mg q12, VEEG shows focal and generalized slowing  monitor electrolytes, check keppra level  pt is ff by pul critical care, ID  oral care, skin care and decubiti prevention  guarded state

## 2019-06-12 NOTE — PROGRESS NOTE ADULT - ASSESSMENT
· Assessment		  74 y.o F with PMH of HTN, HLD, DM, hemorrhagic stroke,? EVD placement at that time ( about 8 years ago)  with residual left side weakness and slurred speech, ambulated with cane, on ASA daily  Pt. brought to [facility] ED as a stroke code.    WBC 6.7>8.6>12.7>10.7  BCx 6/1 NG  BCx6/4 Strep  BCx 6/5 , 6, 7 NG  No pyuria  UCx  6/1 NG  UCx 6/4 E coli  Sputa 6/2 moderate PMNs, Strep constellatus  Sputa 6/4 normal  CXR bibasilar opacities which have improved  S/p trach 6/10    IMPRESSION:  Transient Strep bacteremia secondary to possibly LLL Strep PNA ( as Sputa and BCx positive for Strep constellatus ). CXR decreased opacities , CT from 5/30 revealed a LLL consolidation  resolution of sepsis  No endocarditis secondary to Strep with septic emboli to CNS with the thalamic bleed.    No pyelonephritis ( no pyuria ). Asymptomatic bacteruria secondary to E coli.    Acute right thalamic hemorrhagic stroke with mild shift with repeat CT head showing an improvement    Seizures: resolved    Clinically much improved and follows commands.    RECOMMENDATIONS:  Cefepime 2 gm iv q8h. Will finish a 7 day course starting 6/6

## 2019-06-12 NOTE — PROGRESS NOTE ADULT - SUBJECTIVE AND OBJECTIVE BOX
HERNANDO HERNANDEZ  74y, Female      OVERNIGHT EVENTS:    low grade fever, clinically no change    VITALS:  T(F): 99.2, Max: 100 (06-11-19 @ 16:00)  HR: 91  BP: 134/69  RR: 18Vital Signs Last 24 Hrs  T(C): 37.3 (12 Jun 2019 08:00), Max: 37.8 (11 Jun 2019 16:00)  T(F): 99.2 (12 Jun 2019 08:00), Max: 100 (11 Jun 2019 16:00)  HR: 91 (12 Jun 2019 08:00) (54 - 96)  BP: 134/69 (12 Jun 2019 08:00) (100/51 - 155/74)  BP(mean): 87 (12 Jun 2019 05:00) (68 - 116)  RR: 18 (12 Jun 2019 08:00) (18 - 21)  SpO2: 98% (12 Jun 2019 08:00) (88% - 100%)    TESTS & MEASUREMENTS:                        11.1   10.74 )-----------( 489      ( 12 Jun 2019 08:02 )             36.2     06-12    138  |  102  |  27<H>  ----------------------------<  223<H>  4.3   |  23  |  0.8    Ca    9.7      12 Jun 2019 08:02  Phos  2.4     06-12  Mg     2.1     06-12    TPro  6.2  /  Alb  3.3<L>  /  TBili  0.5  /  DBili  x   /  AST  14  /  ALT  20  /  AlkPhos  74  06-12    LIVER FUNCTIONS - ( 12 Jun 2019 08:02 )  Alb: 3.3 g/dL / Pro: 6.2 g/dL / ALK PHOS: 74 U/L / ALT: 20 U/L / AST: 14 U/L / GGT: x             Culture - Blood (collected 06-07-19 @ 04:27)  Source: .Blood None  Preliminary Report (06-08-19 @ 14:01):    No growth to date.    Culture - Urine (collected 06-06-19 @ 12:35)  Source: .Urine Catheterized  Final Report (06-09-19 @ 09:34):    50,000 - 99,000 CFU/mL Escherichia coli  Organism: Escherichia coli (06-09-19 @ 09:34)  Organism: Escherichia coli (06-09-19 @ 09:34)      -  Amikacin: S <=16      -  Ampicillin: R >16 These ampicillin results predict results for amoxicillin      -  Ampicillin/Sulbactam: R >16/8      -  Aztreonam: S <=4      -  Cefazolin: I 16 For uncomplicated UTI with K. pneumoniae, E. coli, or P. mirablis: MARI <=16 is sensitive and MARI >=32 is resistant. This also predicts results for oral agents cefaclor, cefdinir, cefpodoxime, cefprozil, cefuroxime axetil, cephalexin and locarbeffor uncomplicated UTI. Note that some isolates may be susceptible to these agents while testing resistant to cefazolin.      -  Cefepime: S <=4      -  Cefoxitin: S <=8      -  Ceftriaxone: S <=1 Enterobacter, Citrobacter, and Serratia may develop resistance during prolonged therapy      -  Ciprofloxacin: S <=1      -  Ertapenem: S <=1      -  Gentamicin: S <=4      -  Imipenem: S <=1      -  Levofloxacin: S <=2      -  Meropenem: S <=1      -  Nitrofurantoin: S <=32 Should not be used to treat pyelonephritis      -  Piperacillin/Tazobactam: S <=16      -  Tigecycline: S <=2      -  Tobramycin: S <=4      -  Trimethoprim/Sulfamethoxazole: R >2/38      Method Type: MARI    Culture - Blood (collected 06-06-19 @ 11:04)  Source: .Blood None  Final Report (06-11-19 @ 17:00):    No growth at 5 days.    Culture - Blood (collected 06-06-19 @ 04:45)  Source: .Blood Blood  Final Report (06-11-19 @ 14:00):    No growth at 5 days.    Culture - Blood (collected 06-05-19 @ 16:34)  Source: .Blood None  Final Report (06-11-19 @ 03:00):    No growth at 5 days.            RADIOLOGY & ADDITIONAL TESTS:    ANTIBIOTICS:  cefepime   IVPB 2000 milliGRAM(s) IV Intermittent every 8 hours  nystatin    Suspension 588235 Unit(s) Oral two times a day

## 2019-06-12 NOTE — PROGRESS NOTE ADULT - SUBJECTIVE AND OBJECTIVE BOX
Patient is a 74y old  Female who presents with a chief complaint of left sided weakness and slurred speech, R thalamic hemorrhagic CVA (11 Jun 2019 22:03)      HPI:  74 y.o F with PMH of HTN, HLD, DM, hemorrhagic stroke,? EVD placement at that time ( about 8 years ago)  with residual left side weakness and slurred speech, ambulated with cane, on ASA daily  Pt. brought to [facility] ED as a stroke code. At the time of presentation to [facility] ED A&Ox 2, baseline slurped speech with left sided weakness. /82, 55 HR, 18 RR, CTH prelim report findings with acute right thalamic hemorrhage with extension to right lateral and third ventricle. At [facility] ED Pt. Intubated 2/2 worsening of neuro exam for air way protection as per ED attending note Pt.  became more drowsy with decreased responsiveness. Pt. was transferred to ED-[place]. At time of NSGY eval Pt intubated, sedated on Propofol and Fentanyl, radial arterial line, she received keppra, zofran and mannitol in ED. Repeat CT scan stable from prior study as per neurosurgery. Platelets one unit ordered as per neurosurgery (30 May 2019 02:50)      HOSPITAL DAY NO: 13d    Overnight events         Patient is a 74y old  Female who presents with a chief complaint of left sided weakness and slurred speech, R thalamic hemorrhagic CVA (11 Jun 2019 22:03)  HYPERTENSIVE EMERGENCY;INTRACRANIAL HEMORRHAGE  ^HYPERTENSIVE EMERGENCY;INTRACRANIAL HEMORRHAGE  No pertinent family history in first degree relatives  Handoff  MEWS Score  Diabetes mellitus  Stroke  Hypertension  Failure respiratory  Failure respiratory  Hypertensive emergency  Open tracheostomy  No significant past surgical history  FALL  Intracranial hemorrhage      Vital Signs Last 24 Hrs  T(C): 37.3 (12 Jun 2019 08:00), Max: 37.8 (11 Jun 2019 16:00)  T(F): 99.2 (12 Jun 2019 08:00), Max: 100 (11 Jun 2019 16:00)  HR: 91 (12 Jun 2019 08:00) (54 - 91)  BP: 134/69 (12 Jun 2019 08:00) (100/51 - 155/74)  BP(mean): 87 (12 Jun 2019 05:00) (68 - 116)  RR: 18 (12 Jun 2019 08:00) (18 - 21)  SpO2: 98% (12 Jun 2019 08:00) (97% - 100%)    Mode: AC/ CMV (Assist Control/ Continuous Mandatory Ventilation)  RR (machine): 12  TV (machine): 450  FiO2: 40  PEEP: 5  ITime: 1  MAP: 12  PIP: 25                                      ABG - ( 12 Jun 2019 09:49 )  pH, Arterial: 7.45  pH, Blood: x     /  pCO2: 41    /  pO2: 110   / HCO3: 28    / Base Excess: 3.9   /  SaO2: 99                  Gen : NAD  MS: Follows simple commands  HEENT: + trach  CHEST: B/L breath sounds   ABDOMEN: soft   HEART:S1/S2 regular  SKIN: intact  Extremities: No C/C/E    Nutrition:   via                       LABS:                          11.1   10.74 )-----------( 489      ( 12 Jun 2019 08:02 )             36.2                                               06-12    138  |  102  |  27<H>  ----------------------------<  223<H>  4.3   |  23  |  0.8    Ca    9.7      12 Jun 2019 08:02  Phos  2.4     06-12  Mg     2.1     06-12    TPro  6.2  /  Alb  3.3<L>  /  TBili  0.5  /  DBili  x   /  AST  14  /  ALT  20  /  AlkPhos  74  06-12                                                LIVER FUNCTIONS - ( 12 Jun 2019 08:02 )  Alb: 3.3 g/dL / Pro: 6.2 g/dL / ALK PHOS: 74 U/L / ALT: 20 U/L / AST: 14 U/L / GGT: x                                                                                                           MEDICATIONS  (STANDING):  amLODIPine   Tablet 10 milliGRAM(s) Oral daily  cefepime   IVPB 2000 milliGRAM(s) IV Intermittent every 8 hours  chlorhexidine 0.12% Liquid 15 milliLiter(s) Oral Mucosa two times a day  chlorhexidine 4% Liquid 1 Application(s) Topical <User Schedule>  dextrose 50% Injectable 12.5 Gram(s) IV Push once  dextrose 50% Injectable 25 Gram(s) IV Push once  dextrose 50% Injectable 25 Gram(s) IV Push once  docusate sodium Liquid 100 milliGRAM(s) Oral three times a day  heparin  Injectable 5000 Unit(s) SubCutaneous every 8 hours  hydrALAZINE 25 milliGRAM(s) Oral three times a day  insulin glargine Injectable (LANTUS) 18 Unit(s) SubCutaneous every morning  insulin lispro (HumaLOG) corrective regimen sliding scale   SubCutaneous three times a day before meals  insulin lispro Injectable (HumaLOG) 6 Unit(s) SubCutaneous three times a day before meals  levETIRAcetam  Solution 1500 milliGRAM(s) Oral two times a day  losartan 25 milliGRAM(s) Oral daily  nystatin    Suspension [id number] Unit(s) Oral two times a day  pantoprazole   Suspension 40 milliGRAM(s) Oral daily  senna 2 Tablet(s) Oral at bedtime    MEDICATIONS  (PRN):  acetaminophen   Tablet .. 650 milliGRAM(s) Oral every 6 hours PRN Temp greater or equal to 38C (100.4F)  dextrose 40% Gel 15 Gram(s) Oral once PRN Blood Glucose LESS THAN 70 milliGRAM(s)/deciliter  glucagon  Injectable 1 milliGRAM(s) IntraMuscular once PRN Glucose LESS THAN 70 milligrams/deciliter  morphine  - Injectable 2 milliGRAM(s) IV Push every 4 hours PRN Moderate Pain (4 - 6)          Radiology       Case discussed with staff and family at the bedside

## 2019-06-12 NOTE — PROGRESS NOTE ADULT - GASTROINTESTINAL DETAILS
nontender/no rebound tenderness/no rigidity/soft/no guarding
no guarding/soft/no rigidity/no rebound tenderness/nontender
no rebound tenderness/no guarding/no rigidity/soft/nontender

## 2019-06-12 NOTE — PROGRESS NOTE ADULT - ASSESSMENT
Impression:    Intra cerebral Bleed/ following simple commands  Seizure  Hypertensive emergency  HO hemorrhagic stroke  penumonia aspiration?  UTI  Strep Bacteremia      PLAN:    CNS: Continue with keppra for now.    HEENT: Trach care    PULMONARY:  HOB @ 30 degrees.      CARDIOVASCULAR: i=o. Maintain BP <140. Continue with blood pressure medicine.     GI: GI prophylaxis.  Feeding through NG tube.    RENAL:  Follow up lytes.  Correct as needed    INFECTIOUS DISEASE: abx per ID follow ID cefepime     HEMATOLOGICAL:   DVT prophylaxis.    ENDOCRINE:  Follow up FS.     MUSCULOSKELETAL: Bed rest    Poor prognosis

## 2019-06-13 NOTE — PROGRESS NOTE ADULT - SUBJECTIVE AND OBJECTIVE BOX
Neurology Progress Note    Interval History:    Pt seen today in f/u for right thalamic bleed with intraventricular extension.  She had seizures treated with full dose keppra 1500 mg bid.  Had VEEG a week ago showing frequent epileptiform discharges.    T(F): 97.4 (06-13-19 @ 08:04), Max: 100.3 (06-13-19 @ 00:00)  HR: 86 (06-13-19 @ 08:04) (69 - 97)  BP: 139/64 (06-13-19 @ 08:04) (103/61 - 139/64)  RR: 15 (06-13-19 @ 08:04) (15 - 20)  SpO2: 99% (06-13-19 @ 08:00) (82% - 99%)  Neurological Exam:   Mental status: On ventilator trach collar.  Not following commands for me.  Cranial nerves: Pupils equally round and reactive to light.  No versive gaze.    Motor:  Spontaneously moves both legs.  Arms flexed.  Sensation:  + withdraws to pain in extremities.    MEDICATIONS  (STANDING):  amLODIPine   Tablet 10 milliGRAM(s) Oral daily  chlorhexidine 0.12% Liquid 15 milliLiter(s) Oral Mucosa two times a day  chlorhexidine 4% Liquid 1 Application(s) Topical <User Schedule>  dextrose 5%. 1000 milliLiter(s) (50 mL/Hr) IV Continuous <Continuous>  dextrose 50% Injectable 12.5 Gram(s) IV Push once  dextrose 50% Injectable 25 Gram(s) IV Push once  dextrose 50% Injectable 25 Gram(s) IV Push once  docusate sodium Liquid 100 milliGRAM(s) Oral three times a day  heparin  Injectable 5000 Unit(s) SubCutaneous every 8 hours  hydrALAZINE 25 milliGRAM(s) Oral three times a day  insulin glargine Injectable (LANTUS) 18 Unit(s) SubCutaneous every morning  insulin lispro (HumaLOG) corrective regimen sliding scale   SubCutaneous every 4 hours  levETIRAcetam  Solution 1500 milliGRAM(s) Oral two times a day  losartan 25 milliGRAM(s) Oral daily  nystatin    Suspension [id number] Unit(s) Oral two times a day  pantoprazole   Suspension 40 milliGRAM(s) Oral daily  senna 2 Tablet(s) Oral at bedtime    Labs:  CBC Full  -  ( 12 Jun 2019 08:02 )  WBC Count : 10.74 K/uL  RBC Count : 4.34 M/uL  Hemoglobin : 11.1 g/dL  Hematocrit : 36.2 %  Platelet Count - Automated : 489 K/uL  Mean Cell Volume : 83.4 fL  Mean Cell Hemoglobin : 25.6 pg  Mean Cell Hemoglobin Concentration : 30.7 g/dL  Auto Neutrophil # : 8.72 K/uL  Auto Lymphocyte # : 1.23 K/uL  Auto Monocyte # : 0.53 K/uL  Auto Eosinophil # : 0.15 K/uL  Auto Basophil # : 0.04 K/uL  Auto Neutrophil % : 81.1 %  Auto Lymphocyte % : 11.5 %  Auto Monocyte % : 4.9 %  Auto Eosinophil % : 1.4 %  Auto Basophil % : 0.4 %    06-12    138  |  102  |  27<H>  ----------------------------<  223<H>  4.3   |  23  |  0.8    Ca    9.7      12 Jun 2019 08:02  Phos  2.4     06-12  Mg     2.1     06-12    TPro  6.2  /  Alb  3.3<L>  /  TBili  0.5  /  DBili  x   /  AST  14  /  ALT  20  /  AlkPhos  74  06-12    LIVER FUNCTIONS - ( 12 Jun 2019 08:02 )  Alb: 3.3 g/dL / Pro: 6.2 g/dL / ALK PHOS: 74 U/L / ALT: 20 U/L / AST: 14 U/L / GGT: x                 Assessment:  This is a 74y Female w/ h/o right thalamic bleed with intraventricular extension.  She is awake but not following commands for me.  Last EEG very irritable and remains on 1500 mg bid of keppra.    Plan:  1.  Please check trough levetiracetam level.  2.  Routine EEG in am.  If less irritability may consider reducing keppra a little - e.g. to 1000 mg qam ang 1500 mg qpm.

## 2019-06-13 NOTE — CHART NOTE - NSCHARTNOTEFT_GEN_A_CORE
Registered Dietitian Follow-Up    ***Scroll to the bottom for RD recommendation***    Patient Profile Reviewed                           Yes [x]   No []  Nutrition History Previously Obtained        Yes [x]  No []   -       PERTINENT MEDICAL INFORMATIONS:  (1) L side weakness and slurred speech, R thalamic hemorrhage CVA.  (2) s/p trach 6/10. Downgraded to vent. feed via TF. lethargic./  (3) Will contact neuro for f/u. Unable to do SLP. abx per ID.       PERTINENT SUBJECTIVE INFORMATION:        DIET ORDER:  GLUCERNA 1.2 at 240mL q4hr (NGT) + NPO status        ANTHROPOMETRICS:  - Ht. 157.48cm  - Wt.   (6/4): 79.4kg  (6/10): 79.2kg  (6/11): 80.9kg - trending up but relatively stable.  - BMI. 31.9  - IBW. 115#       PERTINENT LAB DATA:  6/12: h/h 11.1/36.2, BUN 23, glucose 223, albumin 3.3  PERTINENT MEDS:  heparin, abx, insulin, losartan, acetaminophen, amlodipine, docusate, morphine, senna, protonix      PHYSICAL FINDINGS  - APPEARANCE:          - GI FUNCTION:          - TUBES:                       - ORAL/MOUTH:        - SKIN:                                NUTRITION REQUIREMENTS  WEIGHT USED:                          Ideal 52.3kg   ESTIMATED ENERGY NEEDS:       CONTINUE [  ]      ADJUST [  ] - (from RD note 5/31)     ESTIMATED ENERGY NEEDS:         1307-1569kcal (25-30kcal/kg IBW)   ESTIMATED PROTEIN NEEDS:         52-68g (1.1-1.3g/kg IBW)   ESTIMATED FLUID NEEDS:                 CURRENT NUTRIENT NEEDS:              [  ] PREVIOUS NUTRITION DIAGNOSIS:   (1) Inadequate protein-energy intake excessive -             [  ] ONGOING        [  ] RESOLVED    NUTRITION DIAGNOSTIC #1  PROBLEM:                     ETIOLOGY:                     SIGN/SYMPTOMS:            PATIENT INTERVENTION:    [  ] ORAL        [ ] EN/TF     GOAL/EXPECTED OUTCOME:       INDICATOR/MONITORING:       RD to monitor diet order, energy intake, body composition, nutrition focused physical findings  NUTRITION INTERVENTION:        Meals and snacks. Medical food supplements    RECS: (1) Registered Dietitian Follow-Up    ***Scroll to the bottom for RD recommendation***    Patient Profile Reviewed                           Yes [x]   No []  Nutrition History Previously Obtained        Yes [x]  No []   - Per RN, pt had 3x large BM in the past 24 hour, been tolerating Glucerna 1.2 at 240ml q4hr (hold 2am) = 1425 kcal/ 71g protein      PERTINENT MEDICAL INFORMATIONS:  (1) L side weakness and slurred speech, R thalamic hemorrhage CVA.  (2) s/p trach 6/10. Downgraded to vent. feed via TF. lethargic./  (3) Will contact neuro for f/u. Unable to do SLP. abx per ID.       PERTINENT SUBJECTIVE INFORMATION:        DIET ORDER:  GLUCERNA 1.2 at 240mL q4hr (NGT) + NPO status        ANTHROPOMETRICS:  - Ht. 157.48cm  - Wt.   (6/4): 79.4kg  (6/10): 79.2kg  (6/11): 80.9kg - trending up but relatively stable.  - BMI. 31.9  - IBW. 115#       PERTINENT LAB DATA:  6/12: h/h 11.1/36.2, BUN 23, glucose 223, albumin 3.3  PERTINENT MEDS:  heparin, abx, insulin, losartan, acetaminophen, amlodipine, docusate, morphine, senna, protonix      PHYSICAL FINDINGS  - APPEARANCE:        Had CVA, not alert. not following much commands. no edema  - GI FUNCTION:        LBM 6/13 per RN  - TUBES:                     NGT  - ORAL/MOUTH:      NPO  - SKIN:                        DTI to lips only        NUTRITION REQUIREMENTS  WEIGHT USED:                          Admitted 79.4kg  ESTIMATED ENERGY NEEDS:       CONTINUE [  ]      ADJUST [ x ] - adjusted today     ESTIMATED ENERGY NEEDS:         8578-0280 kcal/day (MSJ x 1.0-1.1)   ESTIMATED PROTEIN NEEDS:         52-68g (1.1-1.3g/kg IBW) - more for maintenance   ESTIMATED FLUID NEEDS:             per VENT team    CURRENT NUTRIENT NEEDS:    meeting          [  ] PREVIOUS NUTRITION DIAGNOSIS:   (1) Inadequate protein-energy intake excessive - resolved            [  ] ONGOING        [  x] RESOLVED        PATIENT INTERVENTION:    [  ] ORAL        [ x] EN/TF     GOAL/EXPECTED OUTCOME:     pt to continue to meet and tolerate >85% of estimated kcal/protein via TF through LOS  INDICATOR/MONITORING:       RD to monitor diet order, energy intake, body composition, nutrition focused physical findings  NUTRITION INTERVENTION:        enteral nutrition    RECS: (1) Continue current GLUCERNA 1.2 at 240mL q4hr (hold 2am) total 5 feeds a day. NGT. Registered Dietitian Follow-Up    ***Scroll to the bottom for RD recommendation***    Patient Profile Reviewed                           Yes [x]   No []  Nutrition History Previously Obtained        Yes [x]  No []   - Per RN, pt had 3x large BM in the past 24 hour, been tolerating Glucerna 1.2 at 240ml q4hr (hold 2am) = 1425 kcal/ 71g protein      PERTINENT MEDICAL INFORMATIONS:  (1) L side weakness and slurred speech, R thalamic hemorrhage CVA.  (2) s/p trach 6/10. Downgraded to vent. feed via TF. lethargic./  (3) Will contact neuro for f/u. Unable to do SLP. abx per ID.       PERTINENT SUBJECTIVE INFORMATION:        DIET ORDER:  GLUCERNA 1.2 at 240mL q4hr (NGT) + NPO status        ANTHROPOMETRICS:  - Ht. 157.48cm  - Wt.   (6/4): 79.4kg  (6/10): 79.2kg  (6/11): 80.9kg - trending up but relatively stable.  - BMI. 31.9  - IBW. 115#       PERTINENT LAB DATA:  6/12: h/h 11.1/36.2, BUN 23, glucose 223, albumin 3.3  PERTINENT MEDS:  heparin, abx, insulin, losartan, acetaminophen, amlodipine, docusate, morphine, senna, protonix      PHYSICAL FINDINGS  - APPEARANCE:        Had CVA, not alert. trached to vent, not following much commands. no edema  - GI FUNCTION:        LBM 6/13 per RN  - TUBES:                     NGT  - ORAL/MOUTH:      NPO  - SKIN:                        DTI to lips only        NUTRITION REQUIREMENTS  WEIGHT USED:                          Admitted 79.4kg  ESTIMATED ENERGY NEEDS:       CONTINUE [  ]      ADJUST [ x ] - adjusted today     ESTIMATED ENERGY NEEDS:         9057-4145 kcal/day (MSJ x 1.0-1.1)   ESTIMATED PROTEIN NEEDS:         52-68g (1.1-1.3g/kg IBW) - more for maintenance   ESTIMATED FLUID NEEDS:             per VENT team    CURRENT NUTRIENT NEEDS:    meeting          [  ] PREVIOUS NUTRITION DIAGNOSIS:   (1) Inadequate protein-energy intake excessive - resolved            [  ] ONGOING        [  x] RESOLVED        PATIENT INTERVENTION:    [  ] ORAL        [ x] EN/TF     GOAL/EXPECTED OUTCOME:     pt to continue to meet and tolerate >85% of estimated kcal/protein via TF through LOS  INDICATOR/MONITORING:       RD to monitor diet order, energy intake, body composition, nutrition focused physical findings  NUTRITION INTERVENTION:        enteral nutrition    RECS: (1) Continue current GLUCERNA 1.2 at 240mL q4hr (hold 2am) total 5 feeds a day. NGT.

## 2019-06-13 NOTE — PROGRESS NOTE ADULT - ASSESSMENT
74 y.o F with PMH of HTN, HLD, DM, hemorrhagic stroke,? EVD placement at that time ( about 8 years ago)  with residual left side weakness and slurred speech, ambulated with cane, on ASA daily  Pt. brought to [facility] ED as a stroke code.    WBC 6.7>8.6>12.7>10.7  BCx 6/1 NG  BCx6/4 Strep  BCx 6/5 , 6, 7 NG  No pyuria  UCx  6/1 NG  UCx 6/4 E coli  Sputa 6/2 moderate PMNs, Strep constellatus  Sputa 6/4 normal  CXR bibasilar opacities which have improved  S/p trach 6/10    IMPRESSION:  Transient Strep bacteremia secondary to possibly LLL Strep PNA ( as Sputa and BCx positive for Strep constellatus ). CXR decreased opacities , CT from 5/30 revealed a LLL consolidation  resolution of sepsis  No endocarditis secondary to Strep with septic emboli to CNS with the thalamic bleed.  S/P cefepime    No pyelonephritis ( no pyuria ). Asymptomatic bacteruria secondary to E coli.    Acute right thalamic hemorrhagic stroke with mild shift with repeat CT head showing an improvement    Seizures: resolved    Clinically much improved and follows commands.    RECOMMENDATIONS:  Observe off antibioitcs

## 2019-06-13 NOTE — SPEECH LANGUAGE PATHOLOGY EVALUATION - SLP PERTINENT HISTORY OF CURRENT PROBLEM
74 y.o F with PMH of HTN, HLD, DM, hemorrhagic stroke, CTH prelim report findings with acute right thalamic hemorrhage with extension to right lateral and third ventricle,

## 2019-06-13 NOTE — PROGRESS NOTE ADULT - ASSESSMENT
HERNANDO HERNANDEZ 73yo W  Female BIBA to S side from home for elevated BP, slurred speech and L sided weakness.  The pt was noted to have a hypertensive emergency with R thalamic hemorrhagic  CVA. 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    Hospital course:   The pt underwent a stroke code and was intubated for airway protection and transferred [place] for further care. She remains in CCU and cont to be on respirator support.  The hospital course is complicated by seizures, prolonged QT,streptococcus bacteremia with positive blood cultures and positive GM negative rods in the urine.  She is on Ceftriaxone which was changed to Cefepime.   She was fed via OG tube.  When off sedation she followed simple commands but failed weaning and required tracheostomy.      INTERVAL HPI/OVERNIGHT EVENTS: pt underwent trach 6/10, downgraded to Vent unit, today less lethargic, family at bedside, being fed via feeding tube, will attempt S&S, remains on respirator via trach    MEDICATIONS  (STANDING):  amLODIPine   Tablet 10 milliGRAM(s) Oral daily  cefepime   IVPB 2000 milliGRAM(s) IV Intermittent every 8 hours D/C  Rocephine 2 gms q 24  chlorhexidine 0.12% Liquid 15 milliLiter(s) Oral Mucosa two times a day  chlorhexidine 4% Liquid 1 Application(s) Topical <User Schedule>  docusate sodium Liquid 100 milliGRAM(s) Oral three times a day  hydrALAZINE 25 milliGRAM(s) Oral three times a day  insulin regular Infusion 4 Unit(s)/Hr (4 mL/Hr) IV Continuous <Continuous>  levETIRAcetam  Solution 1500 milliGRAM(s) Oral two times a day  nystatin    Suspension [id number] Unit(s) Oral two times a day  pantoprazole   Suspension 40 milliGRAM(s) Oral daily  propofol Infusion. 1 MICROgram(s)/kG/Min (0.473 mL/Hr) IV Continuous <Continuous>  senna 2 Tablet(s) Oral at bedtime    MEDICATIONS  (PRN):  acetaminophen   Tablet .. 650 milliGRAM(s) Oral every 6 hours PRN Temp greater or equal to 38C (100.4F)      Allergies  NKDA     T(F): 97.4  HR: 86  BP: 139/64  RR: 15  SpO2: 99% via trach/ ,/FIO2 40%, rate 12    PHYSICAL EXAM:      Constitutional: pt seen in vent unit, on respirator via trach, less lethargic, tracks with eyes    Eyes: non icteric, eyes opened    ENMT: + feeding tube    Neck:  short and supple, + trach    Respiratory:  harsh respirator BS    Cardiovascular:  S1S2    Gastrointestinal: globose soft and benign    Genitourinary:  no villatoro    Extremities: arthritic changes, exhibits motor strength in limbs with improvement of L sided weakness    LABS:     ^/12/19                   11  10.7 )-----------( 489             36    138 |  102  |  27  ----------------------------<  223  4.3   |  23 |  0.8    GFR 87, 74  Ca    9.7       Mg     2.2, 2.1, 2.4         TPro  5.6<L>  /  Alb  3.0<L>  /  TBili  0.5  /  DBili  x   /  AST  9   /  ALT  23  /  AlkPhos  88  06-06    urine GM- rods  Blood streptococcus  sputum streptococcus      RADIOLOGY & ADDITIONAL TESTS:     EKG NSR 66/min   CXR  +ET tube, + feeding tube    CT of head R thalamic parenchymal hematoma 2.2 cm with intraventricular extension, mild ventricular enlargement, white mater changes      Stroke code, R thalamic hemorrhagic CVA with extension to ventricles, repeat studies show no progression and some improvement of the hematoma  sp Hypertensive emergency  Seizures, controlled on Keppra  Streptococcal Bacteremia probably from transient PNA  Marcio negative UTI    Hx of HTN, ASHD  Hx of DLD  Hx of old hemorrhagic CVA about 8 yrs ago, dysarthria, gait instability  Hx of OA, DDD, DJD  Hx of GERD, HH, Diverticulosis  Hx of anxiety    pt is sp trach  6/10  pt downgraded to Vent unit  will recall neurology for ff up  pt unable to do S&S  Pt is being fed via oral feeding tube  pt is on ceftriaxone, blood culture 6/4 + for strept, source probable  LLL  strept PNA, + sputa for strept and CT shows infiltrate,  urine + for Gm neg rods/asymptomatic pyuria, completed the course of ABX  seizures  controlled on Keppra 1500mg q12, VEEG shows focal and generalized slowing  monitor electrolytes, check keppra level  pt is ff by pul critical care, ID  oral care, skin care and decubiti prevention  guarded state

## 2019-06-13 NOTE — PROGRESS NOTE ADULT - SUBJECTIVE AND OBJECTIVE BOX
HERNANDO HERNANDEZ  74y, Female  Allergy: No Known Allergies      CHIEF COMPLAINT: left sided weakness and slurred speech (13 Jun 2019 10:16)      INTERVAL EVENTS/HPI  - No acute events overnight  - T(F): , Max: 100.3 (06-13-19 @ 00:00)  - Denies any worsening symptoms  - Tolerating medication  -     ROS  General: Denies fevers, chills, nightsweats, weight loss  HEENT: Denies headache, rhinorrhea, sore throat, eye pain  CV: Denies CP, palpitations  PULM: Denies SOB, cough  GI: Denies abdominal pain, diarrhea  : Denies dysuria, hematuria  MSK: Denies arthralgias  SKIN: Denies rash   NEURO: Denies paresthesias, weakness  PSYCH: Denies depression    FH noncontributory     VITALS:  T(F): 97.4, Max: 100.3 (06-13-19 @ 00:00)  HR: 86  BP: 139/64  RR: 15Vital Signs Last 24 Hrs  T(C): 36.3 (13 Jun 2019 08:04), Max: 37.9 (13 Jun 2019 00:00)  T(F): 97.4 (13 Jun 2019 08:04), Max: 100.3 (13 Jun 2019 00:00)  HR: 86 (13 Jun 2019 08:04) (62 - 97)  BP: 139/64 (13 Jun 2019 08:04) (103/61 - 139/64)  BP(mean): 92 (13 Jun 2019 08:04) (80 - 92)  RR: 15 (13 Jun 2019 08:04) (15 - 20)  SpO2: 99% (13 Jun 2019 08:00) (82% - 99%)    PHYSICAL EXAM:  Gen: NAD, resting in bed  HEENT: Normocephalic, atraumatic  Neck: supple, no lymphadenopathy  CV: Regular rate & regular rhythm  Lungs: decreased BS at bases, no fremitus  Abdomen: Soft, BS present  Ext: Warm, well perfused  Neuro: non focal, awake  Skin: no rash, no erythema      TESTS & MEASUREMENTS:                        11.1   10.74 )-----------( 489      ( 12 Jun 2019 08:02 )             36.2     06-12    138  |  102  |  27<H>  ----------------------------<  223<H>  4.3   |  23  |  0.8    Ca    9.7      12 Jun 2019 08:02  Phos  2.4     06-12  Mg     2.1     06-12    TPro  6.2  /  Alb  3.3<L>  /  TBili  0.5  /  DBili  x   /  AST  14  /  ALT  20  /  AlkPhos  74  06-12      LIVER FUNCTIONS - ( 12 Jun 2019 08:02 )  Alb: 3.3 g/dL / Pro: 6.2 g/dL / ALK PHOS: 74 U/L / ALT: 20 U/L / AST: 14 U/L / GGT: x               Culture - Blood (collected 06-07-19 @ 04:27)  Source: .Blood None  Final Report (06-12-19 @ 14:00):    No growth at 5 days.    Culture - Urine (collected 06-06-19 @ 12:35)  Source: .Urine Catheterized  Final Report (06-09-19 @ 09:34):    50,000 - 99,000 CFU/mL Escherichia coli  Organism: Escherichia coli (06-09-19 @ 09:34)  Organism: Escherichia coli (06-09-19 @ 09:34)      -  Amikacin: S <=16      -  Ampicillin: R >16 These ampicillin results predict results for amoxicillin      -  Ampicillin/Sulbactam: R >16/8      -  Aztreonam: S <=4      -  Cefazolin: I 16 For uncomplicated UTI with K. pneumoniae, E. coli, or P. mirablis: MARI <=16 is sensitive and MARI >=32 is resistant. This also predicts results for oral agents cefaclor, cefdinir, cefpodoxime, cefprozil, cefuroxime axetil, cephalexin and locarbeffor uncomplicated UTI. Note that some isolates may be susceptible to these agents while testing resistant to cefazolin.      -  Cefepime: S <=4      -  Cefoxitin: S <=8      -  Ceftriaxone: S <=1 Enterobacter, Citrobacter, and Serratia may develop resistance during prolonged therapy      -  Ciprofloxacin: S <=1      -  Ertapenem: S <=1      -  Gentamicin: S <=4      -  Imipenem: S <=1      -  Levofloxacin: S <=2      -  Meropenem: S <=1      -  Nitrofurantoin: S <=32 Should not be used to treat pyelonephritis      -  Piperacillin/Tazobactam: S <=16      -  Tigecycline: S <=2      -  Tobramycin: S <=4      -  Trimethoprim/Sulfamethoxazole: R >2/38      Method Type: MARI            INFECTIOUS DISEASES TESTING  Hepatitis C Virus Interpretation: Nonreact (05-30-19 @ 08:34)      RADIOLOGY & ADDITIONAL TESTS:  I have personally reviewed the last Chest xray  CXR Improved opacities      CARDIOLOGY TESTING  12 Lead ECG:   Ventricular Rate 70 BPM    Atrial Rate 70 BPM    P-R Interval 160 ms    QRS Duration 78 ms    Q-T Interval 420 ms    QTC Calculation(Bezet) 453 ms    P Axis 46 degrees    R Axis -8 degrees    T Axis 53 degrees    Diagnosis Line Normal sinus rhythm  Possible Anterior infarct , age undetermined  Abnormal ECG    Confirmed by CHAU GALLAGHER MD ([id number]) on 6/11/2019 9:45:40 AM (06-11-19 @ 07:41)  Transthoracic Echocardiogram:    EXAM:  2-D ECHO (TTE) COMPLETE        PROCEDURE DATE:  06/06/2019      INTERPRETATION:  REPORT:    TRANSTHORACIC ECHOCARDIOGRAM REPORT         Patient Name:   HERNANDO HERNANDEZ Accession #: [id number]  Medical Rec #:  [id number]    Height:      63.0 in 160.0 cm  YOB: 1945    Weight:      173.7 lb 78.80 kg  Patient Age:    74 years     BSA:         1.82 m²  Patient Gender: F            BP:          111/51 mmHg       Date of Exam:        6/6/2019 12:27:49 PM  Referring Physician: Service  Sonographer:         Ashley Morales  Reading Physician:   Mikel Valles M.D.    Procedure:   2D Echo/Doppler/Color Doppler Complete.  Indications: I33.0 - Infective Endocarditis  Diagnosis:   Acute and subacute endocarditis, unspecified - I33.9         Summary:   1. Left ventricular ejection fraction, by visual estimation, is 55 to   60%.   2. Spectral Doppler shows impaired relaxation pattern of left   ventricular myocardial filling (Grade I diastolic dysfunction).   3. Mild mitral valve regurgitation.   4. Mild thickening and calcification of the anterior and posterior   mitral valve leaflets.   5. Aortic valve thickening with decreased leaflet opening.   6. No evidence of BE based upon this study, suggest clinical correlation   and JOELLE.   7.Peak transaortic gradient equals 24.3 mmHg, mean transaortic gradient   equals 13.1 mmHg, the calculated aortic valve area equals 1.85 cm² by the   continuity equation consistent with mild aortic stenosis.    PHYSICIAN INTERPRETATION:  Left Ventricle: Normal left ventricular size and wall thicknesses, with   normal systolic and diastolic function. The left ventricular internal   cavity size is normal. Left ventricular wall thickness is normal. Left   ventricular ejection fraction, by visual estimation, is 55 to 60%.   Spectral Doppler shows impaired relaxation pattern of left ventricular   myocardial filling (Grade I diastolic dysfunction).  Right Ventricle: Normal right ventricular size and function.  Left Atrium: Mildly enlarged left atrium.  Right Atrium: Normal right atrial size.  Pericardium: There is no evidence of pericardial effusion.  Mitral Valve: The mitral valve is normal in structure. Mild thickening   and calcification of the anterior and posterior mitral valve leaflets. No   evidence of mitral stenosis. Mild mitral valve regurgitation is seen.  Tricuspid Valve: The tricuspid valve is normal in structure. Mild   tricuspid regurgitation is visualized.  Aortic Valve: The aortic valve is normal. Aortic valve thickening with  decreased leaflet opening. Peak transaortic gradient equals 24.3 mmHg,   mean transaortic gradient equals 13.1 mmHg, the calculated aortic valve   area equals 1.85 cm² by the continuity equation consistent with mild   aortic stenosis. Trivial aortic valve regurgitation is seen.  Pulmonic Valve: The pulmonic valve is normal. Trace pulmonic valve   regurgitation.  Aorta: The aortic root and ascending aorta are structurally normal, with   no evidence of dilitation.  Pulmonary Artery: The main pulmonary artery is normal in size.  Venous: The inferior vena cava was normal sized, with respiratory size   variation less than 50%, consistent with elevated right atrial pressure.       2D AND M-MODE MEASUREMENTS (normal ranges within parentheses):  Left                  Normal   Aorta/Left             Normal  Ventricle:                     Atrium:  IVSd (2D):  1.04 cm  (0.7-1.1) AoV Cusp       1.46  (1.5-2.6)  LVPWd       1.00 cm  (0.7-1.1) Separation:     cm  (2D):                          Left Atrium    3.77  (1.9-4.0)  LVIDd       4.63 cm  (3.4-5.7) (Mmode):        cm  (2D):                          Right  LVIDs       2.93 cm            Ventricle:  (2D):                          RVd (2D):      3.01 cm  LV FS       36.8 %    (>25%)  (2D):  IVSd        0.93 cm  (0.7-1.1)  (Mmode):  LVPWd       0.95 cm  (0.7-1.1)  (Mmode):  LVIDd       5.24 cm  (3.4-5.7)  (Mmode):  LVIDs       3.01 cm  (Mmode):  LV FS       42.6 %    (>25%)  (Mmode):  Relative     0.43     (<0.42)  Wall  Thickness  Rel. Wall    0.36     (<0.42)  Thickness  Mm  LV Mass    99.4 g/m²  Index:  Mmode    SPECTRAL DOPPLER ANALYSIS:  LV DIASTOLIC FUNCTION:  MV Peak E: 0.93 m/s Decel Time: 227 msec  MV Peak A: 1.30 m/s  E/A Ratio: 0.71    Aortic Valve:  AoV VMax:    2.46 m/s  AoV Area, Vmax: 1.57 cm² Vmax Indx: 0.86 cm²/m²  AoV Pk Grad: 24.3 mmHg AoV Area, VTI:  1.85 cm² VTI Indx:  1.02 cm²/m²  AoV Mn Grad: 13.1 mmHg    LVOT Vmax: 1.30 m/s  LVOT VTI:  0.29 m  LVOT Diam: 1.95 cm    Mitral Valve:  MV P1/2 Time: 65.94 msec  MVArea, PHT: 3.34 cm²    Tricuspid Valve and PA/RV Systolic Pressure: TR Max Velocity: 2.37 m/s RA   Pressure: 10 mmHg RVSP/PASP: 32.4 mmHg       [id number] Mikel Valles M.D., Electronically signed on 6/6/2019 at 4:06:18 PM              *** Final ***                    MIKEL VALLES MD  This document has been electronically signed. Jun 6 2019 12:27PM             (06-06-19 @ 12:27)      MEDICATIONS  amLODIPine   Tablet 10  chlorhexidine 0.12% Liquid 15  chlorhexidine 4% Liquid 1  dextrose 50% Injectable 12.5  dextrose 50% Injectable 25  dextrose 50% Injectable 25  docusate sodium Liquid 100  heparin  Injectable 5000  hydrALAZINE 25  insulin glargine Injectable (LANTUS) 18  insulin lispro (HumaLOG) corrective regimen sliding scale   insulin lispro Injectable (HumaLOG) 6  levETIRAcetam  Solution 1500  losartan 25  nystatin    Suspension [id number]  pantoprazole   Suspension 40  senna 2      ANTIBIOTICS:  nystatin    Suspension [id number] Unit(s) Oral two times a day

## 2019-06-13 NOTE — PROGRESS NOTE ADULT - ASSESSMENT
Impression:    Intra cerebral Bleed/ following simple commands  Seizure  Hypertensive emergency  HO hemorrhagic stroke  penumonia aspiration?  UTI  Strep Bacteremia      PLAN:    CNS: Continue with keppra for now.    HEENT: Trach care    PULMONARY:  HOB @ 30 degrees.      CARDIOVASCULAR: i=o. Maintain BP <140. Continue with blood pressure medicine.     GI: GI prophylaxis.  Feeding through NG tube.    RENAL:  Follow up lytes.  Correct as needed    INFECTIOUS DISEASE: abx per ID, course finished today.    HEMATOLOGICAL:   DVT prophylaxis.    ENDOCRINE:  Follow up FS.     MUSCULOSKELETAL: Bed rest    Poor prognosis    PT/OT evaluation when mental status improves

## 2019-06-13 NOTE — PROGRESS NOTE ADULT - SUBJECTIVE AND OBJECTIVE BOX
Patient is a 74y old  Female who presents with a chief complaint of left sided weakness and slurred speech, R thalamic hemorrhagic CVA (12 Jun 2019 21:46)      HPI:  74 y.o F with PMH of HTN, HLD, DM, hemorrhagic stroke,? EVD placement at that time ( about 8 years ago)  with residual left side weakness and slurred speech, ambulated with cane, on ASA daily  Pt. brought to [facility] ED as a stroke code. At the time of presentation to [facility] ED A&Ox 2, baseline slurped speech with left sided weakness. /82, 55 HR, 18 RR, CTH prelim report findings with acute right thalamic hemorrhage with extension to right lateral and third ventricle. At [facility] ED Pt. Intubated 2/2 worsening of neuro exam for air way protection as per ED attending note Pt.  became more drowsy with decreased responsiveness. Pt. was transferred to ED-[place]. At time of NSGY eval Pt intubated, sedated on Propofol and Fentanyl, radial arterial line, she received keppra, zofran and mannitol in ED. Repeat CT scan stable from prior study as per neurosurgery. Platelets one unit ordered as per neurosurgery (30 May 2019 02:50)      HOSPITAL DAY NO: 14d    Overnight events         Patient is a 74y old  Female who presents with a chief complaint of left sided weakness and slurred speech, R thalamic hemorrhagic CVA (12 Jun 2019 21:46)  HYPERTENSIVE EMERGENCY;INTRACRANIAL HEMORRHAGE  ^HYPERTENSIVE EMERGENCY;INTRACRANIAL HEMORRHAGE  No pertinent family history in first degree relatives  Handoff  MEWS Score  Diabetes mellitus  Stroke  Hypertension  Failure respiratory  Failure respiratory  Hypertensive emergency  Open tracheostomy  No significant past surgical history  FALL  Intracranial hemorrhage      Vital Signs Last 24 Hrs  T(C): 36.3 (13 Jun 2019 08:04), Max: 37.9 (13 Jun 2019 00:00)  T(F): 97.4 (13 Jun 2019 08:04), Max: 100.3 (13 Jun 2019 00:00)  HR: 86 (13 Jun 2019 08:04) (62 - 97)  BP: 139/64 (13 Jun 2019 08:04) (103/61 - 139/64)  BP(mean): 92 (13 Jun 2019 08:04) (80 - 92)  RR: 15 (13 Jun 2019 08:04) (15 - 20)  SpO2: 99% (13 Jun 2019 00:30) (82% - 99%)    Mode: AC/ CMV (Assist Control/ Continuous Mandatory Ventilation)  RR (machine): 12  TV (machine): 400  FiO2: 40  PEEP: 5  ITime: 1  MAP: 13  PIP: 26                                      ABG - ( 12 Jun 2019 09:49 )  pH, Arterial: 7.45  pH, Blood: x     /  pCO2: 41    /  pO2: 110   / HCO3: 28    / Base Excess: 3.9   /  SaO2: 99                    Significant exam findings:     Gen : NAD  MS: Awake, lethargic,   CHEST: B/L breath sounds   ABDOMEN: soft   HEART:S1/S2 regular  SKIN: Intact  NEURO: Left sided paresis  Extremities: No C/C/E          Nutrition:   via                       LABS:                          11.1   10.74 )-----------( 489      ( 12 Jun 2019 08:02 )             36.2                                               06-12    138  |  102  |  27<H>  ----------------------------<  223<H>  4.3   |  23  |  0.8    Ca    9.7      12 Jun 2019 08:02  Phos  2.4     06-12  Mg     2.1     06-12    TPro  6.2  /  Alb  3.3<L>  /  TBili  0.5  /  DBili  x   /  AST  14  /  ALT  20  /  AlkPhos  74  06-12                                                LIVER FUNCTIONS - ( 12 Jun 2019 08:02 )  Alb: 3.3 g/dL / Pro: 6.2 g/dL / ALK PHOS: 74 U/L / ALT: 20 U/L / AST: 14 U/L / GGT: x                                                                                                           MEDICATIONS  (STANDING):  amLODIPine   Tablet 10 milliGRAM(s) Oral daily  chlorhexidine 0.12% Liquid 15 milliLiter(s) Oral Mucosa two times a day  chlorhexidine 4% Liquid 1 Application(s) Topical <User Schedule>  dextrose 50% Injectable 12.5 Gram(s) IV Push once  dextrose 50% Injectable 25 Gram(s) IV Push once  dextrose 50% Injectable 25 Gram(s) IV Push once  docusate sodium Liquid 100 milliGRAM(s) Oral three times a day  heparin  Injectable 5000 Unit(s) SubCutaneous every 8 hours  hydrALAZINE 25 milliGRAM(s) Oral three times a day  insulin glargine Injectable (LANTUS) 18 Unit(s) SubCutaneous every morning  insulin lispro (HumaLOG) corrective regimen sliding scale   SubCutaneous three times a day before meals  insulin lispro Injectable (HumaLOG) 6 Unit(s) SubCutaneous three times a day before meals  levETIRAcetam  Solution 1500 milliGRAM(s) Oral two times a day  losartan 25 milliGRAM(s) Oral daily  nystatin    Suspension [id number] Unit(s) Oral two times a day  pantoprazole   Suspension 40 milliGRAM(s) Oral daily  senna 2 Tablet(s) Oral at bedtime    MEDICATIONS  (PRN):  acetaminophen   Tablet .. 650 milliGRAM(s) Oral every 6 hours PRN Temp greater or equal to 38C (100.4F)  dextrose 40% Gel 15 Gram(s) Oral once PRN Blood Glucose LESS THAN 70 milliGRAM(s)/deciliter  glucagon  Injectable 1 milliGRAM(s) IntraMuscular once PRN Glucose LESS THAN 70 milligrams/deciliter  morphine  - Injectable 2 milliGRAM(s) IV Push every 4 hours PRN Moderate Pain (4 - 6)          Radiology       Case discussed with staff and family at the bedside

## 2019-06-14 NOTE — PROGRESS NOTE ADULT - ASSESSMENT
HERNANDO HERNANDEZ 73yo W  Female BIBA to S side from home for elevated BP, slurred speech and L sided weakness.  The pt was noted to have a hypertensive emergency with R thalamic hemorrhagic  CVA. 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    Hospital course:   The pt underwent a stroke code and was intubated for airway protection and transferred North for further care. She remains in CCU and cont to be on respirator support.  The hospital course is complicated by seizures, prolonged QT,streptococcus bacteremia with positive blood cultures and positive GM negative rods in the urine.  She is on Ceftriaxone which was changed to Cefepime.   She was fed via OG tube.  When off sedation she followed simple commands but failed weaning and required tracheostomy.      INTERVAL HPI/OVERNIGHT EVENTS: pt underwent trach 6/10, seen in the vent unit, remains on respirator via trach, off sedation, discussed PEG with family, cont feeds via feeding tube    MEDICATIONS  (STANDING):  amLODIPine   Tablet 10 milliGRAM(s) Oral daily  cefepime   IVPB 2000 milliGRAM(s) IV Intermittent every 8 hours D/C  Rocephine 2 gms q 24  chlorhexidine 0.12% Liquid 15 milliLiter(s) Oral Mucosa two times a day  chlorhexidine 4% Liquid 1 Application(s) Topical <User Schedule>  docusate sodium Liquid 100 milliGRAM(s) Oral three times a day  hydrALAZINE 25 milliGRAM(s) Oral three times a day  insulin regular Infusion 4 Unit(s)/Hr (4 mL/Hr) IV Continuous <Continuous>  levETIRAcetam  Solution 1500 milliGRAM(s) Oral two times a day  nystatin    Suspension 755256 Unit(s) Oral two times a day  pantoprazole   Suspension 40 milliGRAM(s) Oral daily  propofol Infusion. 1 MICROgram(s)/kG/Min (0.473 mL/Hr) IV Continuous <Continuous>  senna 2 Tablet(s) Oral at bedtime    MEDICATIONS  (PRN):  acetaminophen   Tablet .. 650 milliGRAM(s) Oral every 6 hours PRN Temp greater or equal to 38C (100.4F)      Allergies  NKDA     T(F): 99.2  HR: 72  BP: 133/72  RR: 14-18  SpO2: 100 % via trach/ ,/FIO2 40%, rate 12    PHYSICAL EXAM:      Constitutional: pt seen in vent unit, on respirator via trach, less lethargic, tracks with eyes    Eyes: non icteric, eyes opened    ENMT: + feeding tube    Neck:  short and supple, + trach    Respiratory:  harsh respirator BS    Cardiovascular:  S1S2    Gastrointestinal: globose soft and benign    Genitourinary:  no villatoro    Extremities: arthritic changes, exhibits motor strength in limbs with improvement of L sided weakness    LABS:     ^/12/19                   11  10.7 )-----------( 489             36    138 |  102  |  27  ----------------------------<  223  4.3   |  23 |  0.8    GFR 87, 74  Ca    9.7       Mg     2.2, 2.1, 2.4         TPro  5.6<L>  /  Alb  3.0<L>  /  TBili  0.5  /  DBili  x   /  AST  9   /  ALT  23  /  AlkPhos  88  06-06    urine GM- rods  Blood streptococcus  sputum streptococcus      RADIOLOGY & ADDITIONAL TESTS:     EKG NSR 66/min   CXR  +ET tube, + feeding tube    CT of head R thalamic parenchymal hematoma 2.2 cm with intraventricular extension, mild ventricular enlargement, white mater changes      Stroke code, R thalamic hemorrhagic CVA with extension to ventricles, repeat studies show no progression and some improvement of the hematoma  sp Hypertensive emergency  Seizures, controlled on Keppra  Streptococcal Bacteremia probably from transient PNA  Marcio negative UTI    Hx of HTN, ASHD  Hx of DLD  Hx of old hemorrhagic CVA about 8 yrs ago, dysarthria, gait instability  Hx of OA, DDD, DJD  Hx of GERD, HH, Diverticulosis  Hx of anxiety    pt is sp trach  6/10, remains on respirator support    will recall neurology for ff up  repeat attempt at S&S when more alert   Pt is being fed via oral feeding tube, PEG discussed with family on rounds  pt is sp ceftriaxone, blood culture 6/4 + for strept, source probable  LLL  strept PNA, + sputa for strept and CT shows infiltrate,  urine + for Gm neg rods/asymptomatic pyuria, completed the course of ABX  seizures  controlled on Keppra 1500mg q12, VEEG shows focal and generalized slowing  repeat VEEG  recall eurology  monitor electrolytes, check keppra level  pt is ff by pul critical care, ID  oral care, skin care and decubiti prevention  guarded state

## 2019-06-14 NOTE — OCCUPATIONAL THERAPY INITIAL EVALUATION ADULT - SPLINT FABRICATION, SKIN ASSESSMENT, OT EVAL
Ot spoke with JOEY Rocha. OT removed bilateral unsecured mittens and placed on resting hand splints.

## 2019-06-14 NOTE — PROGRESS NOTE ADULT - ASSESSMENT
Impression:    Intra cerebral Bleed/ following simple commands  Seizure  Hypertensive emergency  HO hemorrhagic stroke  penumonia aspiration?  UTI  Strep Bacteremia      PLAN:    CNS: Continue with keppra for now. Re-check EEG per neuro. Check Keppra trough.    HEENT: Trach care    PULMONARY:  HOB @ 30 degrees.      CARDIOVASCULAR: i=o. Maintain BP <140. Continue with antihypertensives    GI: GI prophylaxis.  Feeding through NG tube. Possible PEG next week    RENAL:  Follow up lytes.  Correct as needed    INFECTIOUS DISEASE: abx per ID, course finished today.    HEMATOLOGICAL:   DVT prophylaxis.    ENDOCRINE:  Follow up FS.     MUSCULOSKELETAL: Bed rest    Poor prognosis    PT/OT evaluation when mental status improves

## 2019-06-14 NOTE — PROGRESS NOTE ADULT - SUBJECTIVE AND OBJECTIVE BOX
Patient is a 74y old  Female who presents with a chief complaint of left sided weakness and slurred speech (13 Jun 2019 16:28)      HPI:  74 y.o F with PMH of HTN, HLD, DM, hemorrhagic stroke,? EVD placement at that time ( about 8 years ago)  with residual left side weakness and slurred speech, ambulated with cane, on ASA daily  Pt. brought to [facility] ED as a stroke code. At the time of presentation to [facility] ED A&Ox 2, baseline slurped speech with left sided weakness. /82, 55 HR, 18 RR, CTH prelim report findings with acute right thalamic hemorrhage with extension to right lateral and third ventricle. At [facility] ED Pt. Intubated 2/2 worsening of neuro exam for air way protection as per ED attending note Pt.  became more drowsy with decreased responsiveness. Pt. was transferred to [facility]. At time of NSGY eval Pt intubated, sedated on Propofol and Fentanyl, radial arterial line, she received keppra, zofran and mannitol in ED. Repeat CT scan stable from prior study as per neurosurgery. Platelets one unit ordered as per neurosurgery (30 May 2019 02:50)      HOSPITAL DAY NO: 15d    Overnight events         Patient is a 74y old  Female who presents with a chief complaint of left sided weakness and slurred speech (13 Jun 2019 16:28)  HYPERTENSIVE EMERGENCY;INTRACRANIAL HEMORRHAGE  ^HYPERTENSIVE EMERGENCY;INTRACRANIAL HEMORRHAGE  No pertinent family history in first degree relatives  Handoff  MEWS Score  Diabetes mellitus  Stroke  Hypertension  Failure respiratory  Failure respiratory  Hypertensive emergency  Open tracheostomy  No significant past surgical history  FALL  Intracranial hemorrhage      Vital Signs Last 24 Hrs  T(C): 37.2 (14 Jun 2019 08:00), Max: 37.5 (13 Jun 2019 16:00)  T(F): 98.9 (14 Jun 2019 08:00), Max: 99.5 (13 Jun 2019 16:00)  HR: 62 (14 Jun 2019 08:00) (60 - 80)  BP: 106/42 (14 Jun 2019 08:00) (106/42 - 140/65)  BP(mean): 56 (14 Jun 2019 08:00) (56 - 93)  RR: 18 (14 Jun 2019 08:00) (15 - 20)  SpO2: 97% (14 Jun 2019 07:47) (97% - 100%)    Mode: AC/ CMV (Assist Control/ Continuous Mandatory Ventilation)  RR (machine): 12  TV (machine): 400  FiO2: 40  PEEP: 5  ITime: 1  MAP: 10  PIP: 25                                          Weaning time:     Significant exam findings:     Gen : NAD  MS: Follows some simple commands  HEENT: + trach  CHEST: B/L breath sounds   ABDOMEN: soft   HEART:S1/S2 regular  SKIN: Intact  Extremities: No C/C/E    Nutrition:   via  NGT                     LABS:                                                                                                                                                                                                    MEDICATIONS  (STANDING):  amLODIPine   Tablet 10 milliGRAM(s) Oral daily  chlorhexidine 0.12% Liquid 15 milliLiter(s) Oral Mucosa two times a day  chlorhexidine 4% Liquid 1 Application(s) Topical <User Schedule>  dextrose 5%. 1000 milliLiter(s) (50 mL/Hr) IV Continuous <Continuous>  dextrose 50% Injectable 12.5 Gram(s) IV Push once  dextrose 50% Injectable 25 Gram(s) IV Push once  dextrose 50% Injectable 25 Gram(s) IV Push once  docusate sodium Liquid 100 milliGRAM(s) Oral three times a day  heparin  Injectable 5000 Unit(s) SubCutaneous every 8 hours  hydrALAZINE 25 milliGRAM(s) Oral three times a day  insulin glargine Injectable (LANTUS) 18 Unit(s) SubCutaneous every morning  insulin lispro (HumaLOG) corrective regimen sliding scale   SubCutaneous every 4 hours  levETIRAcetam  Solution 1500 milliGRAM(s) Oral two times a day  losartan 25 milliGRAM(s) Oral daily  nystatin    Suspension 070893 Unit(s) Oral two times a day  pantoprazole   Suspension 40 milliGRAM(s) Oral daily  senna 2 Tablet(s) Oral at bedtime    MEDICATIONS  (PRN):  acetaminophen   Tablet .. 650 milliGRAM(s) Oral every 6 hours PRN Temp greater or equal to 38C (100.4F)  dextrose 40% Gel 15 Gram(s) Oral once PRN Blood Glucose LESS THAN 70 milliGRAM(s)/deciliter  glucagon  Injectable 1 milliGRAM(s) IntraMuscular once PRN Glucose LESS THAN 70 milligrams/deciliter  morphine  - Injectable 2 milliGRAM(s) IV Push every 4 hours PRN Moderate Pain (4 - 6)          Radiology       Case discussed with staff and family at the bedside

## 2019-06-15 NOTE — PROGRESS NOTE ADULT - ASSESSMENT
Impression:    Intra cerebral Bleed/ following simple commands  Seizure  Hypertensive emergency  HO hemorrhagic stroke  penumonia aspiration  UTI  Strep Bacteremia      PLAN:    CNS: Continue with keppra, neuro eval    HEENT: Trach care    PULMONARY:  HOB @ 30 degrees.  weaning    CARDIOVASCULAR: i=o.Continue with antihypertensives    GI: GI prophylaxis.  Feeding through NG tube. Possible PEG next week    RENAL:  Follow up lytes.  Correct as needed    INFECTIOUS DISEASE: per ID    HEMATOLOGICAL:   DVT prophylaxis.    ENDOCRINE:  Follow up FS.     MUSCULOSKELETAL: Bed rest    Poor prognosis

## 2019-06-15 NOTE — PROGRESS NOTE ADULT - SUBJECTIVE AND OBJECTIVE BOX
Neurology Follow up note    Name  HERNANDO HERNANDEZ    F/U ON EEG    Interval History:    EEG remains with bilateral posterior t/p sharps      Medications  acetaminophen   Tablet .. 650 milliGRAM(s) Oral every 6 hours PRN  amLODIPine   Tablet 10 milliGRAM(s) Oral daily  chlorhexidine 0.12% Liquid 15 milliLiter(s) Oral Mucosa two times a day  chlorhexidine 4% Liquid 1 Application(s) Topical <User Schedule>  dextrose 40% Gel 15 Gram(s) Oral once PRN  dextrose 5%. 1000 milliLiter(s) IV Continuous <Continuous>  dextrose 50% Injectable 12.5 Gram(s) IV Push once  dextrose 50% Injectable 25 Gram(s) IV Push once  dextrose 50% Injectable 25 Gram(s) IV Push once  docusate sodium Liquid 100 milliGRAM(s) Oral three times a day  glucagon  Injectable 1 milliGRAM(s) IntraMuscular once PRN  heparin  Injectable 5000 Unit(s) SubCutaneous every 8 hours  hydrALAZINE 25 milliGRAM(s) Oral three times a day  insulin glargine Injectable (LANTUS) 18 Unit(s) SubCutaneous every morning  insulin lispro (HumaLOG) corrective regimen sliding scale   SubCutaneous every 4 hours  levETIRAcetam  Solution 1500 milliGRAM(s) Oral two times a day  losartan 25 milliGRAM(s) Oral daily  morphine  - Injectable 2 milliGRAM(s) IV Push every 4 hours PRN  nystatin    Suspension 687344 Unit(s) Oral two times a day  pantoprazole   Suspension 40 milliGRAM(s) Oral daily  senna 2 Tablet(s) Oral at bedtime      Lab  06-15    145  |  107  |  28<H>  ----------------------------<  159<H>  4.2   |  27  |  0.7    Ca    10.2<H>      15 Bryce 2019 06:49  Mg     2.3     06-15    TPro  5.7<L>  /  Alb  3.1<L>  /  TBili  0.5  /  DBili  x   /  AST  11  /  ALT  16  /  AlkPhos  62  06-15                          10.1   6.34  )-----------( 423      ( 15 Bryce 2019 06:49 )             32.9     LIVER FUNCTIONS - ( 15 Bryce 2019 06:49 )  Alb: 3.1 g/dL / Pro: 5.7 g/dL / ALK PHOS: 62 U/L / ALT: 16 U/L / AST: 11 U/L / GGT: x             2.3        Radiology      Assessment:  PATIENT WITH R THALAMIC BLEED  REPEAT EEG WAS NOT SIGINIFCANTLY IMPROVED BUT NO OVERT SEZIURES SO WOULD MAINTAIN KEPPRA AT CURRENT DOSAGE    PLEASE CALL WITH ANY QUESTIONS

## 2019-06-15 NOTE — PROGRESS NOTE ADULT - ASSESSMENT
HERNANDO HERNANDEZ 73yo W  Female BIBA to S side from home for elevated BP, slurred speech and L sided weakness.  The pt was noted to have a hypertensive emergency with R thalamic hemorrhagic  CVA. 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    Hospital course:   The pt underwent a stroke code and was intubated for airway protection and transferred North for further care. She remains in CCU and cont to be on respirator support.  The hospital course is complicated by seizures, prolonged QT,streptococcus bacteremia with positive blood cultures and positive GM negative rods in the urine.  She is on Ceftriaxone which was changed to Cefepime.   She was fed via OG tube.  When off sedation she followed simple commands but failed weaning and required tracheostomy.      INTERVAL HPI/OVERNIGHT EVENTS: pt underwent trach 6/10, seen in the vent unit, remains on respirator via trach, off sedation, cont feeds via feeding tube, much more alert today, off respirator x 1 hr today    MEDICATIONS  (STANDING):  amLODIPine   Tablet 10 milliGRAM(s) Oral daily  cefepime   IVPB 2000 milliGRAM(s) IV Intermittent every 8 hours D/C  Rocephine 2 gms q 24  chlorhexidine 0.12% Liquid 15 milliLiter(s) Oral Mucosa two times a day  chlorhexidine 4% Liquid 1 Application(s) Topical <User Schedule>  docusate sodium Liquid 100 milliGRAM(s) Oral three times a day  hydrALAZINE 25 milliGRAM(s) Oral three times a day  insulin regular Infusion 4 Unit(s)/Hr (4 mL/Hr) IV Continuous <Continuous>  levETIRAcetam  Solution 1500 milliGRAM(s) Oral two times a day  nystatin    Suspension 056251 Unit(s) Oral two times a day  pantoprazole   Suspension 40 milliGRAM(s) Oral daily  propofol Infusion. 1 MICROgram(s)/kG/Min (0.473 mL/Hr) IV Continuous <Continuous>  senna 2 Tablet(s) Oral at bedtime    MEDICATIONS  (PRN):  acetaminophen   Tablet .. 650 milliGRAM(s) Oral every 6 hours PRN Temp greater or equal to 38C (100.4F)      Allergies  NKDA     T(F): 98.8  HR: 72  BP: 123/58  RR: 22  SpO2: 98 % via trach/ ,/FIO2 40%, rate 12    PHYSICAL EXAM:      Constitutional: pt seen in vent unit, on respirator via trach, more alert today,     Eyes: non icteric, eyes opened    ENMT: + feeding tube    Neck:  short and supple, + trach    Respiratory:  harsh respirator BS    Cardiovascular:  S1S2    Gastrointestinal: globose soft and benign    Genitourinary:  no villatoro    Extremities: arthritic changes, exhibits motor strength in limbs with improvement of L sided weakness    LABS:                       10  6.3 )-----------( 423             32    145 |  107  |  28  ----------------------------<  159  4.2   |  27 |  0.7    GFR 87  Ca    9.7       Mg     2.2, 2.1, 2.4, 2.3        TPro  5.6<L>  /  Alb  3.0<L>  /  TBili  0.5  /  DBili  x   /  AST  9   /  ALT  23  /  AlkPhos  88  06-06    urine GM- rods  Blood streptococcus  sputum streptococcus      RADIOLOGY & ADDITIONAL TESTS:     EKG NSR 66/min   CXR  +ET tube, + feeding tube    CT of head R thalamic parenchymal hematoma 2.2 cm with intraventricular extension, mild ventricular enlargement, white mater changes      Stroke code, R thalamic hemorrhagic CVA with extension to ventricles, repeat studies show no progression and some improvement of the hematoma  sp Hypertensive emergency  Seizures, controlled on Keppra  Streptococcal Bacteremia probably from transient PNA  Marcio negative UTI    Hx of HTN, ASHD  Hx of DLD  Hx of old hemorrhagic CVA about 8 yrs ago, dysarthria, gait instability  Hx of OA, DDD, DJD  Hx of GERD, HH, Diverticulosis  Hx of anxiety    pt is sp trach  6/10, remains on respirator support, of respirator x 1 hr today  pt more alert today  neurology ff up appreciated  repeat attempt at S&S when more alert   Pt is being fed via oral feeding tube, PEG discussed with family on rounds yesterday  pt is sp ceftriaxone, blood culture 6/4 + for strept, source probable  LLL  strept PNA, + sputa for strept and CT shows infiltrate,  urine + for Gm neg rods/asymptomatic pyuria, completed the course of ABX  seizures  controlled on Keppra 1500mg q12, VEEG shows focal and generalized slowin    monitor electrolytes, check keppra level  pt is ff by pul critical care, ID  oral care, skin care and decubiti prevention  guarded state

## 2019-06-15 NOTE — PROGRESS NOTE ADULT - SUBJECTIVE AND OBJECTIVE BOX
OVERNIGHT EVENTS: events noted, not following commands    Vital Signs Last 24 Hrs  T(C): 35.7 (15 Bryce 2019 07:40), Max: 37.3 (14 Jun 2019 16:00)  T(F): 96.3 (15 Bryce 2019 07:40), Max: 99.2 (14 Jun 2019 16:00)  HR: 56 (15 Bryce 2019 07:43) (56 - 84)  BP: 100/51 (15 Bryce 2019 07:40) (100/51 - 146/65)  BP(mean): 74 (15 Bryce 2019 07:40) (74 - 104)  RR: 16 (15 Bryce 2019 07:40) (14 - 17)  SpO2: 100% (15 Bryce 2019 07:43) (97% - 100%)    PHYSICAL EXAMINATION:    GENERAL: The patient is awake and alert in no apparent distress.     HEENT: Head is normocephalic and atraumatic. Extraocular muscles are intact. Mucous membranes are moist.    NECK: Supple.    LUNGS: Dec BS Both bases    HEART: Regular rate and rhythm without murmur.    ABDOMEN: Soft, nontender, and nondistended.      EXTREMITIES: swelling +          LABS:                        10.1   6.34  )-----------( 423      ( 15 Bryce 2019 06:49 )             32.9     06-15    145  |  107  |  28<H>  ----------------------------<  159<H>  4.2   |  27  |  0.7    Ca    10.2<H>      15 Bryce 2019 06:49  Mg     2.3     06-15    TPro  5.7<L>  /  Alb  3.1<L>  /  TBili  0.5  /  DBili  x   /  AST  11  /  ALT  16  /  AlkPhos  62  06-15                          06-14-19 @ 07:01  -  06-15-19 @ 07:00  --------------------------------------------------------  IN: 1020 mL / OUT: 0 mL / NET: 1020 mL    06-15-19 @ 07:01  -  06-15-19 @ 10:18  --------------------------------------------------------  IN: 240 mL / OUT: 0 mL / NET: 240 mL        MICROBIOLOGY:      MEDICATIONS  (STANDING):  amLODIPine   Tablet 10 milliGRAM(s) Oral daily  chlorhexidine 0.12% Liquid 15 milliLiter(s) Oral Mucosa two times a day  chlorhexidine 4% Liquid 1 Application(s) Topical <User Schedule>  dextrose 5%. 1000 milliLiter(s) (50 mL/Hr) IV Continuous <Continuous>  dextrose 50% Injectable 12.5 Gram(s) IV Push once  dextrose 50% Injectable 25 Gram(s) IV Push once  dextrose 50% Injectable 25 Gram(s) IV Push once  docusate sodium Liquid 100 milliGRAM(s) Oral three times a day  heparin  Injectable 5000 Unit(s) SubCutaneous every 8 hours  hydrALAZINE 25 milliGRAM(s) Oral three times a day  insulin glargine Injectable (LANTUS) 18 Unit(s) SubCutaneous every morning  insulin lispro (HumaLOG) corrective regimen sliding scale   SubCutaneous every 4 hours  levETIRAcetam  Solution 1500 milliGRAM(s) Oral two times a day  losartan 25 milliGRAM(s) Oral daily  nystatin    Suspension [id number] Unit(s) Oral two times a day  pantoprazole   Suspension 40 milliGRAM(s) Oral daily  senna 2 Tablet(s) Oral at bedtime    MEDICATIONS  (PRN):  acetaminophen   Tablet .. 650 milliGRAM(s) Oral every 6 hours PRN Temp greater or equal to 38C (100.4F)  dextrose 40% Gel 15 Gram(s) Oral once PRN Blood Glucose LESS THAN 70 milliGRAM(s)/deciliter  glucagon  Injectable 1 milliGRAM(s) IntraMuscular once PRN Glucose LESS THAN 70 milligrams/deciliter  morphine  - Injectable 2 milliGRAM(s) IV Push every 4 hours PRN Moderate Pain (4 - 6)      RADIOLOGY & ADDITIONAL STUDIES:

## 2019-06-16 NOTE — PROGRESS NOTE ADULT - ASSESSMENT
HERNANDO HERNANDEZ 75yo W  Female BIBA to S side from home for elevated BP, slurred speech and L sided weakness.  The pt was noted to have a hypertensive emergency with R thalamic hemorrhagic  CVA. 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    Hospital course:   The pt underwent a stroke code and was intubated for airway protection and transferred [place] for further care. She remains in CCU and cont to be on respirator support.  The hospital course is complicated by seizures, prolonged QT,streptococcus bacteremia with positive blood cultures and positive GM negative rods in the urine.  She is on Ceftriaxone which was changed to Cefepime.   She was fed via OG tube.  When off sedation she followed simple commands but failed weaning and required tracheostomy.      INTERVAL HPI/OVERNIGHT EVENTS: pt underwent trach 6/10, seen in the vent unit, remains on respirator via trach, off sedation,  more alert but v weak, fed via feeding tube, difficulty with weaning    MEDICATIONS  (STANDING):  amLODIPine   Tablet 10 milliGRAM(s) Oral daily  cefepime   IVPB 2000 milliGRAM(s) IV Intermittent every 8 hours D/C  Rocephine 2 gms q 24  chlorhexidine 0.12% Liquid 15 milliLiter(s) Oral Mucosa two times a day  chlorhexidine 4% Liquid 1 Application(s) Topical <User Schedule>  docusate sodium Liquid 100 milliGRAM(s) Oral three times a day  hydrALAZINE 25 milliGRAM(s) Oral three times a day  insulin regular Infusion 4 Unit(s)/Hr (4 mL/Hr) IV Continuous <Continuous>  levETIRAcetam  Solution 1500 milliGRAM(s) Oral two times a day  nystatin    Suspension 380422 Unit(s) Oral two times a day  pantoprazole   Suspension 40 milliGRAM(s) Oral daily  propofol Infusion. 1 MICROgram(s)/kG/Min (0.473 mL/Hr) IV Continuous <Continuous>  senna 2 Tablet(s) Oral at bedtime    MEDICATIONS  (PRN):  acetaminophen   Tablet .. 650 milliGRAM(s) Oral every 6 hours PRN Temp greater or equal to 38C (100.4F)      Allergies  NKDA     T(F): 98.8  HR: 102  BP: 123/58  RR: 22 on vent support, 30s on trial off vent  SpO2: 97 % via trach/ ,/FIO2 40%, rate 12    PHYSICAL EXAM:      Constitutional: pt seen in vent unit, on respirator via trach, more alert today,     Eyes: non icteric, eyes opened    ENMT: + feeding tube    Neck:  short and supple, + trach    Respiratory:  harsh respirator BS    Cardiovascular:  S1S2    Gastrointestinal: globose soft and benign    Genitourinary:  no villatoro    Extremities: arthritic changes, exhibits motor strength in limbs with improvement of L sided weakness    LABS:                       10  6.3 )-----------( 423             32    145 |  107  |  28  ----------------------------<  159  4.2   |  27 |  0.7    GFR 87  Ca    9.7       Mg     2.2, 2.1, 2.4, 2.3        TPro  5.6<L>  /  Alb  3.0<L>  /  TBili  0.5  /  DBili  x   /  AST  9   /  ALT  23  /  AlkPhos  88  06-06    urine GM- rods  Blood streptococcus  sputum streptococcus      RADIOLOGY & ADDITIONAL TESTS:     EKG NSR 66/min   CXR  +ET tube, + feeding tube    CT of head R thalamic parenchymal hematoma 2.2 cm with intraventricular extension, mild ventricular enlargement, white mater changes      Stroke code, R thalamic hemorrhagic CVA with extension to ventricles, repeat studies show no progression and some improvement of the hematoma  sp Hypertensive emergency  Seizures, controlled on Keppra  Streptococcal Bacteremia probably from transient PNA,   Marcio negative UTI    Hx of HTN, ASHD  Hx of DLD  Hx of old hemorrhagic CVA about 8 yrs ago, dysarthria, gait instability  Hx of OA, DDD, DJD  Hx of GERD, HH, Diverticulosis  Hx of anxiety    pt is sp trach  6/10, remains on respirator support, of respirator x 1 hr today  pt more alert today  neurology ff up appreciated  repeat attempt at S&S when more alert   Pt is being fed via oral feeding tube, PEG discussed with family on rounds yesterday  pt is sp ceftriaxone, blood culture 6/4 + for strept, source probable  LLL  strept PNA, + sputa for strept and CT shows infiltrate,  urine + for Gm neg rods/asymptomatic pyuria, completed the course of ABX  seizures  controlled on Keppra 1500mg q12, VEEG shows focal and generalized slowin    monitor electrolytes, check keppra level  pt is ff by pul critical care, ID  oral care, skin care and decubiti prevention  guarded state, v poor progress with attempt at weaning, gets tachypneic and tachycardic on trials off vent

## 2019-06-16 NOTE — PROGRESS NOTE ADULT - SUBJECTIVE AND OBJECTIVE BOX
OVERNIGHT EVENTS: events noted, 1h PS 15    Vital Signs Last 24 Hrs  T(C): 37.3 (16 Jun 2019 07:45), Max: 37.3 (16 Jun 2019 07:45)  T(F): 99.1 (16 Jun 2019 07:45), Max: 99.1 (16 Jun 2019 07:45)  HR: 74 (16 Jun 2019 07:45) (72 - 96)  BP: 123/60 (16 Jun 2019 07:45) (123/58 - 136/58)  BP(mean): 87 (16 Jun 2019 07:45) (83 - 87)  RR: 12 (16 Jun 2019 07:45) (12 - 97)  SpO2: 95% (15 Bryce 2019 23:28) (95% - 98%)    PHYSICAL EXAMINATION:    GENERAL: comfortable     HEENT: Head is normocephalic and atraumatic. Extraocular muscles are intact. Mucous membranes are moist.    NECK: Supple.    LUNGS: Clear to auscultation without wheezing, rales or rhonchi; respirations unlabored    HEART: Regular rate and rhythm without murmur.    ABDOMEN: Soft, nontender, and nondistended.      EXTREMITIES: Without any cyanosis, clubbing, rash, lesions or edema.    NEUROLOGIC: unchanged    SKIN: No ulceration or induration present.      LABS:                        10.1   6.34  )-----------( 423      ( 15 Bryce 2019 06:49 )             32.9     06-15    145  |  107  |  28<H>  ----------------------------<  159<H>  4.2   |  27  |  0.7    Ca    10.2<H>      15 Bryce 2019 06:49  Mg     2.3     06-15    TPro  5.7<L>  /  Alb  3.1<L>  /  TBili  0.5  /  DBili  x   /  AST  11  /  ALT  16  /  AlkPhos  62  06-15                          06-15-19 @ 07:01  -  06-16-19 @ 07:00  --------------------------------------------------------  IN: 1700 mL / OUT: 0 mL / NET: 1700 mL        MICROBIOLOGY:      MEDICATIONS  (STANDING):  amLODIPine   Tablet 10 milliGRAM(s) Oral daily  chlorhexidine 0.12% Liquid 15 milliLiter(s) Oral Mucosa two times a day  chlorhexidine 4% Liquid 1 Application(s) Topical <User Schedule>  dextrose 5%. 1000 milliLiter(s) (50 mL/Hr) IV Continuous <Continuous>  dextrose 50% Injectable 12.5 Gram(s) IV Push once  dextrose 50% Injectable 25 Gram(s) IV Push once  dextrose 50% Injectable 25 Gram(s) IV Push once  docusate sodium Liquid 100 milliGRAM(s) Oral three times a day  heparin  Injectable 5000 Unit(s) SubCutaneous every 8 hours  hydrALAZINE 25 milliGRAM(s) Oral three times a day  insulin glargine Injectable (LANTUS) 18 Unit(s) SubCutaneous every morning  insulin lispro (HumaLOG) corrective regimen sliding scale   SubCutaneous every 4 hours  levETIRAcetam  Solution 1500 milliGRAM(s) Oral two times a day  losartan 25 milliGRAM(s) Oral daily  nystatin    Suspension 027450 Unit(s) Oral two times a day  pantoprazole   Suspension 40 milliGRAM(s) Oral daily  senna 2 Tablet(s) Oral at bedtime    MEDICATIONS  (PRN):  acetaminophen   Tablet .. 650 milliGRAM(s) Oral every 6 hours PRN Temp greater or equal to 38C (100.4F)  dextrose 40% Gel 15 Gram(s) Oral once PRN Blood Glucose LESS THAN 70 milliGRAM(s)/deciliter  glucagon  Injectable 1 milliGRAM(s) IntraMuscular once PRN Glucose LESS THAN 70 milligrams/deciliter  morphine  - Injectable 2 milliGRAM(s) IV Push every 4 hours PRN Moderate Pain (4 - 6)      RADIOLOGY & ADDITIONAL STUDIES:

## 2019-06-16 NOTE — PROGRESS NOTE ADULT - ASSESSMENT
Impression:    Intra cerebral Bleed/ following simple commands  Seizure  Hypertensive emergency  HO hemorrhagic stroke  penumonia aspiration  UTI        PLAN:    CNS: Continue with keppra, neuro eval regarding dosage    HEENT: Trach care    PULMONARY:  HOB @ 30 degrees.  weaning as tolerated    CARDIOVASCULAR: i=o.Continue with antihypertensives    GI: GI prophylaxis.  Feeding through NG tube. Possible PEG next week, of failed swallow eval    RENAL:  Follow up lytes.  Correct as needed    INFECTIOUS DISEASE: per ID    HEMATOLOGICAL:   DVT prophylaxis.    ENDOCRINE:  Follow up FS.     MUSCULOSKELETAL: Bed rest    Poor prognosis

## 2019-06-17 NOTE — PROGRESS NOTE ADULT - ASSESSMENT
Impression:    Intra cerebral Bleed/ following simple commands  Seizure  Hypertensive emergency  HO hemorrhagic stroke  penumonia aspiration  UTI        PLAN:    CNS: Continue with amira king to discuss prognosis with family    HEENT: Trach care    PULMONARY:  HOB @ 30 degrees.    CARDIOVASCULAR: i=o. Continue with antihypertensives    GI: GI prophylaxis.  Feeding through NG tube. Possible PEG next week, of failed swallow eval    RENAL:  Follow up lytes.  Correct as needed    INFECTIOUS DISEASE: per ID    HEMATOLOGICAL:   DVT prophylaxis.    ENDOCRINE:  Follow up FS.     MUSCULOSKELETAL: Bed rest    Poor prognosis      Palliative care evaluation    Discussed with son at bedside.

## 2019-06-17 NOTE — PROGRESS NOTE ADULT - SUBJECTIVE AND OBJECTIVE BOX
Patient is a 74y old  Female who presents with a chief complaint of left sided weakness and slurred speech, R thalamic hemorrhagic CVA (16 Jun 2019 16:26)      HPI:  74 y.o F with PMH of HTN, HLD, DM, hemorrhagic stroke,? EVD placement at that time ( about 8 years ago)  with residual left side weakness and slurred speech, ambulated with cane, on ASA daily  Pt. brought to [facility] ED as a stroke code. At the time of presentation to [facility] ED A&Ox 2, baseline slurped speech with left sided weakness. /82, 55 HR, 18 RR, CTH prelim report findings with acute right thalamic hemorrhage with extension to right lateral and third ventricle. At [facility] ED Pt. Intubated 2/2 worsening of neuro exam for air way protection as per ED attending note Pt.  became more drowsy with decreased responsiveness. Pt. was transferred to [facility]. At time of NSGY eval Pt intubated, sedated on Propofol and Fentanyl, radial arterial line, she received keppra, zofran and mannitol in ED. Repeat CT scan stable from prior study as per neurosurgery. Platelets one unit ordered as per neurosurgery (30 May 2019 02:50)      HOSPITAL DAY NO: 18d    Overnight events         Patient is a 74y old  Female who presents with a chief complaint of left sided weakness and slurred speech, R thalamic hemorrhagic CVA (16 Jun 2019 16:26)  HYPERTENSIVE EMERGENCY;INTRACRANIAL HEMORRHAGE  ^HYPERTENSIVE EMERGENCY;INTRACRANIAL HEMORRHAGE  No pertinent family history in first degree relatives  Handoff  MEWS Score  Diabetes mellitus  Stroke  Hypertension  Failure respiratory  Failure respiratory  Hypertensive emergency  Open tracheostomy  No significant past surgical history  FALL  Intracranial hemorrhage      Vital Signs Last 24 Hrs  T(C): 36.5 (17 Jun 2019 08:00), Max: 37.1 (16 Jun 2019 15:30)  T(F): 97.7 (17 Jun 2019 08:00), Max: 98.8 (16 Jun 2019 15:30)  HR: 75 (17 Jun 2019 08:00) (70 - 102)  BP: 110/54 (17 Jun 2019 08:00) (110/54 - 135/64)  BP(mean): 78 (17 Jun 2019 08:00) (67 - 92)  RR: 18 (17 Jun 2019 08:00) (18 - 30)  SpO2: 95% (16 Jun 2019 23:31) (95% - 95%)    use of pressors:     use of sedation:     Vent dependent:     Mode: AC/ CMV (Assist Control/ Continuous Mandatory Ventilation)  RR (machine): 12  TV (machine): 400  FiO2: 40  PEEP: 5  ITime: 1  MAP: 10  PIP: 33                                          Weaning time:     Significant exam findings:     Gen : NAD  MS: Not following commands  HEENT: + NGT, + trach  CHEST: B/L breath sounds   ABDOMEN: soft   HEART:S1/S2 regular  SKIN: intact  Extremities    No of BMs:       Nutrition:   via                       LABS:                                                                                                                                                                                                    MEDICATIONS  (STANDING):  amLODIPine   Tablet 10 milliGRAM(s) Oral daily  chlorhexidine 0.12% Liquid 15 milliLiter(s) Oral Mucosa two times a day  chlorhexidine 4% Liquid 1 Application(s) Topical <User Schedule>  dextrose 5%. 1000 milliLiter(s) (50 mL/Hr) IV Continuous <Continuous>  dextrose 50% Injectable 12.5 Gram(s) IV Push once  dextrose 50% Injectable 25 Gram(s) IV Push once  dextrose 50% Injectable 25 Gram(s) IV Push once  docusate sodium Liquid 100 milliGRAM(s) Oral three times a day  heparin  Injectable 5000 Unit(s) SubCutaneous every 8 hours  hydrALAZINE 25 milliGRAM(s) Oral three times a day  insulin glargine Injectable (LANTUS) 18 Unit(s) SubCutaneous every morning  insulin lispro (HumaLOG) corrective regimen sliding scale   SubCutaneous every 4 hours  levETIRAcetam  Solution 1500 milliGRAM(s) Oral two times a day  losartan 25 milliGRAM(s) Oral daily  nystatin    Suspension 192486 Unit(s) Oral two times a day  pantoprazole   Suspension 40 milliGRAM(s) Oral daily  senna 2 Tablet(s) Oral at bedtime    MEDICATIONS  (PRN):  acetaminophen   Tablet .. 650 milliGRAM(s) Oral every 6 hours PRN Temp greater or equal to 38C (100.4F)  dextrose 40% Gel 15 Gram(s) Oral once PRN Blood Glucose LESS THAN 70 milliGRAM(s)/deciliter  glucagon  Injectable 1 milliGRAM(s) IntraMuscular once PRN Glucose LESS THAN 70 milligrams/deciliter  morphine  - Injectable 2 milliGRAM(s) IV Push every 4 hours PRN Moderate Pain (4 - 6)          Radiology       Case discussed with staff and family at the bedside

## 2019-06-17 NOTE — PROGRESS NOTE ADULT - ASSESSMENT
HERNANDO HERNANDEZ 73yo W  Female BIBA to S side from home for elevated BP, slurred speech and L sided weakness.  The pt was noted to have a hypertensive emergency with R thalamic hemorrhagic  CVA. 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    Hospital course:   The pt underwent a stroke code and was intubated for airway protection and transferred North for further care. She remains in CCU and cont to be on respirator support.  The hospital course is complicated by seizures, prolonged QT,streptococcus bacteremia with positive blood cultures and positive GM negative rods in the urine.  She is on Ceftriaxone which was changed to Cefepime.   She was fed via OG tube.  When off sedation she followed simple commands but failed weaning and required tracheostomy.      INTERVAL HPI/OVERNIGHT EVENTS: pt underwent trach 6/10, seen in the vent unit, remains on respirator via trach, off sedation,  more alert but v weak, fed via feeding tube, difficulty with weaning    MEDICATIONS  (STANDING):  amLODIPine   Tablet 10 milliGRAM(s) Oral daily  cefepime   IVPB 2000 milliGRAM(s) IV Intermittent every 8 hours D/C  Rocephine 2 gms q 24  chlorhexidine 0.12% Liquid 15 milliLiter(s) Oral Mucosa two times a day  chlorhexidine 4% Liquid 1 Application(s) Topical <User Schedule>  docusate sodium Liquid 100 milliGRAM(s) Oral three times a day  hydrALAZINE 25 milliGRAM(s) Oral three times a day  insulin regular Infusion 4 Unit(s)/Hr (4 mL/Hr) IV Continuous <Continuous>  levETIRAcetam  Solution 1500 milliGRAM(s) Oral two times a day  nystatin    Suspension 771432 Unit(s) Oral two times a day  pantoprazole   Suspension 40 milliGRAM(s) Oral daily  propofol Infusion. 1 MICROgram(s)/kG/Min (0.473 mL/Hr) IV Continuous <Continuous>  senna 2 Tablet(s) Oral at bedtime    MEDICATIONS  (PRN):  acetaminophen   Tablet .. 650 milliGRAM(s) Oral every 6 hours PRN Temp greater or equal to 38C (100.4F)      Allergies  NKDA     T(F): 98.8  HR: 102  BP: 123/58  RR: 22 on vent support, 30s on trial off vent  SpO2: 97 % via trach/ ,/FIO2 40%, rate 12    PHYSICAL EXAM:      Constitutional: pt seen in vent unit, on respirator via trach, more alert today,     Eyes: non icteric, eyes opened    ENMT: + feeding tube    Neck:  short and supple, + trach    Respiratory:  harsh respirator BS    Cardiovascular:  S1S2    Gastrointestinal: globose soft and benign    Genitourinary:  no villatoro    Extremities: arthritic changes, exhibits motor strength in limbs with improvement of L sided weakness    LABS:                       10  6.3 )-----------( 423             32    145 |  107  |  28  ----------------------------<  159  4.2   |  27 |  0.7    GFR 87  Ca    9.7       Mg     2.2, 2.1, 2.4, 2.3        TPro  5.6<L>  /  Alb  3.0<L>  /  TBili  0.5  /  DBili  x   /  AST  9   /  ALT  23  /  AlkPhos  88  06-06    urine GM- rods  Blood streptococcus  sputum streptococcus      RADIOLOGY & ADDITIONAL TESTS:     EKG NSR 66/min   CXR  +ET tube, + feeding tube    CT of head R thalamic parenchymal hematoma 2.2 cm with intraventricular extension, mild ventricular enlargement, white mater changes      Stroke code, R thalamic hemorrhagic CVA with extension to ventricles, repeat studies show no progression and some improvement of the hematoma  sp Hypertensive emergency  Seizures, controlled on Keppra  Streptococcal Bacteremia probably from transient PNA,   Marcio negative UTI    Hx of HTN, ASHD  Hx of DLD  Hx of old hemorrhagic CVA about 8 yrs ago, dysarthria, gait instability  Hx of OA, DDD, DJD  Hx of GERD, HH, Diverticulosis  Hx of anxiety    pt is sp trach  6/10, remains on respirator support, of respirator x 1 hr today  pt more alert today  neurology ff up appreciated  repeat attempt at S&S when more alert   Pt is being fed via oral feeding tube, PEG discussed with family on rounds yesterday  pt is sp ceftriaxone, blood culture 6/4 + for strept, source probable  LLL  strept PNA, + sputa for strept and CT shows infiltrate,  urine + for Gm neg rods/asymptomatic pyuria, completed the course of ABX  seizures  controlled on Keppra 1500mg q12, VEEG shows focal and generalized slowin    monitor electrolytes, check keppra level  pt is ff by pul critical care, ID  oral care, skin care and decubiti prevention  guarded state, v poor progress with attempt at weaning, gets tachypneic and tachycardic on trials off vent HERNANDO HERNANDEZ 75yo W  Female BIBA to S side from home for elevated BP, slurred speech and L sided weakness.  The pt was noted to have a hypertensive emergency with R thalamic hemorrhagic  CVA. 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    Hospital course:   The pt underwent a stroke code and was intubated for airway protection and transferred North for further care. She remains in CCU and cont to be on respirator support.  The hospital course is complicated by seizures, prolonged QT,streptococcus bacteremia with positive blood cultures and positive GM negative rods in the urine.  She is on Ceftriaxone which was changed to Cefepime.   She was fed via OG tube.  When off sedation she followed simple commands but failed weaning and required tracheostomy.      INTERVAL HPI/OVERNIGHT EVENTS: pt underwent trach 6/10, seen in the vent unit, remains on vent support, off sedation, alert but v weak, fed via feeding tube, difficulty with weaning    MEDICATIONS  (STANDING):  amLODIPine   Tablet 10 milliGRAM(s) Oral daily  cefepime   IVPB 2000 milliGRAM(s) IV Intermittent every 8 hours D/C  Rocephine 2 gms q 24  chlorhexidine 0.12% Liquid 15 milliLiter(s) Oral Mucosa two times a day  chlorhexidine 4% Liquid 1 Application(s) Topical <User Schedule>  docusate sodium Liquid 100 milliGRAM(s) Oral three times a day  hydrALAZINE 25 milliGRAM(s) Oral three times a day  insulin regular Infusion 4 Unit(s)/Hr (4 mL/Hr) IV Continuous <Continuous>  levETIRAcetam  Solution 1500 milliGRAM(s) Oral two times a day  nystatin    Suspension 389504 Unit(s) Oral two times a day  pantoprazole   Suspension 40 milliGRAM(s) Oral daily  propofol Infusion. 1 MICROgram(s)/kG/Min (0.473 mL/Hr) IV Continuous <Continuous>  senna 2 Tablet(s) Oral at bedtime    MEDICATIONS  (PRN):  acetaminophen   Tablet .. 650 milliGRAM(s) Oral every 6 hours PRN Temp greater or equal to 38C (100.4F)      Allergies  NKDA     T(F): 100.3  HR: 84  BP: 112/60  RR: 14  SpO2: 98 % via trach/ ,/FIO2 40%, rate 12    PHYSICAL EXAM:      Constitutional: pt seen in vent unit, on respirator via trach,  alert but weak and lethargic    Eyes: non icteric, eyes opened    ENMT: + feeding tube    Neck:  short and supple, + trach    Respiratory:  harsh respirator BS    Cardiovascular:  S1S2    Gastrointestinal: globose soft and benign    Genitourinary:  no villatoro    Extremities: arthritic changes, exhibits motor strength in limbs with improvement of L sided weakness    LABS:                       10  6.3 )-----------( 423             32    145 |  107  |  28  ----------------------------<  159  4.2   |  27 |  0.7    GFR 87  Ca    9.7       Mg     2.2, 2.1, 2.4, 2.3        TPro  5.6<L>  /  Alb  3.0<L>  /  TBili  0.5  /  DBili  x   /  AST  9   /  ALT  23  /  AlkPhos  88  06-06    urine GM- rods  Blood streptococcus  sputum streptococcus      RADIOLOGY & ADDITIONAL TESTS:     EKG NSR 66/min   CXR  +ET tube, + feeding tube    CT of head R thalamic parenchymal hematoma 2.2 cm with intraventricular extension, mild ventricular enlargement, white mater changes      Stroke code, R thalamic hemorrhagic CVA with extension to ventricles, repeat studies show no progression and some improvement of the hematoma  sp Hypertensive emergency  Seizures, controlled on Keppra  Streptococcal Bacteremia probably from transient PNA,   Marcio negative UTI    Hx of HTN, ASHD  Hx of DLD  Hx of old hemorrhagic CVA about 8 yrs ago, dysarthria, gait instability  Hx of OA, DDD, DJD  Hx of GERD, HH, Diverticulosis  Hx of anxiety    pt is sp trach  6/10, remains on respirator support, of respirator x 1 hr today  pt more alert today  neurology ff up appreciated  repeat attempt at S&S when more alert   Pt is being fed via oral feeding tube, PEG discussed with family on rounds yesterday  pt is sp ceftriaxone, blood culture 6/4 + for strept, source probable  LLL  strept PNA, + sputa for strept and CT shows infiltrate,  urine + for Gm neg rods/asymptomatic pyuria, completed the course of ABX  seizures  controlled on Keppra 1500mg q12, VEEG shows focal and generalized slowin    monitor electrolytes, check keppra level  pt is ff by pul critical care, ID  oral care, skin care and decubiti prevention  guarded state, v poor progress with attempt at weaning, gets tachypneic and tachycardic on trials off vent

## 2019-06-18 NOTE — PROGRESS NOTE ADULT - SUBJECTIVE AND OBJECTIVE BOX
Patient is a 74y old  Female who presents with a chief complaint of left sided weakness and slurred speech (17 Jun 2019 23:06)      HPI:  74 y.o F with PMH of HTN, HLD, DM, hemorrhagic stroke,? EVD placement at that time ( about 8 years ago)  with residual left side weakness and slurred speech, ambulated with cane, on ASA daily  Pt. brought to [facility] ED as a stroke code. At the time of presentation to [facility] ED A&Ox 2, baseline slurped speech with left sided weakness. /82, 55 HR, 18 RR, CTH prelim report findings with acute right thalamic hemorrhage with extension to right lateral and third ventricle. At [facility] ED Pt. Intubated 2/2 worsening of neuro exam for air way protection as per ED attending note Pt.  became more drowsy with decreased responsiveness. Pt. was transferred to ED-[place]. At time of NSGY eval Pt intubated, sedated on Propofol and Fentanyl, radial arterial line, she received keppra, zofran and mannitol in ED. Repeat CT scan stable from prior study as per neurosurgery. Platelets one unit ordered as per neurosurgery (30 May 2019 02:50)      HOSPITAL DAY NO: 19d    Overnight events         Patient is a 74y old  Female who presents with a chief complaint of left sided weakness and slurred speech (17 Jun 2019 23:06)  HYPERTENSIVE EMERGENCY;INTRACRANIAL HEMORRHAGE  ^HYPERTENSIVE EMERGENCY;INTRACRANIAL HEMORRHAGE  No pertinent family history in first degree relatives  Handoff  MEWS Score  Diabetes mellitus  Stroke  Hypertension  Failure respiratory  Failure respiratory  Hypertensive emergency  Open tracheostomy  No significant past surgical history  FALL  Intracranial hemorrhage      Vital Signs Last 24 Hrs  T(C): 35.6 (18 Jun 2019 15:38), Max: 37.9 (17 Jun 2019 23:59)  T(F): 96 (18 Jun 2019 15:38), Max: 100.3 (17 Jun 2019 23:59)  HR: 85 (18 Jun 2019 15:38) (64 - 87)  BP: 106/52 (18 Jun 2019 15:38) (104/56 - 112/60)  BP(mean): 71 (18 Jun 2019 15:38) (71 - 82)  RR: 38 (18 Jun 2019 15:38) (14 - 38)  SpO2: 96% (18 Jun 2019 11:00) (94% - 98%)    use of pressors:     use of sedation:     Vent dependent:     Mode: CPAP with PS  FiO2: 40  PEEP: 5  PS: 10                                          Weaning time:     Significant exam findings:     Gen : NAD  MS: Now following some simple commands  HEENT: + trach + NGT  CHEST: B/L breath sounds   ABDOMEN: soft   HEART:S1/S2 regular  SKIN: Intact  Extremities: No C/C/E    No of BMs:       Nutrition:   via                       LABS:                          10.8   10.10 )-----------( 382      ( 18 Jun 2019 05:55 )             35.3                                               06-18    140  |  100  |  24<H>  ----------------------------<  128<H>  5.1<H>   |  28  |  0.6<L>    Ca    10.8<H>      18 Jun 2019 05:55  Mg     2.2     06-18    TPro  6.0  /  Alb  3.3<L>  /  TBili  0.6  /  DBili  x   /  AST  17  /  ALT  30  /  AlkPhos  74  06-18                                                LIVER FUNCTIONS - ( 18 Jun 2019 05:55 )  Alb: 3.3 g/dL / Pro: 6.0 g/dL / ALK PHOS: 74 U/L / ALT: 30 U/L / AST: 17 U/L / GGT: x                                                                                                           MEDICATIONS  (STANDING):  amLODIPine   Tablet 10 milliGRAM(s) Oral daily  chlorhexidine 0.12% Liquid 15 milliLiter(s) Oral Mucosa two times a day  chlorhexidine 4% Liquid 1 Application(s) Topical <User Schedule>  dextrose 5%. 1000 milliLiter(s) (50 mL/Hr) IV Continuous <Continuous>  dextrose 50% Injectable 12.5 Gram(s) IV Push once  dextrose 50% Injectable 25 Gram(s) IV Push once  dextrose 50% Injectable 25 Gram(s) IV Push once  docusate sodium Liquid 100 milliGRAM(s) Oral three times a day  heparin  Injectable 5000 Unit(s) SubCutaneous every 8 hours  hydrALAZINE 25 milliGRAM(s) Oral three times a day  insulin glargine Injectable (LANTUS) 18 Unit(s) SubCutaneous every morning  insulin lispro (HumaLOG) corrective regimen sliding scale   SubCutaneous every 4 hours  levETIRAcetam  Solution 1500 milliGRAM(s) Oral two times a day  losartan 25 milliGRAM(s) Oral daily  nystatin    Suspension 307921 Unit(s) Oral two times a day  pantoprazole   Suspension 40 milliGRAM(s) Oral daily  senna 2 Tablet(s) Oral at bedtime    MEDICATIONS  (PRN):  acetaminophen   Tablet .. 650 milliGRAM(s) Oral every 6 hours PRN Temp greater or equal to 38C (100.4F)  dextrose 40% Gel 15 Gram(s) Oral once PRN Blood Glucose LESS THAN 70 milliGRAM(s)/deciliter  glucagon  Injectable 1 milliGRAM(s) IntraMuscular once PRN Glucose LESS THAN 70 milligrams/deciliter          Radiology       Case discussed with staff and family at the bedside

## 2019-06-18 NOTE — PROGRESS NOTE ADULT - ASSESSMENT
HERNANDO HERNANDEZ 75yo W  Female BIBA to S side from home for elevated BP, slurred speech and L sided weakness.  The pt was noted to have a hypertensive emergency with R thalamic hemorrhagic  CVA. 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    Hospital course:   The pt underwent a stroke code and was intubated for airway protection and transferred [place] for further care. She remains in CCU and cont to be on respirator support.  The hospital course is complicated by seizures, prolonged QT,streptococcus bacteremia with positive blood cultures and positive GM negative rods in the urine.  She is on Ceftriaxone which was changed to Cefepime.   She was fed via OG tube.  When off sedation she followed simple commands but failed weaning and required tracheostomy.      INTERVAL HPI/OVERNIGHT EVENTS: pt underwent trach 6/10, seen in the vent unit, on CPAP via trach, more alert today, + recognition, tries to communicate, son and daughter at bedside    MEDICATIONS  (STANDING):  amLODIPine   Tablet 10 milliGRAM(s) Oral daily  cefepime   IVPB 2000 milliGRAM(s) IV Intermittent every 8 hours D/C  Rocephine 2 gms q 24  chlorhexidine 0.12% Liquid 15 milliLiter(s) Oral Mucosa two times a day  chlorhexidine 4% Liquid 1 Application(s) Topical <User Schedule>  docusate sodium Liquid 100 milliGRAM(s) Oral three times a day  hydrALAZINE 25 milliGRAM(s) Oral three times a day  insulin regular Infusion 4 Unit(s)/Hr (4 mL/Hr) IV Continuous <Continuous>  levETIRAcetam  Solution 1500 milliGRAM(s) Oral two times a day  nystatin    Suspension 356052 Unit(s) Oral two times a day  pantoprazole   Suspension 40 milliGRAM(s) Oral daily  propofol Infusion. 1 MICROgram(s)/kG/Min (0.473 mL/Hr) IV Continuous <Continuous>  senna 2 Tablet(s) Oral at bedtime    MEDICATIONS  (PRN):  acetaminophen   Tablet .. 650 milliGRAM(s) Oral every 6 hours PRN Temp greater or equal to 38C (100.4F)      Allergies  NKDA     T(F): 98.6  HR: 87  BP: 104/56  RR: 14  SpO2: 96 %   CPAP via trach    PHYSICAL EXAM:      Constitutional: pt seen in vent unit, on CPAP via trach, much more alert, tries to communicate    Eyes: non icteric, eyes wide opened, no droop    ENMT: + feeding tube    Neck:  short and supple, + trach    Respiratory:  harsh respirator BS    Cardiovascular:  S1S2    Gastrointestinal: globose soft and benign    Genitourinary:  no villatoro    Extremities: arthritic changes, L sided weakness upper greater than lower    LABS:                       10.8  10 )-----------( 423             35    140 |  100  |  24  ----------------------------<  128  5.1   |  28 |  0.6    GFR 87, 91  Ca    9.7       Mg     2.2, 2.1, 2.4, 2.3 , 2.2      TPro  5.6<L>  /  Alb  3.0<L>  /  TBili  0.5  /  DBili  x   /  AST  9   /  ALT  23  /  AlkPhos  88  06-06    urine GM- rods  Blood streptococcus  sputum streptococcus      RADIOLOGY & ADDITIONAL TESTS:     EKG NSR 66/min   CXR  +ET tube, + feeding tube    CT of head R thalamic parenchymal hematoma 2.2 cm with intraventricular extension, mild ventricular enlargement, white mater changes      Stroke code, R thalamic hemorrhagic CVA with extension to ventricles, repeat studies show no progression and some improvement of the hematoma  sp Hypertensive emergency  Seizures, controlled on Keppra  Streptococcal Bacteremia probably from transient PNA,   Marcio negative UTI    Hx of HTN, ASHD  Hx of DLD  Hx of old hemorrhagic CVA about 8 yrs ago, dysarthria, gait instability  Hx of OA, DDD, DJD  Hx of GERD, HH, Diverticulosis  Hx of anxiety    pt is sp trach  6/10, today much more alert, eyes fully open, on CPAP  neurology ff up appreciated  repeat attempt at S&S when more alert   Pt is being fed via oral feeding tube for now, trying S&S  pt is sp ceftriaxone, blood culture 6/4 + for strept, source probable  LLL  strept PNA, + sputa for strept and CT shows infiltrate,  urine + for Gm neg rods/asymptomatic pyuria, completed the course of ABX  seizures  controlled on Keppra 1500mg q12, VEEG shows focal and generalized slowin    monitor electrolytes, check keppra level  pt is ff by pul critical care, ID  oral care, skin care and decubiti prevention  guarded state, v poor progress with attempt at weaning, gets tachypneic and tachycardic on trials off vent

## 2019-06-18 NOTE — PROGRESS NOTE ADULT - ASSESSMENT
Impression:    Intra cerebral Bleed/ following simple commands  Seizure  Hypertensive emergency  HO hemorrhagic stroke  penumonia aspiration  UTI        PLAN:    CNS: Continue with amira king to discuss prognosis with family    HEENT: Trach care    PULMONARY:  HOB @ 30 degrees.    CARDIOVASCULAR: i=o. Continue with antihypertensives    GI: GI prophylaxis.  Feeding through NG tube. CTS consulted for PEG    RENAL:  Follow up lytes.  Correct as needed    INFECTIOUS DISEASE: per ID    HEMATOLOGICAL:   DVT prophylaxis.    ENDOCRINE:  Follow up FS.     MUSCULOSKELETAL: Bed rest    Poor prognosis    Palliative care evaluation    Neuro to discuss prognosis with family

## 2019-06-19 NOTE — PROGRESS NOTE ADULT - ASSESSMENT
HERNANDO HERNANDEZ 73yo W  Female BIBA to S side from home for elevated BP, slurred speech and L sided weakness.  The pt was noted to have a hypertensive emergency with R thalamic hemorrhagic  CVA. 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    Hospital course:   The pt underwent a stroke code and was intubated for airway protection and transferred [place] for further care. She remains in CCU and cont to be on respirator support.  The hospital course is complicated by seizures, prolonged QT,streptococcus bacteremia with positive blood cultures and positive GM negative rods in the urine.  She is on Ceftriaxone which was changed to Cefepime.   She was fed via OG tube.  When off sedation she followed simple commands but failed weaning and required tracheostomy.      INTERVAL HPI/OVERNIGHT EVENTS: pt underwent trach 6/10, seen in the vent unit, slow attempt at weaning, evaluated bt S&S, re-evaluated by neurology, keppra dec to lessen sedation, repeat CTH ordered    MEDICATIONS  (STANDING):  amLODIPine   Tablet 10 milliGRAM(s) Oral daily  cefepime   IVPB 2000 milliGRAM(s) IV Intermittent every 8 hours D/C  Rocephine 2 gms q 24  chlorhexidine 0.12% Liquid 15 milliLiter(s) Oral Mucosa two times a day  chlorhexidine 4% Liquid 1 Application(s) Topical <User Schedule>  docusate sodium Liquid 100 milliGRAM(s) Oral three times a day  hydrALAZINE 25 milliGRAM(s) Oral three times a day  insulin regular Infusion 4 Unit(s)/Hr (4 mL/Hr) IV Continuous <Continuous>  levETIRAcetam  Solution 1000 milliGRAM(s) Oral two times a day  nystatin    Suspension 737380 Unit(s) Oral two times a day  pantoprazole   Suspension 40 milliGRAM(s) Oral daily  propofol Infusion. 1 MICROgram(s)/kG/Min (0.473 mL/Hr) IV Continuous <Continuous>  senna 2 Tablet(s) Oral at bedtime    MEDICATIONS  (PRN):  acetaminophen   Tablet .. 650 milliGRAM(s) Oral every 6 hours PRN Temp greater or equal to 38C (100.4F)      Allergies  NKDA     T(F): 96  HR: 75  BP: 141/63  RR: 24  SpO2: 95 %   CPAP via trach    PHYSICAL EXAM:      Constitutional: pt seen in vent unit, + trach, + vent support, alert, eyes fully opened, attempts to communicate    Eyes: non icteric, eyes wide opened, no droop    ENMT: + feeding tube    Neck:  short and supple, + trach    Respiratory:  harsh respirator BS    Cardiovascular:  S1S2    Gastrointestinal: globose soft and benign    Genitourinary:  no villatoro    Extremities: arthritic changes, L sided weakness upper greater than lower    LABS:                       10.8  10 )-----------( 423             35    140 |  100  |  24  ----------------------------<  128  5.1   |  28 |  0.6    GFR 87, 91  Ca    9.7       Mg     2.2, 2.1, 2.4, 2.3 , 2.2      TPro  5.6<L>  /  Alb  3.0<L>  /  TBili  0.5  /  DBili  x   /  AST  9   /  ALT  23  /  AlkPhos  88  06-06    urine GM- rods  Blood streptococcus  sputum streptococcus      RADIOLOGY & ADDITIONAL TESTS:     EKG NSR 66/min   CXR  +ET tube, + feeding tube    CT of head R thalamic parenchymal hematoma 2.2 cm with intraventricular extension, mild ventricular enlargement, white mater changes      Stroke code, R thalamic hemorrhagic CVA with extension to ventricles, repeat studies show no progression and some improvement of the hematoma  sp Hypertensive emergency  Seizures, controlled on Keppra  Streptococcal Bacteremia probably from transient PNA,   Marcio negative UTI    Hx of HTN, ASHD  Hx of DLD  Hx of old hemorrhagic CVA about 8 yrs ago, dysarthria, gait instability  Hx of OA, DDD, DJD  Hx of GERD, HH, Diverticulosis  Hx of anxiety    pt is sp trach  6/10, today much more alert, eyes fully open, on CPAP  neurology ff up appreciated:  dec keppra to 100mg 2x to lessen sedation, repeat CTH  repeat attempt at S&S when more alert   Pt is being fed via oral feeding tube for now, trying S&S  pt is sp ceftriaxone, blood culture 6/4 + for strept, source probable  LLL  strept PNA, + sputa for strept and CT shows infiltrate,  urine + for Gm neg rods/asymptomatic pyuria, completed the course of ABX  seizures  controlled on Keppra 1500mg q12, VEEG shows focal and generalized slowin    monitor electrolytes, check keppra level  pt is ff by pul critical care, ID  oral care, skin care and decubiti prevention  guarded state, v poor progress with attempt at weaning, gets tachypneic and tachycardic on trials off vent

## 2019-06-19 NOTE — PROGRESS NOTE ADULT - SUBJECTIVE AND OBJECTIVE BOX
Patient is a 74y old  Female who presents with a chief complaint of left sided weakness and slurred speech, R thalamic hemorrhagic CVA (18 Jun 2019 20:52)      HPI:  74 y.o F with PMH of HTN, HLD, DM, hemorrhagic stroke,? EVD placement at that time ( about 8 years ago)  with residual left side weakness and slurred speech, ambulated with cane, on ASA daily  Pt. brought to [facility] ED as a stroke code. At the time of presentation to [facility] ED A&Ox 2, baseline slurped speech with left sided weakness. /82, 55 HR, 18 RR, CTH prelim report findings with acute right thalamic hemorrhage with extension to right lateral and third ventricle. At [facility] ED Pt. Intubated 2/2 worsening of neuro exam for air way protection as per ED attending note Pt.  became more drowsy with decreased responsiveness. Pt. was transferred to ED-[place]. At time of NSGY eval Pt intubated, sedated on Propofol and Fentanyl, radial arterial line, she received keppra, zofran and mannitol in ED. Repeat CT scan stable from prior study as per neurosurgery. Platelets one unit ordered as per neurosurgery (30 May 2019 02:50)      HOSPITAL DAY NO: 20d    Overnight events         Patient is a 74y old  Female who presents with a chief complaint of left sided weakness and slurred speech, R thalamic hemorrhagic CVA (18 Jun 2019 20:52)  HYPERTENSIVE EMERGENCY;INTRACRANIAL HEMORRHAGE  ^HYPERTENSIVE EMERGENCY;INTRACRANIAL HEMORRHAGE  No pertinent family history in first degree relatives  Handoff  MEWS Score  Diabetes mellitus  Stroke  Hypertension  Failure respiratory  Failure respiratory  Hypertensive emergency  Open tracheostomy  No significant past surgical history  FALL  Intracranial hemorrhage      Vital Signs Last 24 Hrs  T(C): 36.8 (19 Jun 2019 07:45), Max: 37.3 (18 Jun 2019 23:54)  T(F): 98.2 (19 Jun 2019 07:45), Max: 99.1 (18 Jun 2019 23:54)  HR: 81 (19 Jun 2019 07:45) (76 - 85)  BP: 104/50 (19 Jun 2019 07:45) (104/50 - 134/61)  BP(mean): 61 (19 Jun 2019 07:45) (61 - 88)  RR: 20 (18 Jun 2019 23:54) (20 - 38)  SpO2: 96% (18 Jun 2019 23:48) (96% - 98%)      Mode: AC/ CMV (Assist Control/ Continuous Mandatory Ventilation)  RR (machine): 14  TV (machine): 400  FiO2: 40  PEEP: 5  ITime: 1  MAP: 10  PIP: 24                                          Weaning time: 7H    Significant exam findings:     Gen : NAD  MS: Awake, alert, following commands  HEENT: + trach, + NGT  CHEST: B/L breath sounds   ABDOMEN: soft   HEART:S1/S2 regular  SKIN: intact  Extremities    No of BMs:       Nutrition:   via                       LABS:                          10.8   10.10 )-----------( 382      ( 18 Jun 2019 05:55 )             35.3                                               06-18    140  |  100  |  24<H>  ----------------------------<  128<H>  5.1<H>   |  28  |  0.6<L>    Ca    10.8<H>      18 Jun 2019 05:55  Mg     2.2     06-18    TPro  6.0  /  Alb  3.3<L>  /  TBili  0.6  /  DBili  x   /  AST  17  /  ALT  30  /  AlkPhos  74  06-18                                                LIVER FUNCTIONS - ( 18 Jun 2019 05:55 )  Alb: 3.3 g/dL / Pro: 6.0 g/dL / ALK PHOS: 74 U/L / ALT: 30 U/L / AST: 17 U/L / GGT: x                                                  Culture - Urine (collected 18 Jun 2019 01:30)  Source: .Urine Catheterized  Final Report (19 Jun 2019 08:25):    No growth                                                               MEDICATIONS  (STANDING):  amLODIPine   Tablet 10 milliGRAM(s) Oral daily  chlorhexidine 0.12% Liquid 15 milliLiter(s) Oral Mucosa two times a day  chlorhexidine 4% Liquid 1 Application(s) Topical <User Schedule>  dextrose 5%. 1000 milliLiter(s) (50 mL/Hr) IV Continuous <Continuous>  dextrose 50% Injectable 12.5 Gram(s) IV Push once  dextrose 50% Injectable 25 Gram(s) IV Push once  dextrose 50% Injectable 25 Gram(s) IV Push once  docusate sodium Liquid 100 milliGRAM(s) Oral three times a day  heparin  Injectable 5000 Unit(s) SubCutaneous every 8 hours  hydrALAZINE 25 milliGRAM(s) Oral three times a day  insulin glargine Injectable (LANTUS) 18 Unit(s) SubCutaneous every morning  insulin lispro (HumaLOG) corrective regimen sliding scale   SubCutaneous every 4 hours  levETIRAcetam  Solution 1500 milliGRAM(s) Oral two times a day  losartan 25 milliGRAM(s) Oral daily  nystatin    Suspension 511247 Unit(s) Oral two times a day  pantoprazole   Suspension 40 milliGRAM(s) Oral daily  senna 2 Tablet(s) Oral at bedtime    MEDICATIONS  (PRN):  acetaminophen   Tablet .. 650 milliGRAM(s) Oral every 6 hours PRN Temp greater or equal to 38C (100.4F)  dextrose 40% Gel 15 Gram(s) Oral once PRN Blood Glucose LESS THAN 70 milliGRAM(s)/deciliter  glucagon  Injectable 1 milliGRAM(s) IntraMuscular once PRN Glucose LESS THAN 70 milligrams/deciliter          Radiology       Case discussed with staff and family at the bedside

## 2019-06-19 NOTE — SWALLOW FEES ASSESSMENT ADULT - PHARYNGEAL PHASE COMMENTS
Severe pharyngeal dysphagia for puree Severe pharyngeal dysphagia for honey Severe pharyngeal dysphagia for nectar

## 2019-06-19 NOTE — PROGRESS NOTE ADULT - ASSESSMENT
75 yo female with r thalamic bleed with IVentr extension, currently is in vent unit,       Plan:  Decrease Keppra to 1000 BID  repeat CTH  PT/OT  Continue current care,       Neuroattending note will follow

## 2019-06-19 NOTE — PROGRESS NOTE ADULT - SUBJECTIVE AND OBJECTIVE BOX
Neurology Progress Note    Interval History:      HPI:  74 y.o F with PMH of HTN, HLD, DM, hemorrhagic stroke,? EVD placement at that time ( about 8 years ago)  with residual left side weakness and slurred speech, ambulated with cane, on ASA daily  Pt. brought to [facility] ED as a stroke code. At the time of presentation to [facility] ED A&Ox 2, baseline slurped speech with left sided weakness. /82, 55 HR, 18 RR, CTH prelim report findings with acute right thalamic hemorrhage with extension to right lateral and third ventricle. At [facility] ED Pt. Intubated 2/2 worsening of neuro exam for air way protection as per ED attending note Pt.  became more drowsy with decreased responsiveness. Pt. was transferred to ED-[place]. At time of NSGY eval Pt intubated, sedated on Propofol and Fentanyl, radial arterial line, she received keppra, zofran and mannitol in ED. Repeat CT scan stable from prior study as per neurosurgery. Platelets one unit ordered as per neurosurgery (30 May 2019 02:50)    Patient is examined at the bedside. She is awake and alert, follows commands. She is mech vented through tracheostomy and has NG tube. She is able to lift her arms and legs of the ground for the brief period of time. She is on Keppra 1500 BID. Last level was 42.7.  Repeat CTH from 6/7/2019 is stable.    PAST MEDICAL & SURGICAL HISTORY:  Diabetes mellitus  Stroke  Hypertension  No significant past surgical history          Medications:  acetaminophen   Tablet .. 650 milliGRAM(s) Oral every 6 hours PRN  amLODIPine   Tablet 10 milliGRAM(s) Oral daily  chlorhexidine 0.12% Liquid 15 milliLiter(s) Oral Mucosa two times a day  chlorhexidine 4% Liquid 1 Application(s) Topical <User Schedule>  dextrose 40% Gel 15 Gram(s) Oral once PRN  dextrose 5%. 1000 milliLiter(s) IV Continuous <Continuous>  dextrose 50% Injectable 12.5 Gram(s) IV Push once  dextrose 50% Injectable 25 Gram(s) IV Push once  dextrose 50% Injectable 25 Gram(s) IV Push once  docusate sodium Liquid 100 milliGRAM(s) Oral three times a day  glucagon  Injectable 1 milliGRAM(s) IntraMuscular once PRN  heparin  Injectable 5000 Unit(s) SubCutaneous every 8 hours  hydrALAZINE 25 milliGRAM(s) Oral three times a day  insulin glargine Injectable (LANTUS) 18 Unit(s) SubCutaneous every morning  insulin lispro (HumaLOG) corrective regimen sliding scale   SubCutaneous every 4 hours  levETIRAcetam  Solution 1500 milliGRAM(s) Oral two times a day  losartan 25 milliGRAM(s) Oral daily  nystatin    Suspension 559470 Unit(s) Oral two times a day  pantoprazole   Suspension 40 milliGRAM(s) Oral daily  senna 2 Tablet(s) Oral at bedtime      Vital Signs Last 24 Hrs  T(C): 36.8 (19 Jun 2019 07:45), Max: 37.3 (18 Jun 2019 23:54)  T(F): 98.2 (19 Jun 2019 07:45), Max: 99.1 (18 Jun 2019 23:54)  HR: 81 (19 Jun 2019 12:00) (75 - 85)  BP: 104/50 (19 Jun 2019 07:45) (104/50 - 134/61)  BP(mean): 61 (19 Jun 2019 07:45) (61 - 88)  RR: 20 (18 Jun 2019 23:54) (20 - 38)  SpO2: 95% (19 Jun 2019 14:00) (94% - 98%)    Neurological Exam:   Mental status: Awake, alert, following commands   Cranial nerves: Pupils equally round and reactive to light, visual fields full, no nystagmus, extraocular muscles intact, V1 through V3 intact bilaterally and symmetric, face symmetric, hearing intact to finger rub, palate elevation symmetric, tongue was midline.  Motor:  MRC grading 4/5 b/l UE   3/5 bl LE.   strength 4/5.  Normal tone and bulk.  No abnormal movements.    Sensation: Intact to light touch, .   Coordination: No dysmetria on finger-to-nose   Reflexes: 1+ in bilateral UE/LE, downgoing toes bilaterally. (-) Jones.      Labs:  CBC Full  -  ( 18 Jun 2019 05:55 )  WBC Count : 10.10 K/uL  RBC Count : 4.30 M/uL  Hemoglobin : 10.8 g/dL  Hematocrit : 35.3 %  Platelet Count - Automated : 382 K/uL  Mean Cell Volume : 82.1 fL  Mean Cell Hemoglobin : 25.1 pg  Mean Cell Hemoglobin Concentration : 30.6 g/dL  Auto Neutrophil # : 6.81 K/uL  Auto Lymphocyte # : 2.61 K/uL  Auto Monocyte # : 0.51 K/uL  Auto Eosinophil # : 0.11 K/uL  Auto Basophil # : 0.02 K/uL  Auto Neutrophil % : 67.5 %  Auto Lymphocyte % : 25.8 %  Auto Monocyte % : 5.0 %  Auto Eosinophil % : 1.1 %  Auto Basophil % : 0.2 %    06-18    140  |  100  |  24<H>  ----------------------------<  128<H>  5.1<H>   |  28  |  0.6<L>    Ca    10.8<H>      18 Jun 2019 05:55  Mg     2.2     06-18    TPro  6.0  /  Alb  3.3<L>  /  TBili  0.6  /  DBili  x   /  AST  17  /  ALT  30  /  AlkPhos  74  06-18    LIVER FUNCTIONS - ( 18 Jun 2019 05:55 )  Alb: 3.3 g/dL / Pro: 6.0 g/dL / ALK PHOS: 74 U/L / ALT: 30 U/L / AST: 17 U/L / GGT: x                 Assessment:  This is a 74y Female w/ h/o     Plan:

## 2019-06-20 NOTE — CONSULT NOTE ADULT - SUBJECTIVE AND OBJECTIVE BOX
Surgery Consultation Note  =====================================================  HPI: 74y Female  HPI:  74 y.o F with PMH of HTN, HLD, DM, hemorrhagic stroke, EVD placement at that time ( about 8 years ago)  with residual left side weakness and slurred speech, ambulated with cane, on ASA daily  Pt. brought to [facility] ED as a stroke code. At the time of presentation to [facility] ED A&Ox 2, baseline slurped speech with left sided weakness. /82, 55 HR, 18 RR, CTH prelim report findings with acute right thalamic hemorrhage with extension to right lateral and third ventricle. At [facility] ED Pt. Intubated 2/2 worsening of neuro exam for air way protection as per ED attending note Pt.  became more drowsy with decreased responsiveness. Pt. was transferred to ED-[place]. At time of NSGY eval Pt intubated, sedated on Propofol and Fentanyl, radial arterial line, she received keppra, zofran and mannitol in ED. Repeat CT scan stable from prior study as per neurosurgery. Platelets one unit ordered as per neurosurgery (30 May 2019 02:50)      PAST MEDICAL & SURGICAL HISTORY:  Diabetes mellitus  Stroke  Hypertension  No significant past surgical history    Home Meds: Home Medications:  amLODIPine 2.5 mg oral tablet: 1 tab(s) orally once a day (29 May 2019 22:02)  donepezil 10 mg oral tablet: 1 tab(s) orally once a day (at bedtime) (29 May 2019 22:02)  enalapril 10 mg oral tablet: 1 tab(s) orally once a day (29 May 2019 22:02)  enalapril 10 mg oral tablet: 1 tab(s) orally once a day (29 May 2019 22:02)  glimepiride 1 mg oral tablet: 1 tab(s) orally once a day (29 May 2019 22:02)  Norvasc 2.5 mg oral tablet: 1 tab(s) orally once a day (29 May 2019 22:02)  simvastatin 20 mg oral tablet: 1 tab(s) orally once a day (at bedtime) (29 May 2019 22:02)  Vitamin B12 1000 mcg oral tablet: 1 tab(s) orally once a day (29 May 2019 22:02)  Vitamin D3 1000 intl units oral tablet, chewable: 1 tab(s) orally once a day (29 May 2019 22:02)    Allergies: Allergies    No Known Allergies    Intolerances      Soc:   Advanced Directives: Presumed Full Code     ROS:    REVIEW OF SYSTEMS    [x] A ten-point review of systems was otherwise negative except as noted.  [ ] Due to altered mental status/intubation, subjective information were not able to be obtained from the patient. History was obtained, to the extent possible, from review of the chart and collateral sources of information.      CURRENT MEDICATIONS:   --------------------------------------------------------------------------------------  Neurologic Medications  acetaminophen   Tablet .. 650 milliGRAM(s) Oral every 6 hours PRN Temp greater or equal to 38C (100.4F)  levETIRAcetam  Solution 1000 milliGRAM(s) Oral two times a day    Respiratory Medications    Cardiovascular Medications  amLODIPine   Tablet 10 milliGRAM(s) Oral daily  hydrALAZINE 25 milliGRAM(s) Oral three times a day  losartan 25 milliGRAM(s) Oral daily    Gastrointestinal Medications  dextrose 5%. 1000 milliLiter(s) IV Continuous <Continuous>  docusate sodium Liquid 100 milliGRAM(s) Oral three times a day  pantoprazole   Suspension 40 milliGRAM(s) Oral daily  senna 2 Tablet(s) Oral at bedtime    Genitourinary Medications    Hematologic/Oncologic Medications  heparin  Injectable 5000 Unit(s) SubCutaneous every 8 hours    Antimicrobial/Immunologic Medications  nystatin    Suspension 884947 Unit(s) Oral two times a day    Endocrine/Metabolic Medications  dextrose 40% Gel 15 Gram(s) Oral once PRN Blood Glucose LESS THAN 70 milliGRAM(s)/deciliter  dextrose 50% Injectable 12.5 Gram(s) IV Push once  dextrose 50% Injectable 25 Gram(s) IV Push once  dextrose 50% Injectable 25 Gram(s) IV Push once  glucagon  Injectable 1 milliGRAM(s) IntraMuscular once PRN Glucose LESS THAN 70 milligrams/deciliter  insulin glargine Injectable (LANTUS) 18 Unit(s) SubCutaneous every morning  insulin lispro (HumaLOG) corrective regimen sliding scale   SubCutaneous every 4 hours    Topical/Other Medications  chlorhexidine 0.12% Liquid 15 milliLiter(s) Oral Mucosa two times a day  chlorhexidine 4% Liquid 1 Application(s) Topical <User Schedule>    --------------------------------------------------------------------------------------    VITAL SIGNS, INS/OUTS (last 24 hours):  --------------------------------------------------------------------------------------  ICU Vital Signs Last 24 Hrs  T(C): 36.1 (20 Jun 2019 07:28), Max: 36.9 (20 Jun 2019 00:00)  T(F): 97 (20 Jun 2019 07:28), Max: 98.4 (20 Jun 2019 00:00)  HR: 89 (20 Jun 2019 08:10) (70 - 89)  BP: 100/53 (20 Jun 2019 07:28) (100/53 - 141/64)  BP(mean): 70 (20 Jun 2019 07:28) (70 - 92)  ABP: --  ABP(mean): --  RR: 16 (20 Jun 2019 07:28) (16 - 24)  SpO2: 95% (20 Jun 2019 08:10) (94% - 98%)    I&O's Summary    19 Jun 2019 07:01  -  20 Jun 2019 07:00  --------------------------------------------------------  IN: 680 mL / OUT: 600 mL / NET: 80 mL      --------------------------------------------------------------------------------------    EXAM:  General/Neuro    Exam: Normal, NAD, alert, oriented x 3, no focal deficits. PERRLA     Respiratory  Exam: Lungs clear to auscultation, Normal expansion/effort.    [] Tracheostomy   [] Intubated  Mechanical Ventilation: Mode: AC/ CMV (Assist Control/ Continuous Mandatory Ventilation)  RR (machine): 14  TV (machine): 400  FiO2: 40  PEEP: 5  ITime: 1  MAP: 12  PIP: 30      Cardiovascular  Exam: S1, S2.  Regular rate and rhythm.  Peripheral edema    Cardiac Rhythm: Normal Sinus Rhythm  ECHO:     GI  Exam: Abdomen soft, Non-tender, Non-distended.      Current Diet:  NPO      Tubes/Lines/Drains    [x] Peripheral IV  [] Central Venous Line     	[] R	[] L	[] IJ	[] Fem	[] SC        Type:	    Date Placed:   [] Arterial Line		[] R	[] L	[] Fem	[] Rad	[] Ax	Date Placed:   [] PICC:         	[] Midline		[] Mediport           [] Urinary Catheter		Date Placed:     Extremities  Exam: Extremities warm, pink, well-perfused.        Derm:  Exam: Good skin turgor, no skin breakdown.         LABS  --------------------------------------------------------------------------------------  Labs:  CAPILLARY BLOOD GLUCOSE      POCT Blood Glucose.: 145 mg/dL (20 Jun 2019 11:11)  POCT Blood Glucose.: 130 mg/dL (20 Jun 2019 05:46)  POCT Blood Glucose.: 161 mg/dL (20 Jun 2019 02:18)  POCT Blood Glucose.: 121 mg/dL (19 Jun 2019 21:25)  POCT Blood Glucose.: 137 mg/dL (19 Jun 2019 16:50)  POCT Blood Glucose.: 159 mg/dL (19 Jun 2019 14:23)                          10.9   7.36  )-----------( 342      ( 20 Jun 2019 06:08 )             35.6       Auto Neutrophil %: 68.0 % (06-20-19 @ 06:08)  Auto Immature Granulocyte %: 0.5 % (06-20-19 @ 06:08)    06-20    143  |  102  |  23<H>  ----------------------------<  139<H>  4.4   |  28  |  0.6<L>      Calcium, Total Serum: 10.5 mg/dL (06-20-19 @ 06:08)      LFTs:             6.0  | 0.7  | 16       ------------------[75      ( 20 Jun 2019 06:08 )  3.3  | x    | 31          Lipase:x      Amylase:x             Coags:                Culture - Blood (collected 18 Jun 2019 05:55)  Source: .Blood None  Preliminary Report (19 Jun 2019 15:04):    No growth to date.    Culture - Urine (collected 18 Jun 2019 01:30)  Source: .Urine Catheterized  Final Report (19 Jun 2019 08:25):    No growth  --------------------------------------------------------------------------------------    OTHER LABS    IMAGING RESULTS      ASSESSMENT:  74y Female who is trached and has failed s&s multiple time in need of PEG    PLAN:    - Hold TF  - CBC, BMP, Type and Screen, PT/INR

## 2019-06-20 NOTE — CONSULT NOTE ADULT - ATTENDING COMMENTS
General Thoracic Surgery Attestation    I have seen and examined the patient.  Where appropriate I have updated, edited, or corrected the resident's or PA's note with regard to findings, values, and plan.    recalled for PEG placement.  discussed with son at bedside.  he will discuss with (his) sister.  PEG when able and patient's family in agreement.

## 2019-06-20 NOTE — PROGRESS NOTE ADULT - ASSESSMENT
Impression:    Intra cerebral Bleed/ following simple commands  Seizure  Hypertensive emergency  HO hemorrhagic stroke  penumonia aspiration  UTI        PLAN:    CNS: Continue with keppra per neuro    HEENT: Trach care    PULMONARY:  HOB @ 30 degrees.    CARDIOVASCULAR: i=o. Continue with antihypertensives    GI: GI prophylaxis.  Feeding through NG tube. CTS consulted for PEG    RENAL:  Follow up lytes.  Correct as needed    INFECTIOUS DISEASE: per ID    HEMATOLOGICAL:   DVT prophylaxis.    ENDOCRINE:  Follow up FS.     MUSCULOSKELETAL: Bed rest    Poor prognosis    Palliative care evaluation

## 2019-06-20 NOTE — PROGRESS NOTE ADULT - SUBJECTIVE AND OBJECTIVE BOX
Patient is a 74y old  Female who presents with a chief complaint of left sided weakness and slurred speech, R thalamic hemorrhagic CVA (18 Jun 2019 20:52)      HPI:  74 y.o F with PMH of HTN, HLD, DM, hemorrhagic stroke,? EVD placement at that time ( about 8 years ago)  with residual left side weakness and slurred speech, ambulated with cane, on ASA daily  Pt. brought to [facility] ED as a stroke code. At the time of presentation to [facility] ED A&Ox 2, baseline slurped speech with left sided weakness. /82, 55 HR, 18 RR, CTH prelim report findings with acute right thalamic hemorrhage with extension to right lateral and third ventricle. At [facility] ED Pt. Intubated 2/2 worsening of neuro exam for air way protection as per ED attending note Pt.  became more drowsy with decreased responsiveness. Pt. was transferred to ED-[place]. At time of NSGY eval Pt intubated, sedated on Propofol and Fentanyl, radial arterial line, she received keppra, zofran and mannitol in ED. Repeat CT scan stable from prior study as per neurosurgery. Platelets one unit ordered as per neurosurgery (30 May 2019 02:50)      HOSPITAL DAY NO: 21d    Overnight events: No acute event overnight.      Patient is a 74y old  Female who presents with a chief complaint of left sided weakness and slurred speech, R thalamic hemorrhagic CVA (18 Jun 2019 20:52)  HYPERTENSIVE EMERGENCY;INTRACRANIAL HEMORRHAGE  ^HYPERTENSIVE EMERGENCY;INTRACRANIAL HEMORRHAGE  No pertinent family history in first degree relatives  Handoff  MEWS Score  Diabetes mellitus  Stroke  Hypertension  Failure respiratory  Failure respiratory  Hypertensive emergency  Open tracheostomy  No significant past surgical history  FALL  Intracranial hemorrhage      Vital Signs Last 24 Hrs  T(F): 97 (06-20-19 @ 07:28), Max: 98.4 (06-20-19 @ 00:00)  HR: 70 (06-20-19 @ 07:28) (70 - 81)  BP: 100/53 (06-20-19 @ 07:28) (100/53 - 141/64)  RR: 16 (06-20-19 @ 07:28) (16 - 24)  SpO2: 96% (06-19-19 @ 19:30) (94% - 98%)    Mode: AC/ CMV (Assist Control/ Continuous Mandatory Ventilation)  RR (machine): 14  TV (machine): 400  FiO2: 40  PEEP: 5  ITime: 1  MAP: 10  PIP: 25                                         Weaning time:     Significant exam findings:     Gen : NAD  MS: Awake, alert, following commands  HEENT: + trach, + NGT  CHEST: B/L breath sounds   ABDOMEN: soft   HEART:S1/S2 regular  SKIN: intact  Extremities    No of BMs:       Nutrition:   via                   LABS:                        10.9   7.36  )-----------( 342      ( 20 Jun 2019 06:08 )             35.6     06-20    143  |  102  |  23<H>  ----------------------------<  139<H>  4.4   |  28  |  0.6<L>    Ca    10.5<H>      20 Jun 2019 06:08  Mg     2.2     06-20    TPro  6.0  /  Alb  3.3<L>  /  TBili  0.7  /  DBili  x   /  AST  16  /  ALT  31  /  AlkPhos  75  06-20      Culture - Blood (collected 18 Jun 2019 05:55)  Source: .Blood None  Preliminary Report (19 Jun 2019 15:04):    No growth to date.    Culture - Urine (collected 18 Jun 2019 01:30)  Source: .Urine Catheterized  Final Report (19 Jun 2019 08:25):    No growth    MEDICATIONS  (STANDING):  amLODIPine   Tablet 10 milliGRAM(s) Oral daily  chlorhexidine 0.12% Liquid 15 milliLiter(s) Oral Mucosa two times a day  chlorhexidine 4% Liquid 1 Application(s) Topical <User Schedule>  dextrose 5%. 1000 milliLiter(s) (50 mL/Hr) IV Continuous <Continuous>  dextrose 50% Injectable 12.5 Gram(s) IV Push once  dextrose 50% Injectable 25 Gram(s) IV Push once  dextrose 50% Injectable 25 Gram(s) IV Push once  docusate sodium Liquid 100 milliGRAM(s) Oral three times a day  heparin  Injectable 5000 Unit(s) SubCutaneous every 8 hours  hydrALAZINE 25 milliGRAM(s) Oral three times a day  insulin glargine Injectable (LANTUS) 18 Unit(s) SubCutaneous every morning  insulin lispro (HumaLOG) corrective regimen sliding scale   SubCutaneous every 4 hours  levETIRAcetam  Solution 1500 milliGRAM(s) Oral two times a day  losartan 25 milliGRAM(s) Oral daily  nystatin    Suspension 052912 Unit(s) Oral two times a day  pantoprazole   Suspension 40 milliGRAM(s) Oral daily  senna 2 Tablet(s) Oral at bedtime    MEDICATIONS  (PRN):  acetaminophen   Tablet .. 650 milliGRAM(s) Oral every 6 hours PRN Temp greater or equal to 38C (100.4F)  dextrose 40% Gel 15 Gram(s) Oral once PRN Blood Glucose LESS THAN 70 milliGRAM(s)/deciliter  glucagon  Injectable 1 milliGRAM(s) IntraMuscular once PRN Glucose LESS THAN 70 milligrams/deciliter      Radiology       Case discussed with staff and family at the bedside

## 2019-06-20 NOTE — CONSULT NOTE ADULT - CONSULT REQUESTED DATE/TIME
31-May-2019 10:13
07-Jun-2019 06:02
20-Jun-2019 11:28
30-May-2019 00:08
30-May-2019 01:57
30-May-2019 01:59
30-May-2019 09:39
31-May-2019 10:35
10-Bryce-2019 10:24

## 2019-06-20 NOTE — PROGRESS NOTE ADULT - SUBJECTIVE AND OBJECTIVE BOX
Patient is a 74y old  Female who presents with a chief complaint of left sided weakness and slurred speech (20 Jun 2019 09:02)      HPI:  74 y.o F with PMH of HTN, HLD, DM, hemorrhagic stroke,? EVD placement at that time ( about 8 years ago)  with residual left side weakness and slurred speech, ambulated with cane, on ASA daily  Pt. brought to [facility] ED as a stroke code. At the time of presentation to [facility] ED A&Ox 2, baseline slurped speech with left sided weakness. /82, 55 HR, 18 RR, CTH prelim report findings with acute right thalamic hemorrhage with extension to right lateral and third ventricle. At [facility] ED Pt. Intubated 2/2 worsening of neuro exam for air way protection as per ED attending note Pt.  became more drowsy with decreased responsiveness. Pt. was transferred to ED-[place]. At time of NSGY eval Pt intubated, sedated on Propofol and Fentanyl, radial arterial line, she received keppra, zofran and mannitol in ED. Repeat CT scan stable from prior study as per neurosurgery. Platelets one unit ordered as per neurosurgery (30 May 2019 02:50)      HOSPITAL DAY NO: 21d    Overnight events         Patient is a 74y old  Female who presents with a chief complaint of left sided weakness and slurred speech (20 Jun 2019 09:02)  HYPERTENSIVE EMERGENCY;INTRACRANIAL HEMORRHAGE  ^HYPERTENSIVE EMERGENCY;INTRACRANIAL HEMORRHAGE  No pertinent family history in first degree relatives  Handoff  MEWS Score  Diabetes mellitus  Stroke  Hypertension  Failure respiratory  Failure respiratory  Hypertensive emergency  Open tracheostomy  No significant past surgical history  FALL  Intracranial hemorrhage      Vital Signs Last 24 Hrs  T(C): 36.1 (20 Jun 2019 07:28), Max: 36.9 (20 Jun 2019 00:00)  T(F): 97 (20 Jun 2019 07:28), Max: 98.4 (20 Jun 2019 00:00)  HR: 70 (20 Jun 2019 07:28) (70 - 81)  BP: 100/53 (20 Jun 2019 07:28) (100/53 - 141/64)  BP(mean): 70 (20 Jun 2019 07:28) (70 - 92)  RR: 16 (20 Jun 2019 07:28) (16 - 24)  SpO2: 96% (19 Jun 2019 19:30) (94% - 98%)    use of pressors:     use of sedation:     Vent dependent:     Mode: AC/ CMV (Assist Control/ Continuous Mandatory Ventilation)  RR (machine): 14  TV (machine): 400  FiO2: 40  PEEP: 5  ITime: 1  MAP: 10  PIP: 25                                          Weaning time:     Significant exam findings:     Gen : NAD  MS: Awake, alert, follows some simple commands  HEENT: + trach, + NGT  CHEST: B/L breath sounds   ABDOMEN: soft   HEART:S1/S2 regular  SKIN: intact  Extremities: No C/C/E    No of BMs:       Nutrition:   via                       LABS:                          10.9   7.36  )-----------( 342      ( 20 Jun 2019 06:08 )             35.6                                               06-20    143  |  102  |  23<H>  ----------------------------<  139<H>  4.4   |  28  |  0.6<L>    Ca    10.5<H>      20 Jun 2019 06:08  Mg     2.2     06-20    TPro  6.0  /  Alb  3.3<L>  /  TBili  0.7  /  DBili  x   /  AST  16  /  ALT  31  /  AlkPhos  75  06-20                                                LIVER FUNCTIONS - ( 20 Jun 2019 06:08 )  Alb: 3.3 g/dL / Pro: 6.0 g/dL / ALK PHOS: 75 U/L / ALT: 31 U/L / AST: 16 U/L / GGT: x                                                  Culture - Blood (collected 18 Jun 2019 05:55)  Source: .Blood None  Preliminary Report (19 Jun 2019 15:04):    No growth to date.    Culture - Urine (collected 18 Jun 2019 01:30)  Source: .Urine Catheterized  Final Report (19 Jun 2019 08:25):    No growth                                                               MEDICATIONS  (STANDING):  amLODIPine   Tablet 10 milliGRAM(s) Oral daily  chlorhexidine 0.12% Liquid 15 milliLiter(s) Oral Mucosa two times a day  chlorhexidine 4% Liquid 1 Application(s) Topical <User Schedule>  dextrose 5%. 1000 milliLiter(s) (50 mL/Hr) IV Continuous <Continuous>  dextrose 50% Injectable 12.5 Gram(s) IV Push once  dextrose 50% Injectable 25 Gram(s) IV Push once  dextrose 50% Injectable 25 Gram(s) IV Push once  docusate sodium Liquid 100 milliGRAM(s) Oral three times a day  heparin  Injectable 5000 Unit(s) SubCutaneous every 8 hours  hydrALAZINE 25 milliGRAM(s) Oral three times a day  insulin glargine Injectable (LANTUS) 18 Unit(s) SubCutaneous every morning  insulin lispro (HumaLOG) corrective regimen sliding scale   SubCutaneous every 4 hours  levETIRAcetam  Solution 1000 milliGRAM(s) Oral two times a day  losartan 25 milliGRAM(s) Oral daily  nystatin    Suspension 667161 Unit(s) Oral two times a day  pantoprazole   Suspension 40 milliGRAM(s) Oral daily  senna 2 Tablet(s) Oral at bedtime    MEDICATIONS  (PRN):  acetaminophen   Tablet .. 650 milliGRAM(s) Oral every 6 hours PRN Temp greater or equal to 38C (100.4F)  dextrose 40% Gel 15 Gram(s) Oral once PRN Blood Glucose LESS THAN 70 milliGRAM(s)/deciliter  glucagon  Injectable 1 milliGRAM(s) IntraMuscular once PRN Glucose LESS THAN 70 milligrams/deciliter          Radiology       Case discussed with staff and family at the bedside

## 2019-06-20 NOTE — CONSULT NOTE ADULT - PROVIDER SPECIALTY LIST ADULT
Critical Care
Electrophysiology
Electrophysiology
Infectious Disease
Neurology
Neurosurgery
Surgery
Trauma Surgery
Surgery

## 2019-06-20 NOTE — CHART NOTE - NSCHARTNOTEFT_GEN_A_CORE
Limited f/u by MIA Orozco.    Pt continues to be tolerating Glucerna 1.2 at 240cc q4hr (hold 2am) NGT   RN spoken with. Labs and meds reviewed.  LBM 6/19. 2+ b/l edema. Excoriation to skin.   POSSIBLE PEG PLACEMENT THIS WEEK.    Intra cerebral Bleed/ following simple commands  Seizure  Hypertensive emergency  HO hemorrhagic stroke  penumonia aspiration  UTI    Pulm monitoring. C/w meds per neuro. lytes monitoring. CTS consulted for PEG.  Palliative?    Remains not at risk.

## 2019-06-20 NOTE — PROGRESS NOTE ADULT - ASSESSMENT
HERNNADO HERNANDEZ 75yo W  Female BIBA to S side from home for elevated BP, slurred speech and L sided weakness.  The pt was noted to have a hypertensive emergency with R thalamic hemorrhagic  CVA. 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    Hospital course:   The pt underwent a stroke code and was intubated for airway protection and transferred North for further care. She remains in CCU and cont to be on respirator support.  The hospital course is complicated by seizures, prolonged QT,streptococcus bacteremia with positive blood cultures and positive GM negative rods in the urine.  She is on Ceftriaxone which was changed to Cefepime.   She was fed via OG tube.  When off sedation she followed simple commands but failed weaning and required tracheostomy.      INTERVAL HPI/OVERNIGHT EVENTS: pt underwent trach 6/10, although more alert and keppra dose dec to help dec sedation, pt is failing weaning process and has failed multiple S&S, being fed via feeding tube, will need PEG    MEDICATIONS  (STANDING):  amLODIPine   Tablet 10 milliGRAM(s) Oral daily  cefepime   IVPB 2000 milliGRAM(s) IV Intermittent every 8 hours D/C  Rocephine 2 gms q 24  chlorhexidine 0.12% Liquid 15 milliLiter(s) Oral Mucosa two times a day  chlorhexidine 4% Liquid 1 Application(s) Topical <User Schedule>  docusate sodium Liquid 100 milliGRAM(s) Oral three times a day  hydrALAZINE 25 milliGRAM(s) Oral three times a day  insulin regular Infusion 4 Unit(s)/Hr (4 mL/Hr) IV Continuous <Continuous>  levETIRAcetam  Solution 1000 milliGRAM(s) Oral two times a day  nystatin    Suspension 702179 Unit(s) Oral two times a day  pantoprazole   Suspension 40 milliGRAM(s) Oral daily  propofol Infusion. 1 MICROgram(s)/kG/Min (0.473 mL/Hr) IV Continuous <Continuous>  senna 2 Tablet(s) Oral at bedtime    MEDICATIONS  (PRN):  acetaminophen   Tablet .. 650 milliGRAM(s) Oral every 6 hours PRN Temp greater or equal to 38C (100.4F)      Allergies  NKDA     T(F): 97  HR: 89  BP: 100/53  RR: 15  SpO2: 95 %   CPAP via trach    PHYSICAL EXAM:      Constitutional: pt seen in vent unit, + trach, + vent support, alert, eyes fully opened, attempts to communicate    Eyes: non icteric, eyes wide opened, no droop    ENMT: + feeding tube    Neck:  short and supple, + trach    Respiratory:  harsh respirator BS    Cardiovascular:  S1S2    Gastrointestinal: globose soft and benign    Genitourinary:  no villatoro    Extremities: arthritic changes, L sided weakness upper greater than lower    LABS:                       10.9  7.3 )-----------( 423             35.6    140 |  102|  23  ----------------------------<  139  4.4   |  28 |  0.6    GFR 87, 91  Ca    9.7       Mg    2.2      TPro  5.6<L>  /  Alb  3.0, 3.3 /  TBili  0.5, 6.0  /  DBili  x   /  AST  9   /  ALT  23  /  AlkPhos  88  06-06    urine GM- rods  Blood streptococcus  sputum streptococcus      RADIOLOGY & ADDITIONAL TESTS:     EKG NSR 66/min   CXR  +ET tube, + feeding tube    CT of head R thalamic parenchymal hematoma 2.2 cm with intraventricular extension, mild ventricular enlargement, white mater changes     repeat CTH:  evolution of the  R thalamic hemorrhage which has dec, ventricular size stable with mild enlargement, white matter changes, stable chronic hematoma cavity within the L basal ganglia, chronic lacunar infarct in BL basal ganglia and L thalamus      Stroke code, R thalamic hemorrhagic CVA with extension to ventricles, repeat studies show no progression and some improvement of the hematoma  sp Hypertensive emergency  Seizures, controlled on Keppra  Streptococcal Bacteremia probably from transient PNA,   Marcio negative UTI    Hx of HTN, ASHD  Hx of DLD  Hx of old hemorrhagic CVA about 8 yrs ago, dysarthria, gait instability  Hx of OA, DDD, DJD  Hx of GERD, HH, Diverticulosis  Hx of anxiety    pt is sp trach  6/10, today much more alert, eyes fully open, on CPAP  neurology ff up appreciated:  dec keppra to 1000mg 2x to lessen sedation, repeat CTH  repeat attempt at S&S when more alert   Pt is being fed via oral feeding tube for now, failing S&S will need PEG  Surgical consult for PEG    pt is sp ceftriaxone, blood culture 6/4 + for strept, source probable  LLL  strept PNA, + sputa for strept and CT shows infiltrate,  urine + for Gm neg rods/asymptomatic pyuria, completed the course of ABX  seizures  controlled on Keppra 1500mg q12, VEEG shows focal and generalized slowin    monitor electrolytes, check keppra level    pt is ff by pul critical care, ID  oral care, skin care and decubiti prevention  overall poor prognosis

## 2019-06-20 NOTE — CONSULT NOTE ADULT - REASON FOR ADMISSION
left sided weakness and slurred speech

## 2019-06-20 NOTE — PROGRESS NOTE ADULT - ASSESSMENT
Impression:    Intra cerebral Bleed/ following simple commands  Seizure  Hypertensive emergency  HO hemorrhagic stroke  penumonia aspiration  UTI      PLAN:    CNS: Continue with keppra,    HEENT: Trach care    PULMONARY:  HOB @ 30 degrees.    CARDIOVASCULAR: i=o. Continue with antihypertensives    GI: GI prophylaxis.  Feeding through NG tube. CTS consulted for PEG.    RENAL:  Follow up lytes.  Correct as needed    INFECTIOUS DISEASE: per ID    HEMATOLOGICAL:   DVT prophylaxis.    ENDOCRINE:  Follow up FS.     MUSCULOSKELETAL: Bed rest    Poor prognosis    Palliative care evaluation

## 2019-06-20 NOTE — CONSULT NOTE ADULT - CONSULT REASON
prolonged Qtc
Abx for BSI
CTH evidence of acute right thalamic hemorrhage with extension of hemorrhage noted within the right lateral and third ventricle
ICH
PEG placement
Prolonged Qt.
Stroke
found down, evidence for thalamic hemorrhage/stroke
Respiratory Failure

## 2019-06-21 NOTE — PROGRESS NOTE ADULT - ASSESSMENT
HERNANDO HERNANDEZ 75yo W  Female BIBA to S side from home for elevated BP, slurred speech and L sided weakness.  The pt was noted to have a hypertensive emergency with R thalamic hemorrhagic  CVA. 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    Hospital course:   The pt underwent a stroke code and was intubated for airway protection and transferred North for further care. She remains in CCU and cont to be on respirator support.  The hospital course is complicated by seizures, prolonged QT,streptococcus bacteremia with positive blood cultures and positive GM negative rods in the urine.  She is on Ceftriaxone which was changed to Cefepime.   She was fed via OG tube.  When off sedation she followed simple commands but failed weaning and required tracheostomy.      INTERVAL HPI/OVERNIGHT EVENTS: pt underwent trach 6/10, although more alert and keppra dose dec to help dec sedation, pt is failing weaning process and has failed multiple S&S, being fed via feeding tube, for PEG placement    MEDICATIONS  (STANDING):  amLODIPine   Tablet 10 milliGRAM(s) Oral daily  cefepime   IVPB 2000 milliGRAM(s) IV Intermittent every 8 hours D/C  Rocephine 2 gms q 24  chlorhexidine 0.12% Liquid 15 milliLiter(s) Oral Mucosa two times a day  chlorhexidine 4% Liquid 1 Application(s) Topical <User Schedule>  docusate sodium Liquid 100 milliGRAM(s) Oral three times a day  hydrALAZINE 25 milliGRAM(s) Oral three times a day  insulin regular Infusion 4 Unit(s)/Hr (4 mL/Hr) IV Continuous <Continuous>  levETIRAcetam  Solution 1000 milliGRAM(s) Oral two times a day  nystatin    Suspension 744458 Unit(s) Oral two times a day  pantoprazole   Suspension 40 milliGRAM(s) Oral daily  propofol Infusion. 1 MICROgram(s)/kG/Min (0.473 mL/Hr) IV Continuous <Continuous>  senna 2 Tablet(s) Oral at bedtime    MEDICATIONS  (PRN):  acetaminophen   Tablet .. 650 milliGRAM(s) Oral every 6 hours PRN Temp greater or equal to 38C (100.4F)      Allergies  NKDA     T(F): 97  HR: 89  BP: 100/53  RR: 15  SpO2: 95 %   CPAP via trach    PHYSICAL EXAM:      Constitutional: pt seen in vent unit, + trach, + vent support, alert, eyes fully opened, attempts to communicate    Eyes: non icteric, eyes wide opened, no droop    ENMT: + feeding tube    Neck:  short and supple, + trach    Respiratory:  harsh respirator BS    Cardiovascular:  S1S2    Gastrointestinal: globose soft and benign    Genitourinary:  no villatoro    Extremities: arthritic changes, L sided weakness upper greater than lower    LABS:                       10.9  7.3 )-----------( 423             35.6    140 |  102|  23  ----------------------------<  139  4.4   |  28 |  0.6    GFR 87, 91  Ca    9.7       Mg    2.2      TPro  5.6<L>  /  Alb  3.0, 3.3 /  TBili  0.5, 6.0  /  DBili  x   /  AST  9   /  ALT  23  /  AlkPhos  88  06-06    urine GM- rods  Blood streptococcus  sputum streptococcus      RADIOLOGY & ADDITIONAL TESTS:     EKG NSR 66/min   CXR  +ET tube, + feeding tube    CT of head R thalamic parenchymal hematoma 2.2 cm with intraventricular extension, mild ventricular enlargement, white mater changes     repeat CTH:  evolution of the  R thalamic hemorrhage which has dec, ventricular size stable with mild enlargement, white matter changes, stable chronic hematoma cavity within the L basal ganglia, chronic lacunar infarct in BL basal ganglia and L thalamus      Stroke code, R thalamic hemorrhagic CVA with extension to ventricles, repeat studies show no progression and some improvement of the hematoma  sp Hypertensive emergency  Seizures, controlled on Keppra  Streptococcal Bacteremia probably from transient PNA,   Marcio negative UTI    Hx of HTN, ASHD  Hx of DLD  Hx of old hemorrhagic CVA about 8 yrs ago, dysarthria, gait instability  Hx of OA, DDD, DJD  Hx of GERD, HH, Diverticulosis  Hx of anxiety    pt is sp trach  6/10, today much more alert, eyes fully open, on CPAP  neurology ff up appreciated:  dec keppra to 1000mg 2x to lessen sedation, repeat CTH  repeat attempt at S&S when more alert   Pt is being fed via oral feeding tube, failed S&S  for PEG  Surgical consult for PEG    pt is sp ceftriaxone, blood culture 6/4 + for strept, source probable  LLL  strept PNA, + sputa for strept and CT shows infiltrate,  urine + for Gm neg rods/asymptomatic pyuria, completed the course of ABX  seizures  controlled on Keppra 1500mg q12, VEEG shows focal and generalized slowin    monitor electrolytes, check keppra level    pt is ff by pul critical care, ID  oral care, skin care and decubiti prevention    spoke with son regarding the poor prognosis, need for PEG and  probable SNF placement

## 2019-06-21 NOTE — PROGRESS NOTE ADULT - SUBJECTIVE AND OBJECTIVE BOX
HERNANDO HERNANDEZ  74y Female   [id number]    Procedure/dx: needs peg  Events of the Last 24h: no acute events    Patient is a 74y old  Female who presents with a chief complaint of left sided weakness and slurred speech, R thalamic hemorrhagic CVA (2019 15:27)    PAST MEDICAL & SURGICAL HISTORY:  Diabetes mellitus  Stroke  Hypertension  No significant past surgical history    Vital Signs Last 24 Hrs  T(C): 37.2 (2019 00:39), Max: 37.3 (2019 15:44)  T(F): 98.9 (2019 00:39), Max: 99.1 (2019 15:44)  HR: 78 (2019 00:39) (68 - 89)  BP: 85/49 (2019 00:39) (85/49 - 141/64)  BP(mean): 59 (2019 00:39) (59 - 92)  RR: 18 (2019 00:39) (16 - 18)  SpO2: 60% (2019 16:45) (60% - 96%)    Mode: AC/ CMV (Assist Control/ Continuous Mandatory Ventilation), RR (machine): 14, TV (machine): 400, FiO2: 40, PEEP: 5, ITime: 1, MAP: 12, PIP: 28    Diet, NPO with Tube Feed:   Tube Feeding Modality: Nasogastric  Glucerna 1.2 Rohan  Total Volume for 24 Hours (mL): 1440  Bolus   Total Volume of Bolus (mL):  240  Bolus Feed Rate (mL per Hour): 240   Bolus Feed Duration (in Hours): 1  Tube Feed Frequency: Every 4 hours   Tube Feed Start Time: 00:00 (19 @ 17:18)  Diet, NPO with Tube Feed:   Tube Feeding Modality: Nasogastric  Glucerna 1.2 Rohan  Total Volume for 24 Hours (mL): 1440  Bolus   Total Volume of Bolus (mL):  240  Bolus Feed Rate (mL per Hour): 240   Bolus Feed Duration (in Hours): 1  Tube Feed Frequency: Every 4 hours   Tube Feed Start Time: 00:00 (19 @ 08:59)  Diet, NPO after Midnight:      NPO Start Date: 2019,   NPO Start Time: 23:59 (19 @ 11:14)      I&O's Detail    2019 07: 07:00  --------------------------------------------------------  IN:    Enteral Tube Flush: 200 mL    Glucerna: 480 mL  Total IN: 680 mL    OUT:    Incontinent per Collection Ba mL  Total OUT: 600 mL    Total NET: 80 mL      2019 07:  -  2019 04:23  --------------------------------------------------------  IN:    Enteral Tube Flush: 100 mL    Glucerna: 960 mL    IV PiggyBack: 100 mL  Total IN: 1160 mL    OUT:  Total OUT: 0 mL    Total NET: 1160 mL          MEDICATIONS  (STANDING):  amLODIPine   Tablet 10 milliGRAM(s) Oral daily  chlorhexidine 0.12% Liquid 15 milliLiter(s) Oral Mucosa two times a day  chlorhexidine 4% Liquid 1 Application(s) Topical <User Schedule>  dextrose 5%. 1000 milliLiter(s) (50 mL/Hr) IV Continuous <Continuous>  dextrose 50% Injectable 12.5 Gram(s) IV Push once  dextrose 50% Injectable 25 Gram(s) IV Push once  dextrose 50% Injectable 25 Gram(s) IV Push once  docusate sodium Liquid 100 milliGRAM(s) Oral three times a day  heparin  Injectable 5000 Unit(s) SubCutaneous every 8 hours  hydrALAZINE 25 milliGRAM(s) Oral three times a day  insulin glargine Injectable (LANTUS) 18 Unit(s) SubCutaneous every morning  insulin lispro (HumaLOG) corrective regimen sliding scale   SubCutaneous every 4 hours  levETIRAcetam  Solution 1000 milliGRAM(s) Oral two times a day  losartan 25 milliGRAM(s) Oral daily  nystatin    Suspension 287029 Unit(s) Oral two times a day  pantoprazole   Suspension 40 milliGRAM(s) Oral daily  senna 2 Tablet(s) Oral at bedtime  sodium chloride 0.9%. 1000 milliLiter(s) (75 mL/Hr) IV Continuous <Continuous>    MEDICATIONS  (PRN):  acetaminophen   Tablet .. 650 milliGRAM(s) Oral every 6 hours PRN Temp greater or equal to 38C (100.4F)  dextrose 40% Gel 15 Gram(s) Oral once PRN Blood Glucose LESS THAN 70 milliGRAM(s)/deciliter  glucagon  Injectable 1 milliGRAM(s) IntraMuscular once PRN Glucose LESS THAN 70 milligrams/deciliter    PHYSICAL EXAM:  GENERAL: NAD, on trach colar.   CHEST/LUNG: Clear to auscultation bilaterally;   HEART: S1 and S2 noted  ABDOMEN: Soft, Nontender, Nondistended;     LABS:                     10.9   7.36  )-----------( 342      ( 2019 06:08 )             35.6        06-20    143  |  102  |  23<H>  ----------------------------<  139<H>  4.4   |  28  |  0.6<L>    Ca    10.5<H>      2019 06:08  Mg     2.2     20    TPro  6.0  /  Alb  3.3<L>  /  TBili  0.7  /  DBili  x   /  AST  16  /  ALT  31  /  AlkPhos  75  06-20    LIVER FUNCTIONS - ( 2019 06:08 )  Alb: 3.3 g/dL / Pro: 6.0 g/dL / ALK PHOS: 75 U/L / ALT: 31 U/L / AST: 16 U/L / GGT: x           PT/INR - ( 2019 17:52 )   PT: 11.10 sec;   INR: 0.96 ratio        PTT - ( 2019 17:52 )  PTT:29.0 sec    Culture - Blood (collected 2019 05:55)  Source: .Blood None  Preliminary Report (2019 15:04):    No growth to date.      IMAGING:  none

## 2019-06-21 NOTE — PROGRESS NOTE ADULT - SUBJECTIVE AND OBJECTIVE BOX
Patient is a 74y old  Female who presents with a chief complaint of left sided weakness and slurred speech (21 Jun 2019 04:22)      HPI:  74 y.o F with PMH of HTN, HLD, DM, hemorrhagic stroke,? EVD placement at that time ( about 8 years ago)  with residual left side weakness and slurred speech, ambulated with cane, on ASA daily  Pt. brought to [facility] ED as a stroke code. At the time of presentation to [facility] ED A&Ox 2, baseline slurped speech with left sided weakness. /82, 55 HR, 18 RR, CTH prelim report findings with acute right thalamic hemorrhage with extension to right lateral and third ventricle. At [facility] ED Pt. Intubated 2/2 worsening of neuro exam for air way protection as per ED attending note Pt.  became more drowsy with decreased responsiveness. Pt. was transferred to [facility]. At time of NSGY eval Pt intubated, sedated on Propofol and Fentanyl, radial arterial line, she received keppra, zofran and mannitol in ED. Repeat CT scan stable from prior study as per neurosurgery. Platelets one unit ordered as per neurosurgery (30 May 2019 02:50)      HOSPITAL DAY NO: 22d    Overnight events         Patient is a 74y old  Female who presents with a chief complaint of left sided weakness and slurred speech (21 Jun 2019 04:22)  HYPERTENSIVE EMERGENCY;INTRACRANIAL HEMORRHAGE  ^HYPERTENSIVE EMERGENCY;INTRACRANIAL HEMORRHAGE  No pertinent family history in first degree relatives  Handoff  MEWS Score  Diabetes mellitus  Stroke  Hypertension  Failure respiratory  Failure respiratory  Hypertensive emergency  Open tracheostomy  No significant past surgical history  FALL  Intracranial hemorrhage      Vital Signs Last 24 Hrs  T(C): 36.3 (21 Jun 2019 08:00), Max: 37.3 (20 Jun 2019 15:44)  T(F): 97.3 (21 Jun 2019 08:00), Max: 99.1 (20 Jun 2019 15:44)  HR: 62 (21 Jun 2019 08:00) (62 - 85)  BP: 114/53 (21 Jun 2019 08:00) (85/49 - 117/57)  BP(mean): 76 (21 Jun 2019 08:00) (59 - 81)  RR: 23 (21 Jun 2019 08:00) (18 - 23)  SpO2: 60% (20 Jun 2019 16:45) (60% - 96%)    Mode: AC/ CMV (Assist Control/ Continuous Mandatory Ventilation)  RR (machine): 14  TV (machine): 400  FiO2: 40  PEEP: 5  ITime: 1  MAP: 12  PIP: 28                                          Weaning time:     Significant exam findings:     Gen : NAD  MS: Awake alert, following commands  HEENT: + trach, + NGT  CHEST: B/L breath sounds   ABDOMEN: soft   HEART:S1/S2 regular  SKIN: intact  Extremities: No C/C/E    No of BMs:       Nutrition:   via                       LABS:                          10.9   7.36  )-----------( 342      ( 20 Jun 2019 06:08 )             35.6                                               06-20    143  |  102  |  23<H>  ----------------------------<  139<H>  4.4   |  28  |  0.6<L>    Ca    10.5<H>      20 Jun 2019 06:08  Mg     2.2     06-20    TPro  6.0  /  Alb  3.3<L>  /  TBili  0.7  /  DBili  x   /  AST  16  /  ALT  31  /  AlkPhos  75  06-20      PT/INR - ( 20 Jun 2019 17:52 )   PT: 11.10 sec;   INR: 0.96 ratio         PTT - ( 20 Jun 2019 17:52 )  PTT:29.0 sec                                          LIVER FUNCTIONS - ( 20 Jun 2019 06:08 )  Alb: 3.3 g/dL / Pro: 6.0 g/dL / ALK PHOS: 75 U/L / ALT: 31 U/L / AST: 16 U/L / GGT: x                                                                                                           MEDICATIONS  (STANDING):  amLODIPine   Tablet 10 milliGRAM(s) Oral daily  chlorhexidine 0.12% Liquid 15 milliLiter(s) Oral Mucosa two times a day  chlorhexidine 4% Liquid 1 Application(s) Topical <User Schedule>  dextrose 5%. 1000 milliLiter(s) (50 mL/Hr) IV Continuous <Continuous>  dextrose 50% Injectable 12.5 Gram(s) IV Push once  dextrose 50% Injectable 25 Gram(s) IV Push once  dextrose 50% Injectable 25 Gram(s) IV Push once  docusate sodium Liquid 100 milliGRAM(s) Oral three times a day  heparin  Injectable 5000 Unit(s) SubCutaneous every 8 hours  hydrALAZINE 25 milliGRAM(s) Oral three times a day  insulin glargine Injectable (LANTUS) 18 Unit(s) SubCutaneous every morning  insulin lispro (HumaLOG) corrective regimen sliding scale   SubCutaneous every 4 hours  levETIRAcetam  Solution 1000 milliGRAM(s) Oral two times a day  losartan 25 milliGRAM(s) Oral daily  nystatin    Suspension 014439 Unit(s) Oral two times a day  pantoprazole   Suspension 40 milliGRAM(s) Oral daily  senna 2 Tablet(s) Oral at bedtime  sodium chloride 0.9%. 1000 milliLiter(s) (75 mL/Hr) IV Continuous <Continuous>    MEDICATIONS  (PRN):  acetaminophen   Tablet .. 650 milliGRAM(s) Oral every 6 hours PRN Temp greater or equal to 38C (100.4F)  dextrose 40% Gel 15 Gram(s) Oral once PRN Blood Glucose LESS THAN 70 milliGRAM(s)/deciliter  glucagon  Injectable 1 milliGRAM(s) IntraMuscular once PRN Glucose LESS THAN 70 milligrams/deciliter          Radiology       Case discussed with staff and family at the bedside

## 2019-06-21 NOTE — PROGRESS NOTE ADULT - ASSESSMENT
73 y/o female, needs PEG tube placement for feeding after failing s&s multiple times     Plan:   PEG today  keep NPO, IVF  DVT/GI prophylaxis

## 2019-06-22 NOTE — PROGRESS NOTE ADULT - SUBJECTIVE AND OBJECTIVE BOX
SUBJECTIVE:    no events  awaiting peg     REVIEW OF SYSTEMS:  See HPI    PHYSICAL EXAM  Vital Signs Last 24 Hrs  T(C): 37.1 (22 Jun 2019 09:09), Max: 37.4 (21 Jun 2019 23:50)  T(F): 98.7 (22 Jun 2019 09:09), Max: 99.4 (21 Jun 2019 23:50)  HR: 76 (22 Jun 2019 09:09) (76 - 100)  BP: 122/55 (22 Jun 2019 09:09) (107/54 - 135/65)  BP(mean): 82 (22 Jun 2019 09:09) (82 - 87)  RR: 20 (22 Jun 2019 09:09) (20 - 33)  SpO2: 96% (22 Jun 2019 01:20) (92% - 96%)    Gen : does not follow commands. awake , no alert,  MS: Awake alert, following commands  HEENT: + trach, + NGT  CHEST: B/L breath sounds   ABDOMEN: soft   HEART:S1/S2 regular  SKIN: intact  Extremities: No C/C/E    LABS:                          10.4   8.62  )-----------( 241      ( 22 Jun 2019 07:46 )             33.7                                               06-22    141  |  105  |  23<H>  ----------------------------<  202<H>  4.0   |  26  |  0.6<L>    Ca    10.2<H>      22 Jun 2019 07:46  Mg     2.1     06-22    TPro  5.7<L>  /  Alb  3.2<L>  /  TBili  0.7  /  DBili  x   /  AST  11  /  ALT  20  /  AlkPhos  74  06-22      PT/INR - ( 20 Jun 2019 17:52 )   PT: 11.10 sec;   INR: 0.96 ratio         PTT - ( 20 Jun 2019 17:52 )  PTT:29.0 sec                                                                                     LIVER FUNCTIONS - ( 22 Jun 2019 07:46 )  Alb: 3.2 g/dL / Pro: 5.7 g/dL / ALK PHOS: 74 U/L / ALT: 20 U/L / AST: 11 U/L / GGT: x                                                                                                MEDICATIONS  (STANDING):  amLODIPine   Tablet 10 milliGRAM(s) Oral daily  chlorhexidine 0.12% Liquid 15 milliLiter(s) Oral Mucosa two times a day  chlorhexidine 4% Liquid 1 Application(s) Topical <User Schedule>  dextrose 5%. 1000 milliLiter(s) (50 mL/Hr) IV Continuous <Continuous>  dextrose 50% Injectable 12.5 Gram(s) IV Push once  dextrose 50% Injectable 25 Gram(s) IV Push once  dextrose 50% Injectable 25 Gram(s) IV Push once  docusate sodium Liquid 100 milliGRAM(s) Oral three times a day  heparin  Injectable 5000 Unit(s) SubCutaneous every 8 hours  hydrALAZINE 25 milliGRAM(s) Oral three times a day  insulin glargine Injectable (LANTUS) 18 Unit(s) SubCutaneous every morning  insulin lispro (HumaLOG) corrective regimen sliding scale   SubCutaneous every 4 hours  levETIRAcetam  Solution 1000 milliGRAM(s) Oral two times a day  losartan 25 milliGRAM(s) Oral daily  nystatin    Suspension 450019 Unit(s) Oral two times a day  pantoprazole   Suspension 40 milliGRAM(s) Oral daily  senna 2 Tablet(s) Oral at bedtime    MEDICATIONS  (PRN):  acetaminophen   Tablet .. 650 milliGRAM(s) Oral every 6 hours PRN Temp greater or equal to 38C (100.4F)  dextrose 40% Gel 15 Gram(s) Oral once PRN Blood Glucose LESS THAN 70 milliGRAM(s)/deciliter  glucagon  Injectable 1 milliGRAM(s) IntraMuscular once PRN Glucose LESS THAN 70 milligrams/deciliter

## 2019-06-22 NOTE — PROGRESS NOTE ADULT - ASSESSMENT
Impression:    Intra cerebral Bleed/ following simple commands  Seizure  Hypertensive emergency  HO hemorrhagic stroke  penumonia aspiration  UTI        PLAN:    CNS: Continue with keppra per neuro    HEENT: Trach care    PULMONARY:  HOB @ 30 degrees. no vent changes, wean as tolerated     CARDIOVASCULAR: i=o. Continue with antihypertensives    GI: GI prophylaxis.  Feeding through NG tube. CTS consulted for PEG    RENAL:  Follow up lytes.  Correct as needed    INFECTIOUS DISEASE: per ID    HEMATOLOGICAL:   DVT prophylaxis.    ENDOCRINE:  Follow up FS.     MUSCULOSKELETAL: Bed rest    Poor prognosis    Palliative care evaluation

## 2019-06-22 NOTE — PROGRESS NOTE ADULT - ASSESSMENT
HERNANDO HERNANDEZ 73yo W  Female BIBA to S side from home for elevated BP, slurred speech and L sided weakness.  The pt was noted to have a hypertensive emergency with R thalamic hemorrhagic  CVA. 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    Hospital course:   The pt underwent a stroke code and was intubated for airway protection and transferred North for further care. She remains in CCU and cont to be on respirator support.  The hospital course is complicated by seizures, prolonged QT,streptococcus bacteremia with positive blood cultures and positive GM negative rods in the urine.  She is on Ceftriaxone which was changed to Cefepime.   She was fed via OG tube.  When off sedation she followed simple commands but failed weaning and required tracheostomy.      INTERVAL HPI/OVERNIGHT EVENTS: pt underwent trach 6/10, although more alert and keppra dose dec to help dec sedation, pt is failing weaning process and has failed multiple S&S, being fed via feeding tube, for PEG placement    MEDICATIONS  (STANDING):  amLODIPine   Tablet 10 milliGRAM(s) Oral daily  cefepime   IVPB 2000 milliGRAM(s) IV Intermittent every 8 hours D/C  Rocephine 2 gms q 24  chlorhexidine 0.12% Liquid 15 milliLiter(s) Oral Mucosa two times a day  chlorhexidine 4% Liquid 1 Application(s) Topical <User Schedule>  docusate sodium Liquid 100 milliGRAM(s) Oral three times a day  hydrALAZINE 25 milliGRAM(s) Oral three times a day  insulin regular Infusion 4 Unit(s)/Hr (4 mL/Hr) IV Continuous <Continuous>  levETIRAcetam  Solution 1000 milliGRAM(s) Oral two times a day  nystatin    Suspension 699298 Unit(s) Oral two times a day  pantoprazole   Suspension 40 milliGRAM(s) Oral daily  propofol Infusion. 1 MICROgram(s)/kG/Min (0.473 mL/Hr) IV Continuous <Continuous>  senna 2 Tablet(s) Oral at bedtime    MEDICATIONS  (PRN):  acetaminophen   Tablet .. 650 milliGRAM(s) Oral every 6 hours PRN Temp greater or equal to 38C (100.4F)      Allergies  NKDA     T(F): 98.7  HR: 76  BP: 122/55  RR: 20  SpO2: 92 %     PHYSICAL EXAM:      Constitutional: pt seen in vent unit, + trach, + vent support, alert, eyes fully opened, attempts to communicate    Eyes: non icteric, eyes wide opened, no droop    ENMT: + feeding tube    Neck:  short and supple, + trach    Respiratory:  harsh respirator BS    Cardiovascular:  S1S2    Gastrointestinal: globose soft and benign    Genitourinary:  no villatoro    Extremities: arthritic changes, L sided weakness upper greater than lower    LABS:                       10.4  78.6 )-----------( 241             33    141 |  105 |  23  ----------------------------<  202  4.0  |  26   |  0.6    GFR 87, 91  Ca    9.7       Mg    2.2      TPro  5.6<L>  /  Alb  3.0, 3.3 /  TBili  0.5, 6.0  /  DBili  x   /  AST  9   /  ALT  23  /  AlkPhos  88  06-06    urine GM- rods  Blood streptococcus  sputum streptococcus      RADIOLOGY & ADDITIONAL TESTS:     EKG NSR 66/min   CXR  +ET tube, + feeding tube    CT of head R thalamic parenchymal hematoma 2.2 cm with intraventricular extension, mild ventricular enlargement, white mater changes     repeat CTH:  evolution of the  R thalamic hemorrhage which has dec, ventricular size stable with mild enlargement, white matter changes, stable chronic hematoma cavity within the L basal ganglia, chronic lacunar infarct in BL basal ganglia and L thalamus      Stroke code, R thalamic hemorrhagic CVA with extension to ventricles, repeat studies show no progression and some improvement of the hematoma  sp Hypertensive emergency  Seizures, controlled on Keppra  Streptococcal Bacteremia probably from transient PNA,   Marcio negative UTI    Hx of HTN, ASHD  Hx of DLD  Hx of old hemorrhagic CVA about 8 yrs ago, dysarthria, gait instability  Hx of OA, DDD, DJD  Hx of GERD, HH, Diverticulosis  Hx of anxiety    pt is sp trach  6/10, today much more alert, eyes fully open, on CPAP  neurology ff up appreciated:  dec keppra to 1000mg 2x to lessen sedation, repeat CTH  repeat attempt at S&S when more alert   Pt is being fed via oral feeding tube, failed S&S  for PEG  Surgical consult for PEG    pt is sp ceftriaxone, blood culture 6/4 + for strept, source probable  LLL  strept PNA, + sputa for strept and CT shows infiltrate,  urine + for Gm neg rods/asymptomatic pyuria, completed the course of ABX  seizures  controlled on Keppra 1000mg q12, VEEG shows focal and generalized slowing    monitor electrolytes    pt is ff by pul critical care, ID  oral care, skin care and decubiti prevention    spoke with son regarding the poor prognosis, need for PEG and  probable SNF placement HERNANDO HERNANDEZ 75yo W  Female BIBA to S side from home for elevated BP, slurred speech and L sided weakness.  The pt was noted to have a hypertensive emergency with R thalamic hemorrhagic  CVA. 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    Hospital course:   The pt underwent a stroke code and was intubated for airway protection and transferred North for further care. She remains in CCU and cont to be on respirator support.  The hospital course is complicated by seizures, prolonged QT,streptococcus bacteremia with positive blood cultures and positive GM negative rods in the urine.  She is on Ceftriaxone which was changed to Cefepime.   She was fed via OG tube.  When off sedation she followed simple commands but failed weaning and required tracheostomy.      INTERVAL HPI/OVERNIGHT EVENTS: pt underwent trach 6/10, although more alert and keppra dose dec to help dec sedation, pt is failing weaning process and has failed multiple S&S, being fed via feeding tube, for PEG placement    MEDICATIONS  (STANDING):  amLODIPine   Tablet 10 milliGRAM(s) Oral daily  cefepime   IVPB 2000 milliGRAM(s) IV Intermittent every 8 hours D/C  Rocephine 2 gms q 24  chlorhexidine 0.12% Liquid 15 milliLiter(s) Oral Mucosa two times a day  chlorhexidine 4% Liquid 1 Application(s) Topical <User Schedule>  docusate sodium Liquid 100 milliGRAM(s) Oral three times a day  hydrALAZINE 25 milliGRAM(s) Oral three times a day  insulin regular Infusion 4 Unit(s)/Hr (4 mL/Hr) IV Continuous <Continuous>  levETIRAcetam  Solution 1000 milliGRAM(s) Oral two times a day  nystatin    Suspension 984200 Unit(s) Oral two times a day  pantoprazole   Suspension 40 milliGRAM(s) Oral daily  propofol Infusion. 1 MICROgram(s)/kG/Min (0.473 mL/Hr) IV Continuous <Continuous>  senna 2 Tablet(s) Oral at bedtime    MEDICATIONS  (PRN):  acetaminophen   Tablet .. 650 milliGRAM(s) Oral every 6 hours PRN Temp greater or equal to 38C (100.4F)      Allergies  NKDA     T(F): 98.7  HR: 76  BP: 122/55  RR: 20  SpO2: 92 %     PHYSICAL EXAM:      Constitutional: pt seen in vent unit, + trach, + vent support, alert, eyes fully opened, attempts to communicate    Eyes: non icteric, eyes wide opened, no droop    ENMT: + feeding tube    Neck:  short and supple, + trach    Respiratory:  harsh respirator BS    Cardiovascular:  S1S2    Gastrointestinal: globose soft and benign    Genitourinary:  no villatoro    Extremities: arthritic changes, L sided weakness upper greater than lower    LABS:                       10.4  8.6 )-----------( 241             33    141 |  105 |  23  ----------------------------<  202  4.0  |  26   |  0.6    GFR 87, 91  Ca    9.7       Mg    2.2      TPro  5.6<L>  /  Alb  3.0, 3.3 /  TBili  0.5, 6.0  /  DBili  x   /  AST  9   /  ALT  23  /  AlkPhos  88  06-06    urine GM- rods  Blood streptococcus  sputum streptococcus      RADIOLOGY & ADDITIONAL TESTS:     EKG NSR 66/min   CXR  +ET tube, + feeding tube    CT of head R thalamic parenchymal hematoma 2.2 cm with intraventricular extension, mild ventricular enlargement, white mater changes     repeat CTH:  evolution of the  R thalamic hemorrhage which has dec, ventricular size stable with mild enlargement, white matter changes, stable chronic hematoma cavity within the L basal ganglia, chronic lacunar infarct in BL basal ganglia and L thalamus      Stroke code, R thalamic hemorrhagic CVA with extension to ventricles, repeat studies show no progression and some improvement of the hematoma  sp Hypertensive emergency  Seizures, controlled on Keppra  Streptococcal Bacteremia probably from transient PNA,   Marcio negative UTI    Hx of HTN, ASHD  Hx of DLD  Hx of old hemorrhagic CVA about 8 yrs ago, dysarthria, gait instability  Hx of OA, DDD, DJD  Hx of GERD, HH, Diverticulosis  Hx of anxiety    pt is sp trach  6/10, today much more alert, eyes fully open, on CPAP  neurology ff up appreciated:  dec keppra to 1000mg 2x to lessen sedation, repeat CTH  repeat attempt at S&S when more alert   Pt is being fed via oral feeding tube, failed S&S  for PEG  Surgical consult for PEG    pt is sp ceftriaxone, blood culture 6/4 + for strept, source probable  LLL  strept PNA, + sputa for strept and CT shows infiltrate,  urine + for Gm neg rods/asymptomatic pyuria, completed the course of ABX  seizures  controlled on Keppra 1000mg q12, VEEG shows focal and generalized slowing    monitor electrolytes    pt is ff by pul critical care, ID  oral care, skin care and decubiti prevention    spoke with son regarding the poor prognosis, need for PEG and  probable SNF placement

## 2019-06-22 NOTE — PROGRESS NOTE ADULT - SUBJECTIVE AND OBJECTIVE BOX
Progress Note: General Surgery  Patient: HERNANDO HERNANDEZ , 74y (1945)Female   MRN: [id number]  Location: [address]  Visit: 19 Inpatient  Date: 19 @ 07:52    Procedure/Diagnosis: s/p trach 6/10, needs peg    Events/ 24h: No acute events overnight. Pain controlled.    Vitals: T(F): 99.4 (19 @ 23:50), Max: 99.4 (19 @ 23:50)  HR: 80 (19 @ 01:20)  BP: 107/54 (19 @ 23:50) (107/54 - 135/65)  RR: 20 (19 @ 23:50)  SpO2: 96% (19 @ 01:20)  RR (machine): 14, TV (machine): 400, FiO2: 40, PEEP: 5, PIP: 25  In:   19 @ 07:01  -  19 @ 07:00  --------------------------------------------------------  IN: 1360 mL      Out:   19 @ 07:01  -  19 @ 07:00  --------------------------------------------------------  OUT:    Incontinent per Collection Ba mL  Total OUT: 1400 mL        Net:   19 @ 07:01  -  19 @ 07:00  --------------------------------------------------------  NET: -40 mL        Diet: Diet, NPO with Tube Feed:   Tube Feeding Modality: Nasogastric  Glucerna 1.2 Rohan  Total Volume for 24 Hours (mL): 1440  Bolus   Total Volume of Bolus (mL):  240  Bolus Feed Rate (mL per Hour): 240   Bolus Feed Duration (in Hours): 1  Tube Feed Frequency: Every 4 hours   Tube Feed Start Time: 00:00 (19 @ 17:18)  Diet, NPO with Tube Feed:   Tube Feeding Modality: Nasogastric  Glucerna 1.2 Rohan  Total Volume for 24 Hours (mL): 1440  Bolus   Total Volume of Bolus (mL):  240  Bolus Feed Rate (mL per Hour): 240   Bolus Feed Duration (in Hours): 1  Tube Feed Frequency: Every 4 hours   Tube Feed Start Time: 00:00 (19 @ 08:59)    IV Fluids: dextrose 5%. 1000 milliLiter(s) (50 mL/Hr) IV Continuous <Continuous>      Physical Examination:  General Appearance: NAD  HEENT: EOMI, sclera non-icteric. Trach in place  Heart: RRR   Lungs: CTABL. Mechanically ventilated  Abdomen:  Soft, nontender, nondistended.   MSK/Extremities: Warm & well-perfused.   Skin: Warm, dry. No jaundice.       Medications: [Standing]  amLODIPine   Tablet 10 milliGRAM(s) Oral daily  chlorhexidine 0.12% Liquid 15 milliLiter(s) Oral Mucosa two times a day  chlorhexidine 4% Liquid 1 Application(s) Topical <User Schedule>  dextrose 5%. 1000 milliLiter(s) (50 mL/Hr) IV Continuous <Continuous>  dextrose 50% Injectable 12.5 Gram(s) IV Push once  dextrose 50% Injectable 25 Gram(s) IV Push once  dextrose 50% Injectable 25 Gram(s) IV Push once  docusate sodium Liquid 100 milliGRAM(s) Oral three times a day  heparin  Injectable 5000 Unit(s) SubCutaneous every 8 hours  hydrALAZINE 25 milliGRAM(s) Oral three times a day  insulin glargine Injectable (LANTUS) 18 Unit(s) SubCutaneous every morning  insulin lispro (HumaLOG) corrective regimen sliding scale   SubCutaneous every 4 hours  levETIRAcetam  Solution 1000 milliGRAM(s) Oral two times a day  losartan 25 milliGRAM(s) Oral daily  nystatin    Suspension 628620 Unit(s) Oral two times a day  pantoprazole   Suspension 40 milliGRAM(s) Oral daily  senna 2 Tablet(s) Oral at bedtime    DVT Prophylaxis: heparin  Injectable 5000 Unit(s) SubCutaneous every 8 hours    GI Prophylaxis: pantoprazole   Suspension 40 milliGRAM(s) Oral daily    Antibiotics: nystatin    Suspension 486356 Unit(s) Oral two times a day    Anticoagulation:   Medications:[PRN]  acetaminophen   Tablet .. 650 milliGRAM(s) Oral every 6 hours PRN  dextrose 40% Gel 15 Gram(s) Oral once PRN  glucagon  Injectable 1 milliGRAM(s) IntraMuscular once PRN      Labs:    PT/INR - ( 2019 17:52 )   PT: 11.10 sec;   INR: 0.96 ratio         PTT - ( 2019 17:52 )  PTT:29.0 sec        Assessment:  74y Female patient admitted s/p trach 6/10, needs peg    Plan:    Planning trach in coordination w/ family      Date/Time: 19 @ 07:52

## 2019-06-23 NOTE — PROGRESS NOTE ADULT - ASSESSMENT
HERNANDO HERNANDEZ 75yo W  Female BIBA to S side from home for elevated BP, slurred speech and L sided weakness.  The pt was noted to have a hypertensive emergency with R thalamic hemorrhagic  CVA. 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    Hospital course:   The pt underwent a stroke code and was intubated for airway protection and transferred North for further care. She remains in CCU and cont to be on respirator support.  The hospital course is complicated by seizures, prolonged QT,streptococcus bacteremia with positive blood cultures and positive GM negative rods in the urine.  She is on Ceftriaxone which was changed to Cefepime.   She was fed via OG tube.  When off sedation she followed simple commands but failed weaning and required tracheostomy.      INTERVAL HPI/OVERNIGHT EVENTS: pt underwent trach 6/10, although more alert and keppra dose dec to help dec sedation, pt is failing weaning process and has failed multiple S&S, being fed via feeding tube, for PEG placement tomorrow    MEDICATIONS  (STANDING):  amLODIPine   Tablet 10 milliGRAM(s) Oral daily  cefepime   IVPB 2000 milliGRAM(s) IV Intermittent every 8 hours D/C  Rocephine 2 gms q 24  chlorhexidine 0.12% Liquid 15 milliLiter(s) Oral Mucosa two times a day  chlorhexidine 4% Liquid 1 Application(s) Topical <User Schedule>  docusate sodium Liquid 100 milliGRAM(s) Oral three times a day  hydrALAZINE 25 milliGRAM(s) Oral three times a day  insulin regular Infusion 4 Unit(s)/Hr (4 mL/Hr) IV Continuous <Continuous>  levETIRAcetam  Solution 1000 milliGRAM(s) Oral two times a day  nystatin    Suspension 857658 Unit(s) Oral two times a day  pantoprazole   Suspension 40 milliGRAM(s) Oral daily  propofol Infusion. 1 MICROgram(s)/kG/Min (0.473 mL/Hr) IV Continuous <Continuous>  senna 2 Tablet(s) Oral at bedtime    MEDICATIONS  (PRN):  acetaminophen   Tablet .. 650 milliGRAM(s) Oral every 6 hours PRN Temp greater or equal to 38C (100.4F)      Allergies  NKDA     T(F): 98.5  HR: 59  BP: 107/65  RR: 20  SpO2: 99 %     PHYSICAL EXAM:      Constitutional: pt seen in vent unit, + trach, + vent support, alert, eyes fully opened, attempts to communicate    Eyes: non icteric, eyes wide opened, no droop    ENMT: + feeding tube    Neck:  short and supple, + trach    Respiratory:  harsh respirator BS    Cardiovascular:  S1S2    Gastrointestinal: globose soft and benign    Genitourinary:  no villatoro    Extremities: arthritic changes, moves upper ext, dec motor strength of lower ext    LABS:                       11.9  6.8 )-----------( 256             38.5    143 |  103 |  25  ----------------------------<  210  4.4  |  25   |  0.7    GFR 87, 91  Ca    9.7       Mg    2.2, 2.3     TPro  5.6<L>  /  Alb  3.0, 3.3 /  TBili  0.5, 6.0  /  DBili  x   /  AST  9   /  ALT  23  /  AlkPhos  88  06-06    urine GM- rods  Blood streptococcus  sputum streptococcus      RADIOLOGY & ADDITIONAL TESTS:     EKG NSR 66/min   CXR  +ET tube, + feeding tube    CT of head R thalamic parenchymal hematoma 2.2 cm with intraventricular extension, mild ventricular enlargement, white mater changes     repeat CTH:  evolution of the  R thalamic hemorrhage which has dec, ventricular size stable with mild enlargement, white matter changes, stable chronic hematoma cavity within the L basal ganglia, chronic lacunar infarct in BL basal ganglia and L thalamus      Stroke code, R thalamic hemorrhagic CVA with extension to ventricles, repeat studies show no progression and some improvement of the hematoma  sp Hypertensive emergency  Seizures, controlled on Keppra  Streptococcal Bacteremia probably from transient PNA,   Marcio negative UTI    Hx of HTN, ASHD  Hx of DLD  Hx of old hemorrhagic CVA about 8 yrs ago, dysarthria, gait instability  Hx of OA, DDD, DJD  Hx of GERD, HH, Diverticulosis  Hx of anxiety    pt is sp trach  6/10, today much more alert, eyes fully open, on CPAP  neurology ff up appreciated:  dec keppra to 1000mg 2x to lessen sedation, repeat CTH  S&S attempts have failed  Pt is being fed via oral feeding tube    Surgical consult for PEG, scheduled for tomorrow    pt is sp ceftriaxone, blood culture 6/4 + for strept, source probable  LLL  strept PNA, + sputa for strept and CT shows infiltrate,  urine + for Gm neg rods/asymptomatic pyuria, completed the course of ABX  seizures  controlled on Keppra 1000mg q12, VEEG shows focal and generalized slowing    monitor electrolytes    pt is ff by pul critical care, ID  oral care, skin care and decubiti prevention    spoke with daughter and son re pt's lack of neurological improvement, also inability to swallow and breath without the respirator, all making for poor prognosis, after the placement of the PEG, probable SNF for chronic care

## 2019-06-23 NOTE — PROGRESS NOTE ADULT - SUBJECTIVE AND OBJECTIVE BOX
Progress Note: Trauma Surgery  Patient: HERNANDO HERNANDEZ , 74y (1945)Female   MRN: [id number]  Location: [address]  Visit: 19 Inpatient  Date: 19 @ 02:13  Hospital Day: 25  Post-op Day: 13    Procedure/Injury: s/p Trach, c/s for PEG  Events over 24h: Dr. Francis got consent yesterday from family for PEG tube placement. Pt is on schedule for Monday. No acute event overnight. No new complaint. Pt is vitally stable and vented 40/5. On tube feeds diet, tolerating well. Denies nausea, vomiting, and/or abdominal pain.     Vitals: T(F): 98.8 (19 @ 00:33), Max: 98.8 (19 @ 00:33)  HR: 61 (19 @ 01:18)  BP: 125/60 (19 @ 00:33) (100/54 - 134/60)  RR: 14 (19 @ 00:33)  SpO2: 100% (19 @ 01:18)  RR (machine): 14, TV (machine): 400, FiO2: 40, PEEP: 5, PIP: 23    Diet: Diet, NPO with Tube Feed:   Tube Feeding Modality: Nasogastric  Glucerna 1.2 Rohan  Total Volume for 24 Hours (mL): 1440  Bolus   Total Volume of Bolus (mL):  240  Bolus Feed Rate (mL per Hour): 240   Bolus Feed Duration (in Hours): 1  Tube Feed Frequency: Every 4 hours   Tube Feed Start Time: 00:00 (19 @ 17:18)  Diet, NPO with Tube Feed:   Tube Feeding Modality: Nasogastric  Glucerna 1.2 Rohan  Total Volume for 24 Hours (mL): 1440  Bolus   Total Volume of Bolus (mL):  240  Bolus Feed Rate (mL per Hour): 240   Bolus Feed Duration (in Hours): 1  Tube Feed Frequency: Every 4 hours   Tube Feed Start Time: 00:00 (19 @ 08:59)    IV Fluid: dextrose 5%. 1000 milliLiter(s) (50 mL/Hr) IV Continuous <Continuous>      In:   19 @ 07:01  -  19 @ 07:00  --------------------------------------------------------  IN: 1360 mL    19 @ 07:  -  19 @ 02:13  --------------------------------------------------------  IN: 1360 mL      Out:   19 @ 07:  -  19 @ 07:00  --------------------------------------------------------  OUT:    Incontinent per Collection Ba mL  Total OUT: 1400 mL      19 @ 07:  -  19 @ 02:13  --------------------------------------------------------  OUT:    Incontinent per Collection Ba mL  Total OUT: 200 mL        Net:   19 @ 07:  -  19 @ 07:00  --------------------------------------------------------  NET: -40 mL    19 @ 07:  -  19 @ 02:13  --------------------------------------------------------  NET: 1160 mL        Physical Examination:  General Appearance: NAD, alert and cooperative  HEENT: NCAT, WNL  Heart: S1 and S2. No murmurs. Rhythm is regular irregular.  Lungs: Clear to auscultation BL without rales, rhonchi, wheezing, crackles or diminished breath sounds.  Abdomen: Positive bowel sounds. Soft, nondistended, timbo-incisional tenderness nontender. Obese. No rigidity, guarding, or rebound tenderness. No masses palpated. No McBurney point tenderness. No Balderrama's sign. No Rovsing's sign.   MSK/Extremities: ROM intact BL upper/lower extremities. No joint erythema or tenderness. Normal gait. No significant deformity or joint abnormality. No edema. Peripheral pulses intact. No varicosities. WNL  Neuro: CN II-XII intact. Strength and sensation symmetric and intact throughout. Reflexes 2+ throughout. Cerebellar testing normal. Sensation grossly intact.  Skin: Warm/dry, Normal color, No jaundice.   Incisions/Wounds: Dressings in place, clean, dry and intact, no signs of infection/active bleeding/drainage    Medications: [Standing]  amLODIPine   Tablet 10 milliGRAM(s) Oral daily  chlorhexidine 0.12% Liquid 15 milliLiter(s) Oral Mucosa two times a day  chlorhexidine 4% Liquid 1 Application(s) Topical <User Schedule>  dextrose 5%. 1000 milliLiter(s) (50 mL/Hr) IV Continuous <Continuous>  dextrose 50% Injectable 12.5 Gram(s) IV Push once  dextrose 50% Injectable 25 Gram(s) IV Push once  dextrose 50% Injectable 25 Gram(s) IV Push once  docusate sodium Liquid 100 milliGRAM(s) Oral three times a day  heparin  Injectable 5000 Unit(s) SubCutaneous every 8 hours  hydrALAZINE 25 milliGRAM(s) Oral three times a day  insulin glargine Injectable (LANTUS) 18 Unit(s) SubCutaneous every morning  insulin lispro (HumaLOG) corrective regimen sliding scale   SubCutaneous every 4 hours  levETIRAcetam  Solution 1000 milliGRAM(s) Oral two times a day  losartan 25 milliGRAM(s) Oral daily  nystatin    Suspension 358524 Unit(s) Oral two times a day  pantoprazole   Suspension 40 milliGRAM(s) Oral daily  senna 2 Tablet(s) Oral at bedtime    Medications:[PRN]  acetaminophen   Tablet .. 650 milliGRAM(s) Oral every 6 hours PRN  dextrose 40% Gel 15 Gram(s) Oral once PRN  glucagon  Injectable 1 milliGRAM(s) IntraMuscular once PRN    Labs:                        10.4   8.62  )-----------( 241      ( 2019 07:46 )             33.7       141  |  105  |  23<H>  ----------------------------<  202<H>  4.0   |  26  |  0.6<L>    Ca    10.2<H>      2019 07:46  Mg     2.1         TPro  5.7<L>  /  Alb  3.2<L>  /  TBili  0.7  /  DBili  x   /  AST  11  /  ALT  20  /  AlkPhos  74    LIVER FUNCTIONS - ( 2019 07:46 )  Alb: 3.2 g/dL / Pro: 5.7 g/dL / ALK PHOS: 74 U/L / ALT: 20 U/L / AST: 11 U/L / GGT: x             Micro/Urine:    Imaging:  None/24h

## 2019-06-23 NOTE — PROGRESS NOTE ADULT - ASSESSMENT
Assessment:  74y Female patient admitted s/p Trach, c/s for PEG, with the above physical exam, labs, and imaging findings.    Plan:  -OR on Monday 6/24  -Keep NPO from midnight  -F/U AM labs  -Pain control as needed  -Hemodynamic monitoring as per routine  -Encourage ambulation  -GI and DVT prophylaxis  -Check and replete CBC and BMP q daily  -Strict input and output monitoring  -Continue current management    Date/Time: 06-23-19 @ 02:13

## 2019-06-23 NOTE — PROGRESS NOTE ADULT - SUBJECTIVE AND OBJECTIVE BOX
SUBJECTIVE: No events    REVIEW OF SYSTEMS:  See HPI    PHYSICAL EXAM  Vital Signs Last 24 Hrs  T(C): 36.8 (23 Jun 2019 07:52), Max: 37.1 (23 Jun 2019 00:33)  T(F): 98.3 (23 Jun 2019 07:52), Max: 98.8 (23 Jun 2019 00:33)  HR: 96 (23 Jun 2019 07:52) (61 - 96)  BP: 135/63 (23 Jun 2019 07:52) (100/54 - 135/63)  BP(mean): 91 (23 Jun 2019 07:52) (75 - 91)  RR: 18 (23 Jun 2019 07:52) (14 - 18)  SpO2: 100% (23 Jun 2019 01:18) (95% - 100%)    Gen : does not follow commands. awake , no alert,  MS: Awake alert, following commands  HEENT: + trach, + NGT  CHEST: B/L breath sounds   ABDOMEN: soft   HEART:S1/S2 regular  SKIN: intact  Extremities: No C/C/E        LABS:                          11.9   6.87  )-----------( 256      ( 23 Jun 2019 07:17 )             38.5                                               06-23    143  |  103  |  25<H>  ----------------------------<  210<H>  4.4   |  25  |  0.7    Ca    10.8<H>      23 Jun 2019 07:17  Mg     2.3     06-23    TPro  6.6  /  Alb  3.7  /  TBili  1.0  /  DBili  x   /  AST  12  /  ALT  22  /  AlkPhos  78  06-23      PT/INR - ( 23 Jun 2019 07:17 )   PT: 11.10 sec;   INR: 0.96 ratio         PTT - ( 23 Jun 2019 07:17 )  PTT:32.2 sec                                                                                     LIVER FUNCTIONS - ( 23 Jun 2019 07:17 )  Alb: 3.7 g/dL / Pro: 6.6 g/dL / ALK PHOS: 78 U/L / ALT: 22 U/L / AST: 12 U/L / GGT: x                                                                                                MEDICATIONS  (STANDING):  amLODIPine   Tablet 10 milliGRAM(s) Oral daily  chlorhexidine 0.12% Liquid 15 milliLiter(s) Oral Mucosa two times a day  chlorhexidine 4% Liquid 1 Application(s) Topical <User Schedule>  dextrose 5%. 1000 milliLiter(s) (50 mL/Hr) IV Continuous <Continuous>  dextrose 50% Injectable 12.5 Gram(s) IV Push once  dextrose 50% Injectable 25 Gram(s) IV Push once  dextrose 50% Injectable 25 Gram(s) IV Push once  docusate sodium Liquid 100 milliGRAM(s) Oral three times a day  heparin  Injectable 5000 Unit(s) SubCutaneous every 8 hours  hydrALAZINE 25 milliGRAM(s) Oral three times a day  insulin glargine Injectable (LANTUS) 18 Unit(s) SubCutaneous every morning  insulin lispro (HumaLOG) corrective regimen sliding scale   SubCutaneous every 4 hours  levETIRAcetam  Solution 1000 milliGRAM(s) Oral two times a day  losartan 25 milliGRAM(s) Oral daily  nystatin    Suspension 035283 Unit(s) Oral two times a day  pantoprazole   Suspension 40 milliGRAM(s) Oral daily  senna 2 Tablet(s) Oral at bedtime    MEDICATIONS  (PRN):  acetaminophen   Tablet .. 650 milliGRAM(s) Oral every 6 hours PRN Temp greater or equal to 38C (100.4F)  dextrose 40% Gel 15 Gram(s) Oral once PRN Blood Glucose LESS THAN 70 milliGRAM(s)/deciliter  glucagon  Injectable 1 milliGRAM(s) IntraMuscular once PRN Glucose LESS THAN 70 milligrams/deciliter      IMPRESSION:      PLAN:

## 2019-06-24 NOTE — CHART NOTE - NSCHARTNOTEFT_GEN_A_CORE
Post Operative Note  Patient: HERNANDO HERNANDEZ 74y (1945) Female   MRN: [id number]  Location: [facility] [address]  Visit: 05-30-19 Inpatient  Date: 06-24-19 @ 19:41    Procedure: S/P PEG    Subjective:   Intubated    Objective:  Vitals: T(F): 98.3 (06-24-19 @ 15:30), Max: 98.3 (06-24-19 @ 15:30)  HR: 62 (06-24-19 @ 18:00)  BP: 108/41 (06-24-19 @ 18:00) (108/41 - 124/64)  RR: 17 (06-24-19 @ 15:30)  SpO2: 99% (06-24-19 @ 16:27)  Vent Settings: Mode: AC/ CMV (Assist Control/ Continuous Mandatory Ventilation), RR (machine): 14, TV (machine): 400, FiO2: 40, PEEP: 5, MAP: 10, PIP: 26    In:   06-23-19 @ 07:01  -  06-24-19 @ 07:00  --------------------------------------------------------  IN: 340 mL    06-24-19 @ 07:01  -  06-24-19 @ 19:41  --------------------------------------------------------  IN: 0 mL      IV Fluids: dextrose 5%. 1000 milliLiter(s) (50 mL/Hr) IV Continuous <Continuous>      Out:   06-23-19 @ 07:01  -  06-24-19 @ 07:00  --------------------------------------------------------  OUT: 650 mL    06-24-19 @ 07:01  -  06-24-19 @ 19:41  --------------------------------------------------------  OUT: 2 mL      EBL:     Voided Urine:   06-23-19 @ 07:01  -  06-24-19 @ 07:00  --------------------------------------------------------  OUT: 650 mL    06-24-19 @ 07:01  -  06-24-19 @ 19:41  --------------------------------------------------------  OUT: 2 mL        Physical Examination:  General Appearance: NAD  HEENT: EOMI, sclera non-icteric. Trach in place  Heart: RRR  Lungs: CTABL  Abdomen:  Soft, nontender, nondistended. PEG in place to villatoro bag gravity drainage, no blood or other output in bag  MSK/Extremities: Warm & well-perfused. Peripheral pulses intact.  Skin: Warm, dry. No jaundice.   Incisions/Wounds: Dressings in place, clean, dry and intact, no signs of infection/active bleeding/drainage    Medications: [Standing]  acetaminophen   Tablet .. 650 milliGRAM(s) Oral every 6 hours PRN  amLODIPine   Tablet 10 milliGRAM(s) Oral daily  chlorhexidine 0.12% Liquid 15 milliLiter(s) Oral Mucosa two times a day  chlorhexidine 4% Liquid 1 Application(s) Topical <User Schedule>  dextrose 40% Gel 15 Gram(s) Oral once PRN  dextrose 5%. 1000 milliLiter(s) IV Continuous <Continuous>  dextrose 50% Injectable 12.5 Gram(s) IV Push once  dextrose 50% Injectable 25 Gram(s) IV Push once  dextrose 50% Injectable 25 Gram(s) IV Push once  glucagon  Injectable 1 milliGRAM(s) IntraMuscular once PRN  heparin  Injectable 5000 Unit(s) SubCutaneous every 8 hours  hydrALAZINE 25 milliGRAM(s) Oral three times a day  insulin glargine Injectable (LANTUS) 18 Unit(s) SubCutaneous every morning  insulin lispro (HumaLOG) corrective regimen sliding scale   SubCutaneous every 4 hours  levETIRAcetam  IVPB 1000 milliGRAM(s) IV Intermittent every 12 hours  levETIRAcetam  Solution 1000 milliGRAM(s) Oral two times a day  losartan 25 milliGRAM(s) Oral daily  midazolam Injectable 4 milliGRAM(s) IV Push once PRN  morphine  - Injectable 4 milliGRAM(s) IV Push once PRN  nystatin    Suspension 291129 Unit(s) Oral two times a day  pantoprazole   Suspension 40 milliGRAM(s) Oral daily    Medications: [PRN]  acetaminophen   Tablet .. 650 milliGRAM(s) Oral every 6 hours PRN  amLODIPine   Tablet 10 milliGRAM(s) Oral daily  chlorhexidine 0.12% Liquid 15 milliLiter(s) Oral Mucosa two times a day  chlorhexidine 4% Liquid 1 Application(s) Topical <User Schedule>  dextrose 40% Gel 15 Gram(s) Oral once PRN  dextrose 5%. 1000 milliLiter(s) IV Continuous <Continuous>  dextrose 50% Injectable 12.5 Gram(s) IV Push once  dextrose 50% Injectable 25 Gram(s) IV Push once  dextrose 50% Injectable 25 Gram(s) IV Push once  glucagon  Injectable 1 milliGRAM(s) IntraMuscular once PRN  heparin  Injectable 5000 Unit(s) SubCutaneous every 8 hours  hydrALAZINE 25 milliGRAM(s) Oral three times a day  insulin glargine Injectable (LANTUS) 18 Unit(s) SubCutaneous every morning  insulin lispro (HumaLOG) corrective regimen sliding scale   SubCutaneous every 4 hours  levETIRAcetam  IVPB 1000 milliGRAM(s) IV Intermittent every 12 hours  levETIRAcetam  Solution 1000 milliGRAM(s) Oral two times a day  losartan 25 milliGRAM(s) Oral daily  midazolam Injectable 4 milliGRAM(s) IV Push once PRN  morphine  - Injectable 4 milliGRAM(s) IV Push once PRN  nystatin    Suspension [id number] Unit(s) Oral two times a day  pantoprazole   Suspension 40 milliGRAM(s) Oral daily    Labs:                        11.9   6.87  )-----------( 256      ( 23 Jun 2019 07:17 )             38.5     06-23    143  |  103  |  25<H>  ----------------------------<  210<H>  4.4   |  25  |  0.7    Ca    10.8<H>      23 Jun 2019 07:17  Mg     2.3     06-23    TPro  6.6  /  Alb  3.7  /  TBili  1.0  /  DBili  x   /  AST  12  /  ALT  22  /  AlkPhos  78  06-23    PT/INR - ( 23 Jun 2019 07:17 )   PT: 11.10 sec;   INR: 0.96 ratio         PTT - ( 23 Jun 2019 07:17 )  PTT:32.2 sec      Imaging:  No post-op imaging studies    Assessment:  74yFemale patient S/P PEG    Plan:    PEG to villatoro bag gravity drainage  No feeds or meds through PEG  To be cleared for use in AM    Date/Time: 06-24-19 @ 19:41

## 2019-06-24 NOTE — PROGRESS NOTE ADULT - ASSESSMENT
Impression:    Intra cerebral Bleed/ following simple commands  Seizure  Hypertensive emergency  HO hemorrhagic stroke  penumonia aspiration  UTI    PLAN:    CNS: Continue with keppra per neuro    HEENT: Trach care    PULMONARY:  HOB @ 30 degrees. no vent changes, wean as tolerated     CARDIOVASCULAR: i=o. Continue with antihypertensives    GI: GI prophylaxis.  Feeding through NG tube. PEG today    RENAL:  Follow up lytes.  Correct as needed    INFECTIOUS DISEASE: per ID    HEMATOLOGICAL:   DVT prophylaxis.    ENDOCRINE:  Follow up FS.     MUSCULOSKELETAL: Bed rest    Poor prognosis    Palliative care evaluation

## 2019-06-24 NOTE — PROCEDURE NOTE - NSPOSTPRCRAD_GEN_A_CORE
post procedure radiography not performed/Tube visualized by repeat EGD and secured in place
observed with repeat EGD

## 2019-06-24 NOTE — PROGRESS NOTE ADULT - SUBJECTIVE AND OBJECTIVE BOX
Progress Note: General Surgery  Patient: HERNANDO HERNANDEZ , 74y (1945)Female   MRN: [id number]  Location: [address]  Visit: 19 Inpatient  Date: 19 @ 10:14    Procedure/Diagnosis: s/p trach, for bedside PEG today    Events/ 24h: No acute events overnight. Pain controlled.    Vitals: T(F): 97.8 (19 @ 07:30), Max: 98.5 (19 @ 15:56)  HR: 67 (19 @ 07:30)  BP: 123/59 (19 @ 07:30) (107/65 - 124/64)  RR: 14 (19 @ 07:30)  SpO2: 98% (19 @ 00:33)  RR (machine): 14, TV (machine): 400, FiO2: 40, PEEP: 5, PIP: 24  In:   19 @ 07:01  -  19 @ 07:00  --------------------------------------------------------  IN: 340 mL      Out:   19 @ 07:01  -  19 @ 07:00  --------------------------------------------------------  OUT:    Incontinent per Collection Ba mL  Total OUT: 650 mL        Net:   19 @ 07:01  -  19 @ 07:00  --------------------------------------------------------  NET: -310 mL        Diet: Diet, NPO with Tube Feed:   Tube Feeding Modality: Nasogastric  Glucerna 1.2 Rohan  Total Volume for 24 Hours (mL): 1440  Bolus   Total Volume of Bolus (mL):  240  Bolus Feed Rate (mL per Hour): 240   Bolus Feed Duration (in Hours): 1  Tube Feed Frequency: Every 4 hours   Tube Feed Start Time: 00:00 (19 @ 17:18)  Diet, NPO with Tube Feed:   Tube Feeding Modality: Nasogastric  Glucerna 1.2 Rohan  Total Volume for 24 Hours (mL): 1440  Bolus   Total Volume of Bolus (mL):  240  Bolus Feed Rate (mL per Hour): 240   Bolus Feed Duration (in Hours): 1  Tube Feed Frequency: Every 4 hours   Tube Feed Start Time: 00:00 (19 @ 08:59)  Diet, NPO:   NPO for Procedure/Test     NPO Start Date: 2019,   NPO Start Time: 00:00  Except Medications (19 @ 00:33)    IV Fluids: dextrose 5%. 1000 milliLiter(s) (50 mL/Hr) IV Continuous <Continuous>      Physical Examination:  General Appearance: NAD  HEENT: EOMI, sclera non-icteric. Trach in place  Heart: RRR   Lungs: CTABL.   Abdomen:  Soft, nontender, nondistended.   MSK/Extremities: Warm & well-perfused.   Skin: Warm, dry. No jaundice.       Medications: [Standing]  amLODIPine   Tablet 10 milliGRAM(s) Oral daily  chlorhexidine 0.12% Liquid 15 milliLiter(s) Oral Mucosa two times a day  chlorhexidine 4% Liquid 1 Application(s) Topical <User Schedule>  dextrose 5%. 1000 milliLiter(s) (50 mL/Hr) IV Continuous <Continuous>  dextrose 50% Injectable 12.5 Gram(s) IV Push once  dextrose 50% Injectable 25 Gram(s) IV Push once  dextrose 50% Injectable 25 Gram(s) IV Push once  heparin  Injectable 5000 Unit(s) SubCutaneous every 8 hours  hydrALAZINE 25 milliGRAM(s) Oral three times a day  insulin glargine Injectable (LANTUS) 18 Unit(s) SubCutaneous every morning  insulin lispro (HumaLOG) corrective regimen sliding scale   SubCutaneous every 4 hours  levETIRAcetam  Solution 1000 milliGRAM(s) Oral two times a day  losartan 25 milliGRAM(s) Oral daily  nystatin    Suspension [id number] Unit(s) Oral two times a day  pantoprazole   Suspension 40 milliGRAM(s) Oral daily    DVT Prophylaxis: heparin  Injectable 5000 Unit(s) SubCutaneous every 8 hours    GI Prophylaxis: pantoprazole   Suspension 40 milliGRAM(s) Oral daily    Antibiotics: nystatin    Suspension [id number] Unit(s) Oral two times a day    Anticoagulation:   Medications:[PRN]  acetaminophen   Tablet .. 650 milliGRAM(s) Oral every 6 hours PRN  dextrose 40% Gel 15 Gram(s) Oral once PRN  glucagon  Injectable 1 milliGRAM(s) IntraMuscular once PRN  midazolam Injectable 4 milliGRAM(s) IV Push once PRN  morphine  - Injectable 4 milliGRAM(s) IV Push once PRN      Labs:                        11.9   6.87  )-----------( 256      ( 2019 07:17 )             38.5         143  |  103  |  25<H>  ----------------------------<  210<H>  4.4   |  25  |  0.7    Ca    10.8<H>      2019 07:17  Mg     2.3         TPro  6.6  /  Alb  3.7  /  TBili  1.0  /  DBili  x   /  AST  12  /  ALT  22  /  AlkPhos  78      LIVER FUNCTIONS - ( 2019 07:17 )  Alb: 3.7 g/dL / Pro: 6.6 g/dL / ALK PHOS: 78 U/L / ALT: 22 U/L / AST: 12 U/L / GGT: x           PT/INR - ( 2019 07:17 )   PT: 11.10 sec;   INR: 0.96 ratio         PTT - ( 2019 07:17 )  PTT:32.2 sec          Assessment:  74y Female patient admitted s/p trach, for bedside PEG today    Plan:    NPO, IVF  Bedside PEG today  DVT/GI ppx  Pain control    Date/Time: 19 @ 10:14

## 2019-06-24 NOTE — PROCEDURE NOTE - ADDITIONAL PROCEDURE DETAILS
Consent obtained.  Sedation given in VENT unit after patient placed back on PPV (2mg morphine, 2mg versed IV push).  And EGD was commenced and transillumination was performed.  There was good finger indentation and transillumination.  The site was prepped and draped in the usual sterile fashion.  18G needle was advanced into the stomach under maximal insufflation.  The wire was threaded through this and snared, and brought out through the mouth.  The PEG was attached to the wire and placed with pull technique.  EGD was reperformed and noted PEG in good position.  PEG was secured at 2.5cm at the skin and then connected to gravity drainage.  The site was dressed with gauze and tape and abdominal binder was placed.
consent obtained.  EGD performed.  good finger indentation and transillumination without increasing light on scope.  Site prepped and anesthetized with 1% lidocaine.  Stomach accessed with 18G angiocath, wire threaded and snared.  PEG attached to wire at mouth, placed by pull technique.  EGD reperformed and PEG secured at 3cm at skin with bumper.  Dressed with gauze and tape, abdominal binder placed, hands secured.  Stomach vented.

## 2019-06-24 NOTE — PROGRESS NOTE ADULT - SUBJECTIVE AND OBJECTIVE BOX
Patient is a 74y old  Female who presents with a chief complaint of left sided weakness and slurred speech, R thalamic hemorrhagic CVA (23 Jun 2019 17:58)      HPI:  74 y.o F with PMH of HTN, HLD, DM, hemorrhagic stroke,? EVD placement at that time ( about 8 years ago)  with residual left side weakness and slurred speech, ambulated with cane, on ASA daily  Pt. brought to [facility] ED as a stroke code. At the time of presentation to [facility] ED A&Ox 2, baseline slurped speech with left sided weakness. /82, 55 HR, 18 RR, CTH prelim report findings with acute right thalamic hemorrhage with extension to right lateral and third ventricle. At [facility] ED Pt. Intubated 2/2 worsening of neuro exam for air way protection as per ED attending note Pt.  became more drowsy with decreased responsiveness. Pt. was transferred to [facility]-[place]. At time of NSGY eval Pt intubated, sedated on Propofol and Fentanyl, radial arterial line, she received keppra, zofran and mannitol in ED. Repeat CT scan stable from prior study as per neurosurgery. Platelets one unit ordered as per neurosurgery (30 May 2019 02:50)      HOSPITAL DAY NO: 25d    Overnight events         Patient is a 74y old  Female who presents with a chief complaint of left sided weakness and slurred speech, R thalamic hemorrhagic CVA (23 Jun 2019 17:58)  HYPERTENSIVE EMERGENCY;INTRACRANIAL HEMORRHAGE  ^HYPERTENSIVE EMERGENCY;INTRACRANIAL HEMORRHAGE  No pertinent family history in first degree relatives  Handoff  MEWS Score  Diabetes mellitus  Stroke  Hypertension  Failure respiratory  Failure respiratory  Hypertensive emergency  Open tracheostomy  No significant past surgical history  FALL  Intracranial hemorrhage      Vital Signs Last 24 Hrs  T(C): 36.6 (24 Jun 2019 07:30), Max: 36.9 (23 Jun 2019 15:56)  T(F): 97.8 (24 Jun 2019 07:30), Max: 98.5 (23 Jun 2019 15:56)  HR: 67 (24 Jun 2019 07:30) (59 - 86)  BP: 123/59 (24 Jun 2019 07:30) (107/65 - 124/64)  BP(mean): 90 (24 Jun 2019 07:30) (77 - 90)  RR: 14 (24 Jun 2019 07:30) (14 - 20)  SpO2: 98% (24 Jun 2019 00:33) (94% - 99%)    use of pressors:     use of sedation:     Vent dependent:     Mode: AC/ CMV (Assist Control/ Continuous Mandatory Ventilation)  RR (machine): 14  TV (machine): 400  FiO2: 40  PEEP: 5  ITime: 1  MAP: 9  PIP: 24                                          Weaning time:     Significant exam findings:     Gen : NAD  MS: Waxing adn waning, follows some commands  HEENT: + trach, + NGT  CHEST: B/L breath sounds   ABDOMEN: soft   HEART:S1/S2 regular  SKIN: intact  Extremities: No C/C/E    No of BMs:       Nutrition:   via                       LABS:                          11.9   6.87  )-----------( 256      ( 23 Jun 2019 07:17 )             38.5                                               06-23    143  |  103  |  25<H>  ----------------------------<  210<H>  4.4   |  25  |  0.7    Ca    10.8<H>      23 Jun 2019 07:17  Mg     2.3     06-23    TPro  6.6  /  Alb  3.7  /  TBili  1.0  /  DBili  x   /  AST  12  /  ALT  22  /  AlkPhos  78  06-23      PT/INR - ( 23 Jun 2019 07:17 )   PT: 11.10 sec;   INR: 0.96 ratio         PTT - ( 23 Jun 2019 07:17 )  PTT:32.2 sec                                          LIVER FUNCTIONS - ( 23 Jun 2019 07:17 )  Alb: 3.7 g/dL / Pro: 6.6 g/dL / ALK PHOS: 78 U/L / ALT: 22 U/L / AST: 12 U/L / GGT: x                                                                                                           MEDICATIONS  (STANDING):  amLODIPine   Tablet 10 milliGRAM(s) Oral daily  chlorhexidine 0.12% Liquid 15 milliLiter(s) Oral Mucosa two times a day  chlorhexidine 4% Liquid 1 Application(s) Topical <User Schedule>  dextrose 5%. 1000 milliLiter(s) (50 mL/Hr) IV Continuous <Continuous>  dextrose 50% Injectable 12.5 Gram(s) IV Push once  dextrose 50% Injectable 25 Gram(s) IV Push once  dextrose 50% Injectable 25 Gram(s) IV Push once  heparin  Injectable 5000 Unit(s) SubCutaneous every 8 hours  hydrALAZINE 25 milliGRAM(s) Oral three times a day  insulin glargine Injectable (LANTUS) 18 Unit(s) SubCutaneous every morning  insulin lispro (HumaLOG) corrective regimen sliding scale   SubCutaneous every 4 hours  levETIRAcetam  Solution 1000 milliGRAM(s) Oral two times a day  losartan 25 milliGRAM(s) Oral daily  nystatin    Suspension [id number] Unit(s) Oral two times a day  pantoprazole   Suspension 40 milliGRAM(s) Oral daily    MEDICATIONS  (PRN):  acetaminophen   Tablet .. 650 milliGRAM(s) Oral every 6 hours PRN Temp greater or equal to 38C (100.4F)  dextrose 40% Gel 15 Gram(s) Oral once PRN Blood Glucose LESS THAN 70 milliGRAM(s)/deciliter  glucagon  Injectable 1 milliGRAM(s) IntraMuscular once PRN Glucose LESS THAN 70 milligrams/deciliter  midazolam Injectable 4 milliGRAM(s) IV Push once PRN procedure  morphine  - Injectable 4 milliGRAM(s) IV Push once PRN procedure          Radiology       Case discussed with staff and family at the bedside

## 2019-06-24 NOTE — PROGRESS NOTE ADULT - ASSESSMENT
HERNANDO HERNANDEZ 75yo W  Female BIBA to S side from home for elevated BP, slurred speech and L sided weakness.  The pt was noted to have a hypertensive emergency with R thalamic hemorrhagic  CVA. 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    Hospital course:   The pt underwent a stroke code and was intubated for airway protection and transferred North for further care. She remained in CCU and cont to be on respirator support.  The hospital course is complicated by seizures, prolonged QT,streptococcus bacteremia with positive blood cultures and positive GM negative rods in the urine.  She was on Ceftriaxone which was changed to Cefepime.   The pt failed weaning and required tracheostomy on 6/10 and was sent to the vent unit.  She failled S&S several times and required placement of PEG on 6/24.     INTERVAL HPI/OVERNIGHT EVENTS: pt underwent PEG placement today. remains on vent via trach    MEDICATIONS  (STANDING):  amLODIPine   Tablet 10 milliGRAM(s) Oral daily  cefepime   IVPB 2000 milliGRAM(s) IV Intermittent every 8 hours D/C  Rocephine 2 gms q 24  chlorhexidine 0.12% Liquid 15 milliLiter(s) Oral Mucosa two times a day  chlorhexidine 4% Liquid 1 Application(s) Topical <User Schedule>  docusate sodium Liquid 100 milliGRAM(s) Oral three times a day  hydrALAZINE 25 milliGRAM(s) Oral three times a day  insulin regular Infusion 4 Unit(s)/Hr (4 mL/Hr) IV Continuous <Continuous>  levETIRAcetam  Solution 1000 milliGRAM(s) Oral two times a day  nystatin    Suspension 540064 Unit(s) Oral two times a day  pantoprazole   Suspension 40 milliGRAM(s) Oral daily  propofol Infusion. 1 MICROgram(s)/kG/Min (0.473 mL/Hr) IV Continuous <Continuous>  senna 2 Tablet(s) Oral at bedtime    MEDICATIONS  (PRN):  acetaminophen   Tablet .. 650 milliGRAM(s) Oral every 6 hours PRN Temp greater or equal to 38C (100.4F)      Allergies  NKDA     T(F): 98.3  HR: 62  BP: 108/58  RR: 17  SpO2: 99 %     PHYSICAL EXAM:      Constitutional: pt seen in vent unit, + trach, + vent support, somewhat groggy today    Eyes: non icteric, eyes wide opened, no droop    ENMT: dry oral mucosa    Neck:  short and supple, + trach    Respiratory:  harsh respirator BS    Cardiovascular:  S1S2    Gastrointestinal: globose soft and benign, + PEG    Genitourinary:  no villatoro    Extremities: arthritic changes, moves upper ext, dec motor strength of lower ext    LABS:                       11.9  6.8 )-----------( 256             38.5    143 |  103 |  25  ----------------------------<  210  4.4  |  25   |  0.7    GFR 87, 91  Ca    9.7       Mg    2.2, 2.3     TPro  5.6<L>  /  Alb  3.0, 3.3 /  TBili  0.5, 6.0  /  DBili  x   /  AST  9   /  ALT  23  /  AlkPhos  88  06-06    urine GM- rods  Blood streptococcus  sputum streptococcus      RADIOLOGY & ADDITIONAL TESTS:     EKG NSR 66/min   CXR  +ET tube, + feeding tube    CT of head R thalamic parenchymal hematoma 2.2 cm with intraventricular extension, mild ventricular enlargement, white mater changes     repeat CTH:  evolution of the  R thalamic hemorrhage which has dec, ventricular size stable with mild enlargement, white matter changes, stable chronic hematoma cavity within the L basal ganglia, chronic lacunar infarct in BL basal ganglia and L thalamus      Stroke code, R thalamic hemorrhagic CVA with extension to ventricles, repeat studies show no progression and some improvement of the hematoma  sp Hypertensive emergency  Seizures, controlled on Keppra  Streptococcal Bacteremia probably from transient PNA,   Marcio negative UTI    Hx of HTN, ASHD  Hx of DLD  Hx of old hemorrhagic CVA about 8 yrs ago, dysarthria, gait instability  Hx of OA, DDD, DJD  Hx of GERD, HH, Diverticulosis  Hx of anxiety    pt is sp trach  6/10, today much more alert, eyes fully open, on CPAP  neurology ff up appreciated:  dec keppra to 1000mg 2x to lessen sedation, repeat CTH  S&S attempts have failed  Pt is being fed via oral feeding tube    Surgical consult for PEG today    pt is sp ceftriaxone, blood culture 6/4 + for strept, source probable  LLL  strept PNA, + sputa for strept and CT shows infiltrate,  urine + for Gm neg rods/asymptomatic pyuria, completed the course of ABX  seizures  controlled on Keppra 1000mg q12, VEEG shows focal and generalized slowing    monitor electrolytes    pt is ff by pul critical care, ID  oral care, skin care and decubiti prevention    spoke with daughter and son re pt's lack of neurological improvement, also inability to swallow and breath without the respirator, all making for poor prognosis, after the placement of the PEG, probable SNF for chronic care

## 2019-06-25 NOTE — PROGRESS NOTE ADULT - ATTENDING COMMENTS
Agree with above plan.     Continue to monitor neurological exam.     Will need CTH in 2-3 weeks to follow up hemorrhage.
General Thoracic Surgery Attestation    I have seen and examined the patient.  Where appropriate I have updated, edited, or corrected the resident's or PA's note with regard to findings, values, and plan.    plan for PEG monday.  discussed with family at bedside.  family with many questions about overall prognosis and expected course, which I am not able to answer.  risks of tube dislodgement and need for exlap, damage to abdominal structures such as colon/liver/other, tube malfunction, infection, pain, bleeding discussed with family.  they understand and wish to proceed.
Pt seen and examined on am rounds.   Has left arm> leg paresis.   Opens eyes to noxious, pupils pinpoint .    CTH stable compared to initial scan without evolving hydrocephalus.     given this, recommend:   CTH in 48 hours to evaluate for evolving hydrocephalus.     OK for SC heparin for DVT ppx.
General Thoracic Surgery Attestation    I have seen and examined the patient.  Where appropriate I have updated, edited, or corrected the resident's or PA's note with regard to findings, values, and plan.    attempted to call patient's daughter today, no answer.  awaiting final family decision for PEG placement.  will attempt to recall this afternoon/weekend.
General Thoracic Surgery Attestation    I have seen and examined the patient.  Where appropriate I have updated, edited, or corrected the resident's or PA's note with regard to findings, values, and plan.    patient is stable, awaiting PEG placement monday.  NPO/tube feeds off after midnight.
General Thoracic Surgery Attestation    I have seen and examined the patient.  Where appropriate I have updated, edited, or corrected the resident's or PA's note with regard to findings, values, and plan.    peg tube draining normal gastric contents.  site is c/d/i and without any signs of peritonitis.  peg may be used ad sayda for feeds and medications per primary team.
Patient seen and examined with PA. Will try to decrease keppra dose to try decrease sedation.    Plan as above
Patient seen ans examined and agree with above except as noted.  Patient seizure like episode and given ativan.   Clinically Pupils pin point negative dolls, no response to noxious stimuli    Plan as above
Patient seen 6/1/19 in f/u for thalamic bleed with intraventricular extension.  Has not clinically deteriorated, imaging stable.  BP's stable.    Continue treatment of HTN with goal of  SBP < 160.  Once extubated and no longer requiring IV antihypertensives for 24 hours may transfer out of unit.
Patient seen and examined and agree with above except as noted.  Discussed EEG with Dr. Rogers.  Patient opens eyes to vigorous stimulation  plantars up b/l  Withdraws to noxious stimulation  Moves eyes slightly laterally  pupils 2mm and NR    Plan as above
Patient seen and examined and agree with above except as noted.  Patient more alert today and was following commands.  Moving left extremities    Plan as above
Patient seen and examined with PA on rounds.  Patient alert following commands and intubated has some minor asymmetry in movement with less on right.    Plan as above

## 2019-06-25 NOTE — PROGRESS NOTE ADULT - ASSESSMENT
· Assessment		  Impression:    Intra cerebral Bleed/ following simple commands  Seizure  Hypertensive emergency  HO hemorrhagic stroke  penumonia aspiration  UTI    PLAN:    CNS: Continue with keppra per neuro    HEENT: Trach care    PULMONARY:  HOB @ 30 degrees. no vent changes, wean as tolerated     CARDIOVASCULAR: i=o. Continue with antihypertensives    GI: GI prophylaxis.  Ok to use PEG for feeds per surgery    RENAL:  Follow up lytes.  Correct as needed    INFECTIOUS DISEASE: per ID    HEMATOLOGICAL:   DVT prophylaxis.    ENDOCRINE:  Follow up FS.     MUSCULOSKELETAL: Bed rest

## 2019-06-25 NOTE — PROGRESS NOTE ADULT - ASSESSMENT
HERNANDO HERNANDEZ 73yo W  Female BIBA to S side from home for elevated BP, slurred speech and L sided weakness.  The pt was noted to have a hypertensive emergency with R thalamic hemorrhagic  CVA. 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    Hospital course:   The pt underwent a stroke code and was intubated for airway protection and transferred North for further care. She remained in CCU and cont to be on respirator support.  The hospital course is complicated by seizures, prolonged QT,streptococcus bacteremia with positive blood cultures and positive GM negative rods in the urine.  She was on Ceftriaxone which was changed to Cefepime.   The pt failed weaning and required tracheostomy on 6/10 and was sent to the vent unit.  She failed S&S several times and required placement of PEG on 6/24.     INTERVAL HPI/OVERNIGHT EVENTS: pt underwent PEG placement 6/24. cleared by surgery for feeding, remains on vent via trach, little overall improvement in neurological status    MEDICATIONS  (STANDING):  amLODIPine   Tablet 10 milliGRAM(s) Oral daily  cefepime   IVPB 2000 milliGRAM(s) IV Intermittent every 8 hours D/C  Rocephine 2 gms q 24  chlorhexidine 0.12% Liquid 15 milliLiter(s) Oral Mucosa two times a day  chlorhexidine 4% Liquid 1 Application(s) Topical <User Schedule>  docusate sodium Liquid 100 milliGRAM(s) Oral three times a day  hydrALAZINE 25 milliGRAM(s) Oral three times a day  insulin regular Infusion 4 Unit(s)/Hr (4 mL/Hr) IV Continuous <Continuous>  levETIRAcetam  Solution 1000 milliGRAM(s) Oral two times a day  nystatin    Suspension 112223 Unit(s) Oral two times a day  pantoprazole   Suspension 40 milliGRAM(s) Oral daily  propofol Infusion. 1 MICROgram(s)/kG/Min (0.473 mL/Hr) IV Continuous <Continuous>  senna 2 Tablet(s) Oral at bedtime    MEDICATIONS  (PRN):  acetaminophen   Tablet .. 650 milliGRAM(s) Oral every 6 hours PRN Temp greater or equal to 38C (100.4F)      Allergies  NKDA     T(F): 98.7  HR: 84  BP: 124/74  RR: 18  SpO2: 96 %     PHYSICAL EXAM:      Constitutional: pt seen in vent unit, + trach, + vent support, somewhat groggy today    Eyes: non icteric, eyes wide opened, no droop    ENMT: dry oral mucosa    Neck:  short and supple, + trach    Respiratory:  harsh respirator BS    Cardiovascular:  S1S2    Gastrointestinal: globose soft and benign, + PEG    Genitourinary:  no villatoro    Extremities: arthritic changes, moves upper ext, dec motor strength of lower ext    LABS:                       11.9  6.8 )-----------( 256             38.5    143 |  103 |  25  ----------------------------<  210  4.4  |  25   |  0.7    GFR 87, 91  Ca    9.7       Mg    2.2, 2.3     TPro  5.6<L>  /  Alb  3.0, 3.3 /  TBili  0.5, 6.0  /  DBili  x   /  AST  9   /  ALT  23  /  AlkPhos  88  06-06    urine GM- rods  Blood streptococcus  sputum streptococcus      RADIOLOGY & ADDITIONAL TESTS:     EKG NSR 66/min   CXR  +ET tube, + feeding tube    CT of head R thalamic parenchymal hematoma 2.2 cm with intraventricular extension, mild ventricular enlargement, white mater changes     repeat CTH:  evolution of the  R thalamic hemorrhage which has dec, ventricular size stable with mild enlargement, white matter changes, stable chronic hematoma cavity within the L basal ganglia, chronic lacunar infarct in BL basal ganglia and L thalamus      Stroke code, R thalamic hemorrhagic CVA with extension to ventricles, repeat studies show no progression and some improvement of the hematoma  sp Hypertensive emergency  Seizures, controlled on Keppra  Streptococcal Bacteremia probably from transient PNA,   Marcio negative UTI    Hx of HTN, ASHD  Hx of DLD  Hx of old hemorrhagic CVA about 8 yrs ago, dysarthria, gait instability  Hx of OA, DDD, DJD  Hx of GERD, HH, Diverticulosis  Hx of anxiety    pt is sp trach  6/10, today much more alert, eyes fully open, on CPAP  neurology ff up appreciated:  dec keppra to 1000mg 2x to lessen sedation, repeat CTH  S&S attempts have failed  Pt is being fed via oral feeding tube    Surgical consult for PEG 6/24, cleared to use for feeding    pt is sp ceftriaxone, blood culture 6/4 + for strept, source probable  LLL  strept PNA, + sputa for strept and CT shows infiltrate,  urine + for Gm neg rods/asymptomatic pyuria, completed the course of ABX  seizures  controlled on Keppra 1000mg q12, VEEG shows focal and generalized slowing    monitor electrolytes    pt is ff by pul critical care, ID  oral care, skin care and decubiti prevention    spoke with daughter and son re pt's lack of neurological improvement, also inability to swallow and breath without the respirator, all making for poor prognosis, after the placement of the PEG, probable SNF for chronic care

## 2019-06-25 NOTE — PROGRESS NOTE ADULT - SUBJECTIVE AND OBJECTIVE BOX
Hospital Day: 27  Post Operative Day:15  Procedure:peg tube placement  Patient is a 74y old  Female who presents with a chief complaint of left sided weakness and slurred speech, R thalamic hemorrhagic CVA (2019 21:10)    PAST MEDICAL & SURGICAL HISTORY:  Diabetes mellitus  Stroke  Hypertension  No significant past surgical history      Events of the Last 24h:per tube placement  Vital Signs Last 24 Hrs  T(C): 37.7 (2019 00:13), Max: 37.7 (2019 00:13)  T(F): 99.9 (2019 00:13), Max: 99.9 (2019 00:13)  HR: 62 (2019 18:00) (62 - 93)  BP: 140/68 (2019 00:13) (108/41 - 140/68)  BP(mean): 58 (2019 18:00) (58 - 90)  RR: 17 (2019 15:30) (14 - 17)  SpO2: 99% (2019 16:27) (99% - 99%)    Mode: AC/ CMV (Assist Control/ Continuous Mandatory Ventilation), RR (machine): 14, TV (machine): 400, FiO2: 40, PEEP: 5, ITime: 1, MAP: 10, PIP: 26    Diet, NPO with Tube Feed:   Tube Feeding Modality: Nasogastric  Glucerna 1.2 Rohan  Total Volume for 24 Hours (mL): 1440  Bolus   Total Volume of Bolus (mL):  240  Bolus Feed Rate (mL per Hour): 240   Bolus Feed Duration (in Hours): 1  Tube Feed Frequency: Every 4 hours   Tube Feed Start Time: 00:00 (19 @ 17:18)  Diet, NPO:   NPO for Procedure/Test     NPO Start Date: 2019,   NPO Start Time: 00:00  Except Medications (19 @ 00:33)      I&O's Summary    2019 07:  -  2019 07:00  --------------------------------------------------------  IN: 340 mL / OUT: 650 mL / NET: -310 mL    2019 07: 01:24  --------------------------------------------------------  IN: 0 mL / OUT: 202 mL / NET: -202 mL     I&O's Detail    2019 07: 07:00  --------------------------------------------------------  IN:    Enteral Tube Flush: 100 mL    Glucerna: 240 mL  Total IN: 340 mL    OUT:    Incontinent per Collection Ba mL  Total OUT: 650 mL    Total NET: -310 mL      2019 07: 01:24  --------------------------------------------------------  IN:  Total IN: 0 mL    OUT:    Incontinent per Collection Ba mL    Voided: 2 mL  Total OUT: 202 mL    Total NET: -202 mL          MEDICATIONS  (STANDING):  amLODIPine   Tablet 10 milliGRAM(s) Oral daily  chlorhexidine 0.12% Liquid 15 milliLiter(s) Oral Mucosa two times a day  chlorhexidine 4% Liquid 1 Application(s) Topical <User Schedule>  dextrose 5%. 1000 milliLiter(s) (50 mL/Hr) IV Continuous <Continuous>  dextrose 50% Injectable 12.5 Gram(s) IV Push once  dextrose 50% Injectable 25 Gram(s) IV Push once  dextrose 50% Injectable 25 Gram(s) IV Push once  heparin  Injectable 5000 Unit(s) SubCutaneous every 8 hours  hydrALAZINE 25 milliGRAM(s) Oral three times a day  insulin glargine Injectable (LANTUS) 18 Unit(s) SubCutaneous every morning  insulin lispro (HumaLOG) corrective regimen sliding scale   SubCutaneous every 4 hours  levETIRAcetam  IVPB 1000 milliGRAM(s) IV Intermittent every 12 hours  levETIRAcetam  Solution 1000 milliGRAM(s) Oral two times a day  losartan 25 milliGRAM(s) Oral daily  nystatin    Suspension 869224 Unit(s) Oral two times a day  pantoprazole   Suspension 40 milliGRAM(s) Oral daily    MEDICATIONS  (PRN):  acetaminophen   Tablet .. 650 milliGRAM(s) Oral every 6 hours PRN Temp greater or equal to 38C (100.4F)  dextrose 40% Gel 15 Gram(s) Oral once PRN Blood Glucose LESS THAN 70 milliGRAM(s)/deciliter  glucagon  Injectable 1 milliGRAM(s) IntraMuscular once PRN Glucose LESS THAN 70 milligrams/deciliter  midazolam Injectable 4 milliGRAM(s) IV Push once PRN procedure  morphine  - Injectable 4 milliGRAM(s) IV Push once PRN procedure      PHYSICAL EXAM:    GENERAL: NAD    HEENT: NCAT    CHEST/LUNGS: CTAB    HEART: RRR,  No murmurs, rubs, or gallops    ABDOMEN: SNTND +BS, peg tube to gravity     EXTREMITIES:  FROM, No clubbing, cyanosis, or edema, palpable pulse    NEURO: No focal neurological deficits    SKIN: No rashes or lesions    INCISION/WOUNDS:                          11.9   6.87  )-----------( 256      ( 2019 07:17 )             38.5        CBC Full  -  ( 2019 07:17 )  WBC Count : 6.87 K/uL  RBC Count : 4.61 M/uL  Hemoglobin : 11.9 g/dL  Hematocrit : 38.5 %  Platelet Count - Automated : 256 K/uL  Mean Cell Volume : 83.5 fL  Mean Cell Hemoglobin : 25.8 pg  Mean Cell Hemoglobin Concentration : 30.9 g/dL  Auto Neutrophil # : 4.39 K/uL  Auto Lymphocyte # : 1.63 K/uL  Auto Monocyte # : 0.49 K/uL  Auto Eosinophil # : 0.31 K/uL  Auto Basophil # : 0.04 K/uL  Auto Neutrophil % : 64.0 %  Auto Lymphocyte % : 23.7 %  Auto Monocyte % : 7.1 %  Auto Eosinophil % : 4.5 %  Auto Basophil % : 0.6 %               143   |  103   |  25                 Ca: 10.8   BMP:   ----------------------------< 210    M.3   (19 @ 07:17)             4.4    |  25    | 0.7                Ph: x        LFT:     TPro: 6.6 / Alb: 3.7 / TBili: 1.0 / DBili: x / AST: 12 / ALT: 22 / AlkPhos: 78   (19 @ 07:17)    LIVER FUNCTIONS - ( 2019 07:17 )  Alb: 3.7 g/dL / Pro: 6.6 g/dL / ALK PHOS: 78 U/L / ALT: 22 U/L / AST: 12 U/L / GGT: x           PT/INR - ( 2019 07:17 )   PT: 11.10 sec;   INR: 0.96 ratio         PTT - ( 2019 07:17 )  PTT:32.2 sec

## 2019-06-25 NOTE — PROGRESS NOTE ADULT - ASSESSMENT
keep peg tube to gravity   will advance as tolerated  pain management  continue current management plan  call surgery ct team as needed PEG cleared for ad sayda feeds  will advance as tolerated  pain management  continue current management plan  call surgery ct team as needed

## 2019-06-25 NOTE — PROGRESS NOTE ADULT - SUBJECTIVE AND OBJECTIVE BOX
Interval Events:  Patient seen and examined at bedside.  No new events.  Pt is s/p PEG tube        MEDICATIONS  (STANDING):  amLODIPine   Tablet 10 milliGRAM(s) Oral daily  chlorhexidine 0.12% Liquid 15 milliLiter(s) Oral Mucosa two times a day  chlorhexidine 4% Liquid 1 Application(s) Topical <User Schedule>  dextrose 5%. 1000 milliLiter(s) (50 mL/Hr) IV Continuous <Continuous>  dextrose 50% Injectable 12.5 Gram(s) IV Push once  dextrose 50% Injectable 25 Gram(s) IV Push once  dextrose 50% Injectable 25 Gram(s) IV Push once  heparin  Injectable 5000 Unit(s) SubCutaneous every 8 hours  hydrALAZINE 25 milliGRAM(s) Oral three times a day  insulin glargine Injectable (LANTUS) 18 Unit(s) SubCutaneous every morning  insulin lispro (HumaLOG) corrective regimen sliding scale   SubCutaneous every 4 hours  levETIRAcetam  IVPB 1000 milliGRAM(s) IV Intermittent every 12 hours  levETIRAcetam  Solution 1000 milliGRAM(s) Oral two times a day  losartan 25 milliGRAM(s) Oral daily  nystatin    Suspension 533940 Unit(s) Oral two times a day  pantoprazole   Suspension 40 milliGRAM(s) Oral daily  sodium chloride 0.45%. 1000 milliLiter(s) (75 mL/Hr) IV Continuous <Continuous>    MEDICATIONS  (PRN):  acetaminophen   Tablet .. 650 milliGRAM(s) Oral every 6 hours PRN Temp greater or equal to 38C (100.4F)  dextrose 40% Gel 15 Gram(s) Oral once PRN Blood Glucose LESS THAN 70 milliGRAM(s)/deciliter  glucagon  Injectable 1 milliGRAM(s) IntraMuscular once PRN Glucose LESS THAN 70 milligrams/deciliter  midazolam Injectable 4 milliGRAM(s) IV Push once PRN procedure  morphine  - Injectable 4 milliGRAM(s) IV Push once PRN procedure      Allergies    No Known Allergies    Intolerances        Vital Signs Last 24 Hrs  T(C): 36.7 (25 Jun 2019 08:00), Max: 37.7 (25 Jun 2019 00:13)  T(F): 98.1 (25 Jun 2019 08:00), Max: 99.9 (25 Jun 2019 00:13)  HR: 64 (25 Jun 2019 09:15) (62 - 93)  BP: 99/48 (25 Jun 2019 08:00) (99/48 - 140/68)  BP(mean): 66 (25 Jun 2019 08:00) (58 - 83)  RR: 18 (25 Jun 2019 08:00) (17 - 18)  SpO2: 100% (25 Jun 2019 09:15) (94% - 100%)    06-24 @ 07:01  -  06-25 @ 07:00  --------------------------------------------------------  IN: 0 mL / OUT: 352 mL / NET: -352 mL      Mode: standby  FiO2: 50      PHYSICAL EXAM:    Gen : NAD  MS: Waxing adn waning, follows some commands  HEENT: + trach, + NGT  CHEST: B/L breath sounds   ABDOMEN: soft   HEART:S1/S2 regular  SKIN: intact  Extremities: No C/C/E    LABS:      RADIOLOGY & ADDITIONAL STUDIES

## 2019-06-26 NOTE — PROGRESS NOTE ADULT - ASSESSMENT
HERNANDO HERNANDEZ 75yo W  Female BIBA to S side from home for elevated BP, slurred speech and L sided weakness.  The pt was noted to have a hypertensive emergency with R thalamic hemorrhagic  CVA. 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    Hospital course:   The pt underwent a stroke code and was intubated for airway protection and transferred [place] for further care. She remained in CCU and cont to be on respirator support.  The hospital course is complicated by seizures, prolonged QT,streptococcus bacteremia with positive blood cultures and positive GM negative rods in the urine.  She was on Ceftriaxone which was changed to Cefepime.   The pt failed weaning and required tracheostomy on 6/10 and was sent to the vent unit.  She failed S&S several times and required placement of PEG on 6/24.     INTERVAL HPI/OVERNIGHT EVENTS: pt underwent PEG placement 6/24. cleared by surgery for feeding, remains on vent via trach, little overall improvement in neurological status    MEDICATIONS  (STANDING):  amLODIPine   Tablet 10 milliGRAM(s) Oral daily  cefepime   IVPB 2000 milliGRAM(s) IV Intermittent every 8 hours D/C  Rocephine 2 gms q 24  chlorhexidine 0.12% Liquid 15 milliLiter(s) Oral Mucosa two times a day  chlorhexidine 4% Liquid 1 Application(s) Topical <User Schedule>  docusate sodium Liquid 100 milliGRAM(s) Oral three times a day  hydrALAZINE 25 milliGRAM(s) Oral three times a day  insulin regular Infusion 4 Unit(s)/Hr (4 mL/Hr) IV Continuous <Continuous>  levETIRAcetam  Solution 1000 milliGRAM(s) Oral two times a day  nystatin    Suspension [id number] Unit(s) Oral two times a day  pantoprazole   Suspension 40 milliGRAM(s) Oral daily  propofol Infusion. 1 MICROgram(s)/kG/Min (0.473 mL/Hr) IV Continuous <Continuous>  senna 2 Tablet(s) Oral at bedtime    MEDICATIONS  (PRN):  acetaminophen   Tablet .. 650 milliGRAM(s) Oral every 6 hours PRN Temp greater or equal to 38C (100.4F)      Allergies  NKDA     T(F): 98.2  HR: 92  BP: 132/60  RR: 18  SpO2: 99 %     PHYSICAL EXAM:      Constitutional: pt seen in vent unit, + trach, + vent support, somewhat groggy today    Eyes: non icteric, eyes wide opened, no droop    ENMT: dry oral mucosa    Neck:  short and supple, + trach    Respiratory:  harsh respirator BS    Cardiovascular:  S1S2    Gastrointestinal: globose soft and benign, + PEG    Genitourinary:  no villatoro    Extremities: arthritic changes, moves upper ext, dec motor strength of lower ext    LABS:                       10  7 )-----------( 201             34    140 |  102 |  21  ----------------------------<  208  4.1  |  26   |  0.6    GFR 87, 91  Ca    9.7       Mg    2.2, 2.3, 2.1    TPro  5.6<L>  /  Alb  3.0, 3.3 /  TBili  0.5, 6.0  /  DBili  x   /  AST  9   /  ALT  23  /  AlkPhos  88  06-06    urine GM- rods  Blood streptococcus  sputum streptococcus      RADIOLOGY & ADDITIONAL TESTS:     EKG NSR 66/min   CXR  +ET tube, + feeding tube    CT of head R thalamic parenchymal hematoma 2.2 cm with intraventricular extension, mild ventricular enlargement, white mater changes     repeat CTH:  evolution of the  R thalamic hemorrhage which has dec, ventricular size stable with mild enlargement, white matter changes, stable chronic hematoma cavity within the L basal ganglia, chronic lacunar infarct in BL basal ganglia and L thalamus      Stroke code, R thalamic hemorrhagic CVA with extension to ventricles, repeat studies show no progression and some improvement of the hematoma  sp Hypertensive emergency  Seizures, controlled on Keppra  Streptococcal Bacteremia probably from transient PNA,   Marcio negative UTI    Hx of HTN, ASHD  Hx of DLD  Hx of old hemorrhagic CVA about 8 yrs ago, dysarthria, gait instability  Hx of OA, DDD, DJD  Hx of GERD, HH, Diverticulosis  Hx of anxiety    pt is sp trach  6/10, today much more alert, eyes fully open, on CPAP  neurology ff up appreciated:  dec keppra to 1000mg 2x to lessen sedation, repeat CTH  S&S attempts have failed  Pt is being fed via oral feeding tube    Surgical consult for PEG 6/24, cleared to use for feeding    pt is sp ceftriaxone, blood culture 6/4 + for strept, source probable  LLL  strept PNA, + sputa for strept and CT shows infiltrate,  urine + for Gm neg rods/asymptomatic pyuria, completed the course of ABX  seizures  controlled on Keppra 1000mg q12, VEEG shows focal and generalized slowing    monitor electrolytes    pt is ff by pul critical care, ID  oral care, skin care and decubiti prevention    spoke with daughter and son re pt's lack of neurological improvement, also inability to swallow and breath without the respirator, all making for poor prognosis, after the placement of the PEG, probable SNF for chronic care

## 2019-06-26 NOTE — PROGRESS NOTE ADULT - ASSESSMENT
· Assessment		  Impression:    Intra cerebral Bleed/ following simple commands  Seizure  Hypertensive emergency  HO hemorrhagic stroke  penumonia aspiration  UTI    PLAN:    CNS: Continue with keppra per neuro    HEENT: Trach care    PULMONARY:  HOB @ 30 degrees. no vent changes, wean as tolerated     CARDIOVASCULAR: i=o. Continue with antihypertensives    GI: GI prophylaxis.  Ok to use PEG for feeds    RENAL:  Follow up lytes.  Correct as needed    INFECTIOUS DISEASE: per ID    HEMATOLOGICAL:   DVT prophylaxis.    ENDOCRINE:  Follow up FS.     MUSCULOSKELETAL: Bed rest

## 2019-06-26 NOTE — CHART NOTE - NSCHARTNOTEFT_GEN_A_CORE
Limited f/u by MIA Orozco    RN spoken with. Pt continues to be on Glucerna 1.2 at 240cc q4hr (hold 2am)   NOW ON PEG TUBE.  Labs and meds reviewed  LBM 6/25. Intact No edema    Wt trend:  (6/4): 79.4kg  (6/10): 79.2kg  (6/11): 80.9kg   6/24: 78.5kg - relatively stable for now      Pt failed weaning and required trached 6/10.   Failed SLP multiple times. s/p PEG 6/24.  Stroke- R thalamic hemorrhage CVA w/ extension to ventricle.   Some improvement so far  Trached and PEG.    Remains not at risk

## 2019-06-26 NOTE — PROGRESS NOTE ADULT - SUBJECTIVE AND OBJECTIVE BOX
Patient is a 74y old  Female who presents with a chief complaint of left sided weakness and slurred speech, R thalamic hemorrhagic CVA (25 Jun 2019 21:55)      HPI:  74 y.o F with PMH of HTN, HLD, DM, hemorrhagic stroke,? EVD placement at that time ( about 8 years ago)  with residual left side weakness and slurred speech, ambulated with cane, on ASA daily  Pt. brought to [facility] ED as a stroke code. At the time of presentation to [facility] ED A&Ox 2, baseline slurped speech with left sided weakness. /82, 55 HR, 18 RR, CTH prelim report findings with acute right thalamic hemorrhage with extension to right lateral and third ventricle. At [facility] ED Pt. Intubated 2/2 worsening of neuro exam for air way protection as per ED attending note Pt.  became more drowsy with decreased responsiveness. Pt. was transferred to ED-[place]. At time of NSGY eval Pt intubated, sedated on Propofol and Fentanyl, radial arterial line, she received keppra, zofran and mannitol in ED. Repeat CT scan stable from prior study as per neurosurgery. Platelets one unit ordered as per neurosurgery (30 May 2019 02:50)      HOSPITAL DAY NO: 27d    Overnight events         Patient is a 74y old  Female who presents with a chief complaint of left sided weakness and slurred speech, R thalamic hemorrhagic CVA (25 Jun 2019 21:55)  HYPERTENSIVE EMERGENCY;INTRACRANIAL HEMORRHAGE  ^HYPERTENSIVE EMERGENCY;INTRACRANIAL HEMORRHAGE  No pertinent family history in first degree relatives  Handoff  MEWS Score  Diabetes mellitus  Stroke  Hypertension  Failure respiratory  Failure respiratory  Hypertensive emergency  Open tracheostomy  No significant past surgical history  FALL  Intracranial hemorrhage      Vital Signs Last 24 Hrs  T(C): 37.1 (26 Jun 2019 07:31), Max: 37.7 (26 Jun 2019 00:21)  T(F): 98.7 (26 Jun 2019 07:31), Max: 99.9 (26 Jun 2019 00:21)  HR: 62 (26 Jun 2019 08:30) (62 - 84)  BP: 144/85 (26 Jun 2019 07:31) (111/60 - 144/85)  BP(mean): 105 (26 Jun 2019 07:31) (71 - 105)  RR: 20 (26 Jun 2019 07:31) (18 - 20)  SpO2: 98% (26 Jun 2019 08:30) (96% - 98%)    use of pressors:     use of sedation:     Vent dependent:     Mode: AC/ CMV (Assist Control/ Continuous Mandatory Ventilation)  RR (machine): 14  TV (machine): 400  FiO2: 40  PEEP: 5  ITime: 1  MAP: 10  PIP: 28                                          Weaning time:     Significant exam findings:     Gen : NAD  MS: awake alert, following commands  HEENT: + trach  CHEST: B/L breath sounds   ABDOMEN: soft, + PEG   HEART:S1/S2 regular  SKIN: intact  Extremities: No C/C/E    No of BMs:       Nutrition:   via                       LABS:                          10.6   7.09  )-----------( 201      ( 26 Jun 2019 11:29 )             34.1                                               06-26    140  |  102  |  21<H>  ----------------------------<  208<H>  4.1   |  26  |  0.6<L>    Ca    10.5<H>      26 Jun 2019 11:29  Phos  3.5     06-26  Mg     2.1     06-26        PT/INR - ( 26 Jun 2019 11:29 )   PT: 11.80 sec;   INR: 1.03 ratio         PTT - ( 26 Jun 2019 11:29 )  PTT:37.0 sec                                                                                                                                            MEDICATIONS  (STANDING):  amLODIPine   Tablet 10 milliGRAM(s) Oral daily  chlorhexidine 0.12% Liquid 15 milliLiter(s) Oral Mucosa two times a day  chlorhexidine 4% Liquid 1 Application(s) Topical <User Schedule>  dextrose 5%. 1000 milliLiter(s) (50 mL/Hr) IV Continuous <Continuous>  dextrose 50% Injectable 12.5 Gram(s) IV Push once  dextrose 50% Injectable 25 Gram(s) IV Push once  dextrose 50% Injectable 25 Gram(s) IV Push once  heparin  Injectable 5000 Unit(s) SubCutaneous every 8 hours  hydrALAZINE 25 milliGRAM(s) Oral three times a day  insulin glargine Injectable (LANTUS) 18 Unit(s) SubCutaneous every morning  insulin lispro (HumaLOG) corrective regimen sliding scale   SubCutaneous every 4 hours  levETIRAcetam  Solution 1000 milliGRAM(s) Oral two times a day  losartan 25 milliGRAM(s) Oral daily  nystatin    Suspension [id number] Unit(s) Oral two times a day  pantoprazole   Suspension 40 milliGRAM(s) Oral daily    MEDICATIONS  (PRN):  acetaminophen   Tablet .. 650 milliGRAM(s) Oral every 6 hours PRN Temp greater or equal to 38C (100.4F)  dextrose 40% Gel 15 Gram(s) Oral once PRN Blood Glucose LESS THAN 70 milliGRAM(s)/deciliter  glucagon  Injectable 1 milliGRAM(s) IntraMuscular once PRN Glucose LESS THAN 70 milligrams/deciliter  midazolam Injectable 4 milliGRAM(s) IV Push once PRN procedure  morphine  - Injectable 4 milliGRAM(s) IV Push once PRN procedure          Radiology       Case discussed with staff and family at the bedside

## 2019-06-27 NOTE — PROGRESS NOTE ADULT - ASSESSMENT
· Assessment		  Impression:    Intra cerebral Bleed/ following simple commands  Seizure  Hypertensive emergency  HO hemorrhagic stroke  penumonia aspiration  UTI    PLAN:    CNS: Continue with keppra per neuro    HEENT: Trach care. Speech to re-evaluate    PULMONARY:  HOB @ 30 degrees. no vent changes, wean as tolerated     CARDIOVASCULAR: i=o. Continue with antihypertensives    GI: GI prophylaxis.  Ok to use PEG for feeds    RENAL:  Follow up lytes.  Correct as needed    INFECTIOUS DISEASE: per ID    HEMATOLOGICAL:   DVT prophylaxis.    ENDOCRINE:  Follow up FS.     MUSCULOSKELETAL: Bed rest · Assessment		  Impression:    Intra cerebral Bleed/ following simple commands  Seizure  Hypertensive emergency  HO hemorrhagic stroke  penumonia aspiration  UTI    PLAN:    CNS: Continue with keppra per neuro    HEENT: Trach care. Speech to re-evaluate    PULMONARY:  HOB @ 30 degrees. no vent changes, wean as tolerated     CARDIOVASCULAR: i=o. Continue with antihypertensives    GI: GI prophylaxis.  Ok to use PEG for feeds    RENAL:  Follow up lytes.  Correct as needed    INFECTIOUS DISEASE: per ID    HEMATOLOGICAL:   DVT prophylaxis.    ENDOCRINE:  Follow up FS.     MUSCULOSKELETAL: Bed rest    poor prognosis

## 2019-06-27 NOTE — PROGRESS NOTE ADULT - SUBJECTIVE AND OBJECTIVE BOX
Patient is a 74y old  Female who presents with a chief complaint of left sided weakness and slurred speech, R thalamic hemorrhagic CVA (26 Jun 2019 22:32)      HPI:  74 y.o F with PMH of HTN, HLD, DM, hemorrhagic stroke,? EVD placement at that time ( about 8 years ago)  with residual left side weakness and slurred speech, ambulated with cane, on ASA daily  Pt. brought to [facility] ED as a stroke code. At the time of presentation to [facility] ED A&Ox 2, baseline slurped speech with left sided weakness. /82, 55 HR, 18 RR, CTH prelim report findings with acute right thalamic hemorrhage with extension to right lateral and third ventricle. At [facility] ED Pt. Intubated 2/2 worsening of neuro exam for air way protection as per ED attending note Pt.  became more drowsy with decreased responsiveness. Pt. was transferred to ED-[place]. At time of NSGY eval Pt intubated, sedated on Propofol and Fentanyl, radial arterial line, she received keppra, zofran and mannitol in ED. Repeat CT scan stable from prior study as per neurosurgery. Platelets one unit ordered as per neurosurgery (30 May 2019 02:50)      HOSPITAL DAY NO: 28d    Overnight events         Patient is a 74y old  Female who presents with a chief complaint of left sided weakness and slurred speech, R thalamic hemorrhagic CVA (26 Jun 2019 22:32)  HYPERTENSIVE EMERGENCY;INTRACRANIAL HEMORRHAGE  ^HYPERTENSIVE EMERGENCY;INTRACRANIAL HEMORRHAGE  No pertinent family history in first degree relatives  Handoff  MEWS Score  Diabetes mellitus  Stroke  Hypertension  Failure respiratory  Failure respiratory  Hypertensive emergency  Open tracheostomy  No significant past surgical history  FALL  Intracranial hemorrhage      Vital Signs Last 24 Hrs  T(C): 37.3 (27 Jun 2019 07:50), Max: 37.3 (27 Jun 2019 07:50)  T(F): 99.1 (27 Jun 2019 07:50), Max: 99.1 (27 Jun 2019 07:50)  HR: 72 (27 Jun 2019 07:50) (65 - 92)  BP: 109/54 (27 Jun 2019 07:50) (109/54 - 132/60)  BP(mean): 75 (27 Jun 2019 07:50) (75 - 89)  RR: 15 (27 Jun 2019 07:50) (15 - 18)  SpO2: 98% (27 Jun 2019 07:26) (98% - 100%)    use of pressors:     use of sedation:     Vent dependent:     Mode: AC/ CMV (Assist Control/ Continuous Mandatory Ventilation)  RR (machine): 14  TV (machine): 400  FiO2: 40  PEEP: 5  MAP: 10  PIP: 35                                          Weaning time:     Significant exam findings:     Gen :   MS:  CHEST: B/L breath sounds   ABDOMEN: soft   HEART:S1/S2 regular  SKIN:   Extremities    No of BMs:       Nutrition:   via                       LABS:                          10.6   7.09  )-----------( 201      ( 26 Jun 2019 11:29 )             34.1                                               06-26    140  |  102  |  21<H>  ----------------------------<  208<H>  4.1   |  26  |  0.6<L>    Ca    10.5<H>      26 Jun 2019 11:29  Phos  3.5     06-26  Mg     2.1     06-26        PT/INR - ( 26 Jun 2019 11:29 )   PT: 11.80 sec;   INR: 1.03 ratio         PTT - ( 26 Jun 2019 11:29 )  PTT:37.0 sec                                                                                                                                            MEDICATIONS  (STANDING):  amLODIPine   Tablet 10 milliGRAM(s) Oral daily  chlorhexidine 0.12% Liquid 15 milliLiter(s) Oral Mucosa two times a day  chlorhexidine 4% Liquid 1 Application(s) Topical <User Schedule>  dextrose 5%. 1000 milliLiter(s) (50 mL/Hr) IV Continuous <Continuous>  dextrose 50% Injectable 12.5 Gram(s) IV Push once  dextrose 50% Injectable 25 Gram(s) IV Push once  dextrose 50% Injectable 25 Gram(s) IV Push once  heparin  Injectable 5000 Unit(s) SubCutaneous every 8 hours  hydrALAZINE 25 milliGRAM(s) Oral three times a day  insulin glargine Injectable (LANTUS) 18 Unit(s) SubCutaneous every morning  insulin lispro (HumaLOG) corrective regimen sliding scale   SubCutaneous every 4 hours  levETIRAcetam  Solution 1000 milliGRAM(s) Oral two times a day  losartan 25 milliGRAM(s) Oral daily  nystatin    Suspension [id number] Unit(s) Oral two times a day  pantoprazole   Suspension 40 milliGRAM(s) Oral daily    MEDICATIONS  (PRN):  acetaminophen   Tablet .. 650 milliGRAM(s) Oral every 6 hours PRN Temp greater or equal to 38C (100.4F)  dextrose 40% Gel 15 Gram(s) Oral once PRN Blood Glucose LESS THAN 70 milliGRAM(s)/deciliter  glucagon  Injectable 1 milliGRAM(s) IntraMuscular once PRN Glucose LESS THAN 70 milligrams/deciliter  midazolam Injectable 4 milliGRAM(s) IV Push once PRN procedure  morphine  - Injectable 4 milliGRAM(s) IV Push once PRN procedure          Radiology       Case discussed with staff and family at the bedside Patient is a 74y old  Female who presents with a chief complaint of left sided weakness and slurred speech, R thalamic hemorrhagic CVA (26 Jun 2019 22:32)          HOSPITAL DAY NO: 28d    Overnight events noted, off vent 14 h          Vital Signs Last 24 Hrs  T(C): 37.3 (27 Jun 2019 07:50), Max: 37.3 (27 Jun 2019 07:50)  T(F): 99.1 (27 Jun 2019 07:50), Max: 99.1 (27 Jun 2019 07:50)  HR: 72 (27 Jun 2019 07:50) (65 - 92)  BP: 109/54 (27 Jun 2019 07:50) (109/54 - 132/60)  BP(mean): 75 (27 Jun 2019 07:50) (75 - 89)  RR: 15 (27 Jun 2019 07:50) (15 - 18)  SpO2: 98% (27 Jun 2019 07:26) (98% - 100%)    Mode: AC/ CMV (Assist Control/ Continuous Mandatory Ventilation)  RR (machine): 14  TV (machine): 400  FiO2: 40  PEEP: 5  MAP: 10  PIP: 35                                            awake follows commands  CHEST: B/L breath sounds   ABDOMEN: soft   HEART:S1/S2 regular  Neuro unchanged  swelling +                           LABS:                          10.6   7.09  )-----------( 201      ( 26 Jun 2019 11:29 )             34.1                                               06-26    140  |  102  |  21<H>  ----------------------------<  208<H>  4.1   |  26  |  0.6<L>    Ca    10.5<H>      26 Jun 2019 11:29  Phos  3.5     06-26  Mg     2.1     06-26        PT/INR - ( 26 Jun 2019 11:29 )   PT: 11.80 sec;   INR: 1.03 ratio         PTT - ( 26 Jun 2019 11:29 )  PTT:37.0 sec                                                                                                                                            MEDICATIONS  (STANDING):  amLODIPine   Tablet 10 milliGRAM(s) Oral daily  chlorhexidine 0.12% Liquid 15 milliLiter(s) Oral Mucosa two times a day  chlorhexidine 4% Liquid 1 Application(s) Topical <User Schedule>  dextrose 5%. 1000 milliLiter(s) (50 mL/Hr) IV Continuous <Continuous>  dextrose 50% Injectable 12.5 Gram(s) IV Push once  dextrose 50% Injectable 25 Gram(s) IV Push once  dextrose 50% Injectable 25 Gram(s) IV Push once  heparin  Injectable 5000 Unit(s) SubCutaneous every 8 hours  hydrALAZINE 25 milliGRAM(s) Oral three times a day  insulin glargine Injectable (LANTUS) 18 Unit(s) SubCutaneous every morning  insulin lispro (HumaLOG) corrective regimen sliding scale   SubCutaneous every 4 hours  levETIRAcetam  Solution 1000 milliGRAM(s) Oral two times a day  losartan 25 milliGRAM(s) Oral daily  nystatin    Suspension [id number] Unit(s) Oral two times a day  pantoprazole   Suspension 40 milliGRAM(s) Oral daily    MEDICATIONS  (PRN):  acetaminophen   Tablet .. 650 milliGRAM(s) Oral every 6 hours PRN Temp greater or equal to 38C (100.4F)  dextrose 40% Gel 15 Gram(s) Oral once PRN Blood Glucose LESS THAN 70 milliGRAM(s)/deciliter  glucagon  Injectable 1 milliGRAM(s) IntraMuscular once PRN Glucose LESS THAN 70 milligrams/deciliter  midazolam Injectable 4 milliGRAM(s) IV Push once PRN procedure  morphine  - Injectable 4 milliGRAM(s) IV Push once PRN procedure          Radiology       Case discussed with staff and family at the bedside

## 2019-06-27 NOTE — PROGRESS NOTE ADULT - ASSESSMENT
HERNANDO HERNANDEZ 73yo W  Female BIBA to S side from home for elevated BP, slurred speech and L sided weakness.  The pt was noted to have a hypertensive emergency with R thalamic hemorrhagic  CVA. 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    Hospital course:   The pt underwent a stroke code and was intubated for airway protection and transferred North for further care. She remained in CCU and cont to be on respirator support.  The hospital course is complicated by seizures, prolonged QT,streptococcus bacteremia with positive blood cultures and positive GM negative rods in the urine.  She was on Ceftriaxone which was changed to Cefepime.   The pt failed weaning and required tracheostomy on 6/10 and was sent to the vent unit.  She failed S&S several times and required placement of PEG on 6/24.     INTERVAL HPI/OVERNIGHT EVENTS: pt alert tries to mouth words, slow weaning process in progress, today on T tube,  + PEG tube, started working on placement    MEDICATIONS  (STANDING):  amLODIPine   Tablet 10 milliGRAM(s) Oral daily  cefepime   IVPB 2000 milliGRAM(s) IV Intermittent every 8 hours D/C  Rocephine 2 gms q 24  chlorhexidine 0.12% Liquid 15 milliLiter(s) Oral Mucosa two times a day  chlorhexidine 4% Liquid 1 Application(s) Topical <User Schedule>  docusate sodium Liquid 100 milliGRAM(s) Oral three times a day  hydrALAZINE 25 milliGRAM(s) Oral three times a day  insulin regular Infusion 4 Unit(s)/Hr (4 mL/Hr) IV Continuous <Continuous>  levETIRAcetam  Solution 1000 milliGRAM(s) Oral two times a day  nystatin    Suspension [id number] Unit(s) Oral two times a day  pantoprazole   Suspension 40 milliGRAM(s) Oral daily  propofol Infusion. 1 MICROgram(s)/kG/Min (0.473 mL/Hr) IV Continuous <Continuous>  senna 2 Tablet(s) Oral at bedtime    MEDICATIONS  (PRN):  acetaminophen   Tablet .. 650 milliGRAM(s) Oral every 6 hours PRN Temp greater or equal to 38C (100.4F)      Allergies  NKDA     T(F): 99  HR: 78  BP: 126/59  RR: 18  SpO2: 95 %     PHYSICAL EXAM:      Constitutional: pt seen in vent unit, + trach, trial of T tube today, trying to mouth words, NAD    Eyes: non icteric, eyes wide open    ENMT: dry oral mucosa    Neck:  short and supple, + trach    Respiratory:  harsh respirator BS    Cardiovascular:  S1S2    Gastrointestinal: globose soft and benign, + PEG    Genitourinary:  no villatoro    Extremities: arthritic changes, moves upper ext, dec motor strength of lower ext    LABS:       6/26/19                10  7 )-----------( 201             34    140 |  102 |  21  ----------------------------<  208  4.1  |  26   |  0.6    GFR 87, 91  Ca    9.7       Mg    2.2, 2.3, 2.1    TPro  5.6<L>  /  Alb  3.0, 3.3 /  TBili  0.5, 6.0  /  DBili  x   /  AST  9   /  ALT  23  /  AlkPhos  88  06-06    urine GM- rods  Blood streptococcus  sputum streptococcus      RADIOLOGY & ADDITIONAL TESTS:     EKG NSR 66/min   CXR  +ET tube, + feeding tube    CT of head R thalamic parenchymal hematoma 2.2 cm with intraventricular extension, mild ventricular enlargement, white mater changes     repeat CTH:  evolution of the  R thalamic hemorrhage which has dec, ventricular size stable with mild enlargement, white matter changes, stable chronic hematoma cavity within the L basal ganglia, chronic lacunar infarct in BL basal ganglia and L thalamus      Stroke code, R thalamic hemorrhagic CVA with extension to ventricles, repeat studies show no progression and some improvement of the hematoma  sp Hypertensive emergency  Seizures, controlled on Keppra  Streptococcal Bacteremia probably from transient PNA,   Marcio negative UTI    Hx of HTN, ASHD  Hx of DLD  Hx of old hemorrhagic CVA about 8 yrs ago, dysarthria, gait instability  Hx of OA, DDD, DJD  Hx of GERD, HH, Diverticulosis  Hx of anxiety    pt is sp trach  6/10,  more alert, eyes fully open, on trial of T tube, will need slow wean  neurology ff up appreciated:  dec keppra to 1000mg 2x to lessen sedation, repeat CTH  S&S attempts have failed  sp PEG  6/24  pt is sp ceftriaxone, blood culture 6/4 + for strept, source probable  LLL  strept PNA, + sputa for strept and CT shows infiltrate,  urine + for Gm neg rods/asymptomatic pyuria, completed the course of ABX  seizures  controlled on Keppra 1000mg q12, VEEG shows focal and generalized slowing    monitor electrolytes    pt is ff by pul /critical care,  oral care, skin care and decubiti prevention    social SVC/D/C planning working with pt on SNF placement, pt will need slow weaning process and slow prolonged rehab

## 2019-06-28 NOTE — SWALLOW FEES ASSESSMENT ADULT - RECOMMENDED FEEDING/EATING TECHNIQUES
position upright (90 degrees)/oral hygiene
position upright (90 degrees)/use of straw/small sips/bites

## 2019-06-28 NOTE — PHYSICAL THERAPY INITIAL EVALUATION ADULT - SITTING BALANCE: STATIC
edge of  bed, Patient listed to R require constand verbal and phsycal cues to maintain with R UE suport    and L Ue holding chair arm/poor balance

## 2019-06-28 NOTE — PHYSICAL THERAPY INITIAL EVALUATION ADULT - GENERAL OBSERVATIONS, REHAB EVAL
[id number] Patient encountered in chair mode in bed + IV lock, + trach , + Pox , peg tube ,+ B ez boots anmd plexi pulses  NAD receptive to PT

## 2019-06-28 NOTE — PHYSICAL THERAPY INITIAL EVALUATION ADULT - PERTINENT HX OF CURRENT PROBLEM, REHAB EVAL
Patient is a 75 yo female transferred from [facility] with R thalamic hemorhagic CVA and seizures  with L sided weakness and slurred speech

## 2019-06-28 NOTE — OCCUPATIONAL THERAPY INITIAL EVALUATION ADULT - LEVEL OF INDEPENDENCE: SIT/STAND, REHAB EVAL
unable to perform/unable to complete initial lift of bed with assistance from therapist secondary to decreased strength throughout bue/ble/trunk

## 2019-06-28 NOTE — PROGRESS NOTE ADULT - ASSESSMENT
HERNANDO HERNANDEZ 73yo W  Female BIBA to S side from home for elevated BP, slurred speech and L sided weakness.  The pt was noted to have a hypertensive emergency with R thalamic hemorrhagic  CVA. 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    Hospital course:   The pt underwent a stroke code and was intubated for airway protection and transferred North for further care. She remained in CCU and cont to be on respirator support.  The hospital course is complicated by seizures, prolonged QT,streptococcus bacteremia with positive blood cultures and positive GM negative rods in the urine.  She was on Ceftriaxone which was changed to Cefepime.   The pt failed weaning and required tracheostomy on 6/10 and was sent to the vent unit.  She failed S&S several times and required placement of PEG on 6/24.     INTERVAL HPI/OVERNIGHT EVENTS: pt more alert,  OOB to chair for first time, S&S attempts at swallowing noted, trials of weaning in progress but slow and needs 12 hrs on and 12 hrs off, on T tube pulse ox 90, social SVC working for appropriate placement    MEDICATIONS  (STANDING):  amLODIPine   Tablet 10 milliGRAM(s) Oral daily  cefepime   IVPB 2000 milliGRAM(s) IV Intermittent every 8 hours D/C  Rocephine 2 gms q 24  chlorhexidine 0.12% Liquid 15 milliLiter(s) Oral Mucosa two times a day  chlorhexidine 4% Liquid 1 Application(s) Topical <User Schedule>  docusate sodium Liquid 100 milliGRAM(s) Oral three times a day  hydrALAZINE 25 milliGRAM(s) Oral three times a day  insulin regular Infusion 4 Unit(s)/Hr (4 mL/Hr) IV Continuous <Continuous>  levETIRAcetam  Solution 1000 milliGRAM(s) Oral two times a day  nystatin    Suspension [id number] Unit(s) Oral two times a day  pantoprazole   Suspension 40 milliGRAM(s) Oral daily  propofol Infusion. 1 MICROgram(s)/kG/Min (0.473 mL/Hr) IV Continuous <Continuous>  senna 2 Tablet(s) Oral at bedtime    MEDICATIONS  (PRN):  acetaminophen   Tablet .. 650 milliGRAM(s) Oral every 6 hours PRN Temp greater or equal to 38C (100.4F)      Allergies  NKDA     T(F): 99  HR: 78  BP: 126/59  RR: 18  SpO2: 95 %     PHYSICAL EXAM:      Constitutional: pt seen in vent unit, + trach, trial of T tube today, trying to mouth words, NAD    Eyes: non icteric, eyes wide open    ENMT: dry oral mucosa    Neck:  short and supple, + trach    Respiratory:  harsh respirator BS    Cardiovascular:  S1S2    Gastrointestinal: globose soft and benign, + PEG    Genitourinary:  no villatoro    Extremities: arthritic changes, moves upper ext, dec motor strength of lower ext    LABS:       6/26/19                10  7 )-----------( 201             34    140 |  102 |  21  ----------------------------<  208  4.1  |  26   |  0.6    GFR 87, 91  Ca    9.7       Mg    2.2, 2.3, 2.1    TPro  5.6<L>  /  Alb  3.0, 3.3 /  TBili  0.5, 6.0  /  DBili  x   /  AST  9   /  ALT  23  /  AlkPhos  88  06-06    urine GM- rods  Blood streptococcus  sputum streptococcus      RADIOLOGY & ADDITIONAL TESTS:     EKG NSR 66/min   CXR  +ET tube, + feeding tube    CT of head R thalamic parenchymal hematoma 2.2 cm with intraventricular extension, mild ventricular enlargement, white mater changes     repeat CTH:  evolution of the  R thalamic hemorrhage which has dec, ventricular size stable with mild enlargement, white matter changes, stable chronic hematoma cavity within the L basal ganglia, chronic lacunar infarct in BL basal ganglia and L thalamus      Stroke code, R thalamic hemorrhagic CVA with extension to ventricles, repeat studies show no progression and some improvement of the hematoma  sp Hypertensive emergency  Seizures, controlled on Keppra  Streptococcal Bacteremia probably from transient PNA,   Marcio negative UTI    Hx of HTN, ASHD  Hx of DLD  Hx of old hemorrhagic CVA about 8 yrs ago, dysarthria, gait instability  Hx of OA, DDD, DJD  Hx of GERD, HH, Diverticulosis  Hx of anxiety    pt is sp trach  6/10,  more alert, eyes fully open, on trial of T tube, will need slow wean, 12 hrs off and 12 hrs on  neurology ff up appreciated:  dec keppra to 1000mg 2x to lessen sedation, repeat CTH  S&S attempts have failed in the past, today first time pt making some adequate swallowing attempts  sp PEG  6/24  pt is sp ceftriaxone, blood culture 6/4 + for strept, source probable  LLL  strept PNA, + sputa for strept and CT shows infiltrate,  urine + for Gm neg rods/asymptomatic pyuria, completed the course of ABX  seizures  controlled on Keppra 1000mg q12, VEEG shows focal and generalized slowing    monitor electrolytes    pt is ff by pul /critical care,  oral care, skin care and decubiti prevention    social SVC/D/C planning working with pt on SNF placement, pt will need slow weaning process and slow prolonged rehab

## 2019-06-28 NOTE — PHYSICAL THERAPY INITIAL EVALUATION ADULT - IMPAIRED TRANSFERS: BED/CHAIR, REHAB EVAL
impaired balance/decreased strength/impaired motor control/abnormal muscle tone/decreased flexibility

## 2019-06-28 NOTE — CHART NOTE - NSCHARTNOTEFT_GEN_A_CORE
Patient now started dysphagia 3 with nectar diet.  RD suggested some DM friendly supplements for now with the diet to improve PO.  RD to monitor PO trend.

## 2019-06-28 NOTE — PHYSICAL THERAPY INITIAL EVALUATION ADULT - LEVEL OF INDEPENDENCE: GAIT, REHAB EVAL
Patient not Performed: did not occur this session, pt received OOB in chair advance either LE during stand pivot/unable to perform

## 2019-06-28 NOTE — OCCUPATIONAL THERAPY INITIAL EVALUATION ADULT - PLANNED THERAPY INTERVENTIONS, OT EVAL
bed mobility training/motor coordination training/IADL retraining/balance training/fine motor coordination training/parent/caregiver training.../cognitive, visual perceptual/transfer training/ROM/strengthening/ADL retraining

## 2019-06-28 NOTE — PROGRESS NOTE ADULT - SUBJECTIVE AND OBJECTIVE BOX
Patient is a 74y old  Female who presents with a chief complaint of left sided weakness and slurred speech/ R hemorrhagic CVA (27 Jun 2019 14:36)      HPI:  74 y.o F with PMH of HTN, HLD, DM, hemorrhagic stroke,? EVD placement at that time ( about 8 years ago)  with residual left side weakness and slurred speech, ambulated with cane, on ASA daily  Pt. brought to [facility] ED as a stroke code. At the time of presentation to [facility] ED A&Ox 2, baseline slurped speech with left sided weakness. /82, 55 HR, 18 RR, CTH prelim report findings with acute right thalamic hemorrhage with extension to right lateral and third ventricle. At [facility] ED Pt. Intubated 2/2 worsening of neuro exam for air way protection as per ED attending note Pt.  became more drowsy with decreased responsiveness. Pt. was transferred to ED-[place]. At time of NSGY eval Pt intubated, sedated on Propofol and Fentanyl, radial arterial line, she received keppra, zofran and mannitol in ED. Repeat CT scan stable from prior study as per neurosurgery. Platelets one unit ordered as per neurosurgery (30 May 2019 02:50)      HOSPITAL DAY NO: 29d    Overnight events No events        Patient is a 74y old  Female who presents with a chief complaint of left sided weakness and slurred speech/ R hemorrhagic CVA (27 Jun 2019 14:36)  HYPERTENSIVE EMERGENCY;INTRACRANIAL HEMORRHAGE  ^HYPERTENSIVE EMERGENCY;INTRACRANIAL HEMORRHAGE  No pertinent family history in first degree relatives  Handoff  MEWS Score  Diabetes mellitus  Stroke  Hypertension  Failure respiratory  Failure respiratory  Hypertensive emergency  Open tracheostomy  No significant past surgical history  FALL  Intracranial hemorrhage      Vital Signs Last 24 Hrs  T(C): 37.3 (28 Jun 2019 08:00), Max: 37.3 (27 Jun 2019 23:30)  T(F): 99.1 (28 Jun 2019 08:00), Max: 99.1 (27 Jun 2019 23:30)  HR: 71 (28 Jun 2019 08:00) (64 - 97)  BP: 121/56 (28 Jun 2019 08:00) (121/56 - 140/55)  BP(mean): 80 (28 Jun 2019 08:00) (75 - 88)  RR: 20 (28 Jun 2019 08:00) (16 - 20)  SpO2: 98% (28 Jun 2019 07:01) (82% - 99%)    Mode: standby                                          Weaning time:     Significant exam findings:     Gen : NAD  MS: Awake alert and following commands  HEENT: + trach  CHEST: B/L breath sounds   ABDOMEN: soft , + PEG  HEART:S1/S2 regular  SKIN: intact  Extremities: right sided hemiparesis    No of BMs:       Nutrition:   via                       LABS:                          10.6   7.09  )-----------( 201      ( 26 Jun 2019 11:29 )             34.1                                               06-26    140  |  102  |  21<H>  ----------------------------<  208<H>  4.1   |  26  |  0.6<L>    Ca    10.5<H>      26 Jun 2019 11:29  Phos  3.5     06-26  Mg     2.1     06-26        PT/INR - ( 26 Jun 2019 11:29 )   PT: 11.80 sec;   INR: 1.03 ratio         PTT - ( 26 Jun 2019 11:29 )  PTT:37.0 sec                                                                                                                                            MEDICATIONS  (STANDING):  amLODIPine   Tablet 10 milliGRAM(s) Oral daily  chlorhexidine 0.12% Liquid 15 milliLiter(s) Oral Mucosa two times a day  chlorhexidine 4% Liquid 1 Application(s) Topical <User Schedule>  dextrose 5%. 1000 milliLiter(s) (50 mL/Hr) IV Continuous <Continuous>  dextrose 50% Injectable 12.5 Gram(s) IV Push once  dextrose 50% Injectable 25 Gram(s) IV Push once  dextrose 50% Injectable 25 Gram(s) IV Push once  heparin  Injectable 5000 Unit(s) SubCutaneous every 8 hours  hydrALAZINE 25 milliGRAM(s) Oral three times a day  insulin glargine Injectable (LANTUS) 18 Unit(s) SubCutaneous every morning  insulin lispro (HumaLOG) corrective regimen sliding scale   SubCutaneous every 4 hours  levETIRAcetam  Solution 1000 milliGRAM(s) Oral two times a day  losartan 25 milliGRAM(s) Oral daily  nystatin    Suspension [id number] Unit(s) Oral two times a day  pantoprazole   Suspension 40 milliGRAM(s) Oral daily    MEDICATIONS  (PRN):  acetaminophen   Tablet .. 650 milliGRAM(s) Oral every 6 hours PRN Temp greater or equal to 38C (100.4F)  dextrose 40% Gel 15 Gram(s) Oral once PRN Blood Glucose LESS THAN 70 milliGRAM(s)/deciliter  glucagon  Injectable 1 milliGRAM(s) IntraMuscular once PRN Glucose LESS THAN 70 milligrams/deciliter  midazolam Injectable 4 milliGRAM(s) IV Push once PRN procedure  morphine  - Injectable 4 milliGRAM(s) IV Push once PRN procedure          Radiology       Case discussed with staff and family at the bedside Patient is a 74y old  Female who presents with a chief complaint of left sided weakness and slurred speech/ R hemorrhagic CVA (27 Jun 2019 14:36)          HOSPITAL DAY NO: 29d    Overnight events No events overnight, t piece 12 h          Vital Signs Last 24 Hrs  T(C): 37.3 (28 Jun 2019 08:00), Max: 37.3 (27 Jun 2019 23:30)  T(F): 99.1 (28 Jun 2019 08:00), Max: 99.1 (27 Jun 2019 23:30)  HR: 71 (28 Jun 2019 08:00) (64 - 97)  BP: 121/56 (28 Jun 2019 08:00) (121/56 - 140/55)  BP(mean): 80 (28 Jun 2019 08:00) (75 - 88)  RR: 20 (28 Jun 2019 08:00) (16 - 20)  SpO2: 98% (28 Jun 2019 07:01) (82% - 99%)    Mode: standby                                          Weaning time:     Significant exam findings:     Gen : NAD  MS: Awake alert and following commands  HEENT: + trach  CHEST: B/L breath sounds   ABDOMEN: soft , + PEG  HEART:S1/S2 regular  SKIN: intact  Extremities: right sided hemiparesis    No of BMs:       Nutrition:   via                       LABS:                          10.6   7.09  )-----------( 201      ( 26 Jun 2019 11:29 )             34.1                                               06-26    140  |  102  |  21<H>  ----------------------------<  208<H>  4.1   |  26  |  0.6<L>    Ca    10.5<H>      26 Jun 2019 11:29  Phos  3.5     06-26  Mg     2.1     06-26        PT/INR - ( 26 Jun 2019 11:29 )   PT: 11.80 sec;   INR: 1.03 ratio         PTT - ( 26 Jun 2019 11:29 )  PTT:37.0 sec                                                                                                                                            MEDICATIONS  (STANDING):  amLODIPine   Tablet 10 milliGRAM(s) Oral daily  chlorhexidine 0.12% Liquid 15 milliLiter(s) Oral Mucosa two times a day  chlorhexidine 4% Liquid 1 Application(s) Topical <User Schedule>  dextrose 5%. 1000 milliLiter(s) (50 mL/Hr) IV Continuous <Continuous>  dextrose 50% Injectable 12.5 Gram(s) IV Push once  dextrose 50% Injectable 25 Gram(s) IV Push once  dextrose 50% Injectable 25 Gram(s) IV Push once  heparin  Injectable 5000 Unit(s) SubCutaneous every 8 hours  hydrALAZINE 25 milliGRAM(s) Oral three times a day  insulin glargine Injectable (LANTUS) 18 Unit(s) SubCutaneous every morning  insulin lispro (HumaLOG) corrective regimen sliding scale   SubCutaneous every 4 hours  levETIRAcetam  Solution 1000 milliGRAM(s) Oral two times a day  losartan 25 milliGRAM(s) Oral daily  nystatin    Suspension [id number] Unit(s) Oral two times a day  pantoprazole   Suspension 40 milliGRAM(s) Oral daily    MEDICATIONS  (PRN):  acetaminophen   Tablet .. 650 milliGRAM(s) Oral every 6 hours PRN Temp greater or equal to 38C (100.4F)  dextrose 40% Gel 15 Gram(s) Oral once PRN Blood Glucose LESS THAN 70 milliGRAM(s)/deciliter  glucagon  Injectable 1 milliGRAM(s) IntraMuscular once PRN Glucose LESS THAN 70 milligrams/deciliter  midazolam Injectable 4 milliGRAM(s) IV Push once PRN procedure  morphine  - Injectable 4 milliGRAM(s) IV Push once PRN procedure

## 2019-06-28 NOTE — PHYSICAL THERAPY INITIAL EVALUATION ADULT - IMPAIRMENTS CONTRIBUTING IMPAIRED BED MOBILITY, REHAB EVAL
impaired balance/impaired postural control/decreased flexibility/impaired motor control/abnormal muscle tone/impaired coordination/decreased strength

## 2019-06-28 NOTE — SWALLOW FEES ASSESSMENT ADULT - NS SWALLOW FEES REC ASPIR MON
oral hygiene/position upright (90Y)
change of breathing pattern/oral hygiene/throat clearing/position upright (90Y)/upper respiratory infection

## 2019-06-28 NOTE — SWALLOW FEES ASSESSMENT ADULT - ORAL PHASE
pooling to the level of the pyriform sinus prior to the swallow/Uncontrolled bolus/spillover in hypopharynx Uncontrolled bolus/spillover in hypopharynx/pooling into the valleculae w/ spillage overt the epiglottis when fed by clinician by cup +improved control w/ straw pooling into the valleculae

## 2019-06-28 NOTE — SWALLOW FEES ASSESSMENT ADULT - ROSENBEK'S PENETRATION ASPIRATION SCALE
(8) material passes glottis, visible subglottic residue remains, absent patient response (aspiration) (5) material contacts vocal cords, visible residue remains (penetration) (3) material remains above the vocal cords, visible residue remains (penetration)/slight penetration clears w/ liquid wash (1) no aspiration, material does not enter airway

## 2019-06-28 NOTE — SWALLOW FEES ASSESSMENT ADULT - SLP PERTINENT HISTORY OF CURRENT PROBLEM
74 y.o F with PMH of HTN, HLD, DM, hemorrhagic stroke, CTH: acute right thalamic hemorrhage with extension to right lateral and third ventricle,
74 y.o F with PMH of HTN, HLD, DM, hemorrhagic stroke, CTH: acute right thalamic hemorrhage with extension to right lateral and third ventricle, Pt s/p FEES 6/19, s/p PEG

## 2019-06-28 NOTE — OCCUPATIONAL THERAPY INITIAL EVALUATION ADULT - IMPAIRMENTS CONTRIBUTING IMPAIRED BED MOBILITY, REHAB EVAL
decreased strength/impaired coordination/impaired motor control/impaired balance/impaired postural control

## 2019-06-28 NOTE — SWALLOW FEES ASSESSMENT ADULT - DIAGNOSTIC IMPRESSIONS
Moderate oral dysphagia and severe pharyngeal dysphagia for nectar, honey and puree w/ poor airway protections.
severe pharyngeal dysphagia for thin, moderate for nectar, mild for puree and pharyngeal swallow is WFL for soft solids

## 2019-06-28 NOTE — PHYSICAL THERAPY INITIAL EVALUATION ADULT - IMPAIRMENTS FOUND, PT EVAL
tone/aerobic capacity/endurance/gait, locomotion, and balance/muscle strength/ROM/ventilation and respiration/gas exchange/posture

## 2019-06-28 NOTE — PROGRESS NOTE ADULT - ASSESSMENT
· Assessment		  Impression:    Intra cerebral Bleed/ following simple commands  Seizure  Hypertensive emergency  HO hemorrhagic stroke  penumonia aspiration  UTI    PLAN:    CNS: Continue with keppra per neuro    HEENT: Trach care. Speech to re-evaluate    PULMONARY:  HOB @ 30 degrees. no vent changes, wean as tolerated     CARDIOVASCULAR: i=o. Continue with antihypertensives    GI: GI prophylaxis.  Ok to use PEG for feeds    RENAL:  Follow up lytes.  Correct as needed    INFECTIOUS DISEASE: per ID    HEMATOLOGICAL:   DVT prophylaxis.    ENDOCRINE:  Follow up FS.     MUSCULOSKELETAL: Bed rest    poor prognosis · Assessment		  Impression:    Intra cerebral Bleed/ following simple commands  Seizure  Hypertensive emergency  HO hemorrhagic stroke  penumonia aspiration  UTI    PLAN:    CNS: Continue with keppra per neuro    HEENT: Trach care. Speech to re-evaluate    PULMONARY:  HOB @ 30 degrees. no vent changes, wean as tolerated     CARDIOVASCULAR: i=o. Continue with antihypertensives    GI: GI prophylaxis.  Ok to use PEG for feeds    RENAL:  Follow up lytes.  Correct as needed    INFECTIOUS DISEASE: per ID    HEMATOLOGICAL:   DVT prophylaxis. LE doppler    ENDOCRINE:  Follow up FS.     MUSCULOSKELETAL: Bed rest    poor prognosis

## 2019-06-28 NOTE — SWALLOW FEES ASSESSMENT ADULT - PHARYNGEAL PHASE COMMENTS
severe pharyngeal dysphagia for thin Moderate pharyngeal dysphagia for nectar Mild pharyngeal dysphagia for puree pharyngeal swallow is WFL for soft solids

## 2019-06-28 NOTE — PHYSICAL THERAPY INITIAL EVALUATION ADULT - BALANCE DISTURBANCE, IDENTIFIED IMPAIRMENT CONTRIBUTE, REHAB EVAL
decreased strength/abnormal muscle tone/impaired coordination/impaired motor control/impaired postural control

## 2019-06-28 NOTE — PHYSICAL THERAPY INITIAL EVALUATION ADULT - PLANNED THERAPY INTERVENTIONS, PT EVAL
transfer training/postural re-education/stretching/balance training/gait training/bed mobility training/strengthening

## 2019-06-28 NOTE — SWALLOW FEES ASSESSMENT ADULT - SLP GENERAL OBSERVATIONS
Pt received in bed asleep w/ son at b/s. #6 cuffed trach, on vent w/ cuff minimally inflated.
Pt received in bed awake and alert on O2 t-piece, cuff deflated. PMSV not in place

## 2019-06-29 NOTE — PROGRESS NOTE ADULT - ASSESSMENT
· Assessment		  Impression:    Intra cerebral Bleed/ following simple commands  Seizure  Hypertensive emergency  HO hemorrhagic stroke  penumonia aspiration  UTI    PLAN:    CNS: Continue with keppra per neuro    HEENT: Trach care. Speech to re-evaluate    PULMONARY:  HOB @ 30 degrees. no vent changes, wean as tolerated     CARDIOVASCULAR: i=o. Continue with antihypertensives    GI: GI prophylaxis.  Ok to use PEG for feeds. Passed S/S but pt not eating    RENAL:  Follow up lytes.  Correct as needed    INFECTIOUS DISEASE: per ID    HEMATOLOGICAL:   DVT prophylaxis. LE doppler    ENDOCRINE:  Follow up FS.     MUSCULOSKELETAL: Bed rest    poor prognosis · Assessment		  Impression:    Chronic respiratory failure   Intra cerebral Bleed  Seizure  Hypertensive emergency  HO hemorrhagic stroke  penumonia aspiration  UTI    PLAN:    CNS: Continue with keppra per neuro    HEENT: Trach care. Speech to re-evaluate    PULMONARY:  HOB @ 30 degrees. no vent changes, wean as tolerated     CARDIOVASCULAR: i=o. Continue with antihypertensives    GI: GI prophylaxis.  Ok to use PEG for feeds. Passed S/S but pt not eating    RENAL:  Follow up lytes.  Correct as needed    INFECTIOUS DISEASE: per ID    HEMATOLOGICAL:   DVT prophylaxis. LE doppler    ENDOCRINE:  Follow up FS.     MUSCULOSKELETAL: Bed rest    poor prognosis

## 2019-06-29 NOTE — PROGRESS NOTE ADULT - SUBJECTIVE AND OBJECTIVE BOX
HERNANDO HERNANDEZ 75yo W  Female BIBA to S side from home for elevated BP, slurred speech and L sided weakness.  The pt was noted to have a hypertensive emergency with R thalamic hemorrhagic  CVA. 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    Hospital course:   The pt underwent a stroke code and was intubated for airway protection and transferred North for further care. She remained in CCU and cont to be on respirator support.  The hospital course is complicated by seizures, prolonged QT,streptococcus bacteremia with positive blood cultures and positive GM negative rods in the urine.  She was on Ceftriaxone which was changed to Cefepime.   The pt failed weaning and required tracheostomy on 6/10 and was sent to the vent unit.  She failed S&S several times and required placement of PEG on 6/24.     INTERVAL HPI/OVERNIGHT EVENTS: pt more alert,  OOB to chair for first time, S&S attempts at swallowing noted, trials of weaning in progress but slow and needs 12 hrs on and 12 hrs off, on T tube pulse ox 90, social SVC working for appropriate placement    MEDICATIONS  (STANDING):  amLODIPine   Tablet 10 milliGRAM(s) Oral daily  cefepime   IVPB 2000 milliGRAM(s) IV Intermittent every 8 hours D/C  Rocephine 2 gms q 24  chlorhexidine 0.12% Liquid 15 milliLiter(s) Oral Mucosa two times a day  chlorhexidine 4% Liquid 1 Application(s) Topical <User Schedule>  docusate sodium Liquid 100 milliGRAM(s) Oral three times a day  hydrALAZINE 25 milliGRAM(s) Oral three times a day  insulin regular Infusion 4 Unit(s)/Hr (4 mL/Hr) IV Continuous <Continuous>  levETIRAcetam  Solution 1000 milliGRAM(s) Oral two times a day  nystatin    Suspension 376242 Unit(s) Oral two times a day  pantoprazole   Suspension 40 milliGRAM(s) Oral daily  propofol Infusion. 1 MICROgram(s)/kG/Min (0.473 mL/Hr) IV Continuous <Continuous>  senna 2 Tablet(s) Oral at bedtime    MEDICATIONS  (PRN):  acetaminophen   Tablet .. 650 milliGRAM(s) Oral every 6 hours PRN Temp greater or equal to 38C (100.4F)      Allergies  NKDA     Vital Signs Last 24 Hrs  T(C): 36.7 (29 Jun 2019 07:50), Max: 37.3 (29 Jun 2019 00:00)  T(F): 98.1 (29 Jun 2019 07:50), Max: 99.1 (29 Jun 2019 00:00)  HR: 55 (29 Jun 2019 07:50) (43 - 75)  BP: 84/47 (29 Jun 2019 07:50) (84/47 - 156/70)  BP(mean): 63 (29 Jun 2019 07:50) (63 - 100)  RR: 14 (29 Jun 2019 07:50) (14 - 20)  SpO2: 100% (29 Jun 2019 02:33) (94% - 100%)    PHYSICAL EXAM:    Constitutional: pt seen in vent unit, + trach, trial of T tube today, trying to mouth words, NAD  Eyes: non icteric, eyes wide open  ENMT: dry oral mucosa  Neck:  short and supple, + trach  Respiratory:  harsh respirator BS  Cardiovascular:  S1S2  Gastrointestinal: globose soft and benign, + PEG  Genitourinary:  no villatoro  Extremities: arthritic changes, moves upper ext, dec motor strength of lower ext    LABS:                          10.2   5.41  )-----------( 202      ( 29 Jun 2019 06:21 )             33.4     06-29    145  |  104  |  22<H>  ----------------------------<  188<H>  4.4   |  30  |  0.7    Ca    10.6<H>      29 Jun 2019 06:21  Mg     2.2     06-29    TPro  5.9<L>  /  Alb  3.1<L>  /  TBili  0.5  /  DBili  x   /  AST  11  /  ALT  13  /  AlkPhos  74  06-29       CXR  +ET tube, + feeding tube    CT of head R thalamic parenchymal hematoma 2.2 cm with intraventricular extension, mild ventricular enlargement, white mater changes     repeat CTH:  evolution of the  R thalamic hemorrhage which has dec, ventricular size stable with mild enlargement, white matter changes, stable chronic hematoma cavity within the L basal ganglia, chronic lacunar infarct in BL basal ganglia and L thalamus

## 2019-06-29 NOTE — PROGRESS NOTE ADULT - ASSESSMENT
74y female s/p Stroke code, R thalamic hemorrhagic CVA with extension to ventricles, repeat studies show no progression and some improvement of the hematoma, sp Hypertensive emergency, Seizures, controlled on Keppra,Streptococcal Bacteremia probably from transient PNA, Marcio negative UTI    Hx of HTN, ASHD  Hx of DLD  Hx of old hemorrhagic CVA about 8 yrs ago, dysarthria, gait instability  Hx of OA, DDD, DJD  Hx of GERD, HH, Diverticulosis  Hx of anxiety    pt is sp trach  6/10,  more alert, eyes fully open, on trial of T tube, will need slow wean, 12 hrs off and 12 hrs on  neurology ff up appreciated:  dec keppra to 1000mg 2x to lessen sedation, repeat CTH  S&S attempts have failed in the past, today first time pt making some adequate swallowing attempts  sp PEG  6/24  pt is sp ceftriaxone, blood culture 6/4 + for strept, source probable  LLL  strept PNA, + sputa for strept and CT shows infiltrate,  urine + for Gm neg rods/asymptomatic pyuria, completed the course of ABX  seizures  controlled on Keppra 1000mg q12, VEEG shows focal and generalized slowing    monitor electrolytes    pt is ff by pul /critical care,  oral care, skin care and decubiti prevention    advance care reviewed   social SVC/D/C planning working with pt on SNF placement, pt will need slow weaning process and slow prolonged rehab

## 2019-06-29 NOTE — PROGRESS NOTE ADULT - SUBJECTIVE AND OBJECTIVE BOX
Patient is a 74y old  Female who presents with a chief complaint of left sided weakness and slurred speech (2019 08:29)        SUBJECTIVE: NO events    REVIEW OF SYSTEMS:  See HPI    PHYSICAL EXAM  Vital Signs Last 24 Hrs  T(C): 36.7 (2019 07:50), Max: 37.3 (2019 00:00)  T(F): 98.1 (2019 07:50), Max: 99.1 (2019 00:00)  HR: 55 (2019 07:50) (43 - 75)  BP: 84/47 (2019 07:50) (84/47 - 156/70)  BP(mean): 63 (2019 07:50) (63 - 100)  RR: 14 (2019 07:50) (14 - 20)  SpO2: 100% (2019 02:33) (94% - 100%)    Gen : NAD  MS: Awake alert and following commands  HEENT: + trach  CHEST: B/L breath sounds   ABDOMEN: soft , + PEG  HEART:S1/S2 regular  SKIN: intact  Extremities: right sided hemiparesis      19 @ 07:01  -  - @ 07:00  --------------------------------------------------------  IN:    Enteral Tube Flush: 300 mL    Glucerna: 720 mL  Total IN: 1020 mL    OUT:    Incontinent per Collection Ba mL  Total OUT: 2 mL    Total NET: 1018 mL      LABS:                          10.2   5.41  )-----------( 202      ( 2019 06:21 )             33.4                                               06-    145  |  104  |  22<H>  ----------------------------<  188<H>  4.4   |  30  |  0.7    Ca    10.6<H>      2019 06:21  Mg     2.2         TPro  5.9<L>  /  Alb  3.1<L>  /  TBili  0.5  /  DBili  x   /  AST  11  /  ALT  13  /  AlkPhos  74  06-29                                                                                           LIVER FUNCTIONS - ( 2019 06:21 )  Alb: 3.1 g/dL / Pro: 5.9 g/dL / ALK PHOS: 74 U/L / ALT: 13 U/L / AST: 11 U/L / GGT: x                                                                                                MEDICATIONS  (STANDING):  amLODIPine   Tablet 10 milliGRAM(s) Oral daily  chlorhexidine 0.12% Liquid 15 milliLiter(s) Oral Mucosa two times a day  chlorhexidine 4% Liquid 1 Application(s) Topical <User Schedule>  dextrose 5%. 1000 milliLiter(s) (50 mL/Hr) IV Continuous <Continuous>  dextrose 50% Injectable 12.5 Gram(s) IV Push once  dextrose 50% Injectable 25 Gram(s) IV Push once  dextrose 50% Injectable 25 Gram(s) IV Push once  heparin  Injectable 5000 Unit(s) SubCutaneous every 8 hours  hydrALAZINE 25 milliGRAM(s) Oral three times a day  insulin glargine Injectable (LANTUS) 18 Unit(s) SubCutaneous every morning  insulin lispro (HumaLOG) corrective regimen sliding scale   SubCutaneous Before meals and at bedtime  levETIRAcetam  Solution 1000 milliGRAM(s) Oral two times a day  losartan 25 milliGRAM(s) Oral daily  nystatin    Suspension 460062 Unit(s) Oral two times a day  pantoprazole   Suspension 40 milliGRAM(s) Oral daily    MEDICATIONS  (PRN):  acetaminophen   Tablet .. 650 milliGRAM(s) Oral every 6 hours PRN Temp greater or equal to 38C (100.4F)  dextrose 40% Gel 15 Gram(s) Oral once PRN Blood Glucose LESS THAN 70 milliGRAM(s)/deciliter  glucagon  Injectable 1 milliGRAM(s) IntraMuscular once PRN Glucose LESS THAN 70 milligrams/deciliter  midazolam Injectable 4 milliGRAM(s) IV Push once PRN procedure  morphine  - Injectable 4 milliGRAM(s) IV Push once PRN procedure

## 2019-06-30 NOTE — PROGRESS NOTE ADULT - SUBJECTIVE AND OBJECTIVE BOX
75yo W  Female BIBA to S side from home for elevated BP, slurred speech and L sided weakness.  The pt was noted to have a hypertensive emergency with R thalamic hemorrhagic  CVA. 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    Hospital course:   The pt underwent a stroke code and was intubated for airway protection and transferred North for further care. She remained in CCU and cont to be on respirator support.  The hospital course is complicated by seizures, prolonged QT,streptococcus bacteremia with positive blood cultures and positive GM negative rods in the urine.  She was on Ceftriaxone which was changed to Cefepime.   The pt failed weaning and required tracheostomy on 6/10 and was sent to the vent unit.  She failed S&S several times and required placement of PEG on 6/24.     INTERVAL HPI/OVERNIGHT EVENTS: pt more alert,  OOB to chair for first time, S&S attempts at swallowing noted, trials of weaning in progress but slow and needs 12 hrs on and 12 hrs off, on T tube pulse ox 90, social SVC working for appropriate placement    MEDICATIONS  (STANDING):  amLODIPine   Tablet 10 milliGRAM(s) Oral daily  cefepime   IVPB 2000 milliGRAM(s) IV Intermittent every 8 hours D/C  Rocephine 2 gms q 24  chlorhexidine 0.12% Liquid 15 milliLiter(s) Oral Mucosa two times a day  chlorhexidine 4% Liquid 1 Application(s) Topical <User Schedule>  docusate sodium Liquid 100 milliGRAM(s) Oral three times a day  hydrALAZINE 25 milliGRAM(s) Oral three times a day  insulin regular Infusion 4 Unit(s)/Hr (4 mL/Hr) IV Continuous <Continuous>  levETIRAcetam  Solution 1000 milliGRAM(s) Oral two times a day  nystatin    Suspension [id number] Unit(s) Oral two times a day  pantoprazole   Suspension 40 milliGRAM(s) Oral daily  propofol Infusion. 1 MICROgram(s)/kG/Min (0.473 mL/Hr) IV Continuous <Continuous>  senna 2 Tablet(s) Oral at bedtime    MEDICATIONS  (PRN):  acetaminophen   Tablet .. 650 milliGRAM(s) Oral every 6 hours PRN Temp greater or equal to 38C (100.4F)      Allergies  NKDA     Vital Signs Last 24 Hrs  T(C): 37.6 (30 Jun 2019 00:08), Max: 37.6 (30 Jun 2019 00:08)  T(F): 99.6 (30 Jun 2019 00:08), Max: 99.6 (30 Jun 2019 00:08)  HR: 58 (30 Jun 2019 00:27) (55 - 87)  BP: 142/64 (30 Jun 2019 00:08) (84/47 - 142/64)  BP(mean): 92 (30 Jun 2019 00:08) (63 - 92)  RR: 18 (30 Jun 2019 00:08) (14 - 18)  SpO2: 98% (30 Jun 2019 00:27) (98% - 100%)  PHYSICAL EXAM:    Constitutional: pt seen in vent unit, + trach, trial of T tube today, trying to mouth words, NAD  Eyes: non icteric, eyes wide open  ENMT: dry oral mucosa  Neck:  short and supple, + trach  Respiratory:  harsh respirator BS  Cardiovascular:  S1S2  Gastrointestinal: globose soft and benign, + PEG  Genitourinary:  no villatoro  Extremities: arthritic changes, moves upper ext, dec motor strength of lower ext    LABS:                          10.2   5.41  )-----------( 202      ( 29 Jun 2019 06:21 )             33.4     06-29    145  |  104  |  22<H>  ----------------------------<  188<H>  4.4   |  30  |  0.7    Ca    10.6<H>      29 Jun 2019 06:21  Mg     2.2     06-29    TPro  5.9<L>  /  Alb  3.1<L>  /  TBili  0.5  /  DBili  x   /  AST  11  /  ALT  13  /  AlkPhos  74  06-29       CXR  +ET tube, + feeding tube    CT of head R thalamic parenchymal hematoma 2.2 cm with intraventricular extension, mild ventricular enlargement, white mater changes     repeat CTH:  evolution of the  R thalamic hemorrhage which has dec, ventricular size stable with mild enlargement, white matter changes, stable chronic hematoma cavity within the L basal ganglia, chronic lacunar infarct in BL basal ganglia and L thalamus

## 2019-06-30 NOTE — PROGRESS NOTE ADULT - ASSESSMENT
· Assessment		  Impression:    Chronic respiratory failure   Intra cerebral Bleed  Seizure  Hypertensive emergency  HO hemorrhagic stroke  penumonia aspiration  UTI    PLAN:    CNS: Continue with keppra per neuro    HEENT: Trach care. Speech to re-evaluate    PULMONARY:  HOB @ 30 degrees. no vent changes, wean as tolerated     CARDIOVASCULAR: i=o. Continue with antihypertensives    GI: GI prophylaxis.  Ok to use PEG for feeds. Passed S/S but pt not eating    RENAL:  Follow up lytes.  Correct as needed    INFECTIOUS DISEASE: per ID    HEMATOLOGICAL:   DVT prophylaxis. LE doppler    ENDOCRINE:  Follow up FS.     MUSCULOSKELETAL: Bed rest    poor prognosis

## 2019-06-30 NOTE — PROGRESS NOTE ADULT - SUBJECTIVE AND OBJECTIVE BOX
Patient is a 74y old  Female who presents with a chief complaint of left sided weakness and slurred speech (30 Jun 2019 06:08)      HPI:  74 y.o F with PMH of HTN, HLD, DM, hemorrhagic stroke,? EVD placement at that time ( about 8 years ago)  with residual left side weakness and slurred speech, ambulated with cane, on ASA daily  Pt. brought to [facility] ED as a stroke code. At the time of presentation to [facility] ED A&Ox 2, baseline slurped speech with left sided weakness. /82, 55 HR, 18 RR, CTH prelim report findings with acute right thalamic hemorrhage with extension to right lateral and third ventricle. At [facility] ED Pt. Intubated 2/2 worsening of neuro exam for air way protection as per ED attending note Pt.  became more drowsy with decreased responsiveness. Pt. was transferred to ED-[place]. At time of NSGY eval Pt intubated, sedated on Propofol and Fentanyl, radial arterial line, she received keppra, zofran and mannitol in ED. Repeat CT scan stable from prior study as per neurosurgery. Platelets one unit ordered as per neurosurgery (30 May 2019 02:50)      HOSPITAL DAY NO: 31d    Overnight events No events        Patient is a 74y old  Female who presents with a chief complaint of left sided weakness and slurred speech (30 Jun 2019 06:08)  HYPERTENSIVE EMERGENCY;INTRACRANIAL HEMORRHAGE  ^HYPERTENSIVE EMERGENCY;INTRACRANIAL HEMORRHAGE  No pertinent family history in first degree relatives  Handoff  MEWS Score  Diabetes mellitus  Stroke  Hypertension  Failure respiratory  Failure respiratory  Hypertensive emergency  Open tracheostomy  No significant past surgical history  FALL  Intracranial hemorrhage      Vital Signs Last 24 Hrs  T(C): 37.6 (30 Jun 2019 08:00), Max: 37.6 (30 Jun 2019 00:08)  T(F): 99.6 (30 Jun 2019 08:00), Max: 99.6 (30 Jun 2019 00:08)  HR: 69 (30 Jun 2019 08:00) (58 - 87)  BP: 135/64 (30 Jun 2019 08:00) (130/60 - 142/64)  BP(mean): 92 (30 Jun 2019 08:00) (87 - 92)  RR: 18 (30 Jun 2019 08:00) (18 - 18)  SpO2: 98% (30 Jun 2019 00:27) (98% - 98%)    Mode: AC/ CMV (Assist Control/ Continuous Mandatory Ventilation)  RR (machine): 14  TV (machine): 400  FiO2: 40  PEEP: 5  ITime: 1  MAP: 9  PIP: 28                                          Weaning time:     Significant exam findings:     Gen : NAD  MS: Alert, follows commands  HEENT: + trach  CHEST: B/L breath sounds   ABDOMEN: soft, + PEG   HEART:S1/S2 regular  SKIN: intact  Extremities: No C/C/E    No of BMs:     Nutrition:   via                     LABS:                          10.2   5.41  )-----------( 202      ( 29 Jun 2019 06:21 )             33.4                                               06-29    145  |  104  |  22<H>  ----------------------------<  188<H>  4.4   |  30  |  0.7    Ca    10.6<H>      29 Jun 2019 06:21  Mg     2.2     06-29    TPro  5.9<L>  /  Alb  3.1<L>  /  TBili  0.5  /  DBili  x   /  AST  11  /  ALT  13  /  AlkPhos  74  06-29                                                LIVER FUNCTIONS - ( 29 Jun 2019 06:21 )  Alb: 3.1 g/dL / Pro: 5.9 g/dL / ALK PHOS: 74 U/L / ALT: 13 U/L / AST: 11 U/L / GGT: x                                                                                                           MEDICATIONS  (STANDING):  amLODIPine   Tablet 10 milliGRAM(s) Oral daily  chlorhexidine 0.12% Liquid 15 milliLiter(s) Oral Mucosa two times a day  chlorhexidine 4% Liquid 1 Application(s) Topical <User Schedule>  dextrose 5%. 1000 milliLiter(s) (50 mL/Hr) IV Continuous <Continuous>  dextrose 50% Injectable 12.5 Gram(s) IV Push once  dextrose 50% Injectable 25 Gram(s) IV Push once  dextrose 50% Injectable 25 Gram(s) IV Push once  heparin  Injectable 5000 Unit(s) SubCutaneous every 8 hours  hydrALAZINE 25 milliGRAM(s) Oral three times a day  insulin glargine Injectable (LANTUS) 18 Unit(s) SubCutaneous every morning  insulin lispro (HumaLOG) corrective regimen sliding scale   SubCutaneous Before meals and at bedtime  levETIRAcetam  Solution 1000 milliGRAM(s) Oral two times a day  losartan 25 milliGRAM(s) Oral daily  nystatin    Suspension 448336 Unit(s) Oral two times a day  pantoprazole   Suspension 40 milliGRAM(s) Oral daily    MEDICATIONS  (PRN):  acetaminophen   Tablet .. 650 milliGRAM(s) Oral every 6 hours PRN Temp greater or equal to 38C (100.4F)  dextrose 40% Gel 15 Gram(s) Oral once PRN Blood Glucose LESS THAN 70 milliGRAM(s)/deciliter  glucagon  Injectable 1 milliGRAM(s) IntraMuscular once PRN Glucose LESS THAN 70 milligrams/deciliter  midazolam Injectable 4 milliGRAM(s) IV Push once PRN procedure  morphine  - Injectable 4 milliGRAM(s) IV Push once PRN procedure        Radiology       Case discussed with staff and family at the bedside

## 2019-07-01 NOTE — SWALLOW BEDSIDE ASSESSMENT ADULT - SWALLOW EVAL: DIAGNOSIS
+c/o odynophagia, cuff is almost completely deflated, +triggers a dry swallow upon request
+toleration of nectar and soft w/o overt s/s of penetration/aspiration, +moderate oral dysphagia for soft solids

## 2019-07-01 NOTE — SWALLOW BEDSIDE ASSESSMENT ADULT - SWALLOW EVAL: RECOMMENDED FEEDING/EATING TECHNIQUES
oral hygiene/position upright (90 degrees)
maintain upright posture during/after eating for 30 mins/small sips/bites/position upright (90 degrees)/oral hygiene

## 2019-07-01 NOTE — SWALLOW BEDSIDE ASSESSMENT ADULT - SLP PERTINENT HISTORY OF CURRENT PROBLEM
74 y.o F with PMH of HTN, HLD, DM, hemorrhagic stroke, CTH prelim report findings with acute right thalamic hemorrhage with extension to right lateral and third ventricle,
74 y.o F with PMH of HTN, HLD, DM, hemorrhagic stroke, CTH prelim report findings with acute right thalamic hemorrhage with extension to right lateral and third ventricle,
74 y.o F with PMH of HTN, HLD, DM, hemorrhagic stroke, CTH: acute right thalamic hemorrhage with extension to right lateral and third ventricle, Pt s/p FEES 6/19, s/p PEG

## 2019-07-01 NOTE — SWALLOW BEDSIDE ASSESSMENT ADULT - ASR SWALLOW ASPIRATION MONITOR
oral hygiene/position upright (90Y)
upper respiratory infection/position upright (90Y)/cough/fever/oral hygiene/pneumonia

## 2019-07-01 NOTE — SWALLOW BEDSIDE ASSESSMENT ADULT - COMMENTS
Mild oral dysphagia w/o overt s/s of penetration/aspiration w/ soft solids
dysphagia assessment conducted bedside. pt received lethargic, with decreased responsiveness, despite max encouragement. pt is currently  not a candidate for PO trials 2/2 high risk of aspiration.

## 2019-07-01 NOTE — PROGRESS NOTE ADULT - ASSESSMENT
HERNANDO HERNANDEZ 73yo W  Female BIBA to S side from home for elevated BP, slurred speech and L sided weakness.  The pt was noted to have a hypertensive emergency with R thalamic hemorrhagic  CVA. 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    Hospital course:   The pt underwent a stroke code and was intubated for airway protection and transferred [place] for further care. She remained in CCU and cont to be on respirator support.  The hospital course is complicated by seizures, prolonged QT,streptococcus bacteremia with positive blood cultures and positive GM negative rods in the urine.  She was on Ceftriaxone which was changed to Cefepime.   The pt failed weaning and required tracheostomy on 6/10 and was sent to the vent unit.  She failed S&S several times and required placement of PEG on 6/24.     INTERVAL HPI/OVERNIGHT EVENTS: pt more alert,  OOB to chair, S&S attempts at swallowing, sp PEG,   trials of weaning in progress but  v slow,     MEDICATIONS  (STANDING):  amLODIPine   Tablet 10 milliGRAM(s) Oral daily  cefepime   IVPB 2000 milliGRAM(s) IV Intermittent every 8 hours D/C  Rocephine 2 gms q 24  chlorhexidine 0.12% Liquid 15 milliLiter(s) Oral Mucosa two times a day  chlorhexidine 4% Liquid 1 Application(s) Topical <User Schedule>  docusate sodium Liquid 100 milliGRAM(s) Oral three times a day  hydrALAZINE 25 milliGRAM(s) Oral three times a day  insulin regular Infusion 4 Unit(s)/Hr (4 mL/Hr) IV Continuous <Continuous>  levETIRAcetam  Solution 1000 milliGRAM(s) Oral two times a day  nystatin    Suspension [id number] Unit(s) Oral two times a day  pantoprazole   Suspension 40 milliGRAM(s) Oral daily  propofol Infusion. 1 MICROgram(s)/kG/Min (0.473 mL/Hr) IV Continuous <Continuous>  senna 2 Tablet(s) Oral at bedtime    MEDICATIONS  (PRN):  acetaminophen   Tablet .. 650 milliGRAM(s) Oral every 6 hours PRN Temp greater or equal to 38C (100.4F)      Allergies  NKDA     T(F): 99  HR: 82  BP: 106/56  RR: 20  SpO2: 95 %     PHYSICAL EXAM:      Constitutional: pt seen in vent unit, + trach, NAD    Eyes: non icteric, eyes wide open    ENMT: dry oral mucosa    Neck:  short and supple, + trach    Respiratory:  harsh respirator BS    Cardiovascular:  S1S2    Gastrointestinal: globose soft and benign, + PEG    Genitourinary:  no villatoro    Extremities: arthritic changes, moves upper ext, dec motor strength of lower ext    LABS:                      10  4 )-----------( 192             34    139 |  100 |  31  ----------------------------<  228  4.5  |  24   |  1.0    GFR 87, 91, 56  Ca    9.7       Mg    2.2, 2.3, 2.1, 2.2    TPro  5.6<L>  /  Alb  3.0, 3.3 /  TBili  0.5, 6.0  /  DBili  x   /  AST  9   /  ALT  23  /  AlkPhos  88  06-06    urine GM- rods  Blood streptococcus  sputum streptococcus      RADIOLOGY & ADDITIONAL TESTS:     EKG NSR 66/min   CXR  +ET tube, + feeding tube    CT of head R thalamic parenchymal hematoma 2.2 cm with intraventricular extension, mild ventricular enlargement, white mater changes     repeat CTH:  evolution of the  R thalamic hemorrhage which has dec, ventricular size stable with mild enlargement, white matter changes, stable chronic hematoma cavity within the L basal ganglia, chronic lacunar infarct in BL basal ganglia and L thalamus      Stroke code, R thalamic hemorrhagic CVA with extension to ventricles, repeat studies show no progression and some improvement of the hematoma  sp Hypertensive emergency  Seizures, controlled on Keppra  Streptococcal Bacteremia probably from transient PNA,   Marcio negative UTI    Hx of HTN, ASHD  Hx of DLD  Hx of old hemorrhagic CVA about 8 yrs ago, dysarthria, gait instability  Hx of OA, DDD, DJD  Hx of GERD, HH, Diverticulosis  Hx of anxiety    pt is sp trach  6/10,  more alert, eyes fully open, cont to attempt weaning,  neurology ff up appreciated:  dec keppra to 1000mg 2x to lessen sedation, repeat CTH  S&S attempts:  mild oral dysphagia with delayed oral transit and proloned mastication  sp PEG  6/24  pt is sp ceftriaxone, blood culture 6/4 + for strept, source probable  LLL  strept PNA, + sputa for strept and CT shows infiltrate,  urine + for Gm neg rods/asymptomatic pyuria, completed the course of ABX  seizures  controlled on Keppra 1000mg q12, VEEG shows focal and generalized slowing    monitor electrolytes    pt is ff by pul /critical care,  oral care, skin care and decubiti prevention    social SVC/D/C planning working with pt on SNF placement, pt will need slow weaning process and slow prolonged rehab

## 2019-07-01 NOTE — PROGRESS NOTE ADULT - SUBJECTIVE AND OBJECTIVE BOX
HERNANDO HERNANDEZ  74y  Female    Patient is a 74y old  Female who presents with a chief complaint of left sided weakness and slurred speech (30 Jun 2019 10:43)      SUBJECTIVE/OVERNIGHT EVENTS:  Hpi:  74 y.o F with PMH of HTN, HLD, DM, hemorrhagic stroke,? EVD placement at that time ( about 8 years ago)  with residual left side weakness and slurred speech, ambulated with cane, on ASA daily  Pt. brought to [facility] ED as a stroke code. At the time of presentation to [facility] ED A&Ox 2, baseline slurped speech with left sided weakness. /82, 55 HR, 18 RR, CTH prelim report findings with acute right thalamic hemorrhage with extension to right lateral and third ventricle. At [facility] ED Pt. Intubated 2/2 worsening of neuro exam for air way protection as per ED attending note Pt.  became more drowsy with decreased responsiveness. Pt. was transferred to [facility]. At time of NSGY eval Pt intubated, sedated on Propofol and Fentanyl, radial arterial line, she received keppra, zofran and mannitol in ED. Repeat CT scan stable from prior study as per neurosurgery. Platelets one unit ordered as per neurosurgery (30 May 2019 02:50)    PAST MEDICAL & SURGICAL HISTORY:  Diabetes mellitus  Stroke  Hypertension  No significant past surgical history    MEDICATIONS  (STANDING):  amLODIPine   Tablet 10 milliGRAM(s) Oral daily  chlorhexidine 0.12% Liquid 15 milliLiter(s) Oral Mucosa two times a day  chlorhexidine 4% Liquid 1 Application(s) Topical <User Schedule>  dextrose 5%. 1000 milliLiter(s) (50 mL/Hr) IV Continuous <Continuous>  dextrose 50% Injectable 12.5 Gram(s) IV Push once  dextrose 50% Injectable 25 Gram(s) IV Push once  dextrose 50% Injectable 25 Gram(s) IV Push once  heparin  Injectable 5000 Unit(s) SubCutaneous every 8 hours  hydrALAZINE 25 milliGRAM(s) Oral three times a day  insulin glargine Injectable (LANTUS) 18 Unit(s) SubCutaneous every morning  insulin lispro (HumaLOG) corrective regimen sliding scale   SubCutaneous Before meals and at bedtime  levETIRAcetam  Solution 1000 milliGRAM(s) Oral two times a day  losartan 25 milliGRAM(s) Oral daily  nystatin    Suspension [id number] Unit(s) Oral two times a day  pantoprazole   Suspension 40 milliGRAM(s) Oral daily    MEDICATIONS  (PRN):  acetaminophen   Tablet .. 650 milliGRAM(s) Oral every 6 hours PRN Temp greater or equal to 38C (100.4F)  dextrose 40% Gel 15 Gram(s) Oral once PRN Blood Glucose LESS THAN 70 milliGRAM(s)/deciliter  glucagon  Injectable 1 milliGRAM(s) IntraMuscular once PRN Glucose LESS THAN 70 milligrams/deciliter  midazolam Injectable 4 milliGRAM(s) IV Push once PRN procedure  morphine  - Injectable 4 milliGRAM(s) IV Push once PRN procedure      OBJECTIVE:    Vital Signs Last 24 Hrs  T(C): 37.1 (01 Jul 2019 08:00), Max: 37.1 (01 Jul 2019 08:00)  T(F): 98.8 (01 Jul 2019 08:00), Max: 98.8 (01 Jul 2019 08:00)  HR: 70 (01 Jul 2019 08:25) (70 - 85)  BP: 103/58 (01 Jul 2019 08:00) (103/58 - 123/67)  BP(mean): 68 (01 Jul 2019 08:00) (68 - 89)  RR: 18 (01 Jul 2019 08:00) (18 - 18)  SpO2: 100% (01 Jul 2019 08:25) (99% - 100%)    General: In NAD  HEENT: + trach               Neck: Supple.   No Cervical LN    Lungs: Bilateral BS  Cardiovascular: Regular   Abdomen: Soft. + BS  Extremities: No CLubbing   Skin:   Neurological:Patient is awake, weak L>R.    I&O's Summary    30 Jun 2019 07:01  -  01 Jul 2019 07:00  --------------------------------------------------------  IN: 340 mL / OUT: 0 mL / NET: 340 mL        BOWEL MOVEMENTS:    PRESSORS:  SEDATION:  VENTED:      LABS:                          10.4   4.83  )-----------( 192      ( 01 Jul 2019 06:00 )             34.6                                               07-01    139  |  100  |  31<H>  ----------------------------<  228<H>  4.5   |  24  |  1.0    Ca    11.2<H>      01 Jul 2019 06:00  Mg     2.2     07-01    TPro  6.1  /  Alb  3.2<L>  /  TBili  0.6  /  DBili  x   /  AST  12  /  ALT  15  /  AlkPhos  75  07-01                                                                                           LIVER FUNCTIONS - ( 01 Jul 2019 06:00 )  Alb: 3.2 g/dL / Pro: 6.1 g/dL / ALK PHOS: 75 U/L / ALT: 15 U/L / AST: 12 U/L / GGT: x                                                                                               Mode: AC/ CMV (Assist Control/ Continuous Mandatory Ventilation)  RR (machine): 14  TV (machine): 400  FiO2: 40  PEEP: 5  ITime: 1  MAP: 16  PIP: 26

## 2019-07-01 NOTE — SWALLOW BEDSIDE ASSESSMENT ADULT - SLP GENERAL OBSERVATIONS
Pt received awake on vent, cuff minimally inflated, mouthing wds, responsive to commands
Pt received OOB to chair on O2 t-piece, reportedly on vent 12hrs overnight

## 2019-07-01 NOTE — SWALLOW BEDSIDE ASSESSMENT ADULT - SWALLOW EVAL: RECOMMENDED DIET
NPO with alternate means of nutrition/hydration
NPO, non-oral means of nutrition and hydration
dys 2 w/ nectar

## 2019-07-01 NOTE — PROGRESS NOTE ADULT - ASSESSMENT
Impression:    Chronic respiratory failure   Intra cerebral Bleed  Seizure  Hypertensive emergency  HO hemorrhagic stroke  penumonia aspiration  UTI    PLAN:    CNS: Continue with keppra per neuro    HEENT: Trach care. tolerating food(oral)    PULMONARY:  HOB @ 30 degrees. vent prn(12 on /off)    CARDIOVASCULAR: i=o. Continue with antihypertensives    GI: GI prophylaxis.  Ok to use PEG for feeds. Passed S/S but pt not eating    RENAL:  Follow up lytes.  Correct as needed    INFECTIOUS DISEASE: per ID    HEMATOLOGICAL:   DVT prophylaxis. LE doppler    ENDOCRINE:  Follow up FS. check prealbumin    MUSCULOSKELETAL: Bed rest    patient improved Impression:    Chronic respiratory failure   Intra cerebral Bleed  Seizure  Hypertensive emergency  HO hemorrhagic stroke  penumonia aspiration  UTI    PLAN:    CNS: Continue with keppra per neuro    HEENT: Trach care. tolerating food(oral)    PULMONARY:  HOB @ 30 degrees. vent prn(12 on /off)    CARDIOVASCULAR: i=o. Continue with antihypertensives    GI: GI prophylaxis.  Ok to use PEG for feeds. Passed S/S but pt not eating enough.    RENAL:  Follow up lytes.  Correct as needed    INFECTIOUS DISEASE: per ID    HEMATOLOGICAL:   DVT prophylaxis. LE doppler    ENDOCRINE:  Follow up FS. check prealbumin    MUSCULOSKELETAL: Bed rest    patient improved  dispo plan

## 2019-07-02 NOTE — PROGRESS NOTE ADULT - ASSESSMENT
HERNANDO HERNANDEZ 75yo W  Female BIBA to S side from home for elevated BP, slurred speech and L sided weakness.  The pt was noted to have a hypertensive emergency with R thalamic hemorrhagic  CVA. 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    Hospital course:   The pt underwent a stroke code and was intubated for airway protection and transferred North for further care. She remained in CCU and cont to be on respirator support.  The hospital course is complicated by seizures, prolonged QT,streptococcus bacteremia with positive blood cultures and positive GM negative rods in the urine.  She was on Ceftriaxone which was changed to Cefepime.   The pt failed weaning and required tracheostomy on 6/10 and was sent to the vent unit.  She failed S&S several times and required placement of PEG on 6/24.     INTERVAL HPI/OVERNIGHT EVENTS: pt with slow improvement,  OOB to chair, S&S attempts at swallowing, sp PEG,   trials of weaning in progress but  v slow, social svc working for placement    MEDICATIONS  (STANDING):  amLODIPine   Tablet 10 milliGRAM(s) Oral daily  cefepime   IVPB 2000 milliGRAM(s) IV Intermittent every 8 hours D/C  Rocephine 2 gms q 24  chlorhexidine 0.12% Liquid 15 milliLiter(s) Oral Mucosa two times a day  chlorhexidine 4% Liquid 1 Application(s) Topical <User Schedule>  docusate sodium Liquid 100 milliGRAM(s) Oral three times a day  hydrALAZINE 25 milliGRAM(s) Oral three times a day  insulin regular Infusion 4 Unit(s)/Hr (4 mL/Hr) IV Continuous <Continuous>  levETIRAcetam  Solution 1000 milliGRAM(s) Oral two times a day  nystatin    Suspension [id number] Unit(s) Oral two times a day  pantoprazole   Suspension 40 milliGRAM(s) Oral daily  propofol Infusion. 1 MICROgram(s)/kG/Min (0.473 mL/Hr) IV Continuous <Continuous>  senna 2 Tablet(s) Oral at bedtime    MEDICATIONS  (PRN):  acetaminophen   Tablet .. 650 milliGRAM(s) Oral every 6 hours PRN Temp greater or equal to 38C (100.4F)      Allergies  NKDA     T(F): 99.1  HR: 877  BP: 128/56  RR: 20  SpO2: 100 %     PHYSICAL EXAM:      Constitutional: pt seen in vent unit, + trach, NAD    Eyes: non icteric, eyes wide open    ENMT: dry oral mucosa    Neck:  short and supple, + trach    Respiratory:  harsh respirator BS    Cardiovascular:  S1S2    Gastrointestinal: globose soft and benign, + PEG    Genitourinary:  no villatoro    Extremities: arthritic changes, moves upper ext, dec motor strength of lower ext    LABS:   7/1/19                   10  4 )-----------( 192             34    139 |  100 |  31  ----------------------------<  228  4.5  |  24   |  1.0    GFR 87, 91, 56  Ca    9.7       Mg    2.2, 2.3, 2.1, 2.2    TPro  5.6<L>  /  Alb  3.0, 3.3 /  TBili  0.5, 6.0  /  DBili  x   /  AST  9   /  ALT  23  /  AlkPhos  88  06-06    urine GM- rods  Blood streptococcus  sputum streptococcus      RADIOLOGY & ADDITIONAL TESTS:     EKG NSR 66/min   CXR  +ET tube, + feeding tube    CT of head R thalamic parenchymal hematoma 2.2 cm with intraventricular extension, mild ventricular enlargement, white mater changes     repeat CTH:  evolution of the  R thalamic hemorrhage which has dec, ventricular size stable with mild enlargement, white matter changes, stable chronic hematoma cavity within the L basal ganglia, chronic lacunar infarct in BL basal ganglia and L thalamus      Stroke code, R thalamic hemorrhagic CVA with extension to ventricles, repeat studies show no progression and some improvement of the hematoma  sp Hypertensive emergency  Seizures, controlled on Keppra  Streptococcal Bacteremia probably from transient PNA,   Marcio negative UTI    Hx of HTN, ASHD  Hx of DLD  Hx of old hemorrhagic CVA about 8 yrs ago, dysarthria, gait instability  Hx of OA, DDD, DJD  Hx of GERD, HH, Diverticulosis  Hx of anxiety    pt is sp trach  6/10,  more alert, eyes fully open, cont to attempt weaning,  neurology ff up appreciated:  dec keppra to 1000mg 2x to lessen sedation, repeat CTH  S&S attempts:  mild oral dysphagia with delayed oral transit and proloned mastication  sp PEG  6/24  pt is sp ceftriaxone, blood culture 6/4 + for strept, source probable  LLL  strept PNA, + sputa for strept and CT shows infiltrate,  urine + for Gm neg rods/asymptomatic pyuria, completed the course of ABX  seizures  controlled on Keppra 1000mg q12, VEEG shows focal and generalized slowing    monitor electrolytes    pt is ff by pul /critical care,  oral care, skin care and decubiti prevention    social SVC/D/C planning working with pt on SNF placement, pt will need slow weaning process and slow prolonged rehab

## 2019-07-02 NOTE — PROGRESS NOTE ADULT - SUBJECTIVE AND OBJECTIVE BOX
Patient is a 74y old  Female who presents with a chief complaint of left sided weakness and slurred speech/ R thalamic hemorrhagic CVA (01 Jul 2019 22:21)  patient is off the vent 12 hr    PHYSICAL EXAM    Vital Signs Last 24 Hrs  T(C): 36.6 (02 Jul 2019 07:30), Max: 37.2 (01 Jul 2019 23:56)  T(F): 97.9 (02 Jul 2019 07:30), Max: 99 (01 Jul 2019 23:56)  HR: 82 (02 Jul 2019 10:00) (66 - 88)  BP: 108/52 (02 Jul 2019 07:30) (108/52 - 112/50)  BP(mean): 74 (02 Jul 2019 07:30) (74 - 80)  RR: 19 (02 Jul 2019 07:30) (19 - 20)  SpO2: 97% (02 Jul 2019 10:00) (95% - 99%)    General: In NAD  HEENT: + trach               Neck: Supple.   No Cervical LN    Lungs: Bilateral BS  Cardiovascular: Regular   Abdomen: Soft. + BS  Extremities: No CLubbing   Skin:   Neurological: awake        LABS:                          10.4   4.83  )-----------( 192      ( 01 Jul 2019 06:00 )             34.6                                               07-01    139  |  100  |  31<H>  ----------------------------<  228<H>  4.5   |  24  |  1.0    Ca    11.2<H>      01 Jul 2019 06:00  Mg     2.2     07-01    TPro  6.1  /  Alb  3.2<L>  /  TBili  0.6  /  DBili  x   /  AST  12  /  ALT  15  /  AlkPhos  75  07-01                                                                                           LIVER FUNCTIONS - ( 01 Jul 2019 06:00 )  Alb: 3.2 g/dL / Pro: 6.1 g/dL / ALK PHOS: 75 U/L / ALT: 15 U/L / AST: 12 U/L / GGT: x                                                                                               Mode: standby  FiO2: 40                                          MEDICATIONS  (STANDING):  amLODIPine   Tablet 10 milliGRAM(s) Oral daily  chlorhexidine 0.12% Liquid 15 milliLiter(s) Oral Mucosa two times a day  chlorhexidine 4% Liquid 1 Application(s) Topical <User Schedule>  dextrose 5%. 1000 milliLiter(s) (50 mL/Hr) IV Continuous <Continuous>  dextrose 50% Injectable 12.5 Gram(s) IV Push once  dextrose 50% Injectable 25 Gram(s) IV Push once  dextrose 50% Injectable 25 Gram(s) IV Push once  heparin  Injectable 5000 Unit(s) SubCutaneous every 8 hours  hydrALAZINE 25 milliGRAM(s) Oral three times a day  insulin glargine Injectable (LANTUS) 18 Unit(s) SubCutaneous every morning  insulin lispro (HumaLOG) corrective regimen sliding scale   SubCutaneous Before meals and at bedtime  levETIRAcetam  Solution 1000 milliGRAM(s) Oral two times a day  losartan 25 milliGRAM(s) Oral daily  nystatin    Suspension [id number] Unit(s) Oral two times a day  pantoprazole   Suspension 40 milliGRAM(s) Oral daily    MEDICATIONS  (PRN):  acetaminophen   Tablet .. 650 milliGRAM(s) Oral every 6 hours PRN Temp greater or equal to 38C (100.4F)  dextrose 40% Gel 15 Gram(s) Oral once PRN Blood Glucose LESS THAN 70 milliGRAM(s)/deciliter  glucagon  Injectable 1 milliGRAM(s) IntraMuscular once PRN Glucose LESS THAN 70 milligrams/deciliter

## 2019-07-02 NOTE — PROGRESS NOTE ADULT - ASSESSMENT
Impression:    Chronic respiratory failure   Intra cerebral Bleed  Seizure  Hypertensive emergency  HO hemorrhagic stroke  penumonia aspiration  UTI    PLAN:    CNS: Continue with keppra per neuro    HEENT: Trach care. tolerating food(oral)    PULMONARY:  HOB @ 30 degrees. vent prn(12 on /off), weaning extra hour/day    CARDIOVASCULAR: i=o. Continue with antihypertensives    GI: GI prophylaxis.  Ok to use PEG for feeds. Passed S/S but pt not eating enough.    RENAL:  Follow up lytes.  Correct as needed    INFECTIOUS DISEASE: per ID    HEMATOLOGICAL:   DVT prophylaxis.     ENDOCRINE:  Follow up FS.  prealbumin 21    MUSCULOSKELETAL: Bed rest    patient improved  dispo plan

## 2019-07-03 NOTE — CHART NOTE - NSCHARTNOTEFT_GEN_A_CORE
Registered Dietitian Follow-Up    ***Scroll to the bottom for RD recommendation***    Patient Profile Reviewed                           Yes [x]   No []  Nutrition History Previously Obtained        Yes [x]  No []   - pt passed SLP 6/28 and ever since then, pt has been eating very poorly on dysphagia diet per staff. Therefore, pt is now at risk in nutrition.  Per RN, despite TF not ordered (only oral intake), RN has been giving some TF via PEG after each meal.         PERTINENT MEDICAL INFORMATIONS:  (1) Here for L sided weakness + slurred speech.  (2) S/p Trach 6/10. S/p PEG 6/24.  (3) Social service planning for d/c, ?SNF  (4) Rehab needed. Tolerating oral foods but minimally. c/w HTN meds  (5) s/p SLP 7/1. Pulm f/u.      PERTINENT SUBJECTIVE INFORMATION:  (1) see above      DIET ORDER:   DYSPHAGIA 3 + NECTAR + ProSource Gelatin SF x2 + Glucerna shake q24hr.        ANTHROPOMETRICS:  - Ht.  157.48cm  - Wt.  (6/4): 79.4kg  (6/10): 79.2kg  (6/11): 80.9kg   6/24: 78.5kg   (7/1): 78kg - relatively stable.   - BMI. 31.9  - IBW       PERTINENT LAB DATA:  7/1: BUN 31, glucose 228, ca 112, albumin 3.2, GFR 56  PERTINENT MEDS: heparin, d5w, insulin, amlodipine, protonix      PHYSICAL FINDINGS  - APPEARANCE:        alert but often appears weak. No edema  - GI FUNCTION:        LBM 7/1 per RN  - TUBES:                     PEG  - ORAL/MOUTH:      per SLP  - SKIN:                        Intact        NUTRITION REQUIREMENTS  WEIGHT USED:                          ABW from admitted calculation note of 79.4kg  ESTIMATED ENERGY NEEDS:       CONTINUE [ x ]      ADJUST [  ]    ESTIMATED ENERGY NEEDS:         4075-3989 kcal/day (MSJ x 1.0-1.1)   ESTIMATED PROTEIN NEEDS:         52-68g (1.1-1.3g/kg IBW) - aim higher for maintenance  ESTIMATED FLUID NEEDS:             per VENT or 1ml/kcal     CURRENT NUTRIENT NEEDS:     Not meeting orally.          [  ] PREVIOUS NUTRITION DIAGNOSIS:               [  ] ONGOING        [  ] RESOLVED    NUTRITION DIAGNOSTIC #1  (NEW)  PROBLEM:                   Inadequate oral intake  ETIOLOGY:                   acuity of illness vs unknown etiology  SIGN/SYMPTOMS:      pt has been consuming <50% of all meals each day per RD observation and report by staff.      PATIENT INTERVENTION:    [  x] ORAL        [x ] EN/TF     GOAL/EXPECTED OUTCOME:     pt to meet and tolerate about 50% of estimated kcal/protein via TF while consuming and tolerating >50% of all meals and supplements upon f/u in 3 days.   INDICATOR/MONITORING:       RD to monitor diet order, energy intake, body composition, nutrition focused physical findings (PO tolerance, EN tolerance, appetite, electrolytes, renal profile)  NUTRITION INTERVENTION:        Meals and snacks. Enteral nutrition.     RECS: (1) due to poor oral intake, RD recommend to change diet to GLUCERNA 1.2 TUBE FEED at 240mL q8hr (after each meal) alongside with the current diet of Dysphagia 3 + [place] with ProSource SF gelatin q12hr and Glucerna oral SHAKE q24hr. Registered Dietitian Follow-Up    ***Scroll to the bottom for RD recommendation***    Patient Profile Reviewed                           Yes [x]   No []  Nutrition History Previously Obtained        Yes [x]  No []   - pt passed SLP 6/28 and ever since then, pt has been eating very poorly on dysphagia diet per staff. Therefore, pt is now at risk in nutrition.  Per RN, despite TF not ordered (only oral intake), RN has been giving some TF via PEG after each meal.         PERTINENT MEDICAL INFORMATIONS:  (1) Here for L sided weakness + slurred speech.  (2) S/p Trach 6/10. S/p PEG 6/24.  (3) Social service planning for d/c, ?SNF  (4) Rehab needed. Tolerating oral foods but minimally. c/w HTN meds  (5) s/p SLP 7/1. Pulm f/u.      PERTINENT SUBJECTIVE INFORMATION:  (1) see above      DIET ORDER:   DYSPHAGIA 3 + NECTAR + ProSource Gelatin SF x2 + Glucerna shake q24hr.        ANTHROPOMETRICS:  - Ht.  157.48cm  - Wt.  (6/4): 79.4kg  (6/10): 79.2kg  (6/11): 80.9kg   6/24: 78.5kg   (7/1): 78kg - relatively stable.   - BMI. 31.9  - IBW       PERTINENT LAB DATA:  7/1: BUN 31, glucose 228, ca 112, albumin 3.2, GFR 56  PERTINENT MEDS: heparin, d5w, insulin, amlodipine, protonix      PHYSICAL FINDINGS  - APPEARANCE:        alert but often appears weak. No edema  - GI FUNCTION:        LBM 7/1 per RN  - TUBES:                     PEG  - ORAL/MOUTH:      per SLP  - SKIN:                        Intact        NUTRITION REQUIREMENTS  WEIGHT USED:                          ABW from admitted calculation note of 79.4kg  ESTIMATED ENERGY NEEDS:       CONTINUE [ x ]      ADJUST [  ]    ESTIMATED ENERGY NEEDS:         5755-2410 kcal/day (MSJ x 1.0-1.1)   ESTIMATED PROTEIN NEEDS:         52-68g (1.1-1.3g/kg IBW) - aim higher for maintenance  ESTIMATED FLUID NEEDS:             per VENT or 1ml/kcal     CURRENT NUTRIENT NEEDS:     Not meeting orally.          [  ] PREVIOUS NUTRITION DIAGNOSIS:               [  ] ONGOING        [  ] RESOLVED    NUTRITION DIAGNOSTIC #1  (NEW)  PROBLEM:                   Inadequate oral intake  ETIOLOGY:                   acuity of illness vs unknown etiology  SIGN/SYMPTOMS:      pt has been consuming <50% of all meals each day per RD observation and report by staff.      PATIENT INTERVENTION:    [  x] ORAL        [x ] EN/TF     GOAL/EXPECTED OUTCOME:     pt to meet and tolerate about 50% of estimated kcal/protein via TF while consuming and tolerating >50% of all meals and supplements upon f/u in 3 days.   INDICATOR/MONITORING:       RD to monitor diet order, energy intake, body composition, nutrition focused physical findings (PO tolerance, EN tolerance, appetite, electrolytes, renal profile)  NUTRITION INTERVENTION:        Meals and snacks. Enteral nutrition.     RECS: (1) due to poor oral intake, RD recommend to change diet to GLUCERNA 1.2 TUBE FEED at 240mL q8hr (after each meal) alongside with the current diet of Dysphagia 2 + nectar (PER SLP recs on 7/1) with ProSource SF gelatin q12hr and Glucerna oral SHAKE q24hr. will review with Joseph

## 2019-07-03 NOTE — PROGRESS NOTE ADULT - ASSESSMENT
HERNANDO HERNANDEZ 73yo W  Female BIBA to S side from home for elevated BP, slurred speech and L sided weakness.  The pt was noted to have a hypertensive emergency with R thalamic hemorrhagic  CVA. 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    Hospital course:   The pt underwent a stroke code and was intubated for airway protection and transferred North for further care. She remained in CCU and cont to be on respirator support.  The hospital course is complicated by seizures, prolonged QT,streptococcus bacteremia with positive blood cultures and positive GM negative rods in the urine.  She was on Ceftriaxone which was changed to Cefepime.   The pt failed weaning and required tracheostomy on 6/10 and was sent to the vent unit.  She failed S&S several times and required placement of PEG on 6/24.     INTERVAL HPI/OVERNIGHT EVENTS: weaning attempts are slow, pt tolerated 16 hrs off, this AM had episode of facial erythema and was uncomfortable, pt was on T tube, pulse ox was 100%, pulse was 116, pt was repositioned, voice piece was removed and pt sx improved, 2 sons at bedside during rounds, discussed plan of care, being tx for thrush    MEDICATIONS  (STANDING):  amLODIPine   Tablet 10 milliGRAM(s) Oral daily  cefepime   IVPB 2000 milliGRAM(s) IV Intermittent every 8 hours D/C  Rocephine 2 gms q 24  chlorhexidine 0.12% Liquid 15 milliLiter(s) Oral Mucosa two times a day  chlorhexidine 4% Liquid 1 Application(s) Topical <User Schedule>  docusate sodium Liquid 100 milliGRAM(s) Oral three times a day  hydrALAZINE 25 milliGRAM(s) Oral three times a day  insulin regular Infusion 4 Unit(s)/Hr (4 mL/Hr) IV Continuous <Continuous>  levETIRAcetam  Solution 1000 milliGRAM(s) Oral two times a day  nystatin    Suspension 593466 Unit(s) Oral two times a day  pantoprazole   Suspension 40 milliGRAM(s) Oral daily  propofol Infusion. 1 MICROgram(s)/kG/Min (0.473 mL/Hr) IV Continuous <Continuous>  senna 2 Tablet(s) Oral at bedtime    MEDICATIONS  (PRN):  acetaminophen   Tablet .. 650 milliGRAM(s) Oral every 6 hours PRN Temp greater or equal to 38C (100.4F)      Allergies  NKDA     T(F): 98.6  HR: 72  BP: 113/55  RR: 20  SpO2: 100-96 %     PHYSICAL EXAM:      Constitutional: pt seen in vent unit, + trach on T tube    Eyes: non icteric, eyes wide open    ENMT: dry oral mucosa    Neck:  short and supple, + trach    Respiratory:  harsh respirator BS    Cardiovascular:  S1S2    Gastrointestinal: globose soft and benign, + PEG    Genitourinary:  no villatoro    Extremities: arthritic changes, moves upper ext, dec motor strength of lower ext    LABS:   7/1/19                   10  4 )-----------( 192             34    139 |  100 |  31  ----------------------------<  228  4.5  |  24   |  1.0    GFR 87, 91, 56  Ca    9.7       Mg    2.2, 2.3, 2.1, 2.2    TPro  5.6<L>  /  Alb  3.0, 3.3 /  TBili  0.5, 6.0  /  DBili  x   /  AST  9   /  ALT  23  /  AlkPhos  88  06-06    urine GM- rods  Blood streptococcus  sputum streptococcus      RADIOLOGY & ADDITIONAL TESTS:     EKG NSR 66/min   CXR  +ET tube, + feeding tube    CT of head R thalamic parenchymal hematoma 2.2 cm with intraventricular extension, mild ventricular enlargement, white mater changes     repeat CTH:  evolution of the  R thalamic hemorrhage which has dec, ventricular size stable with mild enlargement, white matter changes, stable chronic hematoma cavity within the L basal ganglia, chronic lacunar infarct in BL basal ganglia and L thalamus      Stroke code, R thalamic hemorrhagic CVA with extension to ventricles, repeat studies show no progression and some improvement of the hematoma  sp Hypertensive emergency  Seizures, controlled on Keppra  Streptococcal Bacteremia probably from transient PNA,   Marcio negative UTI    Hx of HTN, ASHD  Hx of DLD  Hx of old hemorrhagic CVA about 8 yrs ago, dysarthria, gait instability  Hx of OA, DDD, DJD  Hx of GERD, HH, Diverticulosis  Hx of anxiety    pt is sp trach  6/10,  more alert, eyes fully open, cont to attempt weaning,  neurology ff up appreciated:  dec keppra to 1000mg 2x to lessen sedation, repeat CTH  S&S attempts:  mild oral dysphagia with delayed oral transit and prolonged mastication  sp PEG  6/24, on feeds  pt is sp ceftriaxone, blood culture 6/4 + for strept, source probable  LLL  strept PNA, + sputa for strept and CT shows infiltrate,  urine + for Gm neg rods/asymptomatic pyuria, completed the course of ABX  seizures  controlled on Keppra 1000mg q12, VEEG shows focal and generalized slowing  tx for thrush  monitor electrolytes    pt is ff by pul /critical care,  oral care, skin care and decubiti prevention    social SVC/D/C planning working with pt on SNF placement, pt will need slow weaning process and slow prolonged rehab

## 2019-07-03 NOTE — PROGRESS NOTE ADULT - ASSESSMENT
Impression:    Chronic respiratory failure   Intra cerebral Bleed  Seizure  Hypertensive emergency  HO hemorrhagic stroke  penumonia aspiration  UTI    PLAN:    CNS: Continue with keppra per neuro    HEENT: Trach care. tolerating food(oral)    PULMONARY:  HOB @ 30 degrees. vent prn(16 hrs off).    CARDIOVASCULAR: i=o. Continue with antihypertensives    GI: GI prophylaxis.  Ok to use PEG for feeds. Passed S/S but pt not eating enough.    RENAL:  Follow up lytes.  Correct as needed    INFECTIOUS DISEASE: per ID    HEMATOLOGICAL:   DVT prophylaxis.     ENDOCRINE:  Follow up FS.  prealbumin 21, crp of 2    MUSCULOSKELETAL: OOB to chair    patient improved  dispo plan     discussed plan with the family at the bedside.

## 2019-07-03 NOTE — PROGRESS NOTE ADULT - SUBJECTIVE AND OBJECTIVE BOX
HERNANDO HERNANDEZ  74y  Female    Patient is a 74y old  Female who presents with a chief complaint of left sided weakness and slurred speech (02 Jul 2019 20:04)      SUBJECTIVE/OVERNIGHT EVENTS:  off the vent for 16 hrs(past few days she was off for 12 hrs)    PAST MEDICAL & SURGICAL HISTORY:  Diabetes mellitus  Stroke  Hypertension  No significant past surgical history    MEDICATIONS  (STANDING):  amLODIPine   Tablet 10 milliGRAM(s) Oral daily  chlorhexidine 0.12% Liquid 15 milliLiter(s) Oral Mucosa two times a day  chlorhexidine 4% Liquid 1 Application(s) Topical <User Schedule>  dextrose 5%. 1000 milliLiter(s) (50 mL/Hr) IV Continuous <Continuous>  dextrose 50% Injectable 12.5 Gram(s) IV Push once  dextrose 50% Injectable 25 Gram(s) IV Push once  dextrose 50% Injectable 25 Gram(s) IV Push once  heparin  Injectable 5000 Unit(s) SubCutaneous every 8 hours  hydrALAZINE 25 milliGRAM(s) Oral three times a day  insulin glargine Injectable (LANTUS) 18 Unit(s) SubCutaneous every morning  insulin lispro (HumaLOG) corrective regimen sliding scale   SubCutaneous Before meals and at bedtime  levETIRAcetam  Solution 1000 milliGRAM(s) Oral two times a day  losartan 25 milliGRAM(s) Oral daily  nystatin    Suspension 750102 Unit(s) Oral two times a day  pantoprazole   Suspension 40 milliGRAM(s) Oral daily    MEDICATIONS  (PRN):  acetaminophen   Tablet .. 650 milliGRAM(s) Oral every 6 hours PRN Temp greater or equal to 38C (100.4F)  dextrose 40% Gel 15 Gram(s) Oral once PRN Blood Glucose LESS THAN 70 milliGRAM(s)/deciliter  glucagon  Injectable 1 milliGRAM(s) IntraMuscular once PRN Glucose LESS THAN 70 milligrams/deciliter      OBJECTIVE:    Vital Signs Last 24 Hrs  T(C): 36.6 (03 Jul 2019 08:00), Max: 37.3 (02 Jul 2019 16:01)  T(F): 97.9 (03 Jul 2019 08:00), Max: 99.1 (02 Jul 2019 16:01)  HR: 81 (03 Jul 2019 08:30) (51 - 81)  BP: 155/57 (03 Jul 2019 08:00) (105/53 - 155/57)  BP(mean): 78 (03 Jul 2019 08:00) (75 - 82)  RR: 20 (03 Jul 2019 08:00) (18 - 20)  SpO2: 96% (03 Jul 2019 08:30) (96% - 100%)    General: In NAD  HEENT: + trach               Neck: Supple.   No Cervical LN    Lungs: Bilateral BS  Cardiovascular: Regular   Abdomen: Soft. + BS  Extremities: No CLubbing   Skin:   Neurological: waxing and waning level of consciousness     I&O's Summary    02 Jul 2019 07:01  -  03 Jul 2019 07:00  --------------------------------------------------------  IN: 1020 mL / OUT: 1 mL / NET: 1019 mL        BOWEL MOVEMENTS:    PRESSORS:  SEDATION:  VENTED:      LABS:                                                                                                                                                                                                                                   Mode: standby

## 2019-07-04 NOTE — PROGRESS NOTE ADULT - ASSESSMENT
Impression:    Chronic respiratory failure   Intra cerebral Bleed  Seizure  Hypertensive emergency  HO hemorrhagic stroke  penumonia aspiration  UTI    PLAN:    CNS: Continue with keppra per neuro    HEENT: Trach care. tolerating food(oral)    PULMONARY:  HOB @ 30 degrees. vent prn(16 hrs off).    CARDIOVASCULAR: i=o. Continue with antihypertensives    GI: GI prophylaxis.  Ok to use PEG for feeds. Passed S/S but pt not eating enough.    RENAL:  Follow up lytes.  Correct as needed    INFECTIOUS DISEASE: per ID    HEMATOLOGICAL:   DVT prophylaxis.     ENDOCRINE:  Follow up FS.  prealbumin 21, crp of 2    MUSCULOSKELETAL: OOB to chair    patient improved  dispo plan

## 2019-07-04 NOTE — PROGRESS NOTE ADULT - SUBJECTIVE AND OBJECTIVE BOX
Patient is a 74y old  Female who presents with a chief complaint of left sided weakness and slurred speech/ R thalamic hemorrhagic CVA (03 Jul 2019 21:03)        Over Night Events: No events overnight. 16 hrs off vent.         ROS:  See HPI    PHYSICAL EXAM    ICU Vital Signs Last 24 Hrs  T(C): 36.8 (04 Jul 2019 07:55), Max: 37 (03 Jul 2019 15:45)  T(F): 98.3 (04 Jul 2019 07:55), Max: 98.6 (03 Jul 2019 15:45)  HR: 83 (04 Jul 2019 09:00) (59 - 87)  BP: 151/79 (04 Jul 2019 07:55) (113/55 - 151/79)  BP(mean): 119 (04 Jul 2019 07:55) (79 - 119)  RR: 20 (04 Jul 2019 07:55) (18 - 20)  SpO2: 100% (04 Jul 2019 09:00) (99% - 100%)      General: Awake, follows commands.    HEENT: AGUSTÍN             Lymphatic system: No cervical LN   Lungs: Bilateral BS  Cardiovascular: Regular   Gastrointestinal: Soft, Positive BS  Extremities: No clubbing.    Skin: Warm, intact  Neurological: Weak left side        LABS:                            11.3   5.93  )-----------( 230      ( 04 Jul 2019 05:41 )             36.0                                               07-04    142  |  103  |  16  ----------------------------<  157<H>  4.0   |  26  |  0.7    Ca    10.9<H>      04 Jul 2019 05:41    TPro  6.6  /  Alb  3.7  /  TBili  0.6  /  DBili  x   /  AST  14  /  ALT  23  /  AlkPhos  71  07-04                                                                                           LIVER FUNCTIONS - ( 04 Jul 2019 05:41 )  Alb: 3.7 g/dL / Pro: 6.6 g/dL / ALK PHOS: 71 U/L / ALT: 23 U/L / AST: 14 U/L / GGT: x                                                                                    Mode: standby  FiO2: 50                                          MEDICATIONS  (STANDING):  amLODIPine   Tablet 10 milliGRAM(s) Oral daily  chlorhexidine 0.12% Liquid 15 milliLiter(s) Oral Mucosa two times a day  chlorhexidine 4% Liquid 1 Application(s) Topical <User Schedule>  dextrose 5%. 1000 milliLiter(s) (50 mL/Hr) IV Continuous <Continuous>  dextrose 50% Injectable 12.5 Gram(s) IV Push once  dextrose 50% Injectable 25 Gram(s) IV Push once  dextrose 50% Injectable 25 Gram(s) IV Push once  heparin  Injectable 5000 Unit(s) SubCutaneous every 8 hours  hydrALAZINE 25 milliGRAM(s) Oral three times a day  insulin glargine Injectable (LANTUS) 18 Unit(s) SubCutaneous every morning  insulin lispro (HumaLOG) corrective regimen sliding scale   SubCutaneous Before meals and at bedtime  levETIRAcetam  Solution 1000 milliGRAM(s) Oral two times a day  losartan 25 milliGRAM(s) Oral daily  nystatin    Suspension [id number] Unit(s) Oral two times a day  pantoprazole   Suspension 40 milliGRAM(s) Oral daily    MEDICATIONS  (PRN):  acetaminophen   Tablet .. 650 milliGRAM(s) Oral every 6 hours PRN Temp greater or equal to 38C (100.4F)  dextrose 40% Gel 15 Gram(s) Oral once PRN Blood Glucose LESS THAN 70 milliGRAM(s)/deciliter  glucagon  Injectable 1 milliGRAM(s) IntraMuscular once PRN Glucose LESS THAN 70 milligrams/deciliter      Xrays:                                                                                     ECHO

## 2019-07-04 NOTE — PROGRESS NOTE ADULT - ASSESSMENT
HERNANDO HERNANDEZ 73yo W  Female BIBA to S side from home for elevated BP, slurred speech and L sided weakness.  The pt was noted to have a hypertensive emergency with R thalamic hemorrhagic  CVA. 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    Hospital course:   The pt underwent a stroke code and was intubated for airway protection and transferred North for further care. She remained in CCU and cont to be on respirator support.  The hospital course is complicated by seizures, prolonged QT,streptococcus bacteremia with positive blood cultures and positive GM negative rods in the urine.  She was on Ceftriaxone which was changed to Cefepime.   The pt failed weaning and required tracheostomy on 6/10 and was sent to the vent unit.  She failed S&S several times and required placement of PEG on 6/24.     INTERVAL HPI/OVERNIGHT EVENTS: weaning attempts are slow, pt tolerated 16 hrs off, tx for thrush    MEDICATIONS  (STANDING):  amLODIPine   Tablet 10 milliGRAM(s) Oral daily  cefepime   IVPB 2000 milliGRAM(s) IV Intermittent every 8 hours D/C  Rocephine 2 gms q 24  chlorhexidine 0.12% Liquid 15 milliLiter(s) Oral Mucosa two times a day  chlorhexidine 4% Liquid 1 Application(s) Topical <User Schedule>  docusate sodium Liquid 100 milliGRAM(s) Oral three times a day  hydrALAZINE 25 milliGRAM(s) Oral three times a day  insulin regular Infusion 4 Unit(s)/Hr (4 mL/Hr) IV Continuous <Continuous>  levETIRAcetam  Solution 1000 milliGRAM(s) Oral two times a day  nystatin    Suspension [id number] Unit(s) Oral two times a day  pantoprazole   Suspension 40 milliGRAM(s) Oral daily  propofol Infusion. 1 MICROgram(s)/kG/Min (0.473 mL/Hr) IV Continuous <Continuous>  senna 2 Tablet(s) Oral at bedtime    MEDICATIONS  (PRN):  acetaminophen   Tablet .. 650 milliGRAM(s) Oral every 6 hours PRN Temp greater or equal to 38C (100.4F)      Allergies  NKDA     T(F): 98.3  HR: 83  BP: 151/79  RR: 20  SpO2: 100%     PHYSICAL EXAM:      Constitutional: pt seen in vent unit, + trach on T tube, alert but tires easily    Eyes: non icteric, eyes wide open    ENMT: dry oral mucosa    Neck:  short and supple, + trach    Respiratory:  harsh respirator BS    Cardiovascular:  S1S2    Gastrointestinal: globose soft and benign, + PEG    Genitourinary:  no villatoro    Extremities: arthritic changes, moves upper ext, dec motor strength of lower ext    LABS:                   11.3  5.9 )-----------( 230             36    142 |  103 |  16  ----------------------------<  157  4.0  |  26   |  0.7    GFR 87, 91, 56, 87  Ca    9.7       Mg    2.2, 2.3, 2.1, 2.2    TPro  5.6<L>  /  Alb  3.0, 3.3, 3.7 /  TBili  0.5, 6.0  /  DBili  x   /  AST  9   /  ALT  23  /  AlkPhos  88  06-06    urine GM- rods  Blood streptococcus  sputum streptococcus      RADIOLOGY & ADDITIONAL TESTS:     EKG NSR 66/min   CXR  +ET tube, + feeding tube    CT of head R thalamic parenchymal hematoma 2.2 cm with intraventricular extension, mild ventricular enlargement, white mater changes     repeat CTH:  evolution of the  R thalamic hemorrhage which has dec, ventricular size stable with mild enlargement, white matter changes, stable chronic hematoma cavity within the L basal ganglia, chronic lacunar infarct in BL basal ganglia and L thalamus      Stroke code, R thalamic hemorrhagic CVA with extension to ventricles  sp Hypertensive emergency  Respiratory Failure, sp trach 6/10  Seizures, controlled on Keppra  Streptococcal Bacteremia probably from transient PNA,   Marcio negative UTI    Hx of HTN, ASHD  Hx of DLD  Hx of old hemorrhagic CVA about 8 yrs ago, dysarthria, gait instability  Hx of OA, DDD, DJD  Hx of GERD, HH, Diverticulosis  Hx of anxiety    pt is sp trach  6/10,  more alert, eyes fully open, cont to attempt weaning, 16 hrs off  neurology:  dec keppra to 1000mg 2x to lessen sedation, repeat CTH  S&S attempts:  mild oral dysphagia with delayed oral transit and prolonged mastication  sp PEG  6/24, on feeds  pt is sp ceftriaxone, blood culture 6/4 + for strept, source probable  LLL  strept PNA, + sputa for strept and CT shows infiltrate,  urine + for Gm neg rods/asymptomatic pyuria, completed the course of ABX  seizures  controlled on Keppra 1000mg q12, VEEG shows focal and generalized slowing  tx for thrush  monitor electrolytes    pt is ff by pul /critical care,  oral care, skin care and decubiti prevention    social SVC/D/C planning working with pt on SNF placement, pt will need slow weaning process and slow prolonged rehab

## 2019-07-05 NOTE — PROGRESS NOTE ADULT - SUBJECTIVE AND OBJECTIVE BOX
HERNANDO HERNANDEZ  74y  Female    Patient is a 74y old  Female who presents with a chief complaint of left sided weakness and slurred speech/ thalamic hemorrhagic CVA, respiratory failure (04 Jul 2019 14:06)      SUBJECTIVE/OVERNIGHT EVENTS:      PAST MEDICAL & SURGICAL HISTORY:  Diabetes mellitus  Stroke  Hypertension  No significant past surgical history    MEDICATIONS  (STANDING):  amLODIPine   Tablet 10 milliGRAM(s) Oral daily  chlorhexidine 0.12% Liquid 15 milliLiter(s) Oral Mucosa two times a day  chlorhexidine 4% Liquid 1 Application(s) Topical <User Schedule>  dextrose 5%. 1000 milliLiter(s) (50 mL/Hr) IV Continuous <Continuous>  dextrose 50% Injectable 12.5 Gram(s) IV Push once  dextrose 50% Injectable 25 Gram(s) IV Push once  dextrose 50% Injectable 25 Gram(s) IV Push once  heparin  Injectable 5000 Unit(s) SubCutaneous every 8 hours  hydrALAZINE 25 milliGRAM(s) Oral three times a day  insulin glargine Injectable (LANTUS) 18 Unit(s) SubCutaneous every morning  insulin lispro (HumaLOG) corrective regimen sliding scale   SubCutaneous every 6 hours  levETIRAcetam  Solution 1000 milliGRAM(s) Oral two times a day  losartan 25 milliGRAM(s) Oral daily  nystatin    Suspension 199380 Unit(s) Oral two times a day  pantoprazole   Suspension 40 milliGRAM(s) Oral daily    MEDICATIONS  (PRN):  acetaminophen   Tablet .. 650 milliGRAM(s) Oral every 6 hours PRN Temp greater or equal to 38C (100.4F)  dextrose 40% Gel 15 Gram(s) Oral once PRN Blood Glucose LESS THAN 70 milliGRAM(s)/deciliter  glucagon  Injectable 1 milliGRAM(s) IntraMuscular once PRN Glucose LESS THAN 70 milligrams/deciliter      OBJECTIVE:    Vital Signs Last 24 Hrs  T(C): 37.2 (05 Jul 2019 07:37), Max: 37.2 (05 Jul 2019 07:37)  T(F): 99 (05 Jul 2019 07:37), Max: 99 (05 Jul 2019 07:37)  HR: 83 (05 Jul 2019 07:37) (63 - 83)  BP: 120/51 (05 Jul 2019 07:37) (110/62 - 131/65)  BP(mean): 74 (05 Jul 2019 07:37) (74 - 95)  RR: 20 (05 Jul 2019 07:37) (20 - 20)  SpO2: 99% (05 Jul 2019 01:03) (99% - 99%)    General: In NAD  HEENT: + trach               Neck: Supple.   No Cervical LN    Lungs: Bilateral BS  Cardiovascular: Regular   Abdomen: Soft. + BS  Extremities: No CLubbing   Skin:   Neurological: non Focal     I&O's Summary    04 Jul 2019 07:01  -  05 Jul 2019 07:00  --------------------------------------------------------  IN: 780 mL / OUT: 0 mL / NET: 780 mL        BOWEL MOVEMENTS:    PRESSORS:  SEDATION:  VENTED:      LABS:                          11.3   5.69  )-----------( 218      ( 05 Jul 2019 01:20 )             36.2                                               07-05    141  |  102  |  18  ----------------------------<  158<H>  4.7   |  28  |  0.7    Ca    11.2<H>      05 Jul 2019 01:20  Phos  3.9     07-05  Mg     2.2     07-05    TPro  6.6  /  Alb  3.7  /  TBili  0.6  /  DBili  x   /  AST  14  /  ALT  23  /  AlkPhos  71  07-04                                                                                           LIVER FUNCTIONS - ( 04 Jul 2019 05:41 )  Alb: 3.7 g/dL / Pro: 6.6 g/dL / ALK PHOS: 71 U/L / ALT: 23 U/L / AST: 14 U/L / GGT: x                                                                                               Mode: AC/ CMV (Assist Control/ Continuous Mandatory Ventilation)  RR (machine): 14  TV (machine): 400  FiO2: 40  PEEP: 5  ITime: 1  MAP: 11  PIP: 23 HERNANDO HERNANDEZ  74y  Female    Patient is a 74y old  Female who presents with a chief complaint of left sided weakness and slurred speech/ thalamic hemorrhagic CVA, respiratory failure (04 Jul 2019 14:06)      SUBJECTIVE/OVERNIGHT EVENTS:      PAST MEDICAL & SURGICAL HISTORY:  Diabetes mellitus  Stroke  Hypertension  No significant past surgical history    MEDICATIONS  (STANDING):  amLODIPine   Tablet 10 milliGRAM(s) Oral daily  chlorhexidine 0.12% Liquid 15 milliLiter(s) Oral Mucosa two times a day  chlorhexidine 4% Liquid 1 Application(s) Topical <User Schedule>  dextrose 5%. 1000 milliLiter(s) (50 mL/Hr) IV Continuous <Continuous>  dextrose 50% Injectable 12.5 Gram(s) IV Push once  dextrose 50% Injectable 25 Gram(s) IV Push once  dextrose 50% Injectable 25 Gram(s) IV Push once  heparin  Injectable 5000 Unit(s) SubCutaneous every 8 hours  hydrALAZINE 25 milliGRAM(s) Oral three times a day  insulin glargine Injectable (LANTUS) 18 Unit(s) SubCutaneous every morning  insulin lispro (HumaLOG) corrective regimen sliding scale   SubCutaneous every 6 hours  levETIRAcetam  Solution 1000 milliGRAM(s) Oral two times a day  losartan 25 milliGRAM(s) Oral daily  nystatin    Suspension 084761 Unit(s) Oral two times a day  pantoprazole   Suspension 40 milliGRAM(s) Oral daily    MEDICATIONS  (PRN):  acetaminophen   Tablet .. 650 milliGRAM(s) Oral every 6 hours PRN Temp greater or equal to 38C (100.4F)  dextrose 40% Gel 15 Gram(s) Oral once PRN Blood Glucose LESS THAN 70 milliGRAM(s)/deciliter  glucagon  Injectable 1 milliGRAM(s) IntraMuscular once PRN Glucose LESS THAN 70 milligrams/deciliter      OBJECTIVE:    Vital Signs Last 24 Hrs  T(C): 37.2 (05 Jul 2019 07:37), Max: 37.2 (05 Jul 2019 07:37)  T(F): 99 (05 Jul 2019 07:37), Max: 99 (05 Jul 2019 07:37)  HR: 83 (05 Jul 2019 07:37) (63 - 83)  BP: 120/51 (05 Jul 2019 07:37) (110/62 - 131/65)  BP(mean): 74 (05 Jul 2019 07:37) (74 - 95)  RR: 20 (05 Jul 2019 07:37) (20 - 20)  SpO2: 99% (05 Jul 2019 01:03) (99% - 99%)    General: In NAD  HEENT: + trach               Neck: Supple.   No Cervical LN    Lungs: Bilateral BS  Cardiovascular: Regular   Abdomen: Soft. + BS  Extremities: No CLubbing   Skin:   Neurological: left side weakness    I&O's Summary    04 Jul 2019 07:01  -  05 Jul 2019 07:00  --------------------------------------------------------  IN: 780 mL / OUT: 0 mL / NET: 780 mL        BOWEL MOVEMENTS:    PRESSORS:  SEDATION:  VENTED:      LABS:                          11.3   5.69  )-----------( 218      ( 05 Jul 2019 01:20 )             36.2                                               07-05    141  |  102  |  18  ----------------------------<  158<H>  4.7   |  28  |  0.7    Ca    11.2<H>      05 Jul 2019 01:20  Phos  3.9     07-05  Mg     2.2     07-05    TPro  6.6  /  Alb  3.7  /  TBili  0.6  /  DBili  x   /  AST  14  /  ALT  23  /  AlkPhos  71  07-04                                                                                           LIVER FUNCTIONS - ( 04 Jul 2019 05:41 )  Alb: 3.7 g/dL / Pro: 6.6 g/dL / ALK PHOS: 71 U/L / ALT: 23 U/L / AST: 14 U/L / GGT: x                                                                                               Mode: AC/ CMV (Assist Control/ Continuous Mandatory Ventilation)  RR (machine): 14  TV (machine): 400  FiO2: 40  PEEP: 5  ITime: 1  MAP: 11  PIP: 23

## 2019-07-05 NOTE — PROGRESS NOTE ADULT - ASSESSMENT
Impression:    Chronic respiratory failure   Intra cerebral Bleed  Seizure  Hypertensive emergency  HO hemorrhagic stroke  penumonia aspiration  UTI    PLAN:    CNS: Continue with keppra per neuro    HEENT: Trach care. tolerating food(oral)    PULMONARY:  HOB @ 30 degrees. vent prn 16 hrs off  for the3 past 3 days. cont weaning.    CARDIOVASCULAR: i=o. Continue with antihypertensives    GI: GI prophylaxis.  Ok to use PEG for feeds. Passed S/S but pt not eating enough.    RENAL:  Follow up lytes.  Correct as needed    INFECTIOUS DISEASE: per ID    HEMATOLOGICAL:   DVT prophylaxis.     ENDOCRINE:  Follow up FS.  prealbumin 23, crp of 1.6    MUSCULOSKELETAL: OOB to chair    patient improved  dispo plan

## 2019-07-05 NOTE — PROGRESS NOTE ADULT - ASSESSMENT
HERNANDO HERNANDEZ 73yo W  Female BIBA to S side from home for elevated BP, slurred speech and L sided weakness.  The pt was noted to have a hypertensive emergency with R thalamic hemorrhagic  CVA. 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    Hospital course:   The pt underwent a stroke code and was intubated for airway protection and transferred North for further care. She remained in CCU and cont to be on respirator support.  The hospital course is complicated by seizures, prolonged QT,streptococcus bacteremia with positive blood cultures and positive GM negative rods in the urine.  She was on Ceftriaxone which was changed to Cefepime.   The pt failed weaning and required tracheostomy on 6/10 and was sent to the vent unit.  She failed S&S several times and required placement of PEG on 6/24.     INTERVAL HPI/OVERNIGHT EVENTS:  slow weaning process, pt tolerated 16 hrs off, tx for thrush, nutrition via PEG    MEDICATIONS  (STANDING):  amLODIPine   Tablet 10 milliGRAM(s) Oral daily  cefepime   IVPB 2000 milliGRAM(s) IV Intermittent every 8 hours D/C  Rocephine 2 gms q 24  chlorhexidine 0.12% Liquid 15 milliLiter(s) Oral Mucosa two times a day  chlorhexidine 4% Liquid 1 Application(s) Topical <User Schedule>  docusate sodium Liquid 100 milliGRAM(s) Oral three times a day  hydrALAZINE 25 milliGRAM(s) Oral three times a day  insulin regular Infusion 4 Unit(s)/Hr (4 mL/Hr) IV Continuous <Continuous>  levETIRAcetam  Solution 1000 milliGRAM(s) Oral two times a day  nystatin    Suspension 414228 Unit(s) Oral two times a day  pantoprazole   Suspension 40 milliGRAM(s) Oral daily  propofol Infusion. 1 MICROgram(s)/kG/Min (0.473 mL/Hr) IV Continuous <Continuous>  senna 2 Tablet(s) Oral at bedtime    MEDICATIONS  (PRN):  acetaminophen   Tablet .. 650 milliGRAM(s) Oral every 6 hours PRN Temp greater or equal to 38C (100.4F)      Allergies  NKDA     T(F): 99.4  HR: 74  BP: 123/60  RR: 20  SpO2: 96%     PHYSICAL EXAM:      Constitutional: pt seen in vent unit, + trach on T tube, alert but tires easily    Eyes: non icteric, eyes wide open    ENMT: dry oral mucosa    Neck:  short and supple, + trach    Respiratory:  harsh respirator BS    Cardiovascular:  S1S2    Gastrointestinal: globose soft and benign, + PEG    Genitourinary:  no villatoro    Extremities: arthritic changes, moves upper ext, dec motor strength of lower ext    LABS:                   11.3  5 )-----------( 218             36    141 |  102 |  18  ----------------------------<  158  4.7  |  28   |  0.7    GFR 87, 91, 56, 87  Ca    9.7       Mg    2.2, 2.3, 2.1, 2.2    TPro  5.6<L>  /  Alb  3.0, 3.3, 3.7 /  TBili  0.5, 6.0  /  DBili  x   /  AST  9   /  ALT  23  /  AlkPhos  88  06-06    urine GM- rods  Blood streptococcus  sputum streptococcus      RADIOLOGY & ADDITIONAL TESTS:     EKG NSR 66/min   CXR  +ET tube, + feeding tube    CT of head R thalamic parenchymal hematoma 2.2 cm with intraventricular extension, mild ventricular enlargement, white mater changes     repeat CTH:  evolution of the  R thalamic hemorrhage which has dec, ventricular size stable with mild enlargement, white matter changes, stable chronic hematoma cavity within the L basal ganglia, chronic lacunar infarct in BL basal ganglia and L thalamus      Stroke code, R thalamic hemorrhagic CVA with extension to ventricles  sp Hypertensive emergency  Respiratory Failure, sp trach 6/10  Seizures, controlled on Keppra  Streptococcal Bacteremia probably from transient PNA,   Marcio negative UTI    Hx of HTN, ASHD  Hx of DLD  Hx of old hemorrhagic CVA about 8 yrs ago, dysarthria, gait instability  Hx of OA, DDD, DJD  Hx of GERD, HH, Diverticulosis  Hx of anxiety    pt is sp trach  6/10,  more alert, eyes fully open, cont to attempt weaning, 16 hrs off  neurology:  dec keppra to 1000mg 2x to lessen sedation, repeat CTH  S&S attempts:  mild oral dysphagia with delayed oral transit and prolonged mastication  sp PEG  6/24, on feeds  pt is sp ceftriaxone, blood culture 6/4 + for strept, source probable  LLL  strept PNA, + sputa for strept and CT shows infiltrate,  urine + for Gm neg rods/asymptomatic pyuria, completed the course of ABX  seizures  controlled on Keppra 1000mg q12, VEEG shows focal and generalized slowing    monitor electrolytes  blood work WNL  pt is ff by pul /critical care,  oral care, skin care and decubiti prevention  tx for thrush  social SVC/D/C planning working with pt on SNF placement, pt will need slow weaning process and slow prolonged rehab

## 2019-07-05 NOTE — CHART NOTE - NSCHARTNOTEFT_GEN_A_CORE
Registered Dietitian Follow-Up    ***Scroll to the bottom for RD recommendation***    Patient Profile Reviewed                           Yes [x]   No []  Nutrition History Previously Obtained        Yes [x]  No []   - pt remains NOT eating any food. But is receiving Glucerna 1.2 at 240cc q8hr timely. per RN, due to pt not eating the ProSource Jello, instead she gives her ProSource TF via PEG q8hr. Tolerating TF. LBM 7/4.   RD will ask Joseph to adjust order.        PERTINENT MEDICAL INFORMATIONS:  (no new)  (1) Here for L sided weakness + slurred speech.  (2) S/p Trach 6/10. S/p PEG 6/24.  (3) Social service planning for d/c, ?SNF  (4) Rehab needed. Tolerating oral foods but minimally. c/w HTN meds  (5) s/p SLP 7/1. Pulm f/u.       PERTINENT SUBJECTIVE INFORMATION:  (1) see above      DIET ORDER:   DYSPHAGIA 3 + NECTAR + ProSource Gelatin SF x2 + Glucerna shake q24hr.        ANTHROPOMETRICS:  - Ht.  157.48cm  - Wt.  (6/4): 79.4kg  (6/10): 79.2kg  (6/11): 80.9kg   6/24: 78.5kg   (7/1): 78kg - relatively stable. no new weight  - BMI. 31.9  - IBW       PERTINENT LAB DATA:  7/5: h/h 11.3/36.2, glucose 158, ca 11.2  PERTINENT MEDS: heparin, abx, d5w, losartan, amlodipine, protonix    PHYSICAL FINDINGS  - APPEARANCE:        alert but often appears weak. No edema  - GI FUNCTION:        LBM 7/4 per RN  - TUBES:                     PEG  - ORAL/MOUTH:      per SLP  - SKIN:                        Intact        NUTRITION REQUIREMENTS  WEIGHT USED:                          ABW from admitted calculation note of 79.4kg  ESTIMATED ENERGY NEEDS:       CONTINUE [ x ]      ADJUST [  ]    ESTIMATED ENERGY NEEDS:         5328-3357 kcal/day (MSJ x 1.0-1.1)   ESTIMATED PROTEIN NEEDS:         52-68g (1.1-1.3g/kg IBW) - aim higher for maintenance  ESTIMATED FLUID NEEDS:             per VENT or 1ml/kcal     CURRENT NUTRIENT NEEDS:     Via PEG = meeting 975 kcal/ 75g protein  (including Prosource TF x3).  via Oral = NONE          [ x ] PREVIOUS NUTRITION DIAGNOSIS:    (1) inadequate oral intake - remains            [x  ] ONGOING        [  ] RESOLVED          PATIENT INTERVENTION:    [  x] ORAL        [x ] EN/TF     GOAL/EXPECTED OUTCOME:     pt to meet and tolerate about 50% of estimated kcal/protein via TF while consuming and tolerating >50% of all meals and supplements upon f/u in 4 days.   INDICATOR/MONITORING:       RD to monitor diet order, energy intake, body composition, nutrition focused physical findings (PO tolerance, EN tolerance, appetite, electrolytes, renal profile)  NUTRITION INTERVENTION:        Meals and snacks. Enteral nutrition.     RECS: (1) due to consistently poor oral intake, pt only benefit from PEG feeding at this time. Therefore, RD has discussed with RN, will adjust current TF slightly for now and monitor PO improvement. Currently pt is meeting ~60% of estimated kcal and ~100% of protein via PEG. Will keep it the same for now. PO encouragement needed.  RD recommend to change diet to GLUCERNA 1.2 TUBE FEED at 240mL q8hr (after each meal) with PROSOURCE TF q8hr. Alongside with the current diet of Dysphagia 2 + nectar.

## 2019-07-06 NOTE — PROGRESS NOTE ADULT - SUBJECTIVE AND OBJECTIVE BOX
Patient is a 74y old  Female who presents with a chief complaint of left sided weakness and slurred speech, acute R thalamic hemorrhagic CVA, respiratory failure, seizures (05 Jul 2019 20:39)        Over Night Events: No events overnight. Off vent for 24 hrs.         ROS:  See HPI    PHYSICAL EXAM    ICU Vital Signs Last 24 Hrs  T(C): 36.1 (06 Jul 2019 08:00), Max: 37.4 (05 Jul 2019 15:55)  T(F): 97 (06 Jul 2019 08:00), Max: 99.4 (05 Jul 2019 15:55)  HR: 65 (06 Jul 2019 09:02) (63 - 85)  BP: 129/63 (06 Jul 2019 08:00) (107/54 - 153/67)  BP(mean): 96 (06 Jul 2019 08:00) (76 - 96)  RR: 20 (06 Jul 2019 08:00) (18 - 20)  SpO2: 100% (06 Jul 2019 09:02) (98% - 100%)      General: awake but not oriented  HEENT: +trach          Lymphatic system: No cervical LN   Lungs: Bilateral BS  Cardiovascular: Regular   Gastrointestinal: Soft, Positive BS, PEG +  Extremities: No clubbing.    Skin: Warm, intact  Neurological: No motor or sensory deficit       07-05-19 @ 07:01  -  07-06-19 @ 07:00  --------------------------------------------------------  IN:    Enteral Tube Flush: 400 mL    Glucerna: 960 mL  Total IN: 1360 mL    OUT:  Total OUT: 0 mL    Total NET: 1360 mL        LABS:                            11.3   5.69  )-----------( 218      ( 05 Jul 2019 01:20 )             36.2                                               07-05    141  |  102  |  18  ----------------------------<  158<H>  4.7   |  28  |  0.7    Ca    11.2<H>      05 Jul 2019 01:20  Phos  3.9     07-05  Mg     2.2     07-05                                  Mode: standby  FiO2: 50                                          MEDICATIONS  (STANDING):  amLODIPine   Tablet 10 milliGRAM(s) Oral daily  chlorhexidine 0.12% Liquid 15 milliLiter(s) Oral Mucosa two times a day  chlorhexidine 4% Liquid 1 Application(s) Topical <User Schedule>  dextrose 5%. 1000 milliLiter(s) (50 mL/Hr) IV Continuous <Continuous>  dextrose 50% Injectable 12.5 Gram(s) IV Push once  dextrose 50% Injectable 25 Gram(s) IV Push once  dextrose 50% Injectable 25 Gram(s) IV Push once  heparin  Injectable 5000 Unit(s) SubCutaneous every 8 hours  hydrALAZINE 25 milliGRAM(s) Oral three times a day  insulin glargine Injectable (LANTUS) 18 Unit(s) SubCutaneous every morning  insulin lispro (HumaLOG) corrective regimen sliding scale   SubCutaneous every 6 hours  levETIRAcetam  Solution 1000 milliGRAM(s) Oral two times a day  losartan 25 milliGRAM(s) Oral daily  nystatin    Suspension 251522 Unit(s) Oral two times a day  pantoprazole   Suspension 40 milliGRAM(s) Oral daily    MEDICATIONS  (PRN):  acetaminophen   Tablet .. 650 milliGRAM(s) Oral every 6 hours PRN Temp greater or equal to 38C (100.4F)  dextrose 40% Gel 15 Gram(s) Oral once PRN Blood Glucose LESS THAN 70 milliGRAM(s)/deciliter  glucagon  Injectable 1 milliGRAM(s) IntraMuscular once PRN Glucose LESS THAN 70 milligrams/deciliter      Xrays:                                                                                     ECHO Patient is a 74y old  Female who presents with a chief complaint of left sided weakness and slurred speech, acute R thalamic hemorrhagic CVA, respiratory failure, seizures (05 Jul 2019 20:39)        Over Night Events: No events overnight. Off vent for 24 hrs.         ROS:  See HPI    PHYSICAL EXAM    ICU Vital Signs Last 24 Hrs  T(C): 36.1 (06 Jul 2019 08:00), Max: 37.4 (05 Jul 2019 15:55)  T(F): 97 (06 Jul 2019 08:00), Max: 99.4 (05 Jul 2019 15:55)  HR: 65 (06 Jul 2019 09:02) (63 - 85)  BP: 129/63 (06 Jul 2019 08:00) (107/54 - 153/67)  BP(mean): 96 (06 Jul 2019 08:00) (76 - 96)  RR: 20 (06 Jul 2019 08:00) (18 - 20)  SpO2: 100% (06 Jul 2019 09:02) (98% - 100%)      General: awake but not oriented  HEENT: +trach          Lymphatic system: No cervical LN   Lungs: Bilateral BS  Cardiovascular: Regular   Gastrointestinal: Soft, Positive BS, PEG +  Extremities: No clubbing.    Neurological: uncahnged      07-05-19 @ 07:01  -  07-06-19 @ 07:00  --------------------------------------------------------  IN:    Enteral Tube Flush: 400 mL    Glucerna: 960 mL  Total IN: 1360 mL    OUT:  Total OUT: 0 mL    Total NET: 1360 mL        LABS:                            11.3   5.69  )-----------( 218      ( 05 Jul 2019 01:20 )             36.2                                               07-05    141  |  102  |  18  ----------------------------<  158<H>  4.7   |  28  |  0.7    Ca    11.2<H>      05 Jul 2019 01:20  Phos  3.9     07-05  Mg     2.2     07-05                                  Mode: standby  FiO2: 50                                          MEDICATIONS  (STANDING):  amLODIPine   Tablet 10 milliGRAM(s) Oral daily  chlorhexidine 0.12% Liquid 15 milliLiter(s) Oral Mucosa two times a day  chlorhexidine 4% Liquid 1 Application(s) Topical <User Schedule>  dextrose 5%. 1000 milliLiter(s) (50 mL/Hr) IV Continuous <Continuous>  dextrose 50% Injectable 12.5 Gram(s) IV Push once  dextrose 50% Injectable 25 Gram(s) IV Push once  dextrose 50% Injectable 25 Gram(s) IV Push once  heparin  Injectable 5000 Unit(s) SubCutaneous every 8 hours  hydrALAZINE 25 milliGRAM(s) Oral three times a day  insulin glargine Injectable (LANTUS) 18 Unit(s) SubCutaneous every morning  insulin lispro (HumaLOG) corrective regimen sliding scale   SubCutaneous every 6 hours  levETIRAcetam  Solution 1000 milliGRAM(s) Oral two times a day  losartan 25 milliGRAM(s) Oral daily  nystatin    Suspension 085801 Unit(s) Oral two times a day  pantoprazole   Suspension 40 milliGRAM(s) Oral daily    MEDICATIONS  (PRN):  acetaminophen   Tablet .. 650 milliGRAM(s) Oral every 6 hours PRN Temp greater or equal to 38C (100.4F)  dextrose 40% Gel 15 Gram(s) Oral once PRN Blood Glucose LESS THAN 70 milliGRAM(s)/deciliter  glucagon  Injectable 1 milliGRAM(s) IntraMuscular once PRN Glucose LESS THAN 70 milligrams/deciliter

## 2019-07-06 NOTE — PROGRESS NOTE ADULT - ASSESSMENT
HERNANDO HERNANDEZ 75yo W  Female BIBA to S side from home for elevated BP, slurred speech and L sided weakness.  The pt was noted to have a hypertensive emergency with R thalamic hemorrhagic  CVA. 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    Hospital course:   The pt underwent a stroke code and was intubated for airway protection and transferred North for further care. She remained in CCU and cont to be on respirator support.  The hospital course is complicated by seizures, prolonged QT,streptococcus bacteremia with positive blood cultures and positive GM negative rods in the urine.  She was on Ceftriaxone which was changed to Cefepime.   The pt failed weaning and required tracheostomy on 6/10 and was sent to the vent unit.  She failed S&S several times and required placement of PEG on 6/24.     INTERVAL HPI/OVERNIGHT EVENTS:  slow weaning process, pt tolerated last 24 hrs off, tx for thrush, nutrition via PEG    MEDICATIONS  (STANDING):  amLODIPine   Tablet 10 milliGRAM(s) Oral daily  cefepime   IVPB 2000 milliGRAM(s) IV Intermittent every 8 hours D/C  Rocephine 2 gms q 24  chlorhexidine 0.12% Liquid 15 milliLiter(s) Oral Mucosa two times a day  chlorhexidine 4% Liquid 1 Application(s) Topical <User Schedule>  docusate sodium Liquid 100 milliGRAM(s) Oral three times a day  hydrALAZINE 25 milliGRAM(s) Oral three times a day  insulin regular Infusion 4 Unit(s)/Hr (4 mL/Hr) IV Continuous <Continuous>  levETIRAcetam  Solution 1000 milliGRAM(s) Oral two times a day  nystatin    Suspension 358856 Unit(s) Oral two times a day  pantoprazole   Suspension 40 milliGRAM(s) Oral daily  propofol Infusion. 1 MICROgram(s)/kG/Min (0.473 mL/Hr) IV Continuous <Continuous>  senna 2 Tablet(s) Oral at bedtime    MEDICATIONS  (PRN):  acetaminophen   Tablet .. 650 milliGRAM(s) Oral every 6 hours PRN Temp greater or equal to 38C (100.4F)      Allergies  NKDA     T(F): 97.2  HR: 66  BP: 123/60  RR: 20  SpO2: 100%     PHYSICAL EXAM:      Constitutional: pt seen in vent unit, + trach on T tube, alert but tires easily    Eyes: non icteric, eyes wide open    ENMT: dry oral mucosa    Neck:  short and supple, + trach    Respiratory:  harsh respirator BS    Cardiovascular:  S1S2    Gastrointestinal: globose soft and benign, + PEG    Genitourinary:  no villatoro    Extremities: arthritic changes, moves upper ext, dec motor strength of lower ext    LABS:                   11  5.6 )-----------( 218             36    141 |  102 |  18  ----------------------------<  158  4.7  |  28   |  0.7    GFR 87, 91, 56, 87  Ca    9.7       Mg    2.2, 2.3, 2.1, 2.2    TPro  5.6<L>  /  Alb  3.0, 3.3, 3.7 /  TBili  0.5, 6.0  /  DBili  x   /  AST  9   /  ALT  23  /  AlkPhos  88  06-06    urine GM- rods  Blood streptococcus  sputum streptococcus      RADIOLOGY & ADDITIONAL TESTS:     EKG NSR 66/min   CXR  +ET tube, + feeding tube    CT of head R thalamic parenchymal hematoma 2.2 cm with intraventricular extension, mild ventricular enlargement, white mater changes     repeat CTH:  evolution of the  R thalamic hemorrhage which has dec, ventricular size stable with mild enlargement, white matter changes, stable chronic hematoma cavity within the L basal ganglia, chronic lacunar infarct in BL basal ganglia and L thalamus      Stroke code, R thalamic hemorrhagic CVA with extension to ventricles  sp Hypertensive emergency  Respiratory Failure, sp trach 6/10  Seizures, controlled on Keppra  Streptococcal Bacteremia probably from transient PNA,   Marcio negative UTI    Hx of HTN, ASHD  Hx of DLD  Hx of old hemorrhagic CVA about 8 yrs ago, dysarthria, gait instability  Hx of OA, DDD, DJD  Hx of GERD, HH, Diverticulosis  Hx of anxiety    pt is sp trach  6/10,  more alert, eyes fully open, cont to attempt weaning, 16 hrs off  neurology:  dec keppra to 1000mg 2x to lessen sedation, repeat CTH  S&S attempts:  mild oral dysphagia with delayed oral transit and prolonged mastication  sp PEG  6/24, on feeds  pt is sp ceftriaxone, blood culture 6/4 + for strept, source probable  LLL  strept PNA, + sputa for strept and CT shows infiltrate,  urine + for Gm neg rods/asymptomatic pyuria, completed the course of ABX  seizures  controlled on Keppra 1000mg q12, VEEG shows focal and generalized slowing    monitor electrolytes periodically  blood work WNL  pt is ff by pul /critical care,  oral care, skin care and decubiti prevention  tx for thrush  social SVC/D/C planning working with pt on SNF placement, pt will need slow weaning process and slow prolonged rehab

## 2019-07-06 NOTE — PROGRESS NOTE ADULT - ASSESSMENT
Impression:    Chronic respiratory failure   Intra cerebral Bleed  Seizure  Hypertensive emergency  HO hemorrhagic stroke  penumonia aspiration  UTI    PLAN:    CNS: Continue with keppra per neuro    HEENT: Trach care. tolerating food(oral)    PULMONARY:  HOB @ 30 degrees. WEan off oxygen.     CARDIOVASCULAR: i=o. Continue with antihypertensives    GI: GI prophylaxis.  Ok to use PEG for feeds. Passed S/S but pt not eating enough.    RENAL:  Follow up lytes.  Correct as needed    INFECTIOUS DISEASE: per ID    HEMATOLOGICAL:   DVT prophylaxis.     ENDOCRINE:  Follow up FS.     MUSCULOSKELETAL: OOB to chair    patient improved  dispo plan Impression:    Chronic respiratory failure   Intra cerebral Bleed  Seizure  Hypertensive emergency  HO hemorrhagic stroke  penumonia aspiration  UTI    PLAN:    CNS: Continue with keppra per neuro    HEENT: Trach care. tolerating food(oral)    PULMONARY:  HOB @ 30 degrees. WEan off oxygen.     CARDIOVASCULAR: i=o. Continue with antihypertensives    GI: GI prophylaxis.  Ok to use PEG for feeds. Passed S/S but pt not eating enough.    RENAL:  Follow up lytes.  Correct as needed    INFECTIOUS DISEASE: per ID    HEMATOLOGICAL:   DVT prophylaxis.     ENDOCRINE:  Follow up FS.     MUSCULOSKELETAL: OOB to chair    rehab eval  dispo plan

## 2019-07-07 NOTE — PROGRESS NOTE ADULT - ASSESSMENT
Impression:    Chronic respiratory failure   Intra cerebral Bleed  Seizure  Hypertensive emergency  HO hemorrhagic stroke  penumonia aspiration  UTI    PLAN:    CNS: Continue with keppra per neuro    HEENT: Trach care. tolerating food(oral)    PULMONARY:  HOB @ 30 degrees. WEan off oxygen.     CARDIOVASCULAR: i=o. Continue with antihypertensives    GI: GI prophylaxis.  Ok to use PEG for feeds. Passed S/S but pt not eating enough.    RENAL:  Follow up lytes.  Correct as needed    INFECTIOUS DISEASE: per ID    HEMATOLOGICAL:   DVT prophylaxis.     ENDOCRINE:  Follow up FS.     MUSCULOSKELETAL: OOB to chair    rehab eval  dispo plan

## 2019-07-07 NOTE — PROGRESS NOTE ADULT - SUBJECTIVE AND OBJECTIVE BOX
Patient is a 74y old  Female who presents with a chief complaint of left sided weakness and slurred speech/ R thalamic hemorrhagic CVA, SZ Disorder, Respiratory Failure (06 Jul 2019 16:24)        Over Night Events: No events overnight. Off vent for 48 hrs.         ROS:  See HPI    PHYSICAL EXAM    ICU Vital Signs Last 24 Hrs  T(C): 36.4 (07 Jul 2019 08:00), Max: 37.3 (07 Jul 2019 00:00)  T(F): 97.6 (07 Jul 2019 08:00), Max: 99.1 (07 Jul 2019 00:00)  HR: 81 (07 Jul 2019 08:00) (66 - 86)  BP: 134/61 (07 Jul 2019 08:00) (123/60 - 153/54)  BP(mean): 88 (07 Jul 2019 08:00) (88 - 96)  RR: 18 (07 Jul 2019 08:00) (18 - 20)  SpO2: 98% (06 Jul 2019 19:57) (98% - 98%)      General: Awake. Lethargic at times.   HEENT: +Trach           Lymphatic system: No cervical LN   Lungs: BS Bl  Cardiovascular: Regular   Gastrointestinal: Soft, Positive BS  Extremities: No clubbing.  Moves extremities.  Full Range of motion   Skin: Warm, intact  Neurological: No motor or sensory deficit       07-06-19 @ 07:01  -  07-07-19 @ 07:00  --------------------------------------------------------  IN:    Enteral Tube Flush: 100 mL    Glucerna: 240 mL  Total IN: 340 mL    OUT:  Total OUT: 0 mL    Total NET: 340 mL                                                           Mode: standby  FiO2: 40                                          MEDICATIONS  (STANDING):  amLODIPine   Tablet 10 milliGRAM(s) Oral daily  chlorhexidine 0.12% Liquid 15 milliLiter(s) Oral Mucosa two times a day  chlorhexidine 4% Liquid 1 Application(s) Topical <User Schedule>  dextrose 5%. 1000 milliLiter(s) (50 mL/Hr) IV Continuous <Continuous>  dextrose 50% Injectable 12.5 Gram(s) IV Push once  dextrose 50% Injectable 25 Gram(s) IV Push once  dextrose 50% Injectable 25 Gram(s) IV Push once  heparin  Injectable 5000 Unit(s) SubCutaneous every 8 hours  hydrALAZINE 25 milliGRAM(s) Oral three times a day  insulin glargine Injectable (LANTUS) 18 Unit(s) SubCutaneous every morning  insulin lispro (HumaLOG) corrective regimen sliding scale   SubCutaneous every 6 hours  levETIRAcetam  Solution 1000 milliGRAM(s) Oral two times a day  losartan 25 milliGRAM(s) Oral daily  nystatin    Suspension 113550 Unit(s) Oral two times a day  pantoprazole   Suspension 40 milliGRAM(s) Oral daily    MEDICATIONS  (PRN):  acetaminophen   Tablet .. 650 milliGRAM(s) Oral every 6 hours PRN Temp greater or equal to 38C (100.4F)  dextrose 40% Gel 15 Gram(s) Oral once PRN Blood Glucose LESS THAN 70 milliGRAM(s)/deciliter  glucagon  Injectable 1 milliGRAM(s) IntraMuscular once PRN Glucose LESS THAN 70 milligrams/deciliter      Xrays:                                                                                     ECHO Patient is a 74y old  Female who presents with a chief complaint of left sided weakness and slurred speech/ R thalamic hemorrhagic CVA, SZ Disorder, Respiratory Failure (06 Jul 2019 16:24)        Over Night Events: No events overnight. Off vent for 48 hrs.         ROS:  See HPI    PHYSICAL EXAM    ICU Vital Signs Last 24 Hrs  T(C): 36.4 (07 Jul 2019 08:00), Max: 37.3 (07 Jul 2019 00:00)  T(F): 97.6 (07 Jul 2019 08:00), Max: 99.1 (07 Jul 2019 00:00)  HR: 81 (07 Jul 2019 08:00) (66 - 86)  BP: 134/61 (07 Jul 2019 08:00) (123/60 - 153/54)  BP(mean): 88 (07 Jul 2019 08:00) (88 - 96)  RR: 18 (07 Jul 2019 08:00) (18 - 20)  SpO2: 98% (06 Jul 2019 19:57) (98% - 98%)      General: Awake. Lethargic at times.   HEENT: +Trach           Lymphatic system: No cervical LN   Lungs: BS Bl  Cardiovascular: Regular   Gastrointestinal: Soft, Positive BS  Extremities: No clubbing.  Moves extremities.  Full Range of motion   Skin: Warm, intact  Neurological unchanged      07-06-19 @ 07:01  -  07-07-19 @ 07:00  --------------------------------------------------------  IN:    Enteral Tube Flush: 100 mL    Glucerna: 240 mL  Total IN: 340 mL    OUT:  Total OUT: 0 mL    Total NET: 340 mL                                                           Mode: standby  FiO2: 40                                          MEDICATIONS  (STANDING):  amLODIPine   Tablet 10 milliGRAM(s) Oral daily  chlorhexidine 0.12% Liquid 15 milliLiter(s) Oral Mucosa two times a day  chlorhexidine 4% Liquid 1 Application(s) Topical <User Schedule>  dextrose 5%. 1000 milliLiter(s) (50 mL/Hr) IV Continuous <Continuous>  dextrose 50% Injectable 12.5 Gram(s) IV Push once  dextrose 50% Injectable 25 Gram(s) IV Push once  dextrose 50% Injectable 25 Gram(s) IV Push once  heparin  Injectable 5000 Unit(s) SubCutaneous every 8 hours  hydrALAZINE 25 milliGRAM(s) Oral three times a day  insulin glargine Injectable (LANTUS) 18 Unit(s) SubCutaneous every morning  insulin lispro (HumaLOG) corrective regimen sliding scale   SubCutaneous every 6 hours  levETIRAcetam  Solution 1000 milliGRAM(s) Oral two times a day  losartan 25 milliGRAM(s) Oral daily  nystatin    Suspension 669217 Unit(s) Oral two times a day  pantoprazole   Suspension 40 milliGRAM(s) Oral daily    MEDICATIONS  (PRN):  acetaminophen   Tablet .. 650 milliGRAM(s) Oral every 6 hours PRN Temp greater or equal to 38C (100.4F)  dextrose 40% Gel 15 Gram(s) Oral once PRN Blood Glucose LESS THAN 70 milliGRAM(s)/deciliter  glucagon  Injectable 1 milliGRAM(s) IntraMuscular once PRN Glucose LESS THAN 70 milligrams/deciliter

## 2019-07-07 NOTE — PROGRESS NOTE ADULT - ASSESSMENT
HERNANDO HERNANDEZ 73yo W  Female BIBA to S side from home for elevated BP, slurred speech and L sided weakness.  The pt was noted to have a hypertensive emergency with R thalamic hemorrhagic  CVA. 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    Hospital course:   The pt underwent a stroke code and was intubated for airway protection and transferred North for further care. She remained in CCU and cont to be on respirator support.  The hospital course is complicated by seizures, prolonged QT,streptococcus bacteremia with positive blood cultures and positive GM negative rods in the urine.  She was on Ceftriaxone which was changed to Cefepime.   The pt failed weaning and required tracheostomy on 6/10 and was sent to the vent unit.  She failed S&S several times and required placement of PEG on 6/24.     INTERVAL HPI/OVERNIGHT EVENTS:  slow weaning process, pt tolerated last 48 hrs off vent, tx for thrush, nutrition via PEG    MEDICATIONS  (STANDING):  amLODIPine   Tablet 10 milliGRAM(s) Oral daily  cefepime   IVPB 2000 milliGRAM(s) IV Intermittent every 8 hours D/C  Rocephine 2 gms q 24  chlorhexidine 0.12% Liquid 15 milliLiter(s) Oral Mucosa two times a day  chlorhexidine 4% Liquid 1 Application(s) Topical <User Schedule>  docusate sodium Liquid 100 milliGRAM(s) Oral three times a day  hydrALAZINE 25 milliGRAM(s) Oral three times a day  insulin regular Infusion 4 Unit(s)/Hr (4 mL/Hr) IV Continuous <Continuous>  levETIRAcetam  Solution 1000 milliGRAM(s) Oral two times a day  nystatin    Suspension 524914 Unit(s) Oral two times a day  pantoprazole   Suspension 40 milliGRAM(s) Oral daily  propofol Infusion. 1 MICROgram(s)/kG/Min (0.473 mL/Hr) IV Continuous <Continuous>  senna 2 Tablet(s) Oral at bedtime    MEDICATIONS  (PRN):  acetaminophen   Tablet .. 650 milliGRAM(s) Oral every 6 hours PRN Temp greater or equal to 38C (100.4F)      Allergies  NKDA     T(F): 98  HR: 89  BP: 132/58  RR: 20  SpO2: 98-96% on T tube    PHYSICAL EXAM:      Constitutional: pt seen in vent unit, + trach on T tube, alert but tires easily    Eyes: non icteric, eyes wide open    ENMT: dry oral mucosa    Neck:  short and supple, + trach    Respiratory:  harsh respirator BS    Cardiovascular:  S1S2    Gastrointestinal: globose soft and benign, + PEG    Genitourinary:  no villatoro    Extremities: arthritic changes, moves upper ext, dec motor strength of lower ext    LABS:                   11  5.6 )-----------( 218             36    141 |  102 |  18  ----------------------------<  158  4.7  |  28   |  0.7    GFR 87, 91, 56, 87  Ca    9.7       Mg    2.2, 2.3, 2.1, 2.2    TPro  5.6<L>  /  Alb  3.0, 3.3, 3.7 /  TBili  0.5, 6.0  /  DBili  x   /  AST  9   /  ALT  23  /  AlkPhos  88  06-06    urine GM- rods  Blood streptococcus  sputum streptococcus      RADIOLOGY & ADDITIONAL TESTS:     EKG NSR 66/min   CXR  +ET tube, + feeding tube    CT of head R thalamic parenchymal hematoma 2.2 cm with intraventricular extension, mild ventricular enlargement, white mater changes     repeat CTH:  evolution of the  R thalamic hemorrhage which has dec, ventricular size stable with mild enlargement, white matter changes, stable chronic hematoma cavity within the L basal ganglia, chronic lacunar infarct in BL basal ganglia and L thalamus      Stroke code, R thalamic hemorrhagic CVA with extension to ventricles  sp Hypertensive emergency  Respiratory Failure, sp trach 6/10, pt off respirator and on T tube last 48 hrs  Seizures, controlled on Keppra  Streptococcal Bacteremia probably from transient PNA,   Marcio negative UTI    Hx of HTN, ASHD  Hx of DLD  Hx of old hemorrhagic CVA about 8 yrs ago, dysarthria, gait instability  Hx of OA, DDD, DJD  Hx of GERD, HH, Diverticulosis  Hx of anxiety    pt is sp trach  6/10,  more alert, eyes fully open, cont to attempt weaning, 16 hrs off  neurology:  dec keppra to 1000mg 2x to lessen sedation, repeat CTH  S&S attempts:  mild oral dysphagia with delayed oral transit and prolonged mastication    sp PEG  6/24, on feeds    pt is sp ceftriaxone, blood culture 6/4 + for strept, source probable  LLL  strept PNA, + sputa for strept and CT shows infiltrate,  urine + for Gm neg rods/asymptomatic pyuria, completed the course of ABX    seizures  controlled on Keppra 1000mg q12, VEEG shows focal and generalized slowing    monitor electrolytes periodically  blood work WNL  pt is ff by pul /critical care,  oral care, skin care and decubiti prevention  tx for thrush  social SVC/D/C planning working with pt on SNF placement

## 2019-07-08 NOTE — PROGRESS NOTE ADULT - ASSESSMENT
· Assessment		  Impression:    Chronic respiratory failure   Intra cerebral Bleed  Seizure  Hypertensive emergency  HO hemorrhagic stroke  penumonia aspiration  UTI    PLAN:    CNS: Continue with keppra per neuro    HEENT: Trach care. tolerating food(oral)    PULMONARY:  HOB @ 30 degrees. Wean off oxygen.     CARDIOVASCULAR: i=o. Continue with antihypertensives    GI: GI prophylaxis. oral feeding/peg    RENAL:  Follow up lytes.  Correct as needed    INFECTIOUS DISEASE: per ID    HEMATOLOGICAL:   DVT prophylaxis.     ENDOCRINE:  Follow up FS.     MUSCULOSKELETAL: OOB to chair    rehab eval  dispo plan

## 2019-07-08 NOTE — PROGRESS NOTE ADULT - SUBJECTIVE AND OBJECTIVE BOX
HERNANDO HERNANDEZ  74y  Female    Patient is a 74y old  Female who presents with a chief complaint of left sided weakness and slurred speech/ R thalamic hemorrhagic CVA, Respiratory Failure (07 Jul 2019 16:59)      SUBJECTIVE/OVERNIGHT EVENTS:      PAST MEDICAL & SURGICAL HISTORY:  Diabetes mellitus  Stroke  Hypertension  No significant past surgical history    MEDICATIONS  (STANDING):  amLODIPine   Tablet 10 milliGRAM(s) Oral daily  chlorhexidine 0.12% Liquid 15 milliLiter(s) Oral Mucosa two times a day  chlorhexidine 4% Liquid 1 Application(s) Topical <User Schedule>  dextrose 5%. 1000 milliLiter(s) (50 mL/Hr) IV Continuous <Continuous>  dextrose 50% Injectable 12.5 Gram(s) IV Push once  dextrose 50% Injectable 25 Gram(s) IV Push once  dextrose 50% Injectable 25 Gram(s) IV Push once  heparin  Injectable 5000 Unit(s) SubCutaneous every 8 hours  hydrALAZINE 25 milliGRAM(s) Oral three times a day  insulin glargine Injectable (LANTUS) 18 Unit(s) SubCutaneous every morning  insulin lispro (HumaLOG) corrective regimen sliding scale   SubCutaneous every 6 hours  levETIRAcetam  Solution 1000 milliGRAM(s) Oral two times a day  losartan 25 milliGRAM(s) Oral daily  nystatin    Suspension 900220 Unit(s) Oral two times a day  pantoprazole   Suspension 40 milliGRAM(s) Oral daily    MEDICATIONS  (PRN):  acetaminophen   Tablet .. 650 milliGRAM(s) Oral every 6 hours PRN Temp greater or equal to 38C (100.4F)  dextrose 40% Gel 15 Gram(s) Oral once PRN Blood Glucose LESS THAN 70 milliGRAM(s)/deciliter  glucagon  Injectable 1 milliGRAM(s) IntraMuscular once PRN Glucose LESS THAN 70 milligrams/deciliter      OBJECTIVE:    Vital Signs Last 24 Hrs  T(C): 37.3 (08 Jul 2019 07:51), Max: 37.3 (08 Jul 2019 07:51)  T(F): 99.1 (08 Jul 2019 07:51), Max: 99.1 (08 Jul 2019 07:51)  HR: 88 (08 Jul 2019 08:45) (72 - 89)  BP: 145/60 (08 Jul 2019 07:51) (132/58 - 179/73)  BP(mean): 86 (08 Jul 2019 07:51) (86 - 103)  RR: 20 (08 Jul 2019 07:51) (20 - 20)  SpO2: 99% (08 Jul 2019 08:45) (98% - 100%)    General: In NAD  HEENT: + trach               Neck: Supple.   No Cervical LN    Lungs: Bilateral BS  Cardiovascular: Regular   Abdomen: Soft. + BS  Extremities: No CLubbing   Skin:   Neurological: weakness improved    I&O's Summary    07 Jul 2019 07:01  -  08 Jul 2019 07:00  --------------------------------------------------------  IN: 1020 mL / OUT: 0 mL / NET: 1020 mL        BOWEL MOVEMENTS:    PRESSORS:  SEDATION:  VENTED:      LABS:                                                                                                                                                                                                                                   Mode: standby  FiO2: 50

## 2019-07-08 NOTE — PROGRESS NOTE ADULT - ASSESSMENT
HERNANDO EHRNANDEZ 73yo W  Female BIBA to S side from home for elevated BP, slurred speech and L sided weakness.  The pt was noted to have a hypertensive emergency with R thalamic hemorrhagic  CVA. 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    Hospital course:   The pt underwent a stroke code and was intubated for airway protection and transferred North for further care. She remained in CCU and cont to be on respirator support.  The hospital course is complicated by seizures, prolonged QT,streptococcus bacteremia with positive blood cultures and positive GM negative rods in the urine.  She was on Ceftriaxone which was changed to Cefepime.   The pt failed weaning and required tracheostomy on 6/10 and was sent to the vent unit.  She failed S&S several times and required placement of PEG on 6/24.     INTERVAL HPI/OVERNIGHT EVENTS:  slow weaning process, pt tolerated last 72 hrs, nutrition via PEG, more alert , social svc working with placing pt in SNF    MEDICATIONS  (STANDING):  amLODIPine   Tablet 10 milliGRAM(s) Oral daily  cefepime   IVPB 2000 milliGRAM(s) IV Intermittent every 8 hours D/C  Rocephine 2 gms q 24  chlorhexidine 0.12% Liquid 15 milliLiter(s) Oral Mucosa two times a day  chlorhexidine 4% Liquid 1 Application(s) Topical <User Schedule>  docusate sodium Liquid 100 milliGRAM(s) Oral three times a day  hydrALAZINE 25 milliGRAM(s) Oral three times a day  insulin regular Infusion 4 Unit(s)/Hr (4 mL/Hr) IV Continuous <Continuous>  levETIRAcetam  Solution 1000 milliGRAM(s) Oral two times a day  nystatin    Suspension 894907 Unit(s) Oral two times a day  pantoprazole   Suspension 40 milliGRAM(s) Oral daily  propofol Infusion. 1 MICROgram(s)/kG/Min (0.473 mL/Hr) IV Continuous <Continuous>  senna 2 Tablet(s) Oral at bedtime    MEDICATIONS  (PRN):  acetaminophen   Tablet .. 650 milliGRAM(s) Oral every 6 hours PRN Temp greater or equal to 38C (100.4F)      Allergies  NKDA     T(F): 97.9  HR: 89  BP: 141/69  RR: 20  SpO2: 96-99% on T tube    PHYSICAL EXAM:      Constitutional: pt seen in vent unit, + trach on T tube, alert but tires easily    Eyes: non icteric, eyes wide open    ENMT: dry oral mucosa    Neck:  short and supple, + trach    Respiratory:  harsh respirator BS    Cardiovascular:  S1S2    Gastrointestinal: globose soft and benign, + PEG    Genitourinary:  no villatoro    Extremities: arthritic changes, moves upper ext, dec motor strength of lower ext    LABS:                   11  5.6 )-----------( 218             36    141 |  102 |  18  ----------------------------<  158  4.7  |  28   |  0.7    GFR 87, 91, 56, 87  Ca    9.7       Mg    2.2, 2.3, 2.1, 2.2    TPro  5.6<L>  /  Alb  3.0, 3.3, 3.7 /  TBili  0.5, 6.0  /  DBili  x   /  AST  9   /  ALT  23  /  AlkPhos  88  06-06    urine GM- rods  Blood streptococcus  sputum streptococcus      RADIOLOGY & ADDITIONAL TESTS:     EKG NSR 66/min   CXR  +ET tube, + feeding tube    CT of head R thalamic parenchymal hematoma 2.2 cm with intraventricular extension, mild ventricular enlargement, white mater changes     repeat CTH:  evolution of the  R thalamic hemorrhage which has dec, ventricular size stable with mild enlargement, white matter changes, stable chronic hematoma cavity within the L basal ganglia, chronic lacunar infarct in BL basal ganglia and L thalamus      Stroke code, R thalamic hemorrhagic CVA with extension to ventricles  sp Hypertensive emergency  Respiratory Failure, sp trach 6/10, pt off respirator and on T tube last 48 hrs  Seizures, controlled on Keppra  Streptococcal Bacteremia probably from transient PNA,   Marcio negative UTI    Hx of HTN, ASHD  Hx of DLD  Hx of old hemorrhagic CVA about 8 yrs ago, dysarthria, gait instability  Hx of OA, DDD, DJD  Hx of GERD, HH, Diverticulosis  Hx of anxiety    pt is sp trach  6/10,  more alert, eyes fully open, cont to attempt weaning, 16 hrs off  neurology:  dec keppra to 1000mg 2x to lessen sedation, repeat CTH  S&S attempts:  mild oral dysphagia with delayed oral transit and prolonged mastication    sp PEG  6/24, on feeds    pt is sp ceftriaxone, blood culture 6/4 + for strept, source probable  LLL  strept PNA, + sputa for strept and CT shows infiltrate,  urine + for Gm neg rods/asymptomatic pyuria, completed the course of ABX    seizures  controlled on Keppra 1000mg q12, VEEG shows focal and generalized slowing    monitor electrolytes and CBC periodically    pt is ff by pul /critical care,  oral care, skin care and decubiti prevention  tx for thrush  social SVC/D/C planning working with pt on SNF placement

## 2019-07-09 NOTE — PROGRESS NOTE ADULT - REASON FOR ADMISSION
left sided weakness and slurred speech
left sided weakness and slurred speech, R thalamic hemorrhagic CVA
left sided weakness and slurred speech
left sided weakness and slurred speech, R thalamic hemorrhagic CVA
left sided weakness and slurred speech, R thalamic hemorrhagic CVA, seizures
left sided weakness and slurred speech, R thalaminc hemorrhagic CVA
left sided weakness and slurred speech/ R thalamic hemorrhagic CVA
left sided weakness and slurred speech
left sided weakness and slurred speech, R thalamic hemorrhagic CVA
left sided weakness and slurred speech, R thalamic hemorrhagic CVA
left sided weakness and slurred speech/ R hemorrhagic CVA
left sided weakness and slurred speech
left sided weakness and slurred speech, CVA
left sided weakness and slurred speech, R thalamic hemorrhagic CVA
left sided weakness and slurred speech, R thalamic hemorrhagic CVA, SZ, acute respiratory failure, intubation
left sided weakness and slurred speech, acute R thalamic hemorrhagic CVA, respiratory failure, seizures
left sided weakness and slurred speech, hypertensive urgency, R thalamic hemorrhagic CVA respiratory failure, Sz
left sided weakness and slurred speech/ R thalamic hemorrhagic CVA
left sided weakness and slurred speech/ R thalamic hemorrhagic CVA
left sided weakness and slurred speech/ R thalamic hemorrhagic CVA, Respiratory Failure
left sided weakness and slurred speech/ R thalamic hemorrhagic CVA, SZ Disorder, Respiratory Failure
left sided weakness and slurred speech/ thalamic hemorrhagic CVA, respiratory failure
left sided weakness and slurred speech/R  thalamic hemorrhagic CVA
left sided weakness and slurred speech

## 2019-07-09 NOTE — DISCHARGE NOTE PROVIDER - HOSPITAL COURSE
HERNANDO HERNANDEZ 73yo W  Female BIBA to S side from home for elevated BP, slurred speech and L sided weakness.  The pt was noted to have a hypertensive emergency with R thalamic hemorrhagic  CVA. 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        Hospital course:   The pt underwent a stroke code and was intubated for airway protection and transferred North for further care. She remained in CCU and cont to be on respirator support.  The hospital course is complicated by seizures, prolonged QT,streptococcus bacteremia with positive blood cultures and positive GM negative rods in the urine.  She was on Ceftriaxone which was changed to Cefepime.   The pt failed weaning and required tracheostomy on 6/10 and was sent to the vent unit.  She failed S&S several times and required placement of PEG on 6/24.         INTERVAL HPI/OVERNIGHT EVENTS:  day 4 of being off vent support, on T tube, alert,  D/C to SNF today        MEDICATIONS  (STANDING):    amLODIPine   Tablet 10 milliGRAM(s) Oral daily    cefepime   IVPB 2000 milliGRAM(s) IV Intermittent every 8 hours D/C    Rocephine 2 gms q 24    chlorhexidine 0.12% Liquid 15 milliLiter(s) Oral Mucosa two times a day    chlorhexidine 4% Liquid 1 Application(s) Topical <User Schedule>    docusate sodium Liquid 100 milliGRAM(s) Oral three times a day    hydrALAZINE 25 milliGRAM(s) Oral three times a day    insulin regular Infusion 4 Unit(s)/Hr (4 mL/Hr) IV Continuous <Continuous>    levETIRAcetam  Solution 1000 milliGRAM(s) Oral two times a day    nystatin    Suspension 821218 Unit(s) Oral two times a day    pantoprazole   Suspension 40 milliGRAM(s) Oral daily    propofol Infusion. 1 MICROgram(s)/kG/Min (0.473 mL/Hr) IV Continuous <Continuous>    senna 2 Tablet(s) Oral at bedtime        MEDICATIONS  (PRN):    acetaminophen   Tablet .. 650 milliGRAM(s) Oral every 6 hours PRN Temp greater or equal to 38C (100.4F)

## 2019-07-09 NOTE — PROGRESS NOTE ADULT - ASSESSMENT
Impression:    Chronic respiratory failure   Intra cerebral Bleed  Seizure  Hypertensive emergency  HO hemorrhagic stroke  penumonia aspiration  UTI    PLAN:    CNS: Continue with keppra per neuro    HEENT: Trach care. tolerating food(oral)    PULMONARY:  HOB @ 30 degrees. Wean off oxygen.     CARDIOVASCULAR: i=o. Continue with antihypertensives    GI: GI prophylaxis. oral feeding/peg    RENAL:  Follow up lytes.  Correct as needed    INFECTIOUS DISEASE: per ID    HEMATOLOGICAL:   DVT prophylaxis.     ENDOCRINE:  Follow up FS.     MUSCULOSKELETAL: OOB to chair      possible dc today.

## 2019-07-09 NOTE — PROGRESS NOTE ADULT - NSHPATTENDINGPLANDISCUSS_GEN_ALL_CORE
Team
neurosurgery PA
neurosurgery PA
team
VEnt unit
team
Vent Unit
team
medicine
medicine

## 2019-07-09 NOTE — DISCHARGE NOTE PROVIDER - INSTRUCTIONS
dysphagia diet :   mech. soft nectar thick liquids   also     peg feeds with GLUCERNA 1.2 SUNIL   240ML BOLUS EVERY 4 HOURS, SKIP 1 TIME FOR TOTAL OF 5 FEEDS , FLUSH WITH WATER PRE/POST FEEDS

## 2019-07-09 NOTE — DISCHARGE NOTE PROVIDER - NSDCCPCAREPLAN_GEN_ALL_CORE_FT
PRINCIPAL DISCHARGE DIAGNOSIS  Diagnosis: High cholesterol  Assessment and Plan of Treatment:       SECONDARY DISCHARGE DIAGNOSES  Diagnosis: Intracranial hemorrhage  Assessment and Plan of Treatment:

## 2019-07-09 NOTE — PROGRESS NOTE ADULT - ASSESSMENT
HERNANDO HERNANDEZ 75yo W  Female BIBA to S side from home for elevated BP, slurred speech and L sided weakness.  The pt was noted to have a hypertensive emergency with R thalamic hemorrhagic  CVA. 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    Hospital course:   The pt underwent a stroke code and was intubated for airway protection and transferred North for further care. She remained in CCU and cont to be on respirator support.  The hospital course is complicated by seizures, prolonged QT,streptococcus bacteremia with positive blood cultures and positive GM negative rods in the urine.  She was on Ceftriaxone which was changed to Cefepime.   The pt failed weaning and required tracheostomy on 6/10 and was sent to the vent unit.  She failed S&S several times and required placement of PEG on 6/24.     INTERVAL HPI/OVERNIGHT EVENTS:  day 4 of being off vent support, on T tube, alert, possible D/C to SNF today    MEDICATIONS  (STANDING):  amLODIPine   Tablet 10 milliGRAM(s) Oral daily  cefepime   IVPB 2000 milliGRAM(s) IV Intermittent every 8 hours D/C  Rocephine 2 gms q 24  chlorhexidine 0.12% Liquid 15 milliLiter(s) Oral Mucosa two times a day  chlorhexidine 4% Liquid 1 Application(s) Topical <User Schedule>  docusate sodium Liquid 100 milliGRAM(s) Oral three times a day  hydrALAZINE 25 milliGRAM(s) Oral three times a day  insulin regular Infusion 4 Unit(s)/Hr (4 mL/Hr) IV Continuous <Continuous>  levETIRAcetam  Solution 1000 milliGRAM(s) Oral two times a day  nystatin    Suspension [id number] Unit(s) Oral two times a day  pantoprazole   Suspension 40 milliGRAM(s) Oral daily  propofol Infusion. 1 MICROgram(s)/kG/Min (0.473 mL/Hr) IV Continuous <Continuous>  senna 2 Tablet(s) Oral at bedtime    MEDICATIONS  (PRN):  acetaminophen   Tablet .. 650 milliGRAM(s) Oral every 6 hours PRN Temp greater or equal to 38C (100.4F)      Allergies  NKDA     T(F): 99.5  HR: 82  BP: 121/58  RR: 18  SpO2: 98% on T tube    PHYSICAL EXAM:      Constitutional: pt on T tube day # 4, v alert    Eyes: non icteric, eyes wide open    ENMT: dry oral mucosa    Neck:  short and supple, + trach    Respiratory:  harsh respirator BS    Cardiovascular:  S1S2    Gastrointestinal: globose soft and benign, + PEG    Genitourinary:  no villatoro    Extremities: arthritic changes, moves upper ext, dec motor strength of lower ext    LABS:                   11  5.6 )-----------( 218             36    141 |  102 |  18  ----------------------------<  158  4.7  |  28   |  0.7    GFR 87, 91, 56, 87  Ca    9.7       Mg    2.2, 2.3, 2.1, 2.2    TPro  5.6<L>  /  Alb  3.0, 3.3, 3.7 /  TBili  0.5, 6.0  /  DBili  x   /  AST  9   /  ALT  23  /  AlkPhos  88  06-06    urine GM- rods  Blood streptococcus  sputum streptococcus      RADIOLOGY & ADDITIONAL TESTS:     EKG NSR 66/min   CXR  +ET tube, + feeding tube    CT of head R thalamic parenchymal hematoma 2.2 cm with intraventricular extension, mild ventricular enlargement, white mater changes     repeat CTH:  evolution of the  R thalamic hemorrhage which has dec, ventricular size stable with mild enlargement, white matter changes, stable chronic hematoma cavity within the L basal ganglia, chronic lacunar infarct in BL basal ganglia and L thalamus      Stroke code, R thalamic hemorrhagic CVA with extension to ventricles  sp Hypertensive emergency  Respiratory Failure, sp trach 6/10, pt off respirator and on T tube last 48 hrs  Seizures, controlled on Keppra  Streptococcal Bacteremia probably from transient PNA,   Marcio negative UTI    Hx of HTN, ASHD  Hx of DLD  Hx of old hemorrhagic CVA about 8 yrs ago, dysarthria, gait instability  Hx of OA, DDD, DJD  Hx of GERD, HH, Diverticulosis  Hx of anxiety    pt is sp trach  6/10, on day 4 off the vent  neurology:  dec keppra to 1000mg 2x to lessen sedation, repeat CTH  S&S attempts:  mild oral dysphagia with delayed oral transit and prolonged mastication    sp PEG  6/24, on feeds    pt is sp ceftriaxone, blood culture 6/4 + for strept, source probable  LLL  strept PNA, + sputa for strept and CT shows infiltrate,  urine + for Gm neg rods/asymptomatic pyuria, completed the course of ABX    seizures  controlled on Keppra 1000mg q12, VEEG shows focal and generalized slowing    monitor electrolytes and CBC periodically    pt is ff by pul /critical care,  oral care, skin care and decubiti prevention    social SVC/D/C planning for SNF placement, possible D/C today

## 2019-07-09 NOTE — CHART NOTE - NSCHARTNOTEFT_GEN_A_CORE
Registered Dietitian Follow-Up    ***Scroll to the bottom for RD recommendation***    Patient Profile Reviewed                           Yes [x]   No []  Nutrition History Previously Obtained        Yes [x]  No []   - pt remains consistency NOT eating any food (try one bite and pushed them away per RN). But is receiving Glucerna 1.2 at 240cc q8hr timely + Prosource TF q8hr now.         PERTINENT MEDICAL INFORMATIONS:  (no new)  (1) Here for L sided weakness + slurred speech.  (2) S/p Trach 6/10. S/p PEG 6/24.  (3) Social service planning for d/c, ?SNF  (4) Rehab needed. Tolerating oral foods but minimally. c/w HTN meds  (5) s/p SLP 7/1. Pulm f/u.       PERTINENT SUBJECTIVE INFORMATION:  (1) see above      DIET ORDER:   DYSPHAGIA 3 + NECTAR + GLucerna 1.2 at 240cc q8hr TF x3 + Ensure Pudding q24hr        ANTHROPOMETRICS:  - Ht.  157.48cm  - Wt.  (6/4): 79.4kg  (6/10): 79.2kg  (6/11): 80.9kg   6/24: 78.5kg   (7/1): 78kg  (7/9): 75.6kg - relatively stable. will monitor weight. This is the lowest wt of this admission   - BMI. 31.9  - IBW       PERTINENT LAB DATA:  7/5: h/h 11.3/36.2, glucose 158, Ca 11.2  PERTINENT MEDS: heparin, insulin, losartan, acetaminophen, protonix    PHYSICAL FINDINGS  - APPEARANCE:        alert but often appears weak. No edema  - GI FUNCTION:        LBM 7/9 per RN  - TUBES:                     PEG  - ORAL/MOUTH:      per SLP  - SKIN:                        Intact        NUTRITION REQUIREMENTS  WEIGHT USED:                          ABW from admitted calculation note of 79.4kg  ESTIMATED ENERGY NEEDS:       CONTINUE [ x ]      ADJUST [  ]    ESTIMATED ENERGY NEEDS:         9727-9585 kcal/day (MSJ x 1.0-1.1)   ESTIMATED PROTEIN NEEDS:         52-68g (1.1-1.3g/kg IBW) - aim higher for maintenance  ESTIMATED FLUID NEEDS:             per VENT or 1ml/kcal     CURRENT NUTRIENT NEEDS:     Via PEG = meeting 975 kcal/ 75g protein  (including Prosource TF x3).  via Oral = NONE          [ x ] PREVIOUS NUTRITION DIAGNOSIS:    (1) inadequate oral intake - resolving now, due to consistency of poor PO intake, pt will be counting on PEG for most of her nutrition now. WIll switch to GLucerna 1.2 4 times a day, to cover more kcal and protein.             [x  ] ONGOING        [  ] RESOLVED          PATIENT INTERVENTION:    [  x] ORAL        [x ] EN/TF     GOAL/EXPECTED OUTCOME:     pt to meet and tolerate >75% of estimated kcal/protein via TF while consuming and tolerating >25% of all meals and supplements upon f/u in 3 days.   INDICATOR/MONITORING:       RD to monitor diet order, energy intake, body composition, nutrition focused physical findings (PO tolerance, EN tolerance, appetite, electrolytes, renal profile)  NUTRITION INTERVENTION:        Meals and snacks. Enteral nutrition.     RECS: (1)  Because pt continues to be eating orally very poorly, will increase PEG feed to cover >75% of estimated kcal/protein and hopefully pt will eat some food orally to cover the rest. (2) Therefore, change formula to Glucerna 1.2 at 240cc q6hr with ProSource x1/day. IN additional with Dysphagia 2 + Nectar diet.

## 2019-07-09 NOTE — DISCHARGE NOTE NURSING/CASE MANAGEMENT/SOCIAL WORK - NSDCPEPTSTRK_GEN_ALL_CORE
Call 911 for stroke/Stroke support groups for patients, families, and friends/Need for follow up after discharge/Stroke education booklet/Stroke warning signs and symptoms/Signs and symptoms of stroke/Risk factors for stroke/Prescribed medications

## 2019-07-09 NOTE — DISCHARGE NOTE NURSING/CASE MANAGEMENT/SOCIAL WORK - NSDCPEWEB_GEN_ALL_CORE
[place] website --- [email or web address] for Tobacco Control website --- [email or web address]

## 2019-07-09 NOTE — PROGRESS NOTE ADULT - PROVIDER SPECIALTY LIST ADULT
CCU
CT Surgery
CT Surgery
Critical Care
Infectious Disease
Internal Medicine
Neurology
Neurosurgery
Pulmonology
Thoracic Surgery
CCU
CCU
Internal Medicine
Internal Medicine
Neurosurgery
Pulmonology
Critical Care
Neurology
Internal Medicine
Pulmonology
Infectious Disease

## 2019-07-09 NOTE — DISCHARGE NOTE PROVIDER - CARE PROVIDER_API CALL
Erasmo Cruz)  Internal Medicine  [address] [place] [address], [address]  [place] [address]  Phone: [phone]  Fax: [phone]  Follow Up Time:

## 2019-07-09 NOTE — PROGRESS NOTE ADULT - SUBJECTIVE AND OBJECTIVE BOX
Patient is a 74y old  Female who presents with a chief complaint of left sided weakness and slurred speech, hypertensive urgency, R thalamic hemorrhagic CVA respiratory failure, Sz (08 Jul 2019 22:37)      PHYSICAL EXAM    Vital Signs Last 24 Hrs  T(C): 37.5 (09 Jul 2019 07:45), Max: 37.5 (09 Jul 2019 07:45)  T(F): 99.5 (09 Jul 2019 07:45), Max: 99.5 (09 Jul 2019 07:45)  HR: 82 (09 Jul 2019 07:45) (79 - 89)  BP: 121/58 (09 Jul 2019 07:45) (121/58 - 158/71)  BP(mean): 88 (09 Jul 2019 07:45) (88 - 102)  RR: 18 (09 Jul 2019 07:45) (18 - 20)  SpO2: 98% (09 Jul 2019 07:25) (96% - 99%)    General: In NAD  HEENT: + trach               Neck: Supple.   No Cervical LN    Lungs: Bilateral BS  Cardiovascular: Regular   Abdomen: Soft. + BS  Extremities: No CLubbing   Skin:   Neurological: left sided weakness improved      LABS:                                                                                                                                                                                                                                   Mode: standby  FiO2: 40                                          MEDICATIONS  (STANDING):  amLODIPine   Tablet 10 milliGRAM(s) Oral daily  chlorhexidine 0.12% Liquid 15 milliLiter(s) Oral Mucosa two times a day  chlorhexidine 4% Liquid 1 Application(s) Topical <User Schedule>  dextrose 5%. 1000 milliLiter(s) (50 mL/Hr) IV Continuous <Continuous>  dextrose 50% Injectable 12.5 Gram(s) IV Push once  dextrose 50% Injectable 25 Gram(s) IV Push once  dextrose 50% Injectable 25 Gram(s) IV Push once  heparin  Injectable 5000 Unit(s) SubCutaneous every 8 hours  hydrALAZINE 25 milliGRAM(s) Oral three times a day  insulin glargine Injectable (LANTUS) 18 Unit(s) SubCutaneous every morning  insulin lispro (HumaLOG) corrective regimen sliding scale   SubCutaneous every 6 hours  levETIRAcetam  Solution 1000 milliGRAM(s) Oral two times a day  losartan 25 milliGRAM(s) Oral daily  nystatin    Suspension 420009 Unit(s) Oral two times a day  pantoprazole   Suspension 40 milliGRAM(s) Oral daily    MEDICATIONS  (PRN):  acetaminophen   Tablet .. 650 milliGRAM(s) Oral every 6 hours PRN Temp greater or equal to 38C (100.4F)  dextrose 40% Gel 15 Gram(s) Oral once PRN Blood Glucose LESS THAN 70 milliGRAM(s)/deciliter  glucagon  Injectable 1 milliGRAM(s) IntraMuscular once PRN Glucose LESS THAN 70 milligrams/deciliter

## 2019-07-09 NOTE — DISCHARGE NOTE NURSING/CASE MANAGEMENT/SOCIAL WORK - NSDCDPATPORTLINK_GEN_ALL_CORE
You can access the [facility] Portal, offered by [facility], by registering with the following website: [email or web address]
(3) slightly limited

## 2019-07-09 NOTE — DISCHARGE NOTE NURSING/CASE MANAGEMENT/SOCIAL WORK - NSDCPEEMAIL_GEN_ALL_CORE
[facility] for Tobacco Control email [email or web address]

## 2019-07-10 PROBLEM — E11.9 TYPE 2 DIABETES MELLITUS WITHOUT COMPLICATIONS: Chronic | Status: ACTIVE | Noted: 2019-01-01

## 2019-07-10 PROBLEM — I63.9 CEREBRAL INFARCTION, UNSPECIFIED: Chronic | Status: ACTIVE | Noted: 2019-01-01

## 2019-07-10 PROBLEM — I10 ESSENTIAL (PRIMARY) HYPERTENSION: Chronic | Status: ACTIVE | Noted: 2019-01-01

## 2019-07-12 NOTE — ED ADULT NURSE NOTE - NSIMPLEMENTINTERV_GEN_ALL_ED
Implemented All Fall with Harm Risk Interventions:  [place] to call system. Call bell, personal items and telephone within reach. Instruct patient to call for assistance. Room bathroom lighting operational. Non-slip footwear when patient is off stretcher. Physically safe environment: no spills, clutter or unnecessary equipment. Stretcher in lowest position, wheels locked, appropriate side rails in place. Provide visual cue, wrist band, yellow gown, etc. Monitor gait and stability. Monitor for mental status changes and reorient to person, place, and time. Review medications for side effects contributing to fall risk. Reinforce activity limits and safety measures with patient and family. Provide visual clues: red socks.

## 2019-07-12 NOTE — ED PROVIDER NOTE - OBJECTIVE STATEMENT
75y/o F w/ hx of seizures on keppra, htn, CVA, trach on 10L humidified is sent from nursing home for fever, cough 75y/o F w/ hx of seizures on keppra, htn, CVA, Gastric tube,  trach on 10L humidified is sent from nursing home for fever, cough and difficulty breathing hypoxia to 80s.  baseline pt nods yes or no. non ambulatory.  no vomiting or diarrhea.

## 2019-07-12 NOTE — H&P ADULT - HISTORY OF PRESENT ILLNESS
73y/o F w/ hx of seizures on keppra, htn, CVA, Gastric tube,  trach on 10L humidified is sent from nursing home for fever, cough and difficulty breathing hypoxia to 80s.  baseline pt nods yes or no. non ambulatory.  no vomiting or diarrhea.    In the ED, pt was given cefepime 2g x 1 and 1L of LR. 73 yo F w/ PMH of HTN, HLD, DM, CVA, EVD placement at that time (about 8 years ago) with residual left side weakness and slurred speech on ASA daily, G-tube and trach on 10 L is sent from nursing home for fever, cough and difficulty breathing hypoxia to 80s.  baseline pt nods yes or no. non ambulatory.  no vomiting or diarrhea.    In the ED, pt was given cefepime 2g x 1 and 1L of LR.     Of note, pt was admitted to [facility] recently (May/2019) for a stroke code. At the time of presentation to [facility] ED A&Ox 2, baseline slurped speech with left sided weakness. CTH shows acute right thalamic hemorrhage with extension to right lateral and third ventricle. At [facility] ED, Pt was Intubated due to worsening of neuro exam and for air way protection. Pt was then transferred to ED-[place] to be evaluated by neurosurgery. Neurosx recommended conservative management and CTH shows no further bleed. Hospital course is complicated by seizures, prolonged QT, streptococcus bacteremia with positive blood cultures and positive GM negative rods in the urine.  She was on ceftriaxone which was changed to Cefepime.  Pt failed weaning and required tracheostomy on 6/10 and was sent to the vent unit.  She failed S&S several times and required placement of PEG on 6/24. Pt was discharged on 7/9/2019. 75 yo F w/ PMH of HTN, HLD, DM, CVA with residual left side weakness and slurred speech, G-tube and trach on 10 L is sent from nursing home for SOB. On baseline, pt nods yes or no, and non-ambulatory.  Pt denies N/V/D, chill, palpitation, CP, abdominal pain, LE edema. Family unable to reach to obtain hx.     In the ED, pt was given cefepime 2g x 1 and 1L of LR.     Of note, pt was admitted to [facility] recently (May/2019) for a stroke code. At the time of presentation to [facility] ED A&Ox 2, baseline slurped speech with left sided weakness. CTH shows acute right thalamic hemorrhage with extension to right lateral and third ventricle. At [facility] ED, Pt was Intubated due to worsening of neuro exam and for air way protection. Pt was then transferred to [facility]-[place] to be evaluated by neurosurgery. Neurosx recommended conservative management and CTH shows no further bleed. Hospital course is complicated by seizures, prolonged QT, streptococcus bacteremia with positive blood cultures and positive GM negative rods in the urine.  She was on ceftriaxone which was changed to Cefepime.  Pt failed weaning and required tracheostomy on 6/10 and was sent to the vent unit.  She failed S&S several times and required placement of PEG on 6/24. Pt was discharged on 7/9/2019.

## 2019-07-12 NOTE — ED PROVIDER NOTE - CLINICAL SUMMARY MEDICAL DECISION MAKING FREE TEXT BOX
Received pt from Dr Salcedo at 0700 awaiting abd CT. Pt with h/o HTN, CVA maintained on trach collar sent in from SNF due to fever, cough/hypoxia. Labs ok, CXR with R basilar infiltrate; O2sat and WOB improved after suctioning and pt now 94-95 range on O2, no accessory muscle use. CT scan no intraabdominal pathology. Pt has been hemodynamically stable, got abx for HCAP, will admit.

## 2019-07-12 NOTE — ED PROVIDER NOTE - PHYSICAL EXAMINATION
VITAL SIGNS: I have reviewed nursing notes and confirm.  CONSTITUTIONAL: trach collar.  obese  SKIN: skin exam is warm and dry, no acute rash.   HEAD: Normocephalic; atraumatic.  EYES:  EOM intact; conjunctiva and sclera clear.  ENT: No nasal discharge; airway clear. moist oral mucosa;  NECK: Supple; non tender.  CARD: S1, S2 normal; no murmurs, gallops, or rubs. Regular rate and rhythm. posterior tibial and radial pulses 2+  RESP: trach in place.  gargling.  inner tube had to be cchanged and suction. pt sounds better.  cta b/l. no use of accessory muscles. no retractions  ABD: Normal bowel sounds; soft; non-distended; tenderness  to palpation.  g-tube in place. no rebound.   EXT: Normal ROM. No  cyanosis or edema.  BACK: No cva tenderness  LYMPH: No acute cervical adenopathy.  NEURO: pt groaning to painful stimuli to all extremities and to sternal rub.

## 2019-07-12 NOTE — ED ADULT NURSE REASSESSMENT NOTE - NS ED NURSE REASSESS COMMENT FT1
Patient resting comfortably in bed. VSS. Sating 100% on 10 L humidified trach collar. Patient producing creamy thick sputum-suctioned as needed. T&P q2h. B/l breast rash noted. Sacral redness noted. IV intact shwos no signs of redness or swelling. No signs of resp distress noted at this time. Will continue to monitor. Patient resting comfortably in bed. VSS. Sating 100% on 10 L humidified trach collar. Patient producing creamy thick sputum-suctioned as needed. B/L wheezing present /T&P q2h. B/l breast rash noted. Sacral redness noted. IV intact shwos no signs of redness or swelling. No signs of resp distress noted at this time. Will continue to monitor.

## 2019-07-12 NOTE — H&P ADULT - ASSESSMENT
A 74y female with PMH of [PMH] presents to the hospital complaining of [cc]. Currently, patient is admitted for [diagnosis]. Hospital course was complicated by [complication].      # Leukocytosis       GI ppx: (  )  DVT ppx: (  )  Activity:  Code status: Full Code (  ) / DNR (  ) / DNI (  )  Disposition:     (  ) Discussion with patient and/or family regarding goals of care  (  ) Discussed Case and Plan with Medical Attending, Name:  73 yo F w/ PMH of HTN, HLD, DM, CVA with residual left side weakness and slurred speech, G-tube and trach on 10 L is sent from nursing home for SOB. Admitted to [facility] for suspected pneumonia.     # SOB r/o pneumonia   - start abx    # Leukocytosis r/o infectious etiology (pneumonia vs UTI)   - recent urinary tract infection and currently positive UA   - CXR cannot exclude superimposed consolidation  - trend CBC   - f/u blood culture x 3   - f/u urine culture   - start abx     # CVA w/ residual left side weakness and slurred speech, stable  - c/w keppra 1000 mg BID     # DM   - lantus 18U at home   - will start insulin 9/3/3/3  - trend POCTs     # HTN, uncontrolled  - likely secondary to infection  - resume home meds     # DLD - c/w simvastatin     Diet: Tube feed with glucerna   GI ppx: Protonix 40 mg qD   DVT ppx: Heparin 5000U q8h subcut	  Activity: Increase as tolerated  Code status: Full Code ( X ) / DNR (  ) / DNI (  ), need discussion with family about GOC (unable to reach family at the moment)  Disposition: from rehab, need medical management     ( X ) Discussion with patient and/or family regarding goals of care 75 yo F w/ PMH of HTN, HLD, DM, CVA with residual left side weakness and slurred speech, G-tube and trach on 10 L is sent from nursing home for SOB. Admitted to [facility] for suspected pneumonia.     # SOB r/o VAP   - BNP is unremarkable, unlikely to be CHF exacerbation  - order B/L LE duplex to r/o DVTs  - pt was recently discharged from hospital and trached   - will start abx with meropenem, vancomycin and ciprofloxacin to cover pseudomonas and MRSA   - repeat CXR in AM to look for resolution   - ID consulted     # Leukocytosis r/o infectious etiology (pneumonia vs UTI)   - recent urinary tract infection and currently positive UA   - CXR cannot exclude superimposed consolidation  - trend CBC   - f/u blood culture x 3   - f/u urine culture   - start abx     # CVA w/ residual left side weakness and slurred speech, stable  - c/w keppra 1000 mg BID     # DM   - lantus 18U at home   - will start insulin 9/3/3/3  - trend POCTs     # HTN, uncontrolled  - likely secondary to infection  - resume home meds     # DLD - c/w simvastatin     Diet: Tube feed with glucerna   GI ppx: Protonix 40 mg qD   DVT ppx: Heparin 5000U q8h subcut	  Activity: Increase as tolerated  Code status: Full Code ( X ) / DNR (  ) / DNI (  ), need discussion with family about GOC (unable to reach family at the moment)  Disposition: from rehab, need medical management     ( X ) Discussion with patient and/or family regarding goals of care 75 yo F w/ PMH of HTN, HLD, DM, CVA with residual left side weakness and slurred speech, G-tube and trach on 10 L is sent from nursing home for SOB. Admitted to [facility] for suspected pneumonia.     # SOB r/o VAP   - BNP is unremarkable, unlikely to be CHF exacerbation  - order B/L LE duplex to r/o DVTs  - pt was recently discharged from hospital and trached   - will start abx with meropenem, vancomycin and ciprofloxacin to cover pseudomonas and MRSA   - repeat CXR in AM to look for resolution   - f/u procalcitonin   - MRSA nasal nares and to trach  - ID consulted     # Leukocytosis r/o infectious etiology (pneumonia vs UTI)   - recent urinary tract infection and currently positive UA   - CXR cannot exclude superimposed consolidation  - trend CBC   - f/u blood culture x 3   - f/u urine culture   - start abx     # CVA w/ residual left side weakness and slurred speech, stable  - c/w keppra 1000 mg BID     # Troponinemia likely secondary to NSTEMI    - Troponin 0.02  - trend troponin     # DM   - lantus 18U at home   - will start insulin 9/3/3/3  - trend POCTs     # HTN, uncontrolled  - resume home meds     # DLD - c/w simvastatin     Diet: Tube feed with glucerna   GI ppx: Protonix 40 mg qD   DVT ppx: Heparin 5000U q8h subcut	  Activity: Increase as tolerated  Code status: Full Code ( X ) / DNR (  ) / DNI (  ), need discussion with family about GOC (unable to reach family at the moment)  Disposition: from rehab, need medical management     ( X ) Discussion with patient and/or family regarding goals of care 75 yo F w/ PMH of HTN, HLD, DM, CVA with residual left side weakness and slurred speech, G-tube and trach on 10 L is sent from nursing home for SOB. Admitted to [facility] for suspected pneumonia.     # SOB r/o VAP   - BNP is unremarkable, unlikely to be CHF exacerbation  - recent ECHO shows normal EF  - order B/L LE duplex to r/o DVTs  - pt was recently discharged from hospital and trached   - will start abx with meropenem, vancomycin and ciprofloxacin to cover pseudomonas and MRSA   - repeat CXR in AM to look for resolution   - f/u procalcitonin   - MRSA nasal nares and to trach  - ID consulted     # Leukocytosis r/o infectious etiology (pneumonia vs UTI)   - recent urinary tract infection and currently positive UA   - CXR cannot exclude superimposed consolidation  - trend CBC   - f/u blood culture x 3   - f/u urine culture   - start abx     # CVA w/ residual left side weakness and slurred speech, stable  - c/w keppra 1000 mg BID     # Troponinemia likely secondary to NSTEMI    - Troponin 0.02  - trend troponin     # DM   - lantus 18U at home   - will start insulin 9/3/3/3  - trend POCTs     # HTN, uncontrolled  - resume home meds     # DLD - c/w simvastatin     Diet: Tube feed with glucerna   GI ppx: Protonix 40 mg qD   DVT ppx: Heparin 5000U q8h subcut	  Activity: Increase as tolerated  Code status: Full Code ( X ) / DNR (  ) / DNI (  ), spoke with son about GOC, son would like to discuss with family prior to signing DNR/DNI  Disposition: from rehab, need medical management     ( X ) Discussion with patient and/or family regarding goals of care

## 2019-07-12 NOTE — H&P ADULT - NSHPREVIEWOFSYSTEMS_GEN_ALL_CORE
CONSTITUTIONAL: No weakness, fevers or chills  EYES/ENT: No visual changes;  No vertigo or throat pain   NECK: No pain or stiffness  RESPIRATORY: No cough, wheezing, hemoptysis; No shortness of breath  CARDIOVASCULAR: No chest pain or palpitations  GASTROINTESTINAL: No abdominal or epigastric pain. No nausea, vomiting, or hematemesis; No diarrhea or constipation. No melena or hematochezia.  GENITOURINARY: No dysuria, frequency or hematuria  NEUROLOGICAL: No numbness or weakness  SKIN: No itching, rashes CONSTITUTIONAL: No weakness.  EYES/ENT: No visual changes;  No vertigo or throat pain   NECK: No pain or stiffness  RESPIRATORY: Admits to SOB. Denies cough, wheezing, hemoptysis.  CARDIOVASCULAR: No chest pain or palpitations  GASTROINTESTINAL: No abdominal or epigastric pain. No nausea, vomiting, or hematemesis; No diarrhea or constipation. No melena or hematochezia.  GENITOURINARY: No dysuria, frequency or hematuria  SKIN: No itching, rashes

## 2019-07-12 NOTE — ED PROVIDER NOTE - ATTENDING CONTRIBUTION TO CARE
73 yo pt from NH on trach collar, sent for resp distress, fever. pt is poor historian, unable to verbalize hpi/ros. pt is lethargic but respnds to tactile stim. perrl, trach collar in place, b/l ronchi, tachy/regular. ab soft. pt moans when palpated. moves all extre spontaneously, not following commands. will suction, imaging, labs, reassess.

## 2019-07-12 NOTE — H&P ADULT - NSHPLABSRESULTS_GEN_ALL_CORE
LABS:                            12.8   12.36 )-----------( 382      ( 2019 05:30 )             41.2           137  |  94<L>  |  25<H>  ----------------------------<  406<H>  5.3<H>   |  27  |  0.8    Ca    12.1<H>      2019 05:30  Mg     2.2         TPro  7.8  /  Alb  4.0  /  TBili  0.7  /  DBili  x   /  AST  12  /  ALT  16  /  AlkPhos  103                Urinalysis Basic - ( 2019 09:45 )    Color: Light Yellow / Appearance: Slightly Turbid / S.025 / pH: x  Gluc: x / Ketone: Negative  / Bili: Negative / Urobili: <2 mg/dL   Blood: x / Protein: Trace / Nitrite: Negative   Leuk Esterase: Large / RBC: 1 /HPF /  /HPF   Sq Epi: x / Non Sq Epi: 1 /HPF / Bacteria: Few            Lactate Trend   @ 05:30 Lactate:2.0       CARDIAC MARKERS ( 2019 05:30 )  x     / 0.02 ng/mL / x     / x     / x            CAPILLARY BLOOD GLUCOSE            RADIOLOGY:    < from: CT Abdomen and Pelvis w/ IV Cont (19 @ 08:38) >  IMPRESSION:   1. No evidence of acute/inflammatory process within the abdomen or pelvis.  < end of copied text >    < from: Xray Chest 1 View-PORTABLE IMMEDIATE (19 @ 06:35) >    Impression:      Low lung volumes with bibasilar atelectasis. Superimposed consolidation   cannot be entirely excluded.    < end of copied text >        EKGS: LABS:                        12.8   12.36 )-----------( 382      ( 2019 05:30 )             41.2           137  |  94<L>  |  25<H>  ----------------------------<  406<H>  5.3<H>   |  27  |  0.8    Ca    12.1<H>      2019 05:30  Mg     2.2         TPro  7.8  /  Alb  4.0  /  TBili  0.7  /  DBili  x   /  AST  12  /  ALT  16  /  AlkPhos  103                Urinalysis Basic - ( 2019 09:45 )    Color: Light Yellow / Appearance: Slightly Turbid / S.025 / pH: x  Gluc: x / Ketone: Negative  / Bili: Negative / Urobili: <2 mg/dL   Blood: x / Protein: Trace / Nitrite: Negative   Leuk Esterase: Large / RBC: 1 /HPF /  /HPF   Sq Epi: x / Non Sq Epi: 1 /HPF / Bacteria: Few            Lactate Trend   @ 05:30 Lactate:2.0       CARDIAC MARKERS ( 2019 05:30 )  x     / 0.02 ng/mL / x     / x     / x            CAPILLARY BLOOD GLUCOSE        RADIOLOGY:    < from: CT Abdomen and Pelvis w/ IV Cont (19 @ 08:38) >  IMPRESSION:   1. No evidence of acute/inflammatory process within the abdomen or pelvis.  < end of copied text >    < from: Xray Chest 1 View-PORTABLE IMMEDIATE (19 @ 06:35) >  Impression:    Low lung volumes with bibasilar atelectasis. Superimposed consolidation   cannot be entirely excluded.  < end of copied text >        EKGS: LABS:                        12.8   12.36 )-----------( 382      ( 2019 05:30 )             41.2           137  |  94<L>  |  25<H>  ----------------------------<  406<H>  5.3<H>   |  27  |  0.8    Ca    12.1<H>      2019 05:30  Mg     2.2         TPro  7.8  /  Alb  4.0  /  TBili  0.7  /  DBili  x   /  AST  12  /  ALT  16  /  AlkPhos  103          Serum Pro-Brain Natriuretic Peptide: 295 pg/mL (19 @ 05:30)    Urinalysis Basic - ( 2019 09:45 )    Color: Light Yellow / Appearance: Slightly Turbid / S.025 / pH: x  Gluc: x / Ketone: Negative  / Bili: Negative / Urobili: <2 mg/dL   Blood: x / Protein: Trace / Nitrite: Negative   Leuk Esterase: Large / RBC: 1 /HPF /  /HPF   Sq Epi: x / Non Sq Epi: 1 /HPF / Bacteria: Few            Lactate Trend   @ 05:30 Lactate:2.0       CARDIAC MARKERS ( 2019 05:30 )  x     / 0.02 ng/mL / x     / x     / x            CAPILLARY BLOOD GLUCOSE        RADIOLOGY:    < from: CT Abdomen and Pelvis w/ IV Cont (19 @ 08:38) >  IMPRESSION:   1. No evidence of acute/inflammatory process within the abdomen or pelvis.  < end of copied text >    < from: Xray Chest 1 View-PORTABLE IMMEDIATE (19 @ 06:35) >  Impression:    Low lung volumes with bibasilar atelectasis. Superimposed consolidation   cannot be entirely excluded.  < end of copied text >        EKGS:

## 2019-07-12 NOTE — H&P ADULT - NSHPPHYSICALEXAM_GEN_ALL_CORE
GENERAL: NAD, AAO x 4, 74y F  HEAD:  Atraumatic, Normocephalic  EYES: EOMI, conjunctiva and sclera white  NECK: Supple, No JVD  CHEST/LUNG: Clear to auscultation bilaterally; No wheeze; No crackles; No accessory muscles used  HEART: Regular rate and rhythm; No murmurs;   ABDOMEN: Soft, Nontender, Nondistended; Bowel sounds present; No guarding  EXTREMITIES:  2+ Peripheral Pulses, No cyanosis or edema  NEUROLOGY: non-focal GENERAL: Non-verbal on baseline, NAD, 74y F  HEAD:  Atraumatic, Normocephalic  EYES: EOMI, conjunctiva clear and sclera white  NECK: Supple, No JVD, trach noted and intact   CHEST/LUNG: Crackles noted B/L Lung fields; No wheeze; No accessory muscles used  HEART: Regular rate and rhythm; No murmurs;   ABDOMEN: Soft, Nontender, Nondistended; Bowel sounds present; No guarding  EXTREMITIES:  2+ Peripheral Pulses, No cyanosis or edema  NEUROLOGY: Left sided weakness, aphasic

## 2019-07-13 NOTE — CONSULT NOTE ADULT - SUBJECTIVE AND OBJECTIVE BOX
HERNANDO HERNANDEZ  74y, Female  Allergy: No Known Allergies      CHIEF COMPLAINT: Shortness of breath (2019 13:57)      HPI:  73 yo F w/ PMH of HTN, HLD, DM, CVA with residual left side weakness and slurred speech, G-tube and trach on 10 L is sent from nursing home for SOB. On baseline, pt nods yes or no, and non-ambulatory.  Pt denies N/V/D, chill, palpitation, CP, abdominal pain, LE edema. Family unable to reach to obtain hx.     In the ED, pt was given cefepime 2g x 1 and 1L of LR.     Of note, pt was admitted to [facility] recently (May/2019) for a stroke code. At the time of presentation to [facility] ED A&Ox 2, baseline slurped speech with left sided weakness. CTH shows acute right thalamic hemorrhage with extension to right lateral and third ventricle. At [facility] ED, Pt was Intubated due to worsening of neuro exam and for air way protection. Pt was then transferred to [facility]-[place] to be evaluated by neurosurgery. Neurosx recommended conservative management and CTH shows no further bleed. Hospital course is complicated by seizures, prolonged QT, streptococcus bacteremia with positive blood cultures and positive GM negative rods in the urine.  She was on ceftriaxone which was changed to Cefepime.  Pt failed weaning and required tracheostomy on 6/10 and was sent to the vent unit.  She failed S&S several times and required placement of PEG on . Pt was discharged on 2019. (2019 13:57)      INFECTIOUS DISEASE HISTORY:  reports worsening secretions from trach  denies abd pain, diarrhea, denies dysuria    PAST MEDICAL & SURGICAL HISTORY:  Diabetes mellitus  Stroke  Hypertension  Feeding by G-tube      FAMILY HISTORY  No pertinent family history in first degree relatives      SOCIAL HISTORY  Social History (marital status, living situation, occupation, tobacco use, alcohol and drug use, and sexual history): Recently discharged from the hospital to rehab.    ROS  General: Denies rigors, nightsweats  HEENT: Denies headache, rhinorrhea, sore throat, eye pain  CV: Denies CP, palpitations  PULM: as noted above   GI: Denies abdominal pain, hematochezia/melena  : Denies dysuria, hematuria  MSK: Denies arthralgias, myalgias  SKIN: Denies rash, lesions  NEURO: Denies paresthesias, weakness  PSYCH: Denies depression, anxiety    VITALS:  T(F): 97.3, Max: 98.3 (19 @ 20:37)  HR: 91  BP: 128/66  RR: 18Vital Signs Last 24 Hrs  T(C): 36.3 (2019 05:54), Max: 36.8 (2019 20:37)  T(F): 97.3 (2019 05:54), Max: 98.3 (2019 20:37)  HR: 91 (2019 05:54) (85 - 94)  BP: 128/66 (2019 05:54) (128/66 - 183/81)  BP(mean): --  RR: 18 (2019 05:54) (18 - 20)  SpO2: 98% (2019 20:40) (98% - 100%)    PHYSICAL EXAM:  Gen: Trach NAD, resting in bed  HEENT: Normocephalic, atraumatic  Neck: supple, no lymphadenopathy  CV: Regular rate & regular rhythm  Lungs: coarse BS  Abdomen: Soft, BS present PEG no suprapubic tenderness, no CVAT   Ext: Warm, well perfused  Neuro: non focal, awake  Skin: no rash, no erythema  Lines: no phlebitis    TESTS & MEASUREMENTS:                        10.5   5.94  )-----------( 261      ( 2019 08:35 )             34.3         138  |  98  |  15  ----------------------------<  302<H>  4.5   |  31  |  0.6<L>    Ca    10.9<H>      2019 08:35  Phos  2.7       Mg     2.0         TPro  6.3  /  Alb  3.4<L>  /  TBili  0.6  /  DBili  x   /  AST  11  /  ALT  13  /  AlkPhos  78      eGFR if Non African American: 91 mL/min/1.73M2 (19 @ 08:35)  eGFR if : 106 mL/min/1.73M2 (19 @ 08:35)    LIVER FUNCTIONS - ( 2019 08:35 )  Alb: 3.4 g/dL / Pro: 6.3 g/dL / ALK PHOS: 78 U/L / ALT: 13 U/L / AST: 11 U/L / GGT: x           Urinalysis Basic - ( 2019 09:45 )    Color: Light Yellow / Appearance: Slightly Turbid / S.025 / pH: x  Gluc: x / Ketone: Negative  / Bili: Negative / Urobili: <2 mg/dL   Blood: x / Protein: Trace / Nitrite: Negative   Leuk Esterase: Large / RBC: 1 /HPF /  /HPF   Sq Epi: x / Non Sq Epi: 1 /HPF / Bacteria: Few        Culture - Blood (collected 19 @ 05:30)  Source: .Blood Blood  Gram Stain (19 @ 22:06):    Growth in anaerobic bottle: Gram positive cocci in pairs    Growth in aerobic bottle: Gram positive cocci in pairs  Preliminary Report (19 @ 22:06):    Growth in anaerobic bottle: Gram positive cocci in pairs    Growth in aerobic bottle: Gram positive cocci in pairs    Culture - Blood (collected 19 @ 05:30)  Source: .Blood Blood  Gram Stain (19 @ 22:02):    Growth in aerobic bottle: Gram positive cocci in pairs    Growth in anaerobic bottle: Gram positive cocci in pairs  Preliminary Report (19 @ 22:02):    Growth in aerobic bottle: Gram positive cocci in pairs    Growth in anaerobic bottle: Gram positive cocci in pairs    "Due to technical problems, Proteus sp. will Not be reported as part of    the BCID panel until further notice"    ***Blood Panel PCR results on this specimen are available    approximately 3 hours after the Gram stain result.***    Gram stain, PCR, and/or culture results may not always    correspond due to difference in methodologies.    ************************************************************    This PCR assay was performed using [employer].    The following targets are tested for: Enterococcus,    vancomycin resistant enterococci, Listeria monocytogenes,    coagulase negative staphylococci, S. aureus,    methicillin resistant S. aureus,Streptococcus agalactiae    (Group B), S. pneumoniae, S. pyogenes (Group A),    Acinetobacter baumannii, Enterobacter cloacae, E. coli,    Klebsiella oxytoca, K. pneumoniae, Proteus sp.,    Serratia marcescens, Haemophilus influenzae,    Neisseria meningitidis, Pseudomonas aeruginosa, Candida    albicans, C. glabrata, C krusei, C parapsilosis,    C. tropicalis and the KPC resistance gene.  Organism: Blood Culture PCR (19 @ 23:36)  Organism: Blood Culture PCR (19 @ 23:36)      -  Enterococcus species: Detec      Method Type: PCR        Lactate, Blood: 2.0 mmol/L (19 @ 05:30)  Blood Gas Venous - Lactate: 1.6 mmoL/L (19 @ 05:08)      INFECTIOUS DISEASES TESTING      RADIOLOGY & ADDITIONAL TESTS:  I have personally reviewed the last Chest xray  CXR      CT  CT Abdomen and Pelvis w/ IV Cont:   EXAM:  CT ABDOMEN AND PELVIS IC            PROCEDURE DATE:  2019            INTERPRETATION:  CLINICAL STATEMENT: Abdominal tenderness.      TECHNIQUE: Contiguous axial CT images were obtained from the lower chest   to the pubic symphysis withIV contrast.  Oral contrast was not given.    Reformatted images in the coronal and sagittal planes were acquired.   Patient was unable to raise arms away from imaging field, there appears   to be imaging streak artifact as a result.    COMPARISON CT: 2019.    OTHER STUDIES USED FOR CORRELATION: None.       FINDINGS:    LOWER CHEST: Bibasilar areas of atelectasis.    HEPATOBILIARY: The partially imaged liver is unremarkable..    SPLEEN: Unremarkable.    PANCREAS: Unremarkable.    ADRENAL GLANDS: Unremarkable.    KIDNEYS: No hydronephrosis. Several bilateral renal hypodensities are too   small to further characterize. Nonobstructing 3 mm left renal lower pole   calculus.    ABDOMINOPELVIC NODES: Unremarkable.    PELVIC ORGANS: Status posthysterectomy.    PERITONEUM/MESENTERY/BOWEL: Unremarkable.    BONES/SOFT TISSUES: Degenerative changes of the spine, with mild   age-indeterminate compression deformity of the L2 vertebral body.    OTHER: G-tube tip within the lumen of stomach. Atherosclerotic vascular   calcification.      IMPRESSION:     1. No evidence of acute/inflammatory process within the abdomen or pelvis.              ROBIN TAMAYO M.D., RESIDENT RADIOLOGIST  This document has been electronically signed.  JENNIFER JOSEPH M.D., ATTENDING RADIOLOGIST  This document has been electronically signed. 2019  9:42AM             (19 @ 08:38)      CARDIOLOGY TESTING  12 Lead ECG:   Ventricular Rate 98 BPM    Atrial Rate 98 BPM    P-R Interval 158 ms    QRS Duration 74 ms    Q-T Interval 344 ms    QTC Calculation(Bezet) 439 ms    P Axis 58 degrees    R Axis -39 degrees    T Axis 50 degrees    Diagnosis Line Normal sinus rhythm  Left axis deviation  Inferior infarct , age undetermined  Anterolateral infarct , age undetermined  Abnormal ECG    Confirmed by Jason Valles ([id number]) on 2019 8:19:38 PM (19 @ 10:13)  12 Lead ECG:   Ventricular Rate 113 BPM    Atrial Rate 113 BPM    P-R Interval 162 ms    QRS Duration 84 ms    Q-T Interval 328 ms    QTC Calculation(Bezet) 449 ms    P Axis 45 degrees    R Axis -23 degrees    T Axis 31 degrees    Diagnosis Line Sinus tachycardia  Minimal voltage criteria for LVH, may be normal variant  Borderline ECG    Confirmed by EMMA MARROQUIN MD ([id number]) on 2019 2:09:05 PM (19 @ 05:55)      MEDICATIONS  amLODIPine   Tablet 2.5  chlorhexidine 0.12% Liquid 15  chlorhexidine 4% Liquid 1  cholecalciferol 1000  cyanocobalamin 1000  dextrose 5%. 1000  dextrose 50% Injectable 12.5  dextrose 50% Injectable 25  dextrose 50% Injectable 25  donepezil 10  heparin  Injectable 5000  hydrALAZINE 25  insulin glargine Injectable (LANTUS) 9  insulin lispro (HumaLOG) corrective regimen sliding scale   insulin lispro Injectable (HumaLOG) 3  levETIRAcetam  Solution 1000  levoFLOXacin IVPB 750  losartan 25  meropenem  IVPB 1000  nystatin Powder 1  pantoprazole   Suspension 40  simvastatin 20  vancomycin  IVPB 1500      ANTIBIOTICS:  levoFLOXacin IVPB 750 milliGRAM(s) IV Intermittent every 24 hours  meropenem  IVPB 1000 milliGRAM(s) IV Intermittent every 8 hours  vancomycin  IVPB 1500 milliGRAM(s) IV Intermittent every 12 hours      No Known Allergies

## 2019-07-13 NOTE — PROGRESS NOTE ADULT - SUBJECTIVE AND OBJECTIVE BOX
HERNANDO HERNANDEZ 74y0 W Female sent from the SNF for increasing SOB and suspected PNA.  The pt was cultured and started on meropenem, vanc and cipro.  The preliminary blood cultures are positive for Gm + cocci in pairs.  The pt was just D/C to SNF on  after a prolonged and complicated hospital course for R thalamic hemorrhagic CVA, hypertensive urgency with RF, SZ, failure to wean, tracheostomy 6/10 and PEG .  The PMHx includes"  HTN, ASHD, DLD, DM II, d neuropathy, ole BL ganglia  hemorrhagic CVA, gait instability, recent CVA, OA,DDD, DJD, GERD, HH, diverticulosis, gait instability.    INTERVAL HPI/OVERNIGHT EVENTS:  pt on IV ABx, O2 via trach    MEDICATIONS  (STANDING):  amLODIPine   Tablet 2.5 milliGRAM(s) Oral daily  ampicillin/sulbactam  IVPB 1.5 Gram(s) IV Intermittent every 6 hours  chlorhexidine 0.12% Liquid 15 milliLiter(s) Oral Mucosa two times a day  chlorhexidine 4% Liquid 1 Application(s) Topical <User Schedule>  cholecalciferol 1000 Unit(s) Oral daily  cyanocobalamin 1000 MICROGram(s) Oral daily  dextrose 5%. 1000 milliLiter(s) (50 mL/Hr) IV Continuous <Continuous>  dextrose 50% Injectable 12.5 Gram(s) IV Push once  dextrose 50% Injectable 25 Gram(s) IV Push once  dextrose 50% Injectable 25 Gram(s) IV Push once  donepezil 10 milliGRAM(s) Oral at bedtime  heparin  Injectable 5000 Unit(s) SubCutaneous every 8 hours  hydrALAZINE 25 milliGRAM(s) Oral three times a day  insulin glargine Injectable (LANTUS) 9 Unit(s) SubCutaneous at bedtime  insulin lispro (HumaLOG) corrective regimen sliding scale   SubCutaneous three times a day before meals  insulin lispro Injectable (HumaLOG) 3 Unit(s) SubCutaneous three times a day before meals  levETIRAcetam  Solution 1000 milliGRAM(s) Oral two times a day  losartan 25 milliGRAM(s) Oral daily  nystatin Powder 1 Application(s) Topical three times a day  pantoprazole   Suspension 40 milliGRAM(s) Oral daily  simvastatin 20 milliGRAM(s) Oral at bedtime    Unasyn  1.5gms q 6hrs    MEDICATIONS  (PRN):  acetaminophen   Tablet .. 650 milliGRAM(s) Oral every 6 hours PRN Temp greater or equal to 38.5C (101.3F)  dextrose 40% Gel 15 Gram(s) Oral once PRN Blood Glucose LESS THAN 70 milliGRAM(s)/deciliter  glucagon  Injectable 1 milliGRAM(s) IntraMuscular once PRN Glucose LESS THAN 70 milligrams/deciliter      Allergies    No Known Allergie	    Vital Signs Last 24 Hrs  T(C): 37 (2019 14:38), Max: 37 (2019 14:38)  T(F): 98.6 (2019 14:38), Max: 98.6 (2019 14:38)  HR: 70 (2019 14:38) (70 - 94)  BP: 109/56 (2019 14:38) (109/56 - 130/63)  BP(mean): --  RR: 18 (2019 14:38) (18 - 18)  SpO2: 99% (2019 14:38) (98% - 99%)    PHYSICAL EXAM:      Constitutional:  pt more alert, opens eyes, tries to communicate    Eyes: opened, nonicteric    ENMT: dry oral mucosa, dental defects    Neck:  short and thick and supple, + trach    Respiratory: shalow respirations, scattered rhonchi    Cardiovascular:  S1S2 reg    Gastrointestinal:  globose and distended, + PEG site clean    Genitourinary:      Extremities:  moves upper ext, dec motor strength of lower ext, + arthritic changes    Vascular: dec pedal pulses    Neurological: bedbound, lower ext weakness    Skin:  no sacral breakdown    LABS:                        10.5   5.94  )-----------( 261      WBC 15 on amission             34.3     07-13    138  |  98  |  15  ----------------------------<  302<H>  4.5   |  31  |  0.6<L>    Ca    10.9<H>      2019 08:35  Phos  2.7     07-13  Mg     2.0     07-13  LA 2.0  troponin 0.02  blood C&S:  aerobic and anaerobic bottles + for GM + cocci in pairs    TPro  6.3  /  Alb  3.4<L>  /  TBili  0.6  /  DBili  x   /  AST  11  /  ALT  13  /  AlkPhos  78  07-13    PT/INR - ( 2019 08:35 )   PT: 12.40 sec;   INR: 1.08 ratio       PTT - ( 2019 08:35 )  PTT:29.8 sec  Urinalysis Basic - ( 2019 09:45 )    Color: Light Yellow / Appearance: Slightly Turbid / S.025 / pH: x  Gluc: x / Ketone: Negative  / Bili: Negative / Urobili: <2 mg/dL   Blood: x / Protein: Trace / Nitrite: Negative   Leuk Esterase: Large / RBC: 1 /HPF /  /HPF   Sq Epi: x / Non Sq Epi: 1 /HPF / Bacteria: Few        RADIOLOGY & ADDITIONAL TESTS:    EKG:   NSR 98/min, Qs in inf leads, nonspecific ST-T changes    CT of abd and pelvis:  no acute path or infla changes in the abd or pelvis HERNANDO HERNANDEZ 74y0 W Female sent from the SNF for increasing SOB and suspected PNA.  The pt was cultured and started on meropenem, vanc and cipro.  The preliminary blood cultures are positive for Gm + cocci in pairs.  The pt was just D/C to SNF on  after a prolonged and complicated hospital course for R thalamic hemorrhagic CVA, hypertensive urgency with RF, SZ, failure to wean, tracheostomy 6/10 and PEG .  The PMHx includes"  HTN, ASHD, DLD, DM II, d neuropathy, ole BL ganglia  hemorrhagic CVA, gait instability, recent CVA, OA,DDD, DJD, GERD, HH, diverticulosis, gait instability.    INTERVAL HPI/OVERNIGHT EVENTS:  pt on IV ABx, O2 via trach    MEDICATIONS  (STANDING):  amLODIPine   Tablet 2.5 milliGRAM(s) Oral daily  ampicillin/sulbactam  IVPB 1.5 Gram(s) IV Intermittent every 6 hours  chlorhexidine 0.12% Liquid 15 milliLiter(s) Oral Mucosa two times a day  chlorhexidine 4% Liquid 1 Application(s) Topical <User Schedule>  cholecalciferol 1000 Unit(s) Oral daily  cyanocobalamin 1000 MICROGram(s) Oral daily  dextrose 5%. 1000 milliLiter(s) (50 mL/Hr) IV Continuous <Continuous>  dextrose 50% Injectable 12.5 Gram(s) IV Push once  dextrose 50% Injectable 25 Gram(s) IV Push once  dextrose 50% Injectable 25 Gram(s) IV Push once  donepezil 10 milliGRAM(s) Oral at bedtime  heparin  Injectable 5000 Unit(s) SubCutaneous every 8 hours  hydrALAZINE 25 milliGRAM(s) Oral three times a day  insulin glargine Injectable (LANTUS) 9 Unit(s) SubCutaneous at bedtime  insulin lispro (HumaLOG) corrective regimen sliding scale   SubCutaneous three times a day before meals  insulin lispro Injectable (HumaLOG) 3 Unit(s) SubCutaneous three times a day before meals  levETIRAcetam  Solution 1000 milliGRAM(s) Oral two times a day  losartan 25 milliGRAM(s) Oral daily  nystatin Powder 1 Application(s) Topical three times a day  pantoprazole   Suspension 40 milliGRAM(s) Oral daily  simvastatin 20 milliGRAM(s) Oral at bedtime    Unasyn  1.5gms q 6hrs    MEDICATIONS  (PRN):  acetaminophen   Tablet .. 650 milliGRAM(s) Oral every 6 hours PRN Temp greater or equal to 38.5C (101.3F)  dextrose 40% Gel 15 Gram(s) Oral once PRN Blood Glucose LESS THAN 70 milliGRAM(s)/deciliter  glucagon  Injectable 1 milliGRAM(s) IntraMuscular once PRN Glucose LESS THAN 70 milligrams/deciliter      Allergies    No Known Allergie	    Vital Signs Last 24 Hrs  T(C): 37 (2019 14:38), Max: 37 (2019 14:38)  T(F): 98.6 (2019 14:38), Max: 98.6 (2019 14:38)  HR: 70 (2019 14:38) (70 - 94)  BP: 109/56 (2019 14:38) (109/56 - 130/63)  BP(mean): --  RR: 18 (2019 14:38) (18 - 18)  SpO2: 99% (2019 14:38) (98% - 99%)    PHYSICAL EXAM:      Constitutional:  pt more alert, opens eyes, tries to communicate    Eyes: opened, nonicteric    ENMT: dry oral mucosa, dental defects    Neck:  short and thick and supple, + trach    Respiratory: shalow respirations, scattered rhonchi    Cardiovascular:  S1S2 reg    Gastrointestinal:  globose and distended, + PEG site clean    Genitourinary:      Extremities:  moves upper ext, dec motor strength of lower ext, + arthritic changes    Vascular: dec pedal pulses    Neurological: bedbound, lower ext weakness    Skin:  no sacral breakdown    LABS:                        10.5   5.94  )-----------( 261      WBC 12.3 on amission             34.3     07-13    138  |  98  |  15  ----------------------------<  302<H>  4.5   |  31  |  0.6<L>  GFR 74  Ca    10.9<H>      2019 08:35  Phos  2.7     07-13  Mg     2.0     07-13  LA 2.0  troponin 0.02    blood C&S:  aerobic and anaerobic bottles + for GM + cocci in pairs    TPro  6.3  /  Alb  3.4<L>  /  TBili  0.6  /  DBili  x   /  AST  11  /  ALT  13  /  AlkPhos  78  07-13    PT/INR - ( 2019 08:35 )   PT: 12.40 sec;   INR: 1.08 ratio       PTT - ( 2019 08:35 )  PTT:29.8 sec  Urinalysis Basic - ( 2019 09:45 )    Color: Light Yellow / Appearance: Slightly Turbid / S.025 / pH: x  Gluc: x / Ketone: Negative  / Bili: Negative / Urobili: <2 mg/dL   Blood: x / Protein: Trace / Nitrite: Negative   Leuk Esterase: Large / RBC: 1 /HPF /  /HPF   Sq Epi: x / Non Sq Epi: 1 /HPF / Bacteria: Few        RADIOLOGY & ADDITIONAL TESTS:    EKG:  sinus tach 113/min, inf Qs, PRWP, nonspecific ST-T changes  EKG:   NSR 98/min, Qs in inf leads, PRWP     CXR:  low lung volumes, bibasilar atelectases, superimposed consolidation cannot be R/O    CT of abd and pelvis:  no acute path or infla changes in the abd or pelvis

## 2019-07-13 NOTE — CONSULT NOTE ADULT - ASSESSMENT
ASSESSMENT  75 yo F w/ PMH of HTN, HLD, DM, CVA with residual left side weakness and slurred speech, G-tube and trach on 10 L is sent from nursing home for SOB found to have enterococcal BSI    IMPRESSION  #Enterococcal BSI +7/12     SIRS on admission WBC 12 RR 28 P126 hypertensive afebrile     Suspect pyelonephritis as UA with 172 WBC     CT AP unremarkable   #Chronic resp failure    CXR atelectasis     RECOMMENDATIONS  - CHANGE ABX to Unasyn 1.5 q6h IV  - f/u urine cultures  - repeat bcx     Spectra [id number]

## 2019-07-14 NOTE — PROGRESS NOTE ADULT - SUBJECTIVE AND OBJECTIVE BOX
SUBJECTIVE:    Patient is a 74y old Female who presents with a chief complaint of Shortness of breath/sepsis (14 Jul 2019 15:01). Currently admitted to medicine with the primary diagnosis of Pneumonia. Today is hospital day 2d. This morning she is resting comfortably in bed when seen and examined.     PAST MEDICAL & SURGICAL HISTORY  Diabetes mellitus  Stroke  Hypertension  Feeding by G-tube    ALLERGIES:  No Known Allergies    MEDICATIONS:  STANDING MEDICATIONS  amLODIPine   Tablet 2.5 milliGRAM(s) Oral daily  ampicillin/sulbactam  IVPB 1.5 Gram(s) IV Intermittent every 6 hours  chlorhexidine 0.12% Liquid 15 milliLiter(s) Oral Mucosa two times a day  chlorhexidine 4% Liquid 1 Application(s) Topical <User Schedule>  cholecalciferol 1000 Unit(s) Oral daily  cyanocobalamin 1000 MICROGram(s) Oral daily  dextrose 5%. 1000 milliLiter(s) IV Continuous <Continuous>  dextrose 50% Injectable 12.5 Gram(s) IV Push once  dextrose 50% Injectable 25 Gram(s) IV Push once  dextrose 50% Injectable 25 Gram(s) IV Push once  donepezil 10 milliGRAM(s) Oral at bedtime  heparin  Injectable 5000 Unit(s) SubCutaneous every 8 hours  hydrALAZINE 25 milliGRAM(s) Oral three times a day  insulin glargine Injectable (LANTUS) 9 Unit(s) SubCutaneous at bedtime  insulin lispro (HumaLOG) corrective regimen sliding scale   SubCutaneous three times a day before meals  insulin lispro Injectable (HumaLOG) 3 Unit(s) SubCutaneous three times a day before meals  levETIRAcetam  Solution 1000 milliGRAM(s) Oral two times a day  losartan 25 milliGRAM(s) Oral daily  nystatin Powder 1 Application(s) Topical three times a day  pantoprazole   Suspension 40 milliGRAM(s) Oral daily  simvastatin 20 milliGRAM(s) Oral at bedtime    PRN MEDICATIONS  acetaminophen   Tablet .. 650 milliGRAM(s) Oral every 6 hours PRN  dextrose 40% Gel 15 Gram(s) Oral once PRN  glucagon  Injectable 1 milliGRAM(s) IntraMuscular once PRN    VITALS:   T(F): 96.3  HR: 78  BP: 118/55  RR: 18  SpO2: 95%    LABS:                        11.3   5.59  )-----------( 282      ( 14 Jul 2019 08:18 )             37.1     07-14    142  |  100  |  16  ----------------------------<  266<H>  4.2   |  26  |  0.8    Ca    11.0<H>      14 Jul 2019 08:18  Phos  3.1     07-14  Mg     2.1     07-14    TPro  6.3  /  Alb  3.4<L>  /  TBili  0.6  /  DBili  x   /  AST  11  /  ALT  13  /  AlkPhos  78  07-13    PT/INR - ( 13 Jul 2019 08:35 )   PT: 12.40 sec;   INR: 1.08 ratio      PTT - ( 13 Jul 2019 08:35 )  PTT:29.8 sec    Culture - Blood (collected 12 Jul 2019 22:36)  Source: .Blood None  Gram Stain (14 Jul 2019 06:10):    Growth in aerobic bottle: Gram Positive Cocci in Pairs and Chains    Growth in anaerobic bottle: Gram Positive Cocci in Pairs and Chains  Preliminary Report (14 Jul 2019 17:10):    Growth in aerobic bottle: Enterococcus faecalis    See previous culture [id number]    Growth in anaerobic bottle: Gram Positive Cocci in Pairs and Chains    Culture - Urine (collected 12 Jul 2019 14:25)  Source: .Urine Clean Catch (Midstream)  Preliminary Report (14 Jul 2019 17:16):    50,000 - 99,000 CFU/mL Enterococcus species    Culture - Urine (collected 12 Jul 2019 09:45)  Source: .Urine Clean Catch (Midstream)  Preliminary Report (13 Jul 2019 18:50):    >100,000 CFU/ml Enterococcus species    Culture - Blood (collected 12 Jul 2019 05:30)  Source: .Blood Blood  Gram Stain (12 Jul 2019 22:06):    Growth in anaerobic bottle: Gram positive cocci in pairs    Growth in aerobic bottle: Gram positive cocci in pairs  Final Report (14 Jul 2019 17:06):    Growth in aerobic and anaerobic bottles: Enterococcus faecalis    See previous culture [id number]    Culture - Blood (collected 12 Jul 2019 05:30)  Source: .Blood Blood  Gram Stain (12 Jul 2019 22:02):    Growth in aerobic bottle: Gram positive cocci in pairs    Growth in anaerobic bottle: Gram positive cocci in pairs  Final Report (14 Jul 2019 17:05):    Growth in aerobic and anaerobic bottles: Enterococcus faecalis    "Due to technical problems, Proteus sp. will Not be reported as part of    the BCID panel until further notice"    ***Blood Panel PCR results on this specimen are available    approximately 3 hours after the Gram stain result.***    Gram stain, PCR, and/or culture results may not always    correspond due to difference in methodologies.    ************************************************************    This PCR assay was performed using [employer].    The following targets are tested for: Enterococcus,    vancomycin resistant enterococci, Listeria monocytogenes,    coagulase negative staphylococci, S. aureus,    methicillin resistant S. aureus, Streptococcus agalactiae    (Group B), S. pneumoniae, S. pyogenes (Group A),    Acinetobacter baumannii, Enterobacter cloacae, E. coli,    Klebsiella oxytoca, K. pneumoniae, Proteus sp.,    Serratia marcescens, Haemophilus influenzae,    Neisseria meningitidis, Pseudomonas aeruginosa, Candida    albicans, C. glabrata, C krusei, C parapsilosis,    C. tropicalis and the KPC resistance gene.  Organism: Blood Culture PCR  Enterococcus faecalis (14 Jul 2019 17:05)  Organism: Enterococcus faecalis (14 Jul 2019 17:05)  Organism: Blood Culture PCR (14 Jul 2019 17:05)    CARDIAC MARKERS ( 13 Jul 2019 08:35 )  x     / 0.01 ng/mL / x     / x     / x      CARDIAC MARKERS ( 12 Jul 2019 22:36 )  x     / 0.01 ng/mL / x     / x     / x        RADIOLOGY:  PRELIM REPORT:  INTERPRETATION:  Clinical History / Reason for exam: The patient is a   74-year-old female with lower extremity swelling. A venous duplex   examination was performed to evaluate the patient for deep venous   thrombosis of the lower extremities.    The common femoral, greater saphenous, superficial femoral and popliteal   veins were visualized bilaterally with no evidence of deep venous   thrombosis.    All these veins were fully compressible.  There was presence of   spontaneous flow, augmentation with distal compression and phasicity.    The right anterior tibial veins were  patent  right posterior tibial   veins were  patent  and  right peroneal veins were patent.    The left anterior tibial veins were  patent  left posterior tibial veins   were  patent  and  left peroneal veins were patent.    Impression:    No evidence of deep venous thrombosis or superficial thrombophlebitis in   bilateral lower extremities.      < from: Xray Chest 1 View- PORTABLE-Routine (07.13.19 @ 05:30) >  EXAM:  XR CHEST PORTABLE ROUTINE 1V            PROCEDURE DATE:  07/13/2019      INTERPRETATION:  Clinical History / Reason for exam: Shortness of breath    Comparison : Chest radiograph 7/12/2019.    Technique/Positioning: Single portable AP radiograph the chest.    Findings:    Support devices: Tracheostomy tube at level of clavicles    Cardiac/mediastinum/hilum: Unchanged    Lung parenchyma/Pleura: Right basilar opacity, may reflect atelectasis   given right hemidiaphragm elevation, however consolidation/pneumonia can   have a similar appearance.    Skeleton/soft tissues: Unchanged    Impression:      Right basilar opacity, atelectasis versus consolidation.    PHYSICAL EXAM:  GEN: No acute distress, AAOx1  PULM/CHEST: Clear to auscultation bilaterally, no rales, rhonchi or wheezes   CVS: Regular rate and rhythm, S1-S2, no murmurs  ABD: Soft, non-tender, non-distended, +BS  EXT: No edema  NEURO: AAOx3

## 2019-07-14 NOTE — PROGRESS NOTE ADULT - SUBJECTIVE AND OBJECTIVE BOX
HERNANDO HERNANDEZ 74y0 W Female sent from the SNF for increasing SOB and suspected PNA.  The pt was cultured and started on meropenem, vanc and cipro.  The preliminary blood cultures are positive for Gm + cocci in pairs.  The pt was just D/C to SNF on  after a prolonged and complicated hospital course for R thalamic hemorrhagic CVA, hypertensive urgency with RF, SZ, failure to wean, tracheostomy 6/10 and PEG .  The PMHx includes"  HTN, ASHD, DLD, DM II, d neuropathy, ole BL ganglia  hemorrhagic CVA, gait instability, recent CVA, OA,DDD, DJD, GERD, HH, diverticulosis, gait instability.    INTERVAL HPI/OVERNIGHT EVENTS:  pt on IV ABx, O2 via trach, + blood cx GM + cocci in pairs and chains, Ucx + enterococcus species > 100.000 CFU    MEDICATIONS  (STANDING):  amLODIPine   Tablet 2.5 milliGRAM(s) Oral daily  ampicillin/sulbactam  IVPB 1.5 Gram(s) IV Intermittent every 6 hours  chlorhexidine 0.12% Liquid 15 milliLiter(s) Oral Mucosa two times a day  chlorhexidine 4% Liquid 1 Application(s) Topical <User Schedule>  cholecalciferol 1000 Unit(s) Oral daily  cyanocobalamin 1000 MICROGram(s) Oral daily  dextrose 5%. 1000 milliLiter(s) (50 mL/Hr) IV Continuous <Continuous>  dextrose 50% Injectable 12.5 Gram(s) IV Push once  dextrose 50% Injectable 25 Gram(s) IV Push once  dextrose 50% Injectable 25 Gram(s) IV Push once  donepezil 10 milliGRAM(s) Oral at bedtime  heparin  Injectable 5000 Unit(s) SubCutaneous every 8 hours  hydrALAZINE 25 milliGRAM(s) Oral three times a day  insulin glargine Injectable (LANTUS) 9 Unit(s) SubCutaneous at bedtime  insulin lispro (HumaLOG) corrective regimen sliding scale   SubCutaneous three times a day before meals  insulin lispro Injectable (HumaLOG) 3 Unit(s) SubCutaneous three times a day before meals  levETIRAcetam  Solution 1000 milliGRAM(s) Oral two times a day  losartan 25 milliGRAM(s) Oral daily  nystatin Powder 1 Application(s) Topical three times a day  pantoprazole   Suspension 40 milliGRAM(s) Oral daily  simvastatin 20 milliGRAM(s) Oral at bedtime    Unasyn  1.5gms q 6hrs    MEDICATIONS  (PRN):  acetaminophen   Tablet .. 650 milliGRAM(s) Oral every 6 hours PRN Temp greater or equal to 38.5C (101.3F)  dextrose 40% Gel 15 Gram(s) Oral once PRN Blood Glucose LESS THAN 70 milliGRAM(s)/deciliter  glucagon  Injectable 1 milliGRAM(s) IntraMuscular once PRN Glucose LESS THAN 70 milligrams/deciliter      Allergies    No Known Allergie	    Vital Signs Last 24 Hrs    T(F): 96.3  HR: 78  BP: 118/55    RR: 18   SpO2: 95%    PHYSICAL EXAM:      Constitutional:  pt more alert, tries to communicate    Eyes: opened, nonicteric    ENMT: dry oral mucosa, dental defects    Neck:  short and thick and supple, + trach    Respiratory: shalow respirations, scattered rhonchi    Cardiovascular:  S1S2 reg    Gastrointestinal:  globose and distended, + PEG site clean    Genitourinary:      Extremities:  moves upper ext, dec motor strength of lower ext, + arthritic changes    Vascular: dec pedal pulses    Neurological: bedbound, lower ext weakness    Skin:  no sacral breakdown    LABS:                        11   5 )-----------( 282     WBC 12.3 on amission             37    142  | 100 |  16  ----------------------------< 266  4.52   |  26  |  0.8  GFR 74  Ca    10.9<H>      2019 08:35  Phos  2.7     07-13  Mg     2.0, 2.1  LA 2.0  troponin 0.02    blood C&S:  aerobic and anaerobic bottles + for GM + cocci in pairs    TPro  6.3  /  Alb  3.4<L>  /  TBili  0.6  /  DBili  x   /  AST  11  /  ALT  13  /  AlkPhos  78  07-13    PT/INR - ( 2019 08:35 )   PT: 12.40 sec;   INR: 1.08 ratio       PTT - ( 2019 08:35 )  PTT:29.8 sec  Urinalysis Basic - ( 2019 09:45 )    Color: Light Yellow / Appearance: Slightly Turbid / S.025 / pH: x  Gluc: x / Ketone: Negative  / Bili: Negative / Urobili: <2 mg/dL   Blood: x / Protein: Trace / Nitrite: Negative   Leuk Esterase: Large / RBC: 1 /HPF /  /HPF   Sq Epi: x / Non Sq Epi: 1 /HPF / Bacteria: Few        RADIOLOGY & ADDITIONAL TESTS:    EKG:  sinus tach 113/min, inf Qs, PRWP, nonspecific ST-T changes  EKG:   NSR 98/min, Qs in inf leads, PRWP     CXR:  low lung volumes, bibasilar atelectases, superimposed consolidation cannot be R/O    CT of abd and pelvis:  no acute path or infla changes in the abd or pelvis    Venous duplex:  negative for DVT

## 2019-07-14 NOTE — PROGRESS NOTE ADULT - SUBJECTIVE AND OBJECTIVE BOX
HERNANDO HERNANDEZ  74y, Female  Allergy: No Known Allergies      CHIEF COMPLAINT: Shortness of breath, sepsis (13 Jul 2019 15:20)      INTERVAL EVENTS/HPI  - No acute events overnight, ucx also with enterococcus, likely source   - T(F): , Max: 98.6 (07-13-19 @ 14:38)  - Denies any worsening symptoms  - Tolerating medication  - WBC Count: 5.59 K/uL (07-14-19 @ 08:18)      ROS  General: Denies rigors, nightsweats  HEENT: Denies headache, rhinorrhea, sore throat, eye pain  CV: Denies CP, palpitations  PULM:+sob  GI: Denies abdominal pain, hematochezia/melena  : Denies dysuria, hematuria  MSK: Denies arthralgias, myalgias  SKIN: Denies rash, lesions  NEURO: Denies paresthesias, weakness  PSYCH: Denies depression, anxiety    VITALS:  T(F): 97.1, Max: 98.6 (07-13-19 @ 14:38)  HR: 83  BP: 113/61  RR: 18Vital Signs Last 24 Hrs  T(C): 36.2 (14 Jul 2019 05:56), Max: 37 (13 Jul 2019 14:38)  T(F): 97.1 (14 Jul 2019 05:56), Max: 98.6 (13 Jul 2019 14:38)  HR: 83 (14 Jul 2019 05:56) (70 - 83)  BP: 113/61 (14 Jul 2019 05:56) (109/56 - 117/65)  BP(mean): --  RR: 18 (14 Jul 2019 05:56) (18 - 18)  SpO2: 95% (14 Jul 2019 07:50) (95% - 99%)    PHYSICAL EXAM:  Gen: Trach NAD, resting in bed  HEENT: Normocephalic, atraumatic  Neck: supple, no lymphadenopathy  CV: Regular rate & regular rhythm  Lungs: coarse BS  Abdomen: Soft, BS present PEG no suprapubic tenderness, no CVAT   Ext: Warm, well perfused  Neuro: non focal, awake  Skin: no rash, no erythema  Lines: no phlebitis        FH: Non-contributory  Social Hx: Non-contributory    TESTS & MEASUREMENTS:                        11.3   5.59  )-----------( 282      ( 14 Jul 2019 08:18 )             37.1     07-14    142  |  100  |  16  ----------------------------<  266<H>  4.2   |  26  |  0.8    Ca    11.0<H>      14 Jul 2019 08:18  Phos  3.1     07-14  Mg     2.1     07-14    TPro  6.3  /  Alb  3.4<L>  /  TBili  0.6  /  DBili  x   /  AST  11  /  ALT  13  /  AlkPhos  78  07-13    eGFR if Non African American: 74 mL/min/1.73M2 (07-14-19 @ 08:18)  eGFR if : 85 mL/min/1.73M2 (07-14-19 @ 08:18)    LIVER FUNCTIONS - ( 13 Jul 2019 08:35 )  Alb: 3.4 g/dL / Pro: 6.3 g/dL / ALK PHOS: 78 U/L / ALT: 13 U/L / AST: 11 U/L / GGT: x               Culture - Blood (collected 07-12-19 @ 22:36)  Source: .Blood None  Gram Stain (07-14-19 @ 06:10):    Growth in aerobic bottle: Gram Positive Cocci in Pairs and Chains    Growth in anaerobic bottle: Gram Positive Cocci in Pairs and Chains  Preliminary Report (07-14-19 @ 06:10):    Growth in aerobic bottle: Gram Positive Cocci in Pairs and Chains    Growth in anaerobic bottle: Gram Positive Cocci in Pairs and Chains    Culture - Urine (collected 07-12-19 @ 09:45)  Source: .Urine Clean Catch (Midstream)  Preliminary Report (07-13-19 @ 18:50):    >100,000 CFU/ml Enterococcus species    Culture - Blood (collected 07-12-19 @ 05:30)  Source: .Blood Blood  Gram Stain (07-12-19 @ 22:06):    Growth in anaerobic bottle: Gram positive cocci in pairs    Growth in aerobic bottle: Gram positive cocci in pairs  Preliminary Report (07-13-19 @ 18:43):    Growth in aerobic and anaerobic bottles: Gram positive cocci in pairs    See previous culture [id number]    Culture - Blood (collected 07-12-19 @ 05:30)  Source: .Blood Blood  Gram Stain (07-12-19 @ 22:02):    Growth in aerobic bottle: Gram positive cocci in pairs    Growth in anaerobic bottle: Gram positive cocci in pairs  Preliminary Report (07-12-19 @ 22:02):    Growth in aerobic bottle: Gram positive cocci in pairs    Growth in anaerobic bottle: Gram positive cocci in pairs    "Due to technical problems, Proteus sp. will Not be reported as part of    the BCID panel until further notice"    ***Blood Panel PCR results on this specimen are available    approximately 3 hours after the Gram stain result.***    Gram stain, PCR, and/or culture results may not always    correspond due to difference in methodologies.    ************************************************************    This PCR assay was performed using [employer].    The following targets are tested for: Enterococcus,    vancomycin resistant enterococci, Listeria monocytogenes,    coagulase negative staphylococci, S. aureus,    methicillin resistant S. aureus,Streptococcus agalactiae    (Group B), S. pneumoniae, S. pyogenes (Group A),    Acinetobacter baumannii, Enterobacter cloacae, E. coli,    Klebsiella oxytoca, K. pneumoniae, Proteus sp.,    Serratia marcescens, Haemophilus influenzae,    Neisseria meningitidis, Pseudomonas aeruginosa, Candida    albicans, C. glabrata, C krusei, C parapsilosis,    C. tropicalis and the KPC resistance gene.  Organism: Blood Culture PCR (07-12-19 @ 23:36)  Organism: Blood Culture PCR (07-12-19 @ 23:36)      -  Enterococcus species: Detec      Method Type: PCR        Lactate, Blood: 2.0 mmol/L (07-12-19 @ 05:30)  Blood Gas Venous - Lactate: 1.6 mmoL/L (07-12-19 @ 05:08)      INFECTIOUS DISEASES TESTING  MRSA PCR Result.: Negative (07-13-19 @ 09:45)      RADIOLOGY & ADDITIONAL TESTS:  I have personally reviewed the last Chest xray  CXR      CT  CT Abdomen and Pelvis w/ IV Cont:   EXAM:  CT ABDOMEN AND PELVIS IC            PROCEDURE DATE:  07/12/2019            INTERPRETATION:  CLINICAL STATEMENT: Abdominal tenderness.      TECHNIQUE: Contiguous axial CT images were obtained from the lower chest   to the pubic symphysis withIV contrast.  Oral contrast was not given.    Reformatted images in the coronal and sagittal planes were acquired.   Patient was unable to raise arms away from imaging field, there appears   to be imaging streak artifact as a result.    COMPARISON CT: 5/30/2019.    OTHER STUDIES USED FOR CORRELATION: None.       FINDINGS:    LOWER CHEST: Bibasilar areas of atelectasis.    HEPATOBILIARY: The partially imaged liver is unremarkable..    SPLEEN: Unremarkable.    PANCREAS: Unremarkable.    ADRENAL GLANDS: Unremarkable.    KIDNEYS: No hydronephrosis. Several bilateral renal hypodensities are too   small to further characterize. Nonobstructing 3 mm left renal lower pole   calculus.    ABDOMINOPELVIC NODES: Unremarkable.    PELVIC ORGANS: Status posthysterectomy.    PERITONEUM/MESENTERY/BOWEL: Unremarkable.    BONES/SOFT TISSUES: Degenerative changes of the spine, with mild   age-indeterminate compression deformity of the L2 vertebral body.    OTHER: G-tube tip within the lumen of stomach. Atherosclerotic vascular   calcification.      IMPRESSION:     1. No evidence of acute/inflammatory process within the abdomen or pelvis.              ROBIN TAMAYO M.D., RESIDENT RADIOLOGIST  This document has been electronically signed.  JENNIFER JOSEPH M.D., ATTENDING RADIOLOGIST  This document has been electronically signed. Jul 12 2019  9:42AM             (07-12-19 @ 08:38)      CARDIOLOGY TESTING  12 Lead ECG:   Ventricular Rate 98 BPM    Atrial Rate 98 BPM    P-R Interval 158 ms    QRS Duration 74 ms    Q-T Interval 344 ms    QTC Calculation(Bezet) 439 ms    P Axis 58 degrees    R Axis -39 degrees    T Axis 50 degrees    Diagnosis Line Normal sinus rhythm  Left axis deviation  Inferior infarct , age undetermined  Anterolateral infarct , age undetermined  Abnormal ECG    Confirmed by Jason Valles ([id number]) on 7/12/2019 8:19:38 PM (07-12-19 @ 10:13)  12 Lead ECG:   Ventricular Rate 113 BPM    Atrial Rate 113 BPM    P-R Interval 162 ms    QRS Duration 84 ms    Q-T Interval 328 ms    QTC Calculation(Bezet) 449 ms    P Axis 45 degrees    R Axis -23 degrees    T Axis 31 degrees    Diagnosis Line Sinus tachycardia  Minimal voltage criteria for LVH, may be normal variant  Borderline ECG    Confirmed by EMMA MARROQUIN MD ([id number]) on 7/12/2019 2:09:05 PM (07-12-19 @ 05:55)      MEDICATIONS  amLODIPine   Tablet 2.5  ampicillin/sulbactam  IVPB 1.5  chlorhexidine 0.12% Liquid 15  chlorhexidine 4% Liquid 1  cholecalciferol 1000  cyanocobalamin 1000  dextrose 5%. 1000  dextrose 50% Injectable 12.5  dextrose 50% Injectable 25  dextrose 50% Injectable 25  donepezil 10  heparin  Injectable 5000  hydrALAZINE 25  insulin glargine Injectable (LANTUS) 9  insulin lispro (HumaLOG) corrective regimen sliding scale   insulin lispro Injectable (HumaLOG) 3  levETIRAcetam  Solution 1000  losartan 25  nystatin Powder 1  pantoprazole   Suspension 40  simvastatin 20      ANTIBIOTICS:  ampicillin/sulbactam  IVPB 1.5 Gram(s) IV Intermittent every 6 hours

## 2019-07-15 NOTE — PROGRESS NOTE ADULT - SUBJECTIVE AND OBJECTIVE BOX
Day 3 of hospitalization.  Patient is fatigued however denies dyspnea, chest pain, diaphoresis.    No fever, no chills.    Vital Signs Last 24 Hrs  T(C): 36.8 (15 Jul 2019 13:15), Max: 37.1 (14 Jul 2019 20:30)  T(F): 98.3 (15 Jul 2019 13:15), Max: 98.7 (14 Jul 2019 20:30)  HR: 77 (15 Jul 2019 13:15) (75 - 86)  BP: 113/58 (15 Jul 2019 13:15) (103/50 - 154/74)  BP(mean): --  RR: 18 (15 Jul 2019 13:15) (18 - 18)  SpO2: 99% (15 Jul 2019 09:16) (95% - 99%)    MEDICATIONS  (STANDING):  amLODIPine   Tablet 2.5 milliGRAM(s) Oral daily  ampicillin/sulbactam  IVPB 1.5 Gram(s) IV Intermittent every 6 hours  chlorhexidine 0.12% Liquid 15 milliLiter(s) Oral Mucosa two times a day  chlorhexidine 4% Liquid 1 Application(s) Topical <User Schedule>  cholecalciferol 1000 Unit(s) Oral daily  cyanocobalamin 1000 MICROGram(s) Oral daily  dextrose 5%. 1000 milliLiter(s) (50 mL/Hr) IV Continuous <Continuous>  dextrose 50% Injectable 12.5 Gram(s) IV Push once  dextrose 50% Injectable 25 Gram(s) IV Push once  dextrose 50% Injectable 25 Gram(s) IV Push once  donepezil 10 milliGRAM(s) Oral at bedtime  heparin  Injectable 5000 Unit(s) SubCutaneous every 8 hours  hydrALAZINE 25 milliGRAM(s) Oral three times a day  insulin glargine Injectable (LANTUS) 9 Unit(s) SubCutaneous at bedtime  insulin lispro (HumaLOG) corrective regimen sliding scale   SubCutaneous three times a day before meals  insulin lispro Injectable (HumaLOG) 3 Unit(s) SubCutaneous three times a day before meals  levETIRAcetam  Solution 1000 milliGRAM(s) Oral two times a day  losartan 25 milliGRAM(s) Oral daily  nystatin Powder 1 Application(s) Topical three times a day  pantoprazole   Suspension 40 milliGRAM(s) Oral daily  simvastatin 20 milliGRAM(s) Oral at bedtime    MEDICATIONS  (PRN):  acetaminophen   Tablet .. 650 milliGRAM(s) Oral every 6 hours PRN Temp greater or equal to 38.5C (101.3F)  dextrose 40% Gel 15 Gram(s) Oral once PRN Blood Glucose LESS THAN 70 milliGRAM(s)/deciliter  glucagon  Injectable 1 milliGRAM(s) IntraMuscular once PRN Glucose LESS THAN 70 milligrams/deciliter      07-15    143  |  101  |  16  ----------------------------<  169<H>  4.5   |  31  |  0.6<L>    Ca    11.6<H>      15 Jul 2019 06:46  Phos  3.4     07-15  Mg     2.3     07-15    TPro  7.0  /  Alb  3.5  /  TBili  0.6  /  DBili  x   /  AST  11  /  ALT  13  /  AlkPhos  80  07-15                          11.5   5.34  )-----------( 301      ( 15 Jul 2019 06:46 )             37.5     Culture - Blood (07.13.19 @ 12:11)    Specimen Source: .Blood None    Culture Results:   No growth to date.      Support devices: Tracheostomy tube in place     Cardiac/mediastinum/hilum: Unchanged     Lung parenchyma/Pleura: Right lower lobe linear atelectasis. No evidence of   pneumothorax.     Skeleton/soft tissues: Unchanged     Impression:     Right lower lobe linear atelectasis.

## 2019-07-15 NOTE — DIETITIAN INITIAL EVALUATION ADULT. - RD TO REMAIN AVAILABLE
yes/RD to monitor diet order, energy intake, NFPF, body comp, glucose and renal profile. RECS discussed with covering resident. Pt at risk f/u 3 days.

## 2019-07-15 NOTE — DIETITIAN INITIAL EVALUATION ADULT. - FACTORS AFF FOOD INTAKE
Pt is NPO on TF- inadequate TF regimen noted. Attempted to reach family, unsuccessful. Obtained information from recent previous admission (pt was still in hospital less than 1 week ago. NKFA. Pt received glucerna 1.2 @240ml x5 feeds during LOS. Regimen and vit/min supplementation at NH unknown at present. LBM 7/14- no GI distress reported. RN reports pt tolerating TF regimen well.

## 2019-07-15 NOTE — DIETITIAN INITIAL EVALUATION ADULT. - ENERGY NEEDS
estimated energy needs: 1435-1795kcal ([id number]) wt loss (unknown #) considered, possible PCM  estimated protein needs: 78-92g (1.1-1.3g/kg) as above, advanced age considered  estimated fluid needs: per LIP

## 2019-07-15 NOTE — PROGRESS NOTE ADULT - SUBJECTIVE AND OBJECTIVE BOX
HERNANDO HERNANDEZ  74y, Female      OVERNIGHT EVENTS:    no fevers, has no  complaints    VITALS:  T(F): 98.4, Max: 98.7 (07-14-19 @ 20:30)  HR: 84  BP: 126/63  RR: 18Vital Signs Last 24 Hrs  T(C): 36.9 (15 Jul 2019 05:30), Max: 37.1 (14 Jul 2019 20:30)  T(F): 98.4 (15 Jul 2019 05:30), Max: 98.7 (14 Jul 2019 20:30)  HR: 84 (15 Jul 2019 09:16) (75 - 86)  BP: 126/63 (15 Jul 2019 09:16) (103/50 - 154/74)  BP(mean): --  RR: 18 (15 Jul 2019 05:30) (18 - 18)  SpO2: 99% (15 Jul 2019 09:16) (95% - 99%)    TESTS & MEASUREMENTS:                        11.5   5.34  )-----------( 301      ( 15 Jul 2019 06:46 )             37.5     07-15    143  |  101  |  16  ----------------------------<  169<H>  4.5   |  31  |  0.6<L>    Ca    11.6<H>      15 Jul 2019 06:46  Phos  3.4     07-15  Mg     2.3     07-15    TPro  7.0  /  Alb  3.5  /  TBili  0.6  /  DBili  x   /  AST  11  /  ALT  13  /  AlkPhos  80  07-15    LIVER FUNCTIONS - ( 15 Jul 2019 06:46 )  Alb: 3.5 g/dL / Pro: 7.0 g/dL / ALK PHOS: 80 U/L / ALT: 13 U/L / AST: 11 U/L / GGT: x             Culture - Blood (collected 07-13-19 @ 12:11)  Source: .Blood None  Preliminary Report (07-14-19 @ 20:01):    No growth to date.    Culture - Blood (collected 07-13-19 @ 08:35)  Source: .Blood None  Preliminary Report (07-14-19 @ 19:01):    No growth to date.    Culture - Blood (collected 07-12-19 @ 22:36)  Source: .Blood None  Gram Stain (07-14-19 @ 06:10):    Growth in aerobic bottle: Gram Positive Cocci in Pairs and Chains    Growth in anaerobic bottle: Gram Positive Cocci in Pairs and Chains  Preliminary Report (07-14-19 @ 17:10):    Growth in aerobic bottle: Enterococcus faecalis    See previous culture [id number]    Growth in anaerobic bottle: Gram Positive Cocci in Pairs and Chains    Culture - Urine (collected 07-12-19 @ 14:25)  Source: .Urine Clean Catch (Midstream)  Preliminary Report (07-14-19 @ 17:16):    50,000 - 99,000 CFU/mL Enterococcus species    Culture - Urine (collected 07-12-19 @ 09:45)  Source: .Urine Clean Catch (Midstream)  Final Report (07-14-19 @ 20:00):    >100,000 CFU/ml Enterococcus faecalis  Organism: Enterococcus faecalis (07-14-19 @ 20:00)  Organism: Enterococcus faecalis (07-14-19 @ 20:00)      -  Ampicillin: S <=2 Predicts results to ampicillin/sulbactam, amoxacillin-clavulanate and  piperacillin-tazobactam.      -  Ciprofloxacin: S <=1      -  Levofloxacin: S <=1      -  Nitrofurantoin: S <=32 Should not be used to treat pyelonephritis.      -  Tetra/Doxy: R >8      -  Vancomycin: S 2      Method Type: MARI    Culture - Blood (collected 07-12-19 @ 05:30)  Source: .Blood Blood  Gram Stain (07-12-19 @ 22:06):    Growth in anaerobic bottle: Gram positive cocci in pairs    Growth in aerobic bottle: Gram positive cocci in pairs  Final Report (07-14-19 @ 17:06):    Growth in aerobic and anaerobic bottles: Enterococcus faecalis    See previous culture [id number]    Culture - Blood (collected 07-12-19 @ 05:30)  Source: .Blood Blood  Gram Stain (07-12-19 @ 22:02):    Growth in aerobic bottle: Gram positive cocci in pairs    Growth in anaerobic bottle: Gram positive cocci in pairs  Final Report (07-14-19 @ 17:05):    Growth in aerobic and anaerobic bottles: Enterococcus faecalis    "Due to technical problems, Proteus sp. will Not be reported as part of    the BCID panel until further notice"    ***Blood Panel PCR results on this specimen are available    approximately 3 hours after the Gram stain result.***    Gram stain, PCR, and/or culture results may not always    correspond due to difference in methodologies.    ************************************************************    This PCR assay was performed using the[employer].    The following targets are tested for: Enterococcus,    vancomycin resistant enterococci, Listeria monocytogenes,    coagulase negative staphylococci, S. aureus,    methicillin resistant S. aureus, Streptococcus agalactiae    (Group B), S. pneumoniae, S. pyogenes (Group A),    Acinetobacter baumannii, Enterobacter cloacae, E. coli,    Klebsiella oxytoca, K. pneumoniae, Proteus sp.,    Serratia marcescens, Haemophilus influenzae,    Neisseria meningitidis, Pseudomonas aeruginosa, Candida    albicans, C. glabrata, C krusei, C parapsilosis,    C. tropicalis and the KPC resistance gene.  Organism: Blood Culture PCR  Enterococcus faecalis (07-14-19 @ 17:05)  Organism: Enterococcus faecalis (07-14-19 @ 17:05)      -  Ampicillin: S <=2 Predicts results to ampicillin/sulbactam, amoxacillin-clavulanate and  piperacillin-tazobactam.      -  Gentamicin synergy: S      -  Vancomycin: S 2      Method Type: MARI  Organism: Blood Culture PCR (07-14-19 @ 17:05)      -  Enterococcus species: Detec      Method Type: PCR            RADIOLOGY & ADDITIONAL TESTS:    ANTIBIOTICS:  ampicillin/sulbactam  IVPB 1.5 Gram(s) IV Intermittent every 6 hours

## 2019-07-15 NOTE — DIETITIAN INITIAL EVALUATION ADULT. - PHYSICAL APPEARANCE
overweight/Per previous admission, pt with significant wt change x~1+ month. Pt admitted to [facility] at 174# 6/4. As of 7/9 pt was 166#. CBW per this admission 158#. Actual wt loss likely, however, as pt was receiving appropriate TF, the # amount demonstrated is unlikely. Will discuss with family upon f/u. BMI: 27.9. IBW: 115#+/-10%. L buttocks stage I noted, no edema.

## 2019-07-15 NOTE — DIETITIAN INITIAL EVALUATION ADULT. - OTHER INFO
Pt presented to ED c/o SOB/sepsis. SOB r/o VAP, unlikely CHF exac.  Leukocytosis r/o infectious etiology (pneumonia vs UTI). CVA w/ residual L sided weakness and slurred speech, stable.  Troponinemia likely secondary to NSTEMI. DM, lantus 18U at home. Admitted with PEG tube. PEG placed on previous admission 6/24.

## 2019-07-15 NOTE — PROGRESS NOTE ADULT - SUBJECTIVE AND OBJECTIVE BOX
HERNANDO HERNANDEZ 74y0 W Female sent from the SNF for increasing SOB and suspected PNA.  The pt was cultured and started on meropenem, vanc and cipro.  The preliminary blood cultures are positive for Gm + cocci in pairs.  The pt was just D/C to SNF on  after a prolonged and complicated hospital course for R thalamic hemorrhagic CVA, hypertensive urgency with RF, SZ, failure to wean, tracheostomy 6/10 and PEG .  The PMHx includes"  HTN, ASHD, DLD, DM II, d neuropathy, ole BL ganglia  hemorrhagic CVA, gait instability, recent CVA, OA,DDD, DJD, GERD, HH, diverticulosis, gait instability.    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 1000 milliLiter(s) (50 mL/Hr) IV Continuous <Continuous>  dextrose 50% Injectable 12.5 Gram(s) IV Push once  dextrose 50% Injectable 25 Gram(s) IV Push once  dextrose 50% Injectable 25 Gram(s) IV Push once  donepezil 10 milliGRAM(s) Oral at bedtime  heparin  Injectable 5000 Unit(s) SubCutaneous every 8 hours  hydrALAZINE 25 milliGRAM(s) Oral three times a day  insulin glargine Injectable (LANTUS) 9 Unit(s) SubCutaneous at bedtime  insulin lispro (HumaLOG) corrective regimen sliding scale   SubCutaneous three times a day before meals  insulin lispro Injectable (HumaLOG) 3 Unit(s) SubCutaneous three times a day before meals  levETIRAcetam  Solution 1000 milliGRAM(s) Oral two times a day  losartan 25 milliGRAM(s) Oral daily  nystatin Powder 1 Application(s) Topical three times a day  pantoprazole   Suspension 40 milliGRAM(s) Oral daily  simvastatin 20 milliGRAM(s) Oral at bedtime    Unasyn  1.5gms q 6hrs    MEDICATIONS  (PRN):  acetaminophen   Tablet .. 650 milliGRAM(s) Oral every 6 hours PRN Temp greater or equal to 38.5C (101.3F)  dextrose 40% Gel 15 Gram(s) Oral once PRN Blood Glucose LESS THAN 70 milliGRAM(s)/deciliter  glucagon  Injectable 1 milliGRAM(s) IntraMuscular once PRN Glucose LESS THAN 70 milligrams/deciliter      Allergies    No Known Allergie	    Vital Signs Last 24 Hrs    T(F): 97.9  HR: 80  BP: 110/65    RR: 18   SpO2: 99%    PHYSICAL EXAM:      Constitutional:  pt opens eyes, more alert, tries to communicate, NAD, + trach    Eyes: opened, nonicteric    ENMT: dry oral mucosa, dental defects    Neck:  short and thick and supple, + trach    Respiratory: shalow respirations, scattered rhonchi    Cardiovascular:  S1S2 reg    Gastrointestinal:  globose and distended, + PEG site clean    Genitourinary:      Extremities:  moves upper ext, dec motor strength of lower ext, + arthritic changes    Vascular: dec pedal pulses    Neurological: bedbound, lower ext weakness    Skin:  no sacral breakdown    LABS:                        11   5 )-----------( 301    WBC 12.3 on admission             37.5    143  | 101 |  16  ----------------------------< 169  4.5   |  31  |  0.6  GFR 74, 91  Ca    10.9<H>      2019 08:35  Phos  2.7     07-13  Mg     2.0, 2.1, 2.3  LA 2.0  troponin 0.02    blood C&S:  aerobic and anaerobic bottles + for GM + cocci in pairs    TPro  6.3  /  Alb  3.4<L>  /  TBili  0.6  /  DBili  x   /  AST  11  /  ALT  13  /  AlkPhos  78  07-13    PT/INR - ( 2019 08:35 )   PT: 12.40 sec;   INR: 1.08 ratio       PTT - ( 2019 08:35 )  PTT:29.8 sec  Urinalysis Basic - ( 2019 09:45 )    Color: Light Yellow / Appearance: Slightly Turbid / S.025 / pH: x  Gluc: x / Ketone: Negative  / Bili: Negative / Urobili: <2 mg/dL   Blood: x / Protein: Trace / Nitrite: Negative   Leuk Esterase: Large / RBC: 1 /HPF /  /HPF   Sq Epi: x / Non Sq Epi: 1 /HPF / Bacteria: Few        RADIOLOGY & ADDITIONAL TESTS:    EKG:  sinus tach 113/min, inf Qs, PRWP, nonspecific ST-T changes  EKG:   NSR 98/min, Qs in inf leads, PRWP     CXR:  low lung volumes, bibasilar atelectases, superimposed consolidation cannot be R/O    CT of abd and pelvis:  no acute path or infla changes in the abd or pelvis    Venous duplex:  negative for DVT

## 2019-07-16 NOTE — PROGRESS NOTE ADULT - SUBJECTIVE AND OBJECTIVE BOX
Sent from SNF for increasing SOB and suspected PNA.    Day 4 of hospitalization, patient has no somatic complaints and is doing well.  Awake and oriented to person. Communication difficult, patient relies on signage to communicate.  Denies chest pain, dyspnea, orthopnea, PND.    MEDICATIONS  (STANDING):  amLODIPine   Tablet 2.5 milliGRAM(s) Oral daily  ampicillin/sulbactam  IVPB 1.5 Gram(s) IV Intermittent every 6 hours  chlorhexidine 0.12% Liquid 15 milliLiter(s) Oral Mucosa two times a day  chlorhexidine 4% Liquid 1 Application(s) Topical <User Schedule>  cholecalciferol 1000 Unit(s) Oral daily  cyanocobalamin 1000 MICROGram(s) Oral daily  dextrose 5%. 1000 milliLiter(s) (50 mL/Hr) IV Continuous <Continuous>  dextrose 50% Injectable 12.5 Gram(s) IV Push once  dextrose 50% Injectable 25 Gram(s) IV Push once  dextrose 50% Injectable 25 Gram(s) IV Push once  donepezil 10 milliGRAM(s) Oral at bedtime  heparin  Injectable 5000 Unit(s) SubCutaneous every 8 hours  hydrALAZINE 25 milliGRAM(s) Oral three times a day  insulin glargine Injectable (LANTUS) 9 Unit(s) SubCutaneous at bedtime  insulin lispro (HumaLOG) corrective regimen sliding scale   SubCutaneous three times a day before meals  insulin lispro Injectable (HumaLOG) 3 Unit(s) SubCutaneous three times a day before meals  levETIRAcetam  Solution 1000 milliGRAM(s) Oral two times a day  losartan 25 milliGRAM(s) Oral daily  nystatin Powder 1 Application(s) Topical three times a day  pantoprazole   Suspension 40 milliGRAM(s) Oral daily  simvastatin 20 milliGRAM(s) Oral at bedtime    MEDICATIONS  (PRN):  acetaminophen   Tablet .. 650 milliGRAM(s) Oral every 6 hours PRN Temp greater or equal to 38.5C (101.3F)  dextrose 40% Gel 15 Gram(s) Oral once PRN Blood Glucose LESS THAN 70 milliGRAM(s)/deciliter  glucagon  Injectable 1 milliGRAM(s) IntraMuscular once PRN Glucose LESS THAN 70 milligrams/deciliter    Vital Signs Last 24 Hrs  T(C): 36.6 (16 Jul 2019 05:00), Max: 36.6 (15 Jul 2019 21:00)  T(F): 97.8 (16 Jul 2019 05:00), Max: 97.9 (15 Jul 2019 21:00)  HR: 81 (16 Jul 2019 05:00) (80 - 81)  BP: 147/66 (16 Jul 2019 05:00) (116/65 - 147/66)  BP(mean): --  RR: 18 (16 Jul 2019 05:00) (18 - 18)  SpO2: 97% (15 Jul 2019 19:48) (97% - 97%)                          11.2   4.33  )-----------( 285      ( 16 Jul 2019 05:58 )             36.3     07-16    142  |  101  |  17  ----------------------------<  157<H>  4.4   |  31  |  0.7    Ca    11.7<H>      16 Jul 2019 05:58  Phos  3.3     07-16  Mg     2.1     07-16    TPro  7.0  /  Alb  3.5  /  TBili  0.6  /  DBili  x   /  AST  11  /  ALT  13  /  AlkPhos  80  07-15

## 2019-07-16 NOTE — PHYSICAL THERAPY INITIAL EVALUATION ADULT - GENERAL OBSERVATIONS, REHAB EVAL
15:25-15:50. Pt encoutered in recliner, + IV , PEG, + trach, sleeping in NAD, did not oppose to PT Eval. 15:25-15:50. Pt encoutered in recliner, + IV , PEG, + trach, +diane pad, sleeping in NAD, did not oppose to PT Eval.

## 2019-07-16 NOTE — PROGRESS NOTE ADULT - SUBJECTIVE AND OBJECTIVE BOX
HERNANDO HERNANDEZ  74y, Female  Allergy: No Known Allergies      CHIEF COMPLAINT: Shortness of breath (16 Jul 2019 10:03)      INTERVAL EVENTS/HPI  - No acute events overnight  - T(F): , Max: 98.3 (07-15-19 @ 13:15)  - Denies any worsening symptoms  - Tolerating medication  - WBC Count: 4.33 K/uL (07-16-19 @ 05:58)      ROS  General: Denies rigors, nightsweats  HEENT: Denies headache, rhinorrhea, sore throat, eye pain  CV: Denies CP, palpitations  PULM: Denies SOB, wheezing  GI: Denies abdominal pain, hematochezia/melena  : Denies dysuria, hematuria  MSK: Denies arthralgias, myalgias  SKIN: Denies rash, lesions  NEURO: Denies paresthesias, weakness  PSYCH: Denies depression, anxiety    VITALS:  T(F): 97.8, Max: 98.3 (07-15-19 @ 13:15)  HR: 81  BP: 147/66  RR: 18Vital Signs Last 24 Hrs  T(C): 36.6 (16 Jul 2019 05:00), Max: 36.8 (15 Jul 2019 13:15)  T(F): 97.8 (16 Jul 2019 05:00), Max: 98.3 (15 Jul 2019 13:15)  HR: 81 (16 Jul 2019 05:00) (77 - 81)  BP: 147/66 (16 Jul 2019 05:00) (113/58 - 147/66)  BP(mean): --  RR: 18 (16 Jul 2019 05:00) (18 - 18)  SpO2: 97% (15 Jul 2019 19:48) (97% - 97%)    PHYSICAL EXAM:  Gen: Trach NAD, resting in bed  HEENT: Normocephalic, atraumatic  Neck: supple, no lymphadenopathy  CV: Regular rate & regular rhythm  Lungs: coarse BS  Abdomen: Soft, BS present PEG no suprapubic tenderness, no CVAT   Ext: Warm, well perfused  Neuro: non focal, awake  Skin: no rash, no erythema  Lines: no phlebitis    FH: Non-contributory  Social Hx: Non-contributory    TESTS & MEASUREMENTS:                        11.2   4.33  )-----------( 285      ( 16 Jul 2019 05:58 )             36.3     07-16    142  |  101  |  17  ----------------------------<  157<H>  4.4   |  31  |  0.7    Ca    11.7<H>      16 Jul 2019 05:58  Phos  3.3     07-16  Mg     2.1     07-16    TPro  7.0  /  Alb  3.5  /  TBili  0.6  /  DBili  x   /  AST  11  /  ALT  13  /  AlkPhos  80  07-15    eGFR if Non African American: 87 mL/min/1.73M2 (07-16-19 @ 05:58)  eGFR if African American: 100 mL/min/1.73M2 (07-16-19 @ 05:58)    LIVER FUNCTIONS - ( 15 Jul 2019 06:46 )  Alb: 3.5 g/dL / Pro: 7.0 g/dL / ALK PHOS: 80 U/L / ALT: 13 U/L / AST: 11 U/L / GGT: x               Culture - Blood (collected 07-14-19 @ 08:18)  Source: .Blood None  Preliminary Report (07-15-19 @ 19:02):    No growth to date.    Culture - Blood (collected 07-13-19 @ 12:11)  Source: .Blood None  Preliminary Report (07-14-19 @ 20:01):    No growth to date.    Culture - Blood (collected 07-13-19 @ 08:35)  Source: .Blood None  Preliminary Report (07-14-19 @ 19:01):    No growth to date.    Culture - Blood (collected 07-12-19 @ 22:36)  Source: .Blood None  Gram Stain (07-14-19 @ 06:10):    Growth in aerobic bottle: Gram Positive Cocci in Pairs and Chains    Growth in anaerobic bottle: Gram Positive Cocci in Pairs and Chains  Final Report (07-15-19 @ 14:25):    Growth in aerobic and anaerobic bottles: Enterococcus faecalis    See previous culture [id number]    Culture - Urine (collected 07-12-19 @ 14:25)  Source: .Urine Clean Catch (Midstream)  Preliminary Report (07-14-19 @ 17:16):    50,000 - 99,000 CFU/mL Enterococcus species    Culture - Urine (collected 07-12-19 @ 09:45)  Source: .Urine Clean Catch (Midstream)  Final Report (07-14-19 @ 20:00):    >100,000 CFU/ml Enterococcus faecalis  Organism: Enterococcus faecalis (07-14-19 @ 20:00)  Organism: Enterococcus faecalis (07-14-19 @ 20:00)      -  Ampicillin: S <=2 Predicts results to ampicillin/sulbactam, amoxacillin-clavulanate and  piperacillin-tazobactam.      -  Ciprofloxacin: S <=1      -  Levofloxacin: S <=1      -  Nitrofurantoin: S <=32 Should not be used to treat pyelonephritis.      -  Tetra/Doxy: R >8      -  Vancomycin: S 2      Method Type: MARI    Culture - Blood (collected 07-12-19 @ 05:30)  Source: .Blood Blood  Gram Stain (07-12-19 @ 22:06):    Growth in anaerobic bottle: Gram positive cocci in pairs    Growth in aerobic bottle: Gram positive cocci in pairs  Final Report (07-14-19 @ 17:06):    Growth in aerobic and anaerobic bottles: Enterococcus faecalis    See previous culture [id number]    Culture - Blood (collected 07-12-19 @ 05:30)  Source: .Blood Blood  Gram Stain (07-12-19 @ 22:02):    Growth in aerobic bottle: Gram positive cocci in pairs    Growth in anaerobic bottle: Gram positive cocci in pairs  Final Report (07-14-19 @ 17:05):    Growth in aerobic and anaerobic bottles: Enterococcus faecalis    "Due to technical problems, Proteus sp. will Not be reported as part of    the BCID panel until further notice"    ***Blood Panel PCR results on this specimen are available    approximately 3 hours after the Gram stain result.***    Gram stain, PCR, and/or culture results may not always    correspond due to difference in methodologies.    ************************************************************    This PCR assay was performed using [employer].    The following targets are tested for: Enterococcus,    vancomycin resistant enterococci, Listeria monocytogenes,    coagulase negative staphylococci, S. aureus,    methicillin resistant S. aureus, Streptococcus agalactiae    (Group B), S. pneumoniae, S. pyogenes (Group A),    Acinetobacter baumannii, Enterobacter cloacae, E. coli,    Klebsiella oxytoca, K. pneumoniae, Proteus sp.,    Serratia marcescens, Haemophilus influenzae,    Neisseria meningitidis, Pseudomonas aeruginosa, Candida    albicans, C. glabrata, C krusei, C parapsilosis,    C. tropicalis and the KPC resistance gene.  Organism: Blood Culture PCR  Enterococcus faecalis (07-14-19 @ 17:05)  Organism: Enterococcus faecalis (07-14-19 @ 17:05)      -  Ampicillin: S <=2 Predicts results to ampicillin/sulbactam, amoxacillin-clavulanate and  piperacillin-tazobactam.      -  Gentamicin synergy: S      -  Vancomycin: S 2      Method Type: MARI  Organism: Blood Culture PCR (07-14-19 @ 17:05)      -  Enterococcus species: Detec      Method Type: PCR        Lactate, Blood: 2.0 mmol/L (07-12-19 @ 05:30)  Blood Gas Venous - Lactate: 1.6 mmoL/L (07-12-19 @ 05:08)      INFECTIOUS DISEASES TESTING  MRSA PCR Result.: Negative (07-13-19 @ 09:45)      RADIOLOGY & ADDITIONAL TESTS:  I have personally reviewed the last Chest xray  CXR  Xray Chest 1 View- PORTABLE-Urgent:   EXAM:  XR CHEST PORTABLE URGENT 1V            PROCEDURE DATE:  07/15/2019            INTERPRETATION:  Clinical History / Reason for exam: Shortness of breath    Comparison : Chest radiograph 7/13/2019.    Technique/Positioning: Portable technique. Patient's chin up obscures   lung apices.    Findings:    Support devices: Tracheostomy tube in place    Cardiac/mediastinum/hilum: Unchanged    Lung parenchyma/Pleura: Right lower lobe linear atelectasis. No evidence   of pneumothorax.    Skeleton/soft tissues: Unchanged    Impression:      Right lower lobe linear atelectasis.                      COSME EDWARDS M.D., ATTENDING RADIOLOGIST  This document has been electronically signed. Jul 15 2019  2:08PM             (07-15-19 @ 13:15)      CT      CARDIOLOGY TESTING  12 Lead ECG:   Ventricular Rate 98 BPM    Atrial Rate 98 BPM    P-R Interval 158 ms    QRS Duration 74 ms    Q-T Interval 344 ms    QTC Calculation(Bezet) 439 ms    P Axis 58 degrees    R Axis -39 degrees    T Axis 50 degrees    Diagnosis Line Normal sinus rhythm  Left axis deviation  Inferior infarct , age undetermined  Anterolateral infarct , age undetermined  Abnormal ECG    Confirmed by Jason Valles ([id number]) on 7/12/2019 8:19:38 PM (07-12-19 @ 10:13)  12 Lead ECG:   Ventricular Rate 113 BPM    Atrial Rate 113 BPM    P-R Interval 162 ms    QRS Duration 84 ms    Q-T Interval 328 ms    QTC Calculation(Bezet) 449 ms    P Axis 45 degrees    R Axis -23 degrees    T Axis 31 degrees    Diagnosis Line Sinus tachycardia  Minimal voltage criteria for LVH, may be normal variant  Borderline ECG    Confirmed by EMMA MARROQUIN MD ([id number]) on 7/12/2019 2:09:05 PM (07-12-19 @ 05:55)      MEDICATIONS  amLODIPine   Tablet 2.5  ampicillin/sulbactam  IVPB 1.5  chlorhexidine 0.12% Liquid 15  chlorhexidine 4% Liquid 1  cholecalciferol 1000  cyanocobalamin 1000  dextrose 5%. 1000  dextrose 50% Injectable 12.5  dextrose 50% Injectable 25  dextrose 50% Injectable 25  donepezil 10  heparin  Injectable 5000  hydrALAZINE 25  insulin glargine Injectable (LANTUS) 9  insulin lispro (HumaLOG) corrective regimen sliding scale   insulin lispro Injectable (HumaLOG) 3  levETIRAcetam  Solution 1000  losartan 25  nystatin Powder 1  pantoprazole   Suspension 40  simvastatin 20      ANTIBIOTICS:  ampicillin/sulbactam  IVPB 1.5 Gram(s) IV Intermittent every 6 hours

## 2019-07-16 NOTE — PHYSICAL THERAPY INITIAL EVALUATION ADULT - PLANNED THERAPY INTERVENTIONS, PT EVAL
balance training/bed mobility training/ROM/strengthening/gait training/transfer training/postural re-education

## 2019-07-16 NOTE — PROGRESS NOTE ADULT - SUBJECTIVE AND OBJECTIVE BOX
HERNANDO HERNANDEZ 74y0 W Female sent from the SNF for increasing SOB and suspected PNA.  The pt was cultured and started on meropenem, vanc and cipro.  The preliminary blood cultures are positive for Gm + cocci in pairs.  The pt was just D/C to SNF on  after a prolonged and complicated hospital course for R thalamic hemorrhagic CVA, hypertensive urgency with RF, SZ, failure to wean, tracheostomy 6/10 and PEG .  The PMHx includes"  HTN, ASHD, DLD, DM II, d neuropathy, ole BL ganglia  hemorrhagic CVA, gait instability, recent CVA, OA,DDD, DJD, GERD, HH, diverticulosis, gait instability.    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 1000 milliLiter(s) (50 mL/Hr) IV Continuous <Continuous>  dextrose 50% Injectable 12.5 Gram(s) IV Push once  dextrose 50% Injectable 25 Gram(s) IV Push once  dextrose 50% Injectable 25 Gram(s) IV Push once  donepezil 10 milliGRAM(s) Oral at bedtime  heparin  Injectable 5000 Unit(s) SubCutaneous every 8 hours  hydrALAZINE 25 milliGRAM(s) Oral three times a day  insulin glargine Injectable (LANTUS) 9 Unit(s) SubCutaneous at bedtime  insulin lispro (HumaLOG) corrective regimen sliding scale   SubCutaneous three times a day before meals  insulin lispro Injectable (HumaLOG) 3 Unit(s) SubCutaneous three times a day before meals  levETIRAcetam  Solution 1000 milliGRAM(s) Oral two times a day  losartan 25 milliGRAM(s) Oral daily  nystatin Powder 1 Application(s) Topical three times a day  pantoprazole   Suspension 40 milliGRAM(s) Oral daily  simvastatin 20 milliGRAM(s) Oral at bedtime    Unasyn  1.5gms q 6hrs    MEDICATIONS  (PRN):  acetaminophen   Tablet .. 650 milliGRAM(s) Oral every 6 hours PRN Temp greater or equal to 38.5C (101.3F)  dextrose 40% Gel 15 Gram(s) Oral once PRN Blood Glucose LESS THAN 70 milliGRAM(s)/deciliter  glucagon  Injectable 1 milliGRAM(s) IntraMuscular once PRN Glucose LESS THAN 70 milligrams/deciliter      Allergies    No Known Allergie	    Vital Signs Last 24 Hrs    T(F): 97.8  HR: 81  BP: 147/66    RR: 18   SpO2: 97%    PHYSICAL EXAM:      Constitutional:  pt opens eyes, more alert, tries to communicate, NAD, + trach, OOB in chair    Eyes: opened, nonicteric    ENMT: dry oral mucosa, dental defects    Neck:  short and thick and supple, + trach    Respiratory: shalow respirations, scattered rhonchi    Cardiovascular:  S1S2 reg    Gastrointestinal:  globose and distended, + PEG site clean    Genitourinary:      Extremities:  moves upper ext, dec motor strength of lower ext, + arthritic changes    Vascular: dec pedal pulses    Neurological: bedbound, lower ext weakness    Skin:  no sacral breakdown, but sm area on nonblanchable erythema stage I on the L buttock    LABS:                        11.2   4.3 )-----------( 285    WBC 12.3 on admission             36    142  | 101 |  17  ----------------------------< 157  4.4   |  31  |  0.7  GFR 74, 91, 87  Ca    10.9<H>      2019 08:35  Phos  2.7     07-13  Mg     2.0, 2.1, 2.3, 2.1  LA 2.0  troponin 0.02    blood C&S:  aerobic and anaerobic bottles + for GM + cocci in pairs    TPro  6.3  /  Alb  3.4<L>  /  TBili  0.6  /  DBili  x   /  AST  11  /  ALT  13  /  AlkPhos  78  07-13    PT/INR - ( 2019 08:35 )   PT: 12.40 sec;   INR: 1.08 ratio       PTT - ( 2019 08:35 )  PTT:29.8 sec  Urinalysis Basic - ( 2019 09:45 )    Color: Light Yellow / Appearance: Slightly Turbid / S.025 / pH: x  Gluc: x / Ketone: Negative  / Bili: Negative / Urobili: <2 mg/dL   Blood: x / Protein: Trace / Nitrite: Negative   Leuk Esterase: Large / RBC: 1 /HPF /  /HPF   Sq Epi: x / Non Sq Epi: 1 /HPF / Bacteria: Few        RADIOLOGY & ADDITIONAL TESTS:    EKG:  sinus tach 113/min, inf Qs, PRWP, nonspecific ST-T changes  EKG:   NSR 98/min, Qs in inf leads, PRWP     CXR:  low lung volumes, bibasilar atelectases, superimposed consolidation cannot be R/O    CT of abd and pelvis:  no acute path or infla changes in the abd or pelvis    Venous duplex:  negative for DVT

## 2019-07-16 NOTE — PHYSICAL THERAPY INITIAL EVALUATION ADULT - LEVEL OF INDEPENDENCE: CHAIR TO BED, REHAB EVAL
unable to perform/Recommend diane transfer 2* to significant decreased in static sitting balance tilting to R side

## 2019-07-16 NOTE — CONSULT NOTE ADULT - ASSESSMENT
IMPRESSION: Rehab for deconditioning    PRECAUTIONS: [  ] Cardiac  [  ] Respiratory  [  ] Seizures [  ] Contact Isolation  [  ] Droplet Isolation  [  ] Other    Weight Bearing Status: WBAT    RECOMMENDATION:    Out of Bed to Chair     DVT/Decubiti Prophylaxis    REHAB PLAN:     [  X ] Bedside P/T 3-5 times a week   [   ]   Bedside O/T  2-3 times a week             [   ] No Rehab Therapy Indicated                   [   ]  Speech Therapy   Conditioning/ROM                                    ADL  Bed Mobility                                               Conditioning/ROM  Transfers                                                     Bed Mobility  Sitting /Standing Balance                         Transfers                                        Gait Training                                               Sitting/Standing Balance  Stair Training [   ]Applicable                    Home equipment Eval                                                                        Splinting  [   ] Only      GOALS:   ADL   [   ]   Independent                    Transfers  [   ] Independent                          Ambulation  [   ] Independent     [    ] With device                            [   ]  CG                                                         [   ]  CG                                                                  [   ] CG                            [    ] Min A                                                   [   ] Min A                                                              [   ] Min  A          DISCHARGE PLAN:   [   ]  Good candidate for Intensive Rehabilitation/Hospital based-[facility]                                             Will tolerate 3hrs Intensive Rehab Daily                                       [    ]  Short Term Rehab in Skilled Nursing Facility                                       [    ]  Home with Outpatient or VN services                                         [    ]  Possible Candidate for Intensive Hospital based Rehab      Thank you. IMPRESSION: Rehab for deconditioning, s/p right thalamic ICH 5/2019 with left hemiplegia    PRECAUTIONS: [  ] Cardiac  [  ] Respiratory  [  ] Seizures [  ] Contact Isolation  [  ] Droplet Isolation  [ X ] Other: NPO    Weight Bearing Status: WBAT    RECOMMENDATION:    Out of Bed to Chair     DVT/Decubiti Prophylaxis    REHAB PLAN:     [  X ] Bedside P/T 3-5 times a week   [   ]   Bedside O/T  2-3 times a week             [   ] No Rehab Therapy Indicated                   [   ]  Speech Therapy   Conditioning/ROM                                    ADL  Bed Mobility                                               Conditioning/ROM  Transfers                                                     Bed Mobility  Sitting /Standing Balance                         Transfers                                        Gait Training                                               Sitting/Standing Balance  Stair Training [   ]Applicable                    Home equipment Eval                                                                        Splinting  [   ] Only      GOALS:   ADL   [   ]   Independent                    Transfers  [   ] Independent                          Ambulation  [   ] Independent     [  X  ] With device                            [   ]  CG                                                         [   ]  CG                                                                  [   ] CG                            [    ] Min A                                                   [  X ] Mod A                                                              [  X ] Mod  A          DISCHARGE PLAN:   [   ]  Good candidate for Intensive Rehabilitation/Hospital based-[facility]                                             Will tolerate 3hrs Intensive Rehab Daily                                       [  X  ]  Short Term Rehab in Skilled Nursing Facility                                       [    ]  Home with Outpatient or  services                                         [    ]  Possible Candidate for Intensive Hospital based Rehab      Thank you.

## 2019-07-16 NOTE — PHYSICAL THERAPY INITIAL EVALUATION ADULT - CRITERIA FOR SKILLED THERAPEUTIC INTERVENTIONS
predicted duration of therapy intervention/impairments found/rehab potential/therapy frequency/functional limitations in following categories

## 2019-07-17 NOTE — PROGRESS NOTE ADULT - SUBJECTIVE AND OBJECTIVE BOX
HERNANDO HERNANDEZ 74y0 W Female sent from the SNF for increasing SOB and suspected PNA.  The pt was cultured and started on meropenem, vanc and cipro.  The preliminary blood cultures are positive for Gm + cocci in pairs.  The pt was just D/C to SNF on  after a prolonged and complicated hospital course for R thalamic hemorrhagic CVA, hypertensive urgency with RF, SZ, failure to wean, tracheostomy 6/10 and PEG .  The PMHx includes"  HTN, ASHD, DLD, DM II, d neuropathy, ole BL ganglia  hemorrhagic CVA, gait instability, recent CVA, OA,DDD, DJD, GERD, HH, diverticulosis, gait instability.    INTERVAL HPI/OVERNIGHT EVENTS:  pt on IV Ampi, will change to po amoxicillin on D/C,  O2 via trach,  pt medically stable for SNF when bed available    MEDICATIONS  (STANDING):  amLODIPine   Tablet 2.5 milliGRAM(s) Oral daily  ampicillin  2gms q 6  chlorhexidine 0.12% Liquid 15 milliLiter(s) Oral Mucosa two times a day  chlorhexidine 4% Liquid 1 Application(s) Topical <User Schedule>  cholecalciferol 1000 Unit(s) Oral daily  cyanocobalamin 1000 MICROGram(s) Oral daily  dextrose 5%. 1000 milliLiter(s) (50 mL/Hr) IV Continuous <Continuous>  dextrose 50% Injectable 12.5 Gram(s) IV Push once  dextrose 50% Injectable 25 Gram(s) IV Push once  dextrose 50% Injectable 25 Gram(s) IV Push once  donepezil 10 milliGRAM(s) Oral at bedtime  heparin  Injectable 5000 Unit(s) SubCutaneous every 8 hours  hydrALAZINE 25 milliGRAM(s) Oral three times a day  insulin glargine Injectable (LANTUS) 9 Unit(s) SubCutaneous at bedtime  insulin lispro (HumaLOG) corrective regimen sliding scale   SubCutaneous three times a day before meals  insulin lispro Injectable (HumaLOG) 3 Unit(s) SubCutaneous three times a day before meals  levETIRAcetam  Solution 1000 milliGRAM(s) Oral two times a day  losartan 25 milliGRAM(s) Oral daily  nystatin Powder 1 Application(s) Topical three times a day  pantoprazole   Suspension 40 milliGRAM(s) Oral daily  simvastatin 20 milliGRAM(s) Oral at bedtime    Unasyn  1.5gms q 6hrs    MEDICATIONS  (PRN):  acetaminophen   Tablet .. 650 milliGRAM(s) Oral every 6 hours PRN Temp greater or equal to 38.5C (101.3F)  dextrose 40% Gel 15 Gram(s) Oral once PRN Blood Glucose LESS THAN 70 milliGRAM(s)/deciliter  glucagon  Injectable 1 milliGRAM(s) IntraMuscular once PRN Glucose LESS THAN 70 milligrams/deciliter      Allergies    No Known Allergie	    Vital Signs Last 24 Hrs    T(F): 98.5  HR: 84  BP: 165/72    RR: 18   SpO2: 99%    PHYSICAL EXAM:      Constitutional:  pt opens eyes, more alert, tries to communicate, NAD, + trach, resting in bed comfortably this AM    Eyes: opened, nonicteric    ENMT: dry oral mucosa, dental defects    Neck:  short and thick and supple, + trach    Respiratory: shalow respirations, scattered rhonchi    Cardiovascular:  S1S2 reg    Gastrointestinal:  globose and distended, + PEG site clean    Genitourinary:      Extremities:  moves upper ext, dec motor strength of lower ext, + arthritic changes    Vascular: dec pedal pulses    Neurological: bedbound, lower ext weakness    Skin:  no sacral breakdown, but sm area on nonblanchable erythema stage I on the L buttock    LABS:                        11   4 )-----------( 285    WBC 12.3 on admission             36    142  | 101 |  17  ----------------------------< 157  4.4   |  31  |  0.7  GFR 74, 91, 87  Ca    10.9<H>      2019 08:35  Phos  2.7     07-13  Mg     2.0, 2.1, 2.3, 2.1  LA 2.0  troponin 0.02    blood C&S:  aerobic and anaerobic bottles + for GM + cocci in pairs    TPro  6.3  /  Alb  3.4<L>  /  TBili  0.6  /  DBili  x   /  AST  11  /  ALT  13  /  AlkPhos  78  07-13    PT/INR - ( 2019 08:35 )   PT: 12.40 sec;   INR: 1.08 ratio       PTT - ( 2019 08:35 )  PTT:29.8 sec  Urinalysis Basic - ( 2019 09:45 )    Color: Light Yellow / Appearance: Slightly Turbid / S.025 / pH: x  Gluc: x / Ketone: Negative  / Bili: Negative / Urobili: <2 mg/dL   Blood: x / Protein: Trace / Nitrite: Negative   Leuk Esterase: Large / RBC: 1 /HPF /  /HPF   Sq Epi: x / Non Sq Epi: 1 /HPF / Bacteria: Few        RADIOLOGY & ADDITIONAL TESTS:    EKG:  sinus tach 113/min, inf Qs, PRWP, nonspecific ST-T changes  EKG:   NSR 98/min, Qs in inf leads, PRWP     CXR:  low lung volumes, bibasilar atelectases, superimposed consolidation cannot be R/O    CT of abd and pelvis:  no acute path or infla changes in the abd or pelvis    Venous duplex:  negative for DVT

## 2019-07-17 NOTE — DISCHARGE NOTE PROVIDER - NSDCCPCAREPLAN_GEN_ALL_CORE_FT
PRINCIPAL DISCHARGE DIAGNOSIS  Diagnosis: Bacteremia  Assessment and Plan of Treatment: Bacteria in your blood stream due to an infection in your lungs. Bacteria being treated adequately with aminopenicillin. Please take medication as directed      SECONDARY DISCHARGE DIAGNOSES  Diagnosis: Hypertension  Assessment and Plan of Treatment:     Diagnosis: Cerebral infarction due to hemorrhagic or thromboembolic event  Assessment and Plan of Treatment: PRINCIPAL DISCHARGE DIAGNOSIS  Diagnosis: Bacteremia  Assessment and Plan of Treatment: Bacteria in your blood stream due to an infection in your lungs. Bacteria being treated adequately with aminopenicillin. Please take medication as directed      SECONDARY DISCHARGE DIAGNOSES  Diagnosis: Dysphagia  Assessment and Plan of Treatment: Difficulty swallowing. You should follow with speech and swallow as out patient. They might suggest Fibre optic endoscopic esophageal swallow assessment (FEES).    Diagnosis: Hypertension  Assessment and Plan of Treatment:     Diagnosis: Cerebral infarction due to hemorrhagic or thromboembolic event  Assessment and Plan of Treatment:

## 2019-07-17 NOTE — PROGRESS NOTE ADULT - SUBJECTIVE AND OBJECTIVE BOX
Sent from SNF for increasing SOB and suspected PNA.    Day 5 of hospitalization, patient has no somatic complaints and is doing well.  Somnolent, not very oriented today. Communication difficult, patient relies on signage to communicate.  Denies chest pain, dyspnea, orthopnea, PND.    MEDICATIONS  (STANDING):  amLODIPine   Tablet 2.5 milliGRAM(s) Oral daily  ampicillin  IVPB 2 Gram(s) IV Intermittent every 4 hours  chlorhexidine 0.12% Liquid 15 milliLiter(s) Oral Mucosa two times a day  chlorhexidine 4% Liquid 1 Application(s) Topical <User Schedule>  cholecalciferol 1000 Unit(s) Oral daily  cyanocobalamin 1000 MICROGram(s) Oral daily  dextrose 5%. 1000 milliLiter(s) (50 mL/Hr) IV Continuous <Continuous>  dextrose 50% Injectable 12.5 Gram(s) IV Push once  dextrose 50% Injectable 25 Gram(s) IV Push once  dextrose 50% Injectable 25 Gram(s) IV Push once  donepezil 10 milliGRAM(s) Oral at bedtime  heparin  Injectable 5000 Unit(s) SubCutaneous every 8 hours  hydrALAZINE 25 milliGRAM(s) Oral three times a day  insulin glargine Injectable (LANTUS) 9 Unit(s) SubCutaneous at bedtime  insulin lispro (HumaLOG) corrective regimen sliding scale   SubCutaneous three times a day before meals  insulin lispro Injectable (HumaLOG) 3 Unit(s) SubCutaneous three times a day before meals  levETIRAcetam  Solution 1000 milliGRAM(s) Oral two times a day  losartan 25 milliGRAM(s) Oral daily  nystatin Powder 1 Application(s) Topical three times a day  pantoprazole   Suspension 40 milliGRAM(s) Oral daily  simvastatin 20 milliGRAM(s) Oral at bedtime    MEDICATIONS  (PRN):  acetaminophen   Tablet .. 650 milliGRAM(s) Oral every 6 hours PRN Temp greater or equal to 38.5C (101.3F)  dextrose 40% Gel 15 Gram(s) Oral once PRN Blood Glucose LESS THAN 70 milliGRAM(s)/deciliter  glucagon  Injectable 1 milliGRAM(s) IntraMuscular once PRN Glucose LESS THAN 70 milligrams/deciliter    Vital Signs Last 24 Hrs  T(C): 36.9 (17 Jul 2019 13:10), Max: 37.1 (16 Jul 2019 20:23)  T(F): 98.5 (17 Jul 2019 13:10), Max: 98.7 (16 Jul 2019 20:23)  HR: 84 (17 Jul 2019 13:10) (68 - 84)  BP: 165/72 (17 Jul 2019 13:10) (92/50 - 165/72)  BP(mean): --  ABP: --  ABP(mean): --  RR: 18 (17 Jul 2019 13:10) (17 - 18)  SpO2: 99% (17 Jul 2019 03:00) (99% - 99%)    07-16    142  |  101  |  17  ----------------------------<  157<H>  4.4   |  31  |  0.7    Ca    11.7<H>      16 Jul 2019 05:58  Phos  3.3     07-16  Mg     2.1     07-16                          11.2   4.33  )-----------( 285      ( 16 Jul 2019 05:58 )             36.3

## 2019-07-17 NOTE — DISCHARGE NOTE PROVIDER - CARE PROVIDER_API CALL
Erasmo Cruz)  Internal Medicine  [address] [place] [address], [address]  [place] [address]  Phone: [phone]  Fax: [phone]  Follow Up Time:

## 2019-07-17 NOTE — DISCHARGE NOTE PROVIDER - HOSPITAL COURSE
74 year old female patient, sent from the SNF for increasing SOB  admitted to [facility] for possible LRTI.         Patient has extensive past medical history which consists of:    - Hx of ischemic CVA involving basal ganglia causing persistent slurred speech and left sided weakness.    - May 2019, admitted for hypertensive urgency with subsequent hemorrhagic CVA into the right thalamus with extension to right lateral and third ventricle. Hospitalization complicated with seizues post hemorrhagic infarct, eventual tracheostomy due to prolonged intubation on 6/10 and PEG on 6/24        Admitted to the [facility]:    +ve blood and urine cultures, CXR possible RLL infiltrates, placed empirically on cefepime.     Cultures returned Enterococcus S to ampi, downgraded antibiotics accordingly. CXR RLL infiltrates cleared up leaving one atelectatic band in RLL.    Patient stable after day 4 of hospitalization ID - amenable for discharge on oral amoxycillin to complete a course of 10 days.         No seizures in current admission, Responding well to Keppra and donepezil.    No hypertensive spikes, responding well to amlodipine, hydralazine, and losartan    To be discharged to a nursing home. 74 year old female patient, sent from the SNF for increasing SOB  admitted to [facility] for possible LRTI.         Patient has extensive past medical history which consists of:    - Hx of ischemic CVA involving basal ganglia causing persistent slurred speech and left sided weakness.    - May 2019, admitted for hypertensive urgency with subsequent hemorrhagic CVA into the right thalamus with extension to right lateral and third ventricle. Hospitalization complicated with seizues post hemorrhagic infarct, eventual tracheostomy due to prolonged intubation on 6/10 and PEG on 6/24        Admitted to the [facility]:    +ve blood and urine cultures, CXR possible RLL infiltrates, placed empirically on cefepime.     Cultures returned Enterococcus S to ampi, downgraded antibiotics accordingly. CXR RLL infiltrates cleared up leaving one atelectatic band in RLL.    Patient stable after day 4 of hospitalization ID - amenable for discharge on oral amoxycillin to complete a course of 10 days.         No seizures in current admission, Responding well to Keppra and donepezil.    No hypertensive spikes, responding well to amlodipine, hydralazine, and losartan    To be discharged to SNF.    Given her high risk of aspiration, should follow with speech and swallow at other facility to assess for dysphagia - FEES.

## 2019-07-18 NOTE — PROGRESS NOTE ADULT - SUBJECTIVE AND OBJECTIVE BOX
Sent from SNF for increasing SOB and suspected PNA.    Day 5 of hospitalization, patient has no somatic complaints and is doing well.  Somnolent, not very oriented today. Communication difficult, patient relies on signage to communicate.  Denies chest pain, dyspnea, orthopnea, PND.    MEDICATIONS  (STANDING):  amLODIPine   Tablet 2.5 milliGRAM(s) Oral daily  ampicillin  IVPB 2 Gram(s) IV Intermittent every 4 hours  chlorhexidine 0.12% Liquid 15 milliLiter(s) Oral Mucosa two times a day  chlorhexidine 4% Liquid 1 Application(s) Topical <User Schedule>  cholecalciferol 1000 Unit(s) Oral daily  cyanocobalamin 1000 MICROGram(s) Oral daily  dextrose 5%. 1000 milliLiter(s) (50 mL/Hr) IV Continuous <Continuous>  dextrose 50% Injectable 12.5 Gram(s) IV Push once  dextrose 50% Injectable 25 Gram(s) IV Push once  dextrose 50% Injectable 25 Gram(s) IV Push once  donepezil 10 milliGRAM(s) Oral at bedtime  heparin  Injectable 5000 Unit(s) SubCutaneous every 8 hours  hydrALAZINE 25 milliGRAM(s) Oral three times a day  insulin glargine Injectable (LANTUS) 9 Unit(s) SubCutaneous at bedtime  insulin lispro (HumaLOG) corrective regimen sliding scale   SubCutaneous three times a day before meals  insulin lispro Injectable (HumaLOG) 3 Unit(s) SubCutaneous three times a day before meals  levETIRAcetam  Solution 1000 milliGRAM(s) Oral two times a day  losartan 25 milliGRAM(s) Oral daily  nystatin Powder 1 Application(s) Topical three times a day  pantoprazole   Suspension 40 milliGRAM(s) Oral daily  simvastatin 20 milliGRAM(s) Oral at bedtime    MEDICATIONS  (PRN):  acetaminophen   Tablet .. 650 milliGRAM(s) Oral every 6 hours PRN Temp greater or equal to 38.5C (101.3F)  dextrose 40% Gel 15 Gram(s) Oral once PRN Blood Glucose LESS THAN 70 milliGRAM(s)/deciliter  glucagon  Injectable 1 milliGRAM(s) IntraMuscular once PRN Glucose LESS THAN 70 milligrams/deciliter    Vital Signs Last 24 Hrs  T(C): 36.7 (18 Jul 2019 04:55), Max: 36.9 (17 Jul 2019 13:10)  T(F): 98 (18 Jul 2019 04:55), Max: 98.5 (17 Jul 2019 13:10)  HR: 74 (18 Jul 2019 04:55) (74 - 84)  BP: 170/72 (18 Jul 2019 04:55) (119/58 - 170/72)  BP(mean): --  RR: 20 (18 Jul 2019 08:15) (18 - 20)  SpO2: 95% (18 Jul 2019 08:15) (94% - 95%)

## 2019-07-18 NOTE — CHART NOTE - NSCHARTNOTEFT_GEN_A_CORE
Registered Dietitian Follow-Up     Patient Profile Reviewed                           Yes [x]   No []     Nutrition History Previously Obtained        Yes []  No [x]  no family present at b/s. Attempted to call phone number but no answer      Pertinent Subjective Information: Spoke w/ RN. Pt tolerating TF well.      Pertinent Medical Interventions: Enterococcal bacteremia S to ampi, Source possibly urinary. Right thalamic hemorrhagic infarct secondary to hypertensive urgency. On vit D supplementation.      Diet order: glucerna 1.2 240ml q 8 hrs via PEG     Anthropometrics:  - Ht. 160.02 Centimeter(s)  - Wt.  71.2 kg (7/12)   69.5 kg (7/18) likely d/t inadequate TF regimen   - %wt change  - BMI: 27.9  - IBW: 115#+/-10%     Pertinent Lab Data: (7/18) H/H 11.2/36.3  glucose 157      Pertinent Meds: heparin, abx, lantus, humalog, norvasc, vit D3, vitb12, apresoline, cozaar, protonix, zocor.     Physical Findings:  - Appearance: Somnolent, not very oriented today per MD  - GI function: LBM 7/16 fecal incont   - Tubes: PEG  - Oral/Mouth cavity: per SLP  - Skin: BS 12 stage I L buttocks      Nutrition Requirements  Weight Used: dosing  158#, needs cont from RD assessment 7/15     Estimated Energy Needs    Continue [x]  Adjust [] 1435-1795kcal ([id number]) wt loss (unknown #) considered, possible PCM  Adjusted Energy Recommendations:   kcal/day         Estimated Protein Needs    Continue [x]  Adjust [] 78-92g (1.1-1.3g/kg) as above, advanced age considered  Adjusted Protein Recommendations:   gm/day        Estimated Fluid Needs        Continue [x]  Adjust [] : per LIP  Adjusted Fluid Recommendations:   mL/day     Nutrient Intake: EN not providing enough energy/protein to meet needs         [] Previous Nutrition Diagnosis:  Inadequate protein-energy intake.             [x] Ongoing          [] Resolved    [] No active nutrition diagnosis identified at this time     Nutrition Diagnostic #1  Problem:  Etiology:  Statement:     Nutrition Diagnostic #2  Problem:  Etiology:  Statement:     Nutrition Intervention  EN     Goal/Expected Outcome:  Pt to tolerate/consume >85% and <105% of estimated needs within 4 days.     Indicator/Monitoring: RD to monitor diet order, energy intake, NFPF, body comp, glucose and renal profile.    Recommendation: Consider switching TF regimen to Glucerna 1.2 @360ml q6h via PEG; provides: 1728kcal, 86g Pro, 1188 ml free h2o. Meets 100% estimated needs.

## 2019-07-19 NOTE — SWALLOW BEDSIDE ASSESSMENT ADULT - COMMENTS
severe pharyngeal dysphagia for thin, moderate for nectar, mild for puree and pharyngeal swallow is WFL for soft solids. Dysphagia Diet III Mechanical soft consistency with cut up meats with Nectar-thickened liquids via  position upright (90 degrees); small sips/bites; use of straw

## 2019-07-19 NOTE — SWALLOW BEDSIDE ASSESSMENT ADULT - SLP PERTINENT HISTORY OF CURRENT PROBLEM
sent from SNF for SOB. admitted for possible PNA vs UTI. chest xray revealed R basilar opacity. pt known to SLP dept from recent admission for acute acute right thalamic hemorrhage with extension to right lateral and third ventricle. pt s/p tracheostomy and PEG. pt hd FEES 6/19/19 and 6/28/19.

## 2019-07-19 NOTE — PROGRESS NOTE ADULT - SUBJECTIVE AND OBJECTIVE BOX
Patient is a 74y old  Female who presents with a chief complaint of Shortness of breath (18 Jul 2019 12:24)    HPI:  73 yo F w/ PMH of HTN, HLD, DM, CVA with residual left side weakness and slurred speech, G-tube and trach on 10 L is sent from nursing home for SOB. On baseline, pt nods yes or no, and non-ambulatory.  Pt denies N/V/D, chill, palpitation, CP, abdominal pain, LE edema. Family unable to reach to obtain hx.     In the ED, pt was given cefepime 2g x 1 and 1L of LR.     Of note, pt was admitted to [facility] recently (May/2019) for a stroke code. At the time of presentation to [facility] ED A&Ox 2, baseline slurped speech with left sided weakness. CTH shows acute right thalamic hemorrhage with extension to right lateral and third ventricle. At [facility] ED, Pt was Intubated due to worsening of neuro exam and for air way protection. Pt was then transferred to [facility] to be evaluated by neurosurgery. Neurosx recommended conservative management and CTH shows no further bleed. Hospital course is complicated by seizures, prolonged QT, streptococcus bacteremia with positive blood cultures and positive GM negative rods in the urine.  She was on ceftriaxone which was changed to Cefepime.  Pt failed weaning and required tracheostomy on 6/10 and was sent to the vent unit.  She failed S&S several times and required placement of PEG on 6/24. Pt was discharged on 7/9/2019. (12 Jul 2019 13:57)    PAST MEDICAL & SURGICAL HISTORY:  Diabetes mellitus  Stroke  Hypertension  Feeding by G-tube    patient seen and examined independently on morning rounds for the first time today, chart reviewed and discussed with the medicine resident.    no overnight events---    Vital Signs Last 24 Hrs  T(C): 36.1 (19 Jul 2019 12:17), Max: 36.1 (19 Jul 2019 05:27)  T(F): 97 (19 Jul 2019 12:17), Max: 97 (19 Jul 2019 12:17)  HR: 64 (19 Jul 2019 12:17) (64 - 84)  BP: 145/64 (19 Jul 2019 12:17) (124/58 - 151/81)  BP(mean): --  RR: 18 (19 Jul 2019 12:17) (18 - 20)  SpO2: 95% (19 Jul 2019 08:00) (95% - 95%)    PE:  GEN-NAD, AAOx0- lethargic today but arousable with sternal rub  NECK- supple +trach  PULM- fair air entry  CVS- +s1/s2 RRR   GI- soft NT ND +peg site c/d/i +bs, no rebound, no guarding  EXT- no edema      MEDICATIONS  (STANDING):  amLODIPine   Tablet 2.5 milliGRAM(s) Oral daily  ampicillin  IVPB 2 Gram(s) IV Intermittent every 4 hours  chlorhexidine 0.12% Liquid 15 milliLiter(s) Oral Mucosa two times a day  chlorhexidine 4% Liquid 1 Application(s) Topical <User Schedule>  cholecalciferol 1000 Unit(s) Oral daily  cyanocobalamin 1000 MICROGram(s) Oral daily  dextrose 5%. 1000 milliLiter(s) (50 mL/Hr) IV Continuous <Continuous>  dextrose 50% Injectable 12.5 Gram(s) IV Push once  dextrose 50% Injectable 25 Gram(s) IV Push once  dextrose 50% Injectable 25 Gram(s) IV Push once  donepezil 10 milliGRAM(s) Oral at bedtime  heparin  Injectable 5000 Unit(s) SubCutaneous every 8 hours  hydrALAZINE 25 milliGRAM(s) Oral three times a day  insulin glargine Injectable (LANTUS) 9 Unit(s) SubCutaneous at bedtime  insulin lispro (HumaLOG) corrective regimen sliding scale   SubCutaneous three times a day before meals  insulin lispro Injectable (HumaLOG) 3 Unit(s) SubCutaneous three times a day before meals  levETIRAcetam  Solution 1000 milliGRAM(s) Oral two times a day  losartan 25 milliGRAM(s) Oral daily  nystatin Powder 1 Application(s) Topical three times a day  pantoprazole   Suspension 40 milliGRAM(s) Oral daily  simvastatin 20 milliGRAM(s) Oral at bedtime

## 2019-07-19 NOTE — PROGRESS NOTE ADULT - ASSESSMENT
74 year old female patient known HTN, DL, DM II, CVA with residual left side weakness and slurred speech (since 7/9/19), gastrostomy 6/24 and trach 6/10 is sent from nursing home for SOB found to have enterococcal Bacteremia.    #Enterococcal bacteremia S to ampi  - Source possibly urinary, CT-abd/pelv unremarkable  - CXR RLL atelectasis, no overt consolidation  - on ampi/sulbactam, repeat blood cultures -ve.  - ID > switch to ampicillin 2g IV q4h  - will DC on oral amoxycillin when amenable for DC (to complete a 10 day course)    # Right thalamic hemorrhagic infarct secondary to hypertensive urgency ib 7/9/19  - Keep amlodipine, hydralazine, losartan  > Seizures  - Keep levetiracetam, donepezil    # Hx of bilateral basal ganglia CVA 8 years ago causing gait instability    # Vitamin D deficiency  Continue supplementation.    # DM   - insulin 9/3/3/3
shortness of breath, fever, leukocytosis,  sepsis,  bacteremia, UTI  Hx of recent prolonged hospitalization for R hemorrhagic thalamic CVA with hypertensive urgency,   SZ Disorder,  ARF, sp trach 6/10, sp PEG 6/24    Bedbound state    Hx of HTN, ASHD  Hx of DLD  Hx of DM II, d neuropathy  Hx of previous BL ganglia CVA about 8 yrs ago, gait instability  Hx of OA, DDD, DJD,  Hx of GERD, HH, diverticulosis  Hx of obesity      the pt was cultured, preliminary reports + both aerobic and anaerobic bottles for GM + cocci in pairs, sputum P, urine + for enterococcus species > 100,000 CFU   ID:  original Abx of meropenem, vanco and cipro switched to Unasyn 1.5gms q 6 hrs  cont O2 via trach, trach care, suction PRN  oral and skin care/decubiti prevention  cont all meds  Rehab  consult for beside PT  Speech and Swallow  consult  nutrition via PEG  pt is a full code, guarded state  social C consult for appropriate SNF as pt has trach, family not amenable to have pt return to [facility]
shortness of breath, fever, leukocytosis,  sepsis,  bacteremia, UTI  Hx of recent prolonged hospitalization for R hemorrhagic thalamic CVA with hypertensive urgency,   SZ Disorder,  ARF, sp trach 6/10, sp PEG 6/24    Bedbound state    Hx of HTN, ASHD  Hx of DLD  Hx of DM II, d neuropathy  Hx of previous BL ganglia CVA about 8 yrs ago, gait instability  Hx of OA, DDD, DJD,  Hx of GERD, HH, diverticulosis  Hx of obesity      the pt was cultured, preliminary reports + both aerobic and anaerobic bottles for GM + cocci in pairs, sputum P, urine + for enterococcus species > 100,000 CFU, blood enterococcus S to Ampi    ID:  original Abx of meropenem, vanco and cipro switched to Unasyn 1.5gms q 6 hrs, now on ampicillin 2gms q 6 hrs, on d/C will transition to amoxicillin x 10 days untill 7/22    cont O2 via trach, trach care, suction PRN  oral and skin care/decubiti prevention, pt has stage I L buttock  cont all meds  OOB to chair via HOYA lift  Rehab  consult for beside PT  Speech and Swallow  consult  nutrition via PEG  pt is a full code  pt is a high risk for readmission given the complexity and multiplicity of pt's medical issues  social SVC for SNF placement, pt medically stable for D/C to SNF when bed available
74 year old female patient known HTN, DL, DM II, CVA with residual left side weakness and slurred speech (since 7/9/19), G-tube and trach on 10 L is sent from nursing home for SOB found to have enterococcal Bacteremia.    #Enterococcal bacteremia S to ampi  - Source possibly urinary.  - on ampi/sulbactam, repeat blood cultures -ve.  - Keep same antibiotic, will DC on oral amoxycillin when amenable for DC (to complete a 10 day course)    # Vitamin D deficiency  Continue supplementation.    # CVA w/ residual left side weakness and slurred speech, stable  - c/w keppra 1000 mg BID     # DM   - insulin 9/3/3/3    # HTN, controlled.     # DLD   - c/w simvastatin
74 year old female patient known HTN, DL, DM II, CVA with residual left side weakness and slurred speech (since 7/9/19), gastrostomy 6/24 and trach 6/10 is sent from nursing home for SOB found to have enterococcal Bacteremia.    #Enterococcal bacteremia S to ampi  - Source possibly urinary, CT-abd/pelv unremarkable  - CXR RLL atelectasis, no overt consolidation  - on ampi/sulbactam, repeat blood cultures -ve.  - ID > switch to ampicillin 2g IV q4h  - will DC on oral amoxycillin when amenable for DC (to complete a 10 day course)    # Right thalamic hemorrhagic infarct secondary to hypertensive urgency ib 7/9/19  - Keep amlodipine, hydralazine, losartan  > Seizures  - Keep levetiracetam, donepezil    # Hx of bilateral basal ganglia CVA 8 years ago causing gait instability    # Vitamin D deficiency  Continue supplementation.    # DM   - insulin 9/3/3/3    # DLD   - c/w simvastatin     # DVT  - Heparin 5000 q8h?    # Dispo: [facility]
75 yo F w/ PMH of HTN, HLD, DM, CVA with residual left side weakness and slurred speech, G-tube and trach on 10 L is sent from nursing home for SOB. Admitted to [facility] for suspected pneumonia.     # SOB r/o VAP   - BNP is unremarkable, unlikely to be CHF exacerbation  - recent ECHO shows normal EF  - order B/L LE duplex to r/o DVTs  - pt was recently discharged from hospital and trached   - will start abx with meropenem, vancomycin and ciprofloxacin to cover pseudomonas and MRSA   - Repeat CXR as above  - f/u procalcitonin   - MRSA nasal nares and to trach  - ID consulted. Recs: Unasyn 1.5 q6h IV, f/u enterococcal sensitivities, if S Amp, can change to PO amoxicillin to complete 10 day course. Will start Unasyn (7/14)  - f/u repeat bcx     # Leukocytosis r/o infectious etiology (pneumonia vs UTI)   - recent urinary tract infection and currently positive UA   - CXR cannot exclude superimposed consolidation  - trend CBC   - f/u blood culture x 3   - f/u urine culture   - start abx     # CVA w/ residual left side weakness and slurred speech, stable  - c/w keppra 1000 mg BID     # Troponinemia likely secondary to NSTEMI    - Troponin 0.02  - trend troponin     # DM   - lantus 18U at home   - will start insulin 9/3/3/3  - trend POCTs     # HTN, uncontrolled  - resume home meds     # DLD   - c/w simvastatin     Diet: Tube feed with glucerna   GI ppx: Protonix 40 mg qD   DVT ppx: Heparin 5000U q8h subcut	  Activity: Increase as tolerated  Code status: Full Code ( X ) / DNR (  ) / DNI (  ), spoke with son about GOC, son would like to discuss with family prior to signing DNR/DNI  Disposition: from rehab, need medical management     ( X ) Discussion with patient and/or family regarding goals of care
ASSESSMENT  73 yo F w/ PMH of HTN, HLD, DM, CVA with residual left side weakness and slurred speech, G-tube and trach on 10 L is sent from nursing home for SOB found to have enterococcal BSI    IMPRESSION  #Enterococcal BSI +7/12 & +UCX 2/2 pyelonephritis     SIRS on admission WBC 12 RR 28 P126 hypertensive afebrile     CT AP unremarkable   #Chronic resp failure    CXR atelectasis     RECOMMENDATIONS  - CHANGE to ampicillin 2g q4h IV  - d/c on PO amoxicillin to complete 10 day course 7/22    Spectra [id number]
ASSESSMENT  75 yo F w/ PMH of HTN, HLD, DM, CVA with residual left side weakness and slurred speech, G-tube and trach on 10 L is sent from nursing home for SOB found to have enterococcal BSI    IMPRESSION  #Enterococcal BSI +7/12 & +UCX 2/2 pyelonephritis     SIRS on admission WBC 12 RR 28 P126 hypertensive afebrile     CT AP unremarkable   #Chronic resp failure    CXR atelectasis     RECOMMENDATIONS  - Unasyn 1.5 q6h IV  - f/u enterococcal sensitivities , if S Amp, can change to PO amoxicillin to complete 10 day course   - f/u repeat bcx     Spectra [id number]
a/p:  #SOB post-hemorrahgic CVA s/p peg and trach during last admission (dc to NH on 7/9/19)-a./w SOB and sepsis 2/2 Enterococcal bacteremia  -initially admitted under care of Dr. Cruz/Dr. Emery however changed yesterday to hospitalist [facility] as per family request---today my first day caring for patient  -cont abx--repeat bcx negative  -transition to amoxicillin via peg to complete 10 day course once stable for dc  -f/u with speech and swallow--will cont npo with peg feeds- if family ok with dc back to NH and then returning for additional w/u plan for possible discharge today--otherwise patient will remain in hospital over weekend   -aspiration precautions  -trach care    #Guarded prognosis  #FULL CODE    DVT/GI ppx    #Progress Note Handoff  Disposition: anticipate dc back to NH---if able to return over weekend to NH will discharge and then will need f/u with sp swallow for future repeat instrumental assessement
shortness of breath, fever, leukocytosis R/O sepsis, R/O PNA    Hx of recent prolonged hospitalization for R hemorrhagic thalamic CBVA with hypertensive urgency,   SZ Disorder,  ARF, sp trach 6/10, sp PEG 6/24    Bedbound state    Hx of HTN, ASHD  Hx of DLD  Hx of DM II, d neuropathy  Hx of previous BL ganglia CVA about 8 yrs ago, gait instability  Hx of OA, DDD, DJD,  Hx of GERD, HH, diverticulosis  Hx of obesity      the pt was cultured, preliminary reports + both aerobic and anaerobic bottles for GM + cocci in pairs, sputum and urine P  ID:  original Abx of meropenem, vanco and cipro switched to Unasyn 1.5gms q 6 hrs  cont O2 via trach, trach care, suction PRN  oral and skin care/decubiti prevention  cont all meds  Rehab  consult for beside PT  Speech and Swallow  consult  nutrition via PEG  pt is a full code, guarded state
shortness of breath, fever, leukocytosis,  sepsis,  bacteremia, UTI  Hx of recent prolonged hospitalization for R hemorrhagic thalamic CVA with hypertensive urgency,   SZ Disorder,  ARF, sp trach 6/10, sp PEG 6/24    Bedbound state    Hx of HTN, ASHD  Hx of DLD  Hx of DM II, d neuropathy  Hx of previous BL ganglia CVA about 8 yrs ago, gait instability  Hx of OA, DDD, DJD,  Hx of GERD, HH, diverticulosis  Hx of obesity      the pt was cultured, preliminary reports + both aerobic and anaerobic bottles for GM + cocci in pairs, sputum P, urine + for enterococcus species > 100,000 CFU, blood enterococcus S to Ami  ID:  original Abx of meropenem, vanco and cipro switched to Unasyn 1.5gms q 6 hrs, now on ampicillin 2gms q 6 hrs, on d/C will transition to amoxicillin x 10 days  cont O2 via trach, trach care, suction PRN  oral and skin care/decubiti prevention  cont all meds  Rehab  consult for beside PT  Speech and Swallow  consult  nutrition via PEG  pt is a full code  social SVC consult for appropriate SNF as pt has trach, family not amenable to have pt return to [facility]
shortness of breath, fever, leukocytosis,  sepsis,  bacteremia, UTI  Hx of recent prolonged hospitalization for R hemorrhagic thalamic CVA with hypertensive urgency,   SZ Disorder,  ARF, sp trach 6/10, sp PEG 6/24    Bedbound state    Hx of HTN, ASHD  Hx of DLD  Hx of DM II, d neuropathy  Hx of previous BL ganglia CVA about 8 yrs ago, gait instability  Hx of OA, DDD, DJD,  Hx of GERD, HH, diverticulosis  Hx of obesity      the pt was cultured, preliminary reports + both aerobic and anaerobic bottles for GM + cocci in pairs, sputum P, urine + for enterococcus species > 100,000 CFU, blood enterococcus S to Ampi    ID:  original Abx of meropenem, vanco and cipro switched to Unasyn 1.5gms q 6 hrs, now on ampicillin 2gms q 6 hrs, on d/C will transition to amoxicillin x 10 days untill 7/22    cont O2 via trach, trach care, suction PRN  oral and skin care/decubiti prevention, pt has stage I L buttock  cont all meds  OOB to chair via HOYA lift  Rehab  consult for beside PT  Speech and Swallow  consult  nutrition via PEG  pt is a full code  pt is a high risk for readmission given the complexity and multiplicity  social SVC for SNF placement, pt medically stable for D/C to SNF when bed available
· Assessment		  74 year old female patient known HTN, DL, DM II, CVA with residual left side weakness and slurred speech (since 7/9/19), gastrostomy 6/24 and trach 6/10 is sent from nursing home for SOB found to have enterococcal Bacteremia.    #Enterococcal bacteremia S to ampi  - Source possibly urinary, CT-abd/pelv unremarkable  - CXR RLL atelectasis, no overt consolidation  - on ampi/sulbactam, repeat blood cultures -ve.  - ID > switch to ampicillin 2g IV q4h  - will DC on oral amoxycillin when amenable for DC (to complete a 10 day course)    # Right thalamic hemorrhagic infarct secondary to hypertensive urgency ib 7/9/19  - Keep amlodipine, hydralazine, losartan  > Seizures  - Keep levetiracetam, donepezil    # Hx of bilateral basal ganglia CVA 8 years ago causing gait instability    # Vitamin D deficiency  Continue supplementation.    # DM   - insulin 9/3/3/3
· Assessment		  ASSESSMENT  75 yo F w/ PMH of HTN, HLD, DM, CVA with residual left side weakness and slurred speech, G-tube and trach on 10 L is sent from nursing home for SOB found to have enterococcal BSI    IMPRESSION  #Enterococcal BSI +7/12 & +UCX 2/2 pyelonephritis     SIRS on admission WBC 12 RR 28 P126 hypertensive afebrile     CT AP unremarkable   #Chronic resp failure    CXR atelectasis    BCx 7/12,13 NG     RECOMMENDATIONS  - Ampicillin 2 gm iv q6h  - D/c Unasyn   - f/u enterococcal ( in blood )sensitivities , if S Amp, can change to PO amoxicillin to complete 10 day course

## 2019-07-19 NOTE — SWALLOW BEDSIDE ASSESSMENT ADULT - SLP GENERAL OBSERVATIONS
pt awake. no apparent pain. + trach. pt on o2 via trach collar. no voicing upon occlusion. pt followed some simple commands

## 2019-07-19 NOTE — PROGRESS NOTE ADULT - PROVIDER SPECIALTY LIST ADULT
Hospitalist
Infectious Disease
Internal Medicine

## 2019-07-19 NOTE — PROGRESS NOTE ADULT - REASON FOR ADMISSION
Shortness of breath
Shortness of breath, sepsis, bacteremia and UTI
Shortness of breath/sepsis
Shortness of breath
Shortness of breath, Sepsis,  bacteremia, UTI
Shortness of breath, sepsis
Shortness of breath

## 2019-07-19 NOTE — DISCHARGE NOTE NURSING/CASE MANAGEMENT/SOCIAL WORK - NSDCPEPTSTRK_GEN_ALL_CORE
Call 911 for stroke/Stroke warning signs and symptoms/Stroke support groups for patients, families, and friends/Need for follow up after discharge/Prescribed medications/Risk factors for stroke/Stroke education booklet/Signs and symptoms of stroke

## 2019-07-19 NOTE — DISCHARGE NOTE NURSING/CASE MANAGEMENT/SOCIAL WORK - NSDCDPATPORTLINK_GEN_ALL_CORE
You can access the [facility] Patient Portal, offered by [facility], by registering with the following website: [email or web address]

## 2019-07-19 NOTE — SWALLOW BEDSIDE ASSESSMENT ADULT - SWALLOW EVAL: DIAGNOSIS
po trials not appropriate at bedside 2/2 known dysphagia and possible admission for PNA. pt would need repeat instrumental assessment

## 2019-11-17 NOTE — ED PROVIDER NOTE - ATTENDING CONTRIBUTION TO CARE
I personally evaluated the patient. I reviewed the Resident’s or Physician Assistant’s note (as assigned above), and agree with the findings and plan except as documented in my note.  75 Y/O F HTN, DLD, DM, CVA WITH L HEMIPARESIS, S/P TRACH/PEG, BROUGHT TO ED FROM SNF AFER CARDIAC ARREST. EMS NOTED PT TO BE IN [facility]. NO ROSC. BVM VIA TRACH. AS PER FAMILY, PT IS DNR AND NO RESUSCITATION WAS ATTEMPTED ON ARRIVAL TO THE ED. ON ARRIVAL, CPR IN PROGRESS. PUPILS FIXED AND DILATED. PULSELESS. NO SPONTANEOUS RESPIRATIONS. NO EVIDENCE OF TRAUMA. TRACH IN PLACE, + BS B/L WITH BVM. FEEDING TUBE IN PLACE.

## 2019-11-17 NOTE — ED PROVIDER NOTE - PHYSICAL EXAMINATION
CONSTITUTIONAL: well developed, nontoxic appearing  SKIN: warm, dry, no rash, cap refill < 2 seconds  HEENT: normocephalic, atraumatic, no conjunctival erythema, moist mucous membranes, patent airway  NECK: supple, no masses  CV:  regular rate, regular rhythm, 2+ radial pulses bilaterally  RESP: no wheezes, no rales, no rhonchi, normal work of breathing  ABD: soft, nontender, nondistended, no rebound, no guarding  MSK: normal ROM, no cyanosis, no edema  NEURO: alert, oriented, grossly unremarkable  PSYCH: cooperative, appropriate CONSTITUTIONAL: well developed, nontoxic appearing  SKIN: warm, dry, no rash  HEENT: normocephalic, atraumatic, moist mucous membranes, patent airway  NECK: trach in place  CV:  pulseless  RESP: no spontaneous breathing  ABD: soft, nondistended  MSK: no edema  NEURO: unresponsive  PSYCH: unable to assess CONSTITUTIONAL: well developed  SKIN: warm, dry, no rash  HEENT: normocephalic, atraumatic, moist mucous membranes, patent airway  NECK: trach in place  CV:  pulseless  RESP: no spontaneous breathing  ABD: soft, nondistended, PEG tube in place  MSK: no edema  NEURO: unresponsive  PSYCH: unable to assess

## 2019-11-17 NOTE — ED PROVIDER NOTE - PROGRESS NOTE DETAILS
TC: TC: 75 yo F with PMHx of DM, HTN, stroke, trach, PEG who was BIBEMS for PEA arrest. CPR initiated prior to discovering pt is DNR/DNI. Upon arrival to ED, pt pulseless and apneic. Time of death called at 19:55. Discussed findings with next of kin Brian (tasia). Death packet completed and [employer] called. TC: 75 yo F with PMHx of DM, HTN, stroke, trach, PEG who was BIBEMS for PEA arrest. CPR initiated prior to discovering pt is DNR/DNI. Upon arrival to ED, pt pulseless and apneic. Discontinued CPR. Time of death called at 19:55. Discussed findings with next of kin Chente and Torres (sons). Death packet completed and [employer] called. Family did not want autopsy at this time. Call placed to inform PMD Dr. Cruz. ASHLY[facility] AND LORE IN ED AND AWARE OF EXPIRATION. DR. LOZA PAGED. AWAITING CALL BACK. [employer] CONTACTED. TC: Spoke with Dr. Emery (covering for Dr. Cruz), informed of expiration.

## 2019-11-17 NOTE — ED ADULT TRIAGE NOTE - CHIEF COMPLAINT QUOTE
Patient arrived from [facility] with active CPR in progress. As per family request, patient is DNR. Upon transfer into trauma bay a pulse check showed no pulse or electrical activity via cardiac monitor, time of death 1955

## 2019-11-17 NOTE — ED PROVIDER NOTE - CLINICAL SUMMARY MEDICAL DECISION MAKING FREE TEXT BOX
73 Y/O F PMXH AS DOCUMENTED BROUGHT TO ED FROM HOME IN CARDIAC ARREST. RESUSCITATION NOT ATTEMPTED IN THE ED DUE TO FAMILY'S WISH FOR DNR. PT PRONOUNCED IN THE ED. FAMILY AND PMD AND [employer] DONATION AWARE.

## 2019-11-17 NOTE — ED PROVIDER NOTE - OBJECTIVE STATEMENT
73 yo F with PMHx of DM, HTN, stroke, trach, PEG who was BIBEMS from NH for PEA arrest. CPR initiated prior to discovering that pt is DNR/DNI. Arrived to ED with compressions in progress and bagging via trach. Further hx limited 2/2 cardiac arrest.

## 2019-11-25 ENCOUNTER — APPOINTMENT (OUTPATIENT)
Dept: NEUROLOGY | Facility: CLINIC | Age: 74
End: 2019-11-25

## 2019-11-29 NOTE — ED ADULT TRIAGE NOTE - CHIEF COMPLAINT QUOTE
BIBA with complaints of difficulty breathing, on trache collar, sent from nursing home
29-Nov-2019 19:50

## 2020-04-09 NOTE — DIETITIAN INITIAL EVALUATION ADULT. - MALNUTRITION
ER consider PCM upon f/u once established UBW known. UBW possibly 174# ( as of bed scale 6/4 in previous admission)

## 2021-01-01 NOTE — SWALLOW FEES ASSESSMENT ADULT - PRELIMINARY ENDOSCOPIC EXAMINATIONS
Patient father called to establish care for his daughter 
Vocal fold adduction/abduction
Interarytenoid/post-commissure edema/Vocal fold adduction/abduction

## 2022-03-08 NOTE — PROGRESS NOTE ADULT - GUM GEN PE MLT EXAM PC
EXAMINATION TYPE: XR wrist limited LT

 

DATE OF EXAM: 3/8/2022

 

COMPARISON: X-ray dated the 3/3/2022

 

INDICATION: Distal radial ORIF

 

TECHNIQUE: No images in PACS

 

FINDINGS: 

Fluoroscopic guided ORIF of distal radial fracture. Fluoroscopic time 38 seconds. No images in PACS.

 

IMPRESSION: 

As above
Normal genitalia; no lesions; no discharge

## 2022-06-02 NOTE — DISCHARGE NOTE NURSING/CASE MANAGEMENT/SOCIAL WORK - NSDCPETBCESMAN_GEN_ALL_CORE
If you are a smoker, it is important for your health to stop smoking. Please be aware that second hand smoke is also harmful. ED MD

## 2022-06-20 NOTE — ED PROCEDURE NOTE - CPROC ED CUFF TYPE1
Verified procedural consent signed and present. Verified blood consent signed and present. Verified complete H&P in chart. Verified pre-sedation documentation signed and present. cuffed

## 2023-05-05 NOTE — CONSULT NOTE ADULT - SUBJECTIVE AND OBJECTIVE BOX
We did receive fax and are waiting for  to sign. Mauro Enciso is in the ICU this week hopefully he will get it done early next week while in clinic. LM for inogen. HPI:  73 yo F w/ PMH of HTN, HLD, DM, CVA with residual left side weakness and slurred speech, G-tube and trach on 10 L is sent from nursing home for SOB. On baseline, pt nods yes or no, and non-ambulatory.  Pt denies N/V/D, chill, palpitation, CP, abdominal pain, LE edema. Family unable to reach to obtain hx.     In the ED, pt was given cefepime 2g x 1 and 1L of LR.     Of note, pt was admitted to [facility] recently (May/2019) for a stroke code. At the time of presentation to [facility] ED A&Ox 2, baseline slurped speech with left sided weakness. CTH shows acute right thalamic hemorrhage with extension to right lateral and third ventricle. At [facility] ED, Pt was Intubated due to worsening of neuro exam and for air way protection. Pt was then transferred to ED-[place] to be evaluated by neurosurgery. Neurosx recommended conservative management and CTH shows no further bleed. Hospital course is complicated by seizures, prolonged QT, streptococcus bacteremia with positive blood cultures and positive GM negative rods in the urine.  She was on ceftriaxone which was changed to Cefepime.  Pt failed weaning and required tracheostomy on 6/10 and was sent to the vent unit.  She failed S&S several times and required placement of PEG on 6/24. Pt was discharged on 7/9/2019. (12 Jul 2019 13:57)      PAST MEDICAL & SURGICAL HISTORY:  Diabetes mellitus  Stroke  Hypertension  Feeding by G-tube      Hospital Course:    TODAY'S SUBJECTIVE & REVIEW OF SYMPTOMS:     Constitutional WNL   Cardio WNL   Resp WNL   GI WNL  Heme WNL  Endo WNL  Skin WNL  MSK WNL  Neuro WNL  Cognitive WNL  Psych WNL      MEDICATIONS  (STANDING):  amLODIPine   Tablet 2.5 milliGRAM(s) Oral daily  ampicillin/sulbactam  IVPB 1.5 Gram(s) IV Intermittent every 6 hours  chlorhexidine 0.12% Liquid 15 milliLiter(s) Oral Mucosa two times a day  chlorhexidine 4% Liquid 1 Application(s) Topical <User Schedule>  cholecalciferol 1000 Unit(s) Oral daily  cyanocobalamin 1000 MICROGram(s) Oral daily  dextrose 5%. 1000 milliLiter(s) (50 mL/Hr) IV Continuous <Continuous>  dextrose 50% Injectable 12.5 Gram(s) IV Push once  dextrose 50% Injectable 25 Gram(s) IV Push once  dextrose 50% Injectable 25 Gram(s) IV Push once  donepezil 10 milliGRAM(s) Oral at bedtime  heparin  Injectable 5000 Unit(s) SubCutaneous every 8 hours  hydrALAZINE 25 milliGRAM(s) Oral three times a day  insulin glargine Injectable (LANTUS) 9 Unit(s) SubCutaneous at bedtime  insulin lispro (HumaLOG) corrective regimen sliding scale   SubCutaneous three times a day before meals  insulin lispro Injectable (HumaLOG) 3 Unit(s) SubCutaneous three times a day before meals  levETIRAcetam  Solution 1000 milliGRAM(s) Oral two times a day  losartan 25 milliGRAM(s) Oral daily  nystatin Powder 1 Application(s) Topical three times a day  pantoprazole   Suspension 40 milliGRAM(s) Oral daily  simvastatin 20 milliGRAM(s) Oral at bedtime    MEDICATIONS  (PRN):  acetaminophen   Tablet .. 650 milliGRAM(s) Oral every 6 hours PRN Temp greater or equal to 38.5C (101.3F)  dextrose 40% Gel 15 Gram(s) Oral once PRN Blood Glucose LESS THAN 70 milliGRAM(s)/deciliter  glucagon  Injectable 1 milliGRAM(s) IntraMuscular once PRN Glucose LESS THAN 70 milligrams/deciliter      FAMILY HISTORY:      Allergies    No Known Allergies    Intolerances        SOCIAL HISTORY:    [  ] EtOH  [  ] Smoking  [  ] Substance abuse     Home Environment:  [  ] Home Alone  [  ] Lives with Family  [  ] Home Health Aid    Dwelling:  [  ] Apartment  [  ] Private House  [  ] Adult Home  [  ] Skilled Nursing Facility      [  ] Short Term  [  ] Long Term  [  ] Stairs       Elevator [  ]    FUNCTIONAL STATUS PTA: (Check all that apply)  Ambulation: [   ]Independent    [  ] Dependent     [  ] Non-Ambulatory  Assistive Device: [  ] SA Cane  [  ]  Q Cane  [  ] Walker  [  ]  Wheelchair  ADL : [  ] Independent  [  ]  Dependent       Vital Signs Last 24 Hrs  T(C): 36.6 (16 Jul 2019 05:00), Max: 36.8 (15 Jul 2019 13:15)  T(F): 97.8 (16 Jul 2019 05:00), Max: 98.3 (15 Jul 2019 13:15)  HR: 81 (16 Jul 2019 05:00) (77 - 81)  BP: 147/66 (16 Jul 2019 05:00) (113/58 - 147/66)  BP(mean): --  RR: 18 (16 Jul 2019 05:00) (18 - 18)  SpO2: 97% (15 Jul 2019 19:48) (97% - 97%)      PHYSICAL EXAM: Alert & Oriented X3  GENERAL: NAD, well-groomed, well-developed  HEAD:  Atraumatic, Normocephalic  EYES: EOMI, PERRLA, conjunctiva and sclera clear  NECK: Supple, No JVD, Normal thyroid  CHEST/LUNG: Clear to percussion bilaterally; No rales, rhonchi, wheezing, or rubs  HEART: Regular rate and rhythm; No murmurs, rubs, or gallops  ABDOMEN: Soft, Nontender, Nondistended; Bowel sounds present  EXTREMITIES:  2+ Peripheral Pulses, No clubbing, cyanosis, or edema    NERVOUS SYSTEM:  Cranial Nerves 2-12 intact [  ] Abnormal  [  ]  ROM: WFL all extremities [  ]  Abnormal [  ]  Motor Strength: WFL all extremities  [  ]  Abnormal [  ]  Sensation: intact to light touch [  ] Abnormal [  ]  Reflexes: Symmetric [  ]  Abnormal [  ]    FUNCTIONAL STATUS:  Bed Mobility: Independent [  ]  Supervision [  ]  Needs Assistance [  ]  N/A [  ]  Transfers: Independent [  ]  Supervision [  ]  Needs Assistance [  ]  N/A [  ]   Ambulation: Independent [  ]  Supervision [  ]  Needs Assistance [  ]  N/A [  ]  ADL: Independent [  ] Requires Assistance [  ] N/A [  ]      LABS:                        11.2   4.33  )-----------( 285      ( 16 Jul 2019 05:58 )             36.3     07-16    142  |  101  |  17  ----------------------------<  157<H>  4.4   |  31  |  0.7    Ca    11.7<H>      16 Jul 2019 05:58  Phos  3.3     07-16  Mg     2.1     07-16    TPro  7.0  /  Alb  3.5  /  TBili  0.6  /  DBili  x   /  AST  11  /  ALT  13  /  AlkPhos  80  07-15          RADIOLOGY & ADDITIONAL STUDIES:    EXAM:  XR CHEST PORTABLE URGENT 1V            PROCEDURE DATE:  07/15/2019            INTERPRETATION:  Clinical History / Reason for exam: Shortness of breath    Comparison : Chest radiograph 7/13/2019.    Technique/Positioning: Portable technique. Patient's chin up obscures   lung apices.    Findings:    Support devices: Tracheostomy tube in place    Cardiac/mediastinum/hilum: Unchanged    Lung parenchyma/Pleura: Right lower lobe linear atelectasis. No evidence   of pneumothorax.    Skeleton/soft tissues: Unchanged    Impression:      Right lower lobe linear atelectasis.          EXAM:  DUPLEX SCAN EXT VEINS LOWER BI            PROCEDURE DATE:  07/13/2019            INTERPRETATION:  Clinical History / Reason for exam: The patient is a   74-year-old female with lower extremity swelling. A venous duplex   examination was performed to evaluate the patient for deep venous   thrombosis of the lower extremities.    The common femoral, greater saphenous, superficial femoral and popliteal   veins were visualized bilaterally with no evidence of deep venous   thrombosis.    All these veins were fully compressible.  There was presence of   spontaneous flow, augmentation with distal compression and phasicity.    The right anterior tibial veins were  patent  right posterior tibial   veins were  patent  and  right peroneal veins were patent.    The left anterior tibial veins were  patent  left posterior tibial veins   were  patent  and  left peroneal veins were patent.    Impression:    No evidence of deep venous thrombosis or superficial thrombophlebitis in   bilateral lower extremities. HPI:  73 yo F w/ PMH of HTN, HLD, DM, CVA with residual left side weakness and slurred speech, G-tube and trach on 10 L is sent from nursing home for SOB. On baseline, pt nods yes or no, and non-ambulatory.  Pt denies N/V/D, chill, palpitation, CP, abdominal pain, LE edema. Family unable to reach to obtain hx.     In the ED, pt was given cefepime 2g x 1 and 1L of LR.     Of note, pt was admitted to [facility] recently (May/2019) for a stroke code. At the time of presentation to [facility] ED A&Ox 2, baseline slurred speech with left sided weakness. CTH shows acute right thalamic hemorrhage with extension to right lateral and third ventricle. At [facility] ED, Pt was Intubated due to worsening of neuro exam and for air way protection. Pt was then transferred to ED-[place] to be evaluated by neurosurgery. Neurosx recommended conservative management and CTH shows no further bleed. Hospital course is complicated by seizures, prolonged QT, streptococcus bacteremia with positive blood cultures and positive GM negative rods in the urine.  She was on ceftriaxone which was changed to Cefepime.  Pt failed weaning and required tracheostomy on 6/10 and was sent to the vent unit.  She failed S&S several times and required placement of PEG on 6/24. Pt was discharged on 7/9/2019. (12 Jul 2019 13:57)      PAST MEDICAL & SURGICAL HISTORY:  Diabetes mellitus  Stroke  Hypertension  Feeding by G-tube      Hospital Course:    TODAY'S SUBJECTIVE & REVIEW OF SYMPTOMS:     Constitutional WNL   Cardio WNL   Resp + trach, + SOB   GI Dysphagia  Heme WNL  Endo WNL  Skin WNL  MSK WNL  Neuro + dysphagia  Cognitive WNL  Psych WNL      MEDICATIONS  (STANDING):  amLODIPine   Tablet 2.5 milliGRAM(s) Oral daily  ampicillin/sulbactam  IVPB 1.5 Gram(s) IV Intermittent every 6 hours  chlorhexidine 0.12% Liquid 15 milliLiter(s) Oral Mucosa two times a day  chlorhexidine 4% Liquid 1 Application(s) Topical <User Schedule>  cholecalciferol 1000 Unit(s) Oral daily  cyanocobalamin 1000 MICROGram(s) Oral daily  dextrose 5%. 1000 milliLiter(s) (50 mL/Hr) IV Continuous <Continuous>  dextrose 50% Injectable 12.5 Gram(s) IV Push once  dextrose 50% Injectable 25 Gram(s) IV Push once  dextrose 50% Injectable 25 Gram(s) IV Push once  donepezil 10 milliGRAM(s) Oral at bedtime  heparin  Injectable 5000 Unit(s) SubCutaneous every 8 hours  hydrALAZINE 25 milliGRAM(s) Oral three times a day  insulin glargine Injectable (LANTUS) 9 Unit(s) SubCutaneous at bedtime  insulin lispro (HumaLOG) corrective regimen sliding scale   SubCutaneous three times a day before meals  insulin lispro Injectable (HumaLOG) 3 Unit(s) SubCutaneous three times a day before meals  levETIRAcetam  Solution 1000 milliGRAM(s) Oral two times a day  losartan 25 milliGRAM(s) Oral daily  nystatin Powder 1 Application(s) Topical three times a day  pantoprazole   Suspension 40 milliGRAM(s) Oral daily  simvastatin 20 milliGRAM(s) Oral at bedtime    MEDICATIONS  (PRN):  acetaminophen   Tablet .. 650 milliGRAM(s) Oral every 6 hours PRN Temp greater or equal to 38.5C (101.3F)  dextrose 40% Gel 15 Gram(s) Oral once PRN Blood Glucose LESS THAN 70 milliGRAM(s)/deciliter  glucagon  Injectable 1 milliGRAM(s) IntraMuscular once PRN Glucose LESS THAN 70 milligrams/deciliter      FAMILY HISTORY:      Allergies    No Known Allergies    Intolerances        SOCIAL HISTORY:    [  ] EtOH  [  ] Smoking  [  ] Substance abuse     Home Environment:  [  ] Home Alone  [  ] Lives with Family  [  ] Home Health Aide    Dwelling:  [  ] Apartment  [  ] Private House  [  ] Adult Home  [ X ] Skilled Nursing Facility      [ X ] Short Term  [ ] Long Term  [  ] Stairs       Elevator [  ]    FUNCTIONAL STATUS PTA: (Check all that apply)  Ambulation: [   ]Independent    [  ] Dependent     [X  ] Non-Ambulatory  Assistive Device: [  ] SA Cane  [  ]  Q Cane  [  ] Walker  [X  ]  Wheelchair  ADL : [  ] Independent  [ X ]  Dependent       Vital Signs Last 24 Hrs  T(C): 36.6 (16 Jul 2019 05:00), Max: 36.8 (15 Jul 2019 13:15)  T(F): 97.8 (16 Jul 2019 05:00), Max: 98.3 (15 Jul 2019 13:15)  HR: 81 (16 Jul 2019 05:00) (77 - 81)  BP: 147/66 (16 Jul 2019 05:00) (113/58 - 147/66)  BP(mean): --  RR: 18 (16 Jul 2019 05:00) (18 - 18)  SpO2: 97% (15 Jul 2019 19:48) (97% - 97%)      PHYSICAL EXAM: Alert & Oriented   GENERAL: NAD, well-groomed, well-developed  HEAD:  Atraumatic, Normocephalic  EYES: EOMI, PERRLA, conjunctiva and sclera clear  NECK: Supple, No JVD, Normal thyroid  CHEST/LUNG: + rhonchi  HEART: Regular rate and rhythm; No murmurs, rubs, or gallops  ABDOMEN: Soft, Nontender, Nondistended; Bowel sounds present  EXTREMITIES:  2+ Peripheral Pulses, No clubbing, cyanosis, or edema    NERVOUS SYSTEM:  Cranial Nerves 2-12 intact [  ] Abnormal  [ X ]  ROM: WFL all extremities [  ]  Abnormal [ X ]  Motor Strength: WFL all extremities  [  ]  Abnormal [ X ]  Sensation: intact to light touch [  ] Abnormal [  ]  Reflexes: Symmetric [  ]  Abnormal [  ]    FUNCTIONAL STATUS:  Bed Mobility: Independent [  ]  Supervision [  ]  Needs Assistance [  ]  N/A [  ]  Transfers: Independent [  ]  Supervision [  ]  Needs Assistance [  ]  N/A [  ]   Ambulation: Independent [  ]  Supervision [  ]  Needs Assistance [  ]  N/A [  ]  ADL: Independent [  ] Requires Assistance [  ] N/A [  ]      LABS:                        11.2   4.33  )-----------( 285      ( 16 Jul 2019 05:58 )             36.3     07-16    142  |  101  |  17  ----------------------------<  157<H>  4.4   |  31  |  0.7    Ca    11.7<H>      16 Jul 2019 05:58  Phos  3.3     07-16  Mg     2.1     07-16    TPro  7.0  /  Alb  3.5  /  TBili  0.6  /  DBili  x   /  AST  11  /  ALT  13  /  AlkPhos  80  07-15          RADIOLOGY & ADDITIONAL STUDIES:    EXAM:  XR CHEST PORTABLE URGENT 1V            PROCEDURE DATE:  07/15/2019            INTERPRETATION:  Clinical History / Reason for exam: Shortness of breath    Comparison : Chest radiograph 7/13/2019.    Technique/Positioning: Portable technique. Patient's chin up obscures   lung apices.    Findings:    Support devices: Tracheostomy tube in place    Cardiac/mediastinum/hilum: Unchanged    Lung parenchyma/Pleura: Right lower lobe linear atelectasis. No evidence   of pneumothorax.    Skeleton/soft tissues: Unchanged    Impression:      Right lower lobe linear atelectasis.          EXAM:  DUPLEX SCAN EXT VEINS LOWER BI            PROCEDURE DATE:  07/13/2019            INTERPRETATION:  Clinical History / Reason for exam: The patient is a   74-year-old female with lower extremity swelling. A venous duplex   examination was performed to evaluate the patient for deep venous   thrombosis of the lower extremities.    The common femoral, greater saphenous, superficial femoral and popliteal   veins were visualized bilaterally with no evidence of deep venous   thrombosis.    All these veins were fully compressible.  There was presence of   spontaneous flow, augmentation with distal compression and phasicity.    The right anterior tibial veins were  patent  right posterior tibial   veins were  patent  and  right peroneal veins were patent.    The left anterior tibial veins were  patent  left posterior tibial veins   were  patent  and  left peroneal veins were patent.    Impression:    No evidence of deep venous thrombosis or superficial thrombophlebitis in   bilateral lower extremities.

## 2024-02-24 NOTE — PROGRESS NOTE ADULT - ASSESSMENT
Impression:    Intra cerebral Bleed/ following simple commands  Seizure  Hypertensive emergency  HO hemorrhagic stroke  penumonia aspiration?  UTI  Strep Bacteremia      PLAN:    CNS: SAT. Continue with keppra for now.    HEENT: Oral care    PULMONARY:  HOB @ 30 degrees. Daily SBT. If fails will be candidate for trach     CARDIOVASCULAR: i=o. Maintain BP <140. Continue with blood pressure medicine.     GI: GI prophylaxis.  Feeding through OG tube.    RENAL:  Follow up lytes.  Correct as needed    INFECTIOUS DISEASE: abx per ID    HEMATOLOGICAL:   DVT prophylaxis.    ENDOCRINE:  Follow up FS.     MUSCULOSKELETAL: Bed rest    Poor prognosis 3 = A little assistance

## 2024-03-01 NOTE — PRE-OP CHECKLIST - TEMPERATURE IN CELSIUS (DEGREES C)
Problem: Safety - Adult  Goal: Free from fall injury  3/1/2024 1337 by Demi Lim RN  Outcome: Completed  3/1/2024 1336 by Demi Lim RN  Outcome: Progressing  3/1/2024 0238 by Sivakumar Baker RN  Outcome: Progressing     Problem: Discharge Planning  Goal: Discharge to home or other facility with appropriate resources  3/1/2024 1337 by Demi Lim RN  Outcome: Completed  3/1/2024 1336 by Demi Lim RN  Outcome: Progressing  3/1/2024 0238 by Sivakumar Baker RN  Outcome: Progressing     Problem: Skin/Tissue Integrity  Goal: Absence of new skin breakdown  Description: 1.  Monitor for areas of redness and/or skin breakdown  2.  Assess vascular access sites hourly  3.  Every 4-6 hours minimum:  Change oxygen saturation probe site  4.  Every 4-6 hours:  If on nasal continuous positive airway pressure, respiratory therapy assess nares and determine need for appliance change or resting period.  3/1/2024 1337 by Demi Lim RN  Outcome: Completed  3/1/2024 1336 by Demi Lim RN  Outcome: Progressing     Problem: ABCDS Injury Assessment  Goal: Absence of physical injury  3/1/2024 1337 by Demi Lim RN  Outcome: Completed  3/1/2024 1336 by Demi Lim RN  Outcome: Progressing     Problem: Respiratory - Adult  Goal: Achieves optimal ventilation and oxygenation  3/1/2024 1337 by Demi iLm RN  Outcome: Completed  3/1/2024 1336 by Demi Lim RN  Outcome: Progressing     Problem: Cardiovascular - Adult  Goal: Maintains optimal cardiac output and hemodynamic stability  3/1/2024 1337 by Demi Lim RN  Outcome: Completed  3/1/2024 1336 by Demi Lim RN  Outcome: Progressing     Problem: Skin/Tissue Integrity - Adult  Goal: Skin integrity remains intact  3/1/2024 1337 by Demi Lim RN  Outcome: Completed  3/1/2024 1336 by Demi Lim RN  Outcome: Progressing  3/1/2024 0238 by Sivakumar Baker RN  Outcome: Progressing  Goal: Oral mucous membranes remain  intact  3/1/2024 1337 by Demi Lim RN  Outcome: Completed  3/1/2024 1336 by Demi Lim RN  Outcome: Progressing     Problem: Musculoskeletal - Adult  Goal: Return mobility to safest level of function  3/1/2024 1337 by Demi Lim RN  Outcome: Completed  3/1/2024 1336 by Demi Lim RN  Outcome: Progressing  Goal: Return ADL status to a safe level of function  3/1/2024 1337 by Demi Lim RN  Outcome: Completed  3/1/2024 1336 by Demi Lim RN  Outcome: Progressing     Problem: Gastrointestinal - Adult  Goal: Maintains or returns to baseline bowel function  3/1/2024 1337 by Demi Lim RN  Outcome: Completed  3/1/2024 1336 by Demi Lim RN  Outcome: Progressing  Goal: Maintains adequate nutritional intake  3/1/2024 1337 by Demi Lim RN  Outcome: Completed  3/1/2024 1336 by Demi Lim RN  Outcome: Progressing     Problem: Genitourinary - Adult  Goal: Absence of urinary retention  3/1/2024 1337 by Demi Lim RN  Outcome: Completed  3/1/2024 1336 by Demi Lim RN  Outcome: Progressing     Problem: Infection - Adult  Goal: Absence of infection at discharge  3/1/2024 1337 by Demi Lim RN  Outcome: Completed  3/1/2024 1336 by Demi Lim RN  Outcome: Progressing  Goal: Absence of infection during hospitalization  3/1/2024 1337 by Demi Lim RN  Outcome: Completed  3/1/2024 1336 by Demi Lim RN  Outcome: Progressing     Problem: Metabolic/Fluid and Electrolytes - Adult  Goal: Electrolytes maintained within normal limits  3/1/2024 1337 by Demi Lim RN  Outcome: Completed  3/1/2024 1336 by Demi Lim RN  Outcome: Progressing  Goal: Hemodynamic stability and optimal renal function maintained  3/1/2024 1337 by Demi Lim RN  Outcome: Completed  3/1/2024 1336 by Demi Lim RN  Outcome: Progressing  Goal: Glucose maintained within prescribed range  3/1/2024 1337 by Demi Lim RN  Outcome: Completed  3/1/2024 1336 by Raphael,  36.8

## 2024-06-07 NOTE — ED PROCEDURE NOTE - CPROC ED TOLERANCE1
Patient tolerated procedure well.
For information on Fall & Injury Prevention, visit: [email or web address] OR  [email or web address] OR  https://www.cdc.gov/steadi/patient.html

## 2024-09-04 NOTE — PRE-ANESTHESIA EVALUATION ADULT - NSANTHDIETYNSD_GEN_ALL_CORE
Procedure done under supervision of Anesthesia. Vital signs, charting, monitoring, Ins/Outs, and sedation provided by Anesthesia. See flow sheet for more information.  (TF off since 8:30am)/Yes

## 2024-11-09 NOTE — PROGRESS NOTE ADULT - SUBJECTIVE AND OBJECTIVE BOX
Patient is a 74y old  Female who presents with a chief complaint of left sided weakness and slurred speech (07 Jun 2019 08:30)  HOSPITAL DAY: 8d   ICU DAY: 8    SUMMARY OF HOSPITAL COURSE TO DATE 74 y.o F with PMH of HTN, HLD, DM, hemorrhagic stroke,? EVD placement at that time ( about 8 years ago)  with residual left side weakness and slurred speech, ambulated with cane, on ASA daily.  Pt. brought to [facility] ED as a stroke code. At the time of presentation to [facility] ED A&Ox 2, baseline slurred speech with left sided weakness. /82, 55 HR, 18 RR, CTH showed acute right thalamic hemorrhage with extension to right lateral and third ventricle. At [facility] ED, Pt. Intubated 2/2 worsening of neuro exam for air way protection as per ED attending note. Pt.  became more drowsy with decreased responsiveness. Pt. was transferred to [facility]. At time of NSGY eval Pt intubated, sedated on Propofol and Fentanyl, radial arterial line placed, she received keppra, zofran and mannitol in ED. Repeat CT scan stable from prior study as per neurosurgery. Platelets one unit ordered as per neurosurgery.    6/4: Patient had tonic-clonic seizure lasting 10 minutes around 6 am. 4mg Ativan given; started on propofol. Neuro NP saw her; VEEG ongoing, rpt CT head unchanged, and increased dose of keppra ordered.     Streptococcus bacteremia and gram negative rods in urine culture.     OVERNIGHT EVENTS: Off sedation. No events.     TODAY: Patient was seen this morning at bedside.  Failed SBT.     ================= PRESENT TODAY ==================    1-Mckeon Catheter: yes  2-Central Line: no  3-IV Fluids: no  4-Drips:  4.1-Pressors: no  4.2-Sedation: no  4.3-Heparin:   no  5-Intubated: yes      INTERVAL HPI/OVERNIGHT EVENTS:  ICU Vital Signs Last 24 Hrs  T(C): 37.8 (07 Jun 2019 16:00), Max: 38.1 (07 Jun 2019 12:00)  T(F): 100 (07 Jun 2019 16:00), Max: 100.6 (07 Jun 2019 12:00)  HR: 79 (07 Jun 2019 17:20) (50 - 79)  BP: 153/80 (07 Jun 2019 17:20) (94/51 - 159/78)  BP(mean): 112 (07 Jun 2019 17:20) (62 - 124)  ABP: --  ABP(mean): --  RR: 27 (07 Jun 2019 17:20) (11 - 27)  SpO2: 98% (07 Jun 2019 17:20) (97% - 100%)    I&O's Summary    06 Jun 2019 07:01  -  07 Jun 2019 07:00  --------------------------------------------------------  IN: 1610.2 mL / OUT: 400 mL / NET: 1210.2 mL    07 Jun 2019 07:01  -  07 Jun 2019 17:29  --------------------------------------------------------  IN: 504 mL / OUT: 400 mL / NET: 104 mL      Mode: AC/ CMV (Assist Control/ Continuous Mandatory Ventilation)  RR (machine): 12  TV (machine): 450  FiO2: 40  PEEP: 5  ITime: 1  MAP: 13  PIP: 24      LABS:                        9.9    6.77  )-----------( 262      ( 07 Jun 2019 04:27 )             32.2     06-07    140  |  101  |  25<H>  ----------------------------<  186<H>  4.0   |  27  |  0.6<L>    Ca    9.6      07 Jun 2019 04:27  Mg     2.1     06-07    TPro  5.4<L>  /  Alb  2.8<L>  /  TBili  0.4  /  DBili  x   /  AST  10  /  ALT  18  /  AlkPhos  75  06-07        CAPILLARY BLOOD GLUCOSE      POCT Blood Glucose.: 151 mg/dL (07 Jun 2019 15:57)  POCT Blood Glucose.: 127 mg/dL (07 Jun 2019 14:54)  POCT Blood Glucose.: 99 mg/dL (07 Jun 2019 14:07)  POCT Blood Glucose.: 150 mg/dL (07 Jun 2019 11:29)  POCT Blood Glucose.: 182 mg/dL (07 Jun 2019 09:54)  POCT Blood Glucose.: 208 mg/dL (07 Jun 2019 07:41)  POCT Blood Glucose.: 222 mg/dL (07 Jun 2019 06:28)  POCT Blood Glucose.: 158 mg/dL (07 Jun 2019 04:20)  POCT Blood Glucose.: 153 mg/dL (07 Jun 2019 02:14)  POCT Blood Glucose.: 130 mg/dL (07 Jun 2019 00:20)  POCT Blood Glucose.: 150 mg/dL (06 Jun 2019 21:49)  POCT Blood Glucose.: 178 mg/dL (06 Jun 2019 19:58)  POCT Blood Glucose.: 147 mg/dL (06 Jun 2019 17:46)    ABG - ( 07 Jun 2019 05:08 )  pH, Arterial: 7.55  pH, Blood: x     /  pCO2: 36    /  pO2: 86    / HCO3: 31    / Base Excess: 8.5   /  SaO2: 98         RADIOLOGY & ADDITIONAL TESTS:  < from: Xray Chest 1 View- PORTABLE-Routine (06.07.19 @ 05:25) >  Impression:      No radiographic evidence of acute cardiopulmonary disease.    Stable support devices.    < end of copied text >      Consultant(s) Notes Reviewed:  [x ] YES  [ ] NO    MEDICATIONS  (STANDING):  amLODIPine   Tablet 10 milliGRAM(s) Oral daily  cefepime   IVPB 2000 milliGRAM(s) IV Intermittent every 8 hours  chlorhexidine 0.12% Liquid 15 milliLiter(s) Oral Mucosa two times a day  chlorhexidine 4% Liquid 1 Application(s) Topical <User Schedule>  docusate sodium Liquid 100 milliGRAM(s) Oral three times a day  hydrALAZINE 25 milliGRAM(s) Oral three times a day  insulin regular Infusion 4 Unit(s)/Hr (4 mL/Hr) IV Continuous <Continuous>  levETIRAcetam  Solution 1500 milliGRAM(s) Oral two times a day  nystatin    Suspension 680799 Unit(s) Oral two times a day  pantoprazole   Suspension 40 milliGRAM(s) Oral daily  propofol Infusion 1 MICROgram(s)/kG/Min (0.473 mL/Hr) IV Continuous <Continuous>  senna 2 Tablet(s) Oral at bedtime    MEDICATIONS  (PRN):  acetaminophen   Tablet .. 650 milliGRAM(s) Oral every 6 hours PRN Temp greater or equal to 38C (100.4F)    PPHYSICAL EXAM:  GENERAL: well built, well nourished.  HEAD:  Atraumatic, Normocephalic.  EYES: spontaneously opens eyes. Pupils reactive but sluggish.  ENT: dry mucous membranes.   NECK: Supple, No JVD.  NERVOUS SYSTEM: Pupils pinpoint but reactive; moves extremities; intermittently follows commands.  	  CHEST/LUNG: intubated; equal bilateral chest rise.   HEART: S1S2 normal, no S3, Regular rate and rhythm; No murmurs, rubs, or gallops.  ABDOMEN: Soft, Nondistended; Bowel sounds present  EXTREMITIES: No clubbing, cyanosis, or edema  SKIN: No rashes or lesions 37M PMH of Bipolar Disorder, ADHD, MR, Hypothyroidism, Asthma, DM2, HTN, HLD, autism, behavioral disorder requiring previous inpatient psychiatric admissions and constant 1:1 observation, BPH with urinary retention, hx testicular cancer s/p b/l orchiectomy B[facility]EMS from [facility] resides [facility] in [place] comes in  for urinary retention    ED provider spoke with adult home supervisor KAYLIE ([phone]), and she states the group home does not have the resources to take care and manage the mckeon cath.     ED Course:   Vitals: BP: 131/88, HR: 81, Temp: 97.8F, RR: 16, SpO2: 96% on RA   Labs:  BUN/Cr 13/1.1  ABG: pH: , PO2: , PCO2: , HCO3: , SpO2: %  UA: negative nitrites, negative LE   EKG: pending   Received in the ED: 1L NS bolus x 1    37M PMH of Bipolar Disorder, ADHD, MR, Hypothyroidism, Asthma, DM2, HTN, HLD, autism, behavioral disorder requiring previous inpatient psychiatric admissions and constant 1:1 observation, BPH with urinary retention, hx testicular cancer s/p b/l orchiectomy B[facility]EMS [facility] resides at [facility] in [place] comes in  for urinary retention. Per patient, last urination was early morning of 11/8/24 with some associated pain and blood observed. Since then, pt has been unable to urinate even with straining. Mckeon was placed with 550cc urine output. Of note, pt had been here multiple times for same issue, mckeon was placed,  TOV was completed and discharged back to his group home; pt's group home does not have the resources to take care and manage the mckeon cath.   Patient has RUE scar from self-harm episode around 2 weeks prior. Currently does not have any thought of suicidal or homicidal thoughts.     ED Course:   Vitals: BP: 131/88, HR: 81, Temp: 97.8F, RR: 16, SpO2: 96% on RA   Labs:  BUN/Cr 13/1.1  ABG: pH: , PO2: , PCO2: , HCO3: , SpO2: %  UA: negative nitrites, negative LE   EKG: pending   Received in the ED: 1L NS bolus x 1    37M PMH of Bipolar Disorder, ADHD, MR, Hypothyroidism, Asthma, DM2, HTN, HLD, autism, behavioral disorder requiring previous inpatient psychiatric admissions and constant 1:1 observation, BPH with urinary retention, hx testicular cancer s/p b/l orchiectomy B[facility]EMS from [facility] resides at [facility] in [place] comes in  for urinary retention. Per patient, last urination was early morning of 11/8/24 with some associated pain and blood observed. Since then, pt has been unable to urinate even with straining. Mckeon was placed with 550cc urine output. Of note, pt had been here multiple times for same issue, mckeon was placed,  TOV was completed and discharged back to his group home; pt's group home does not have the resources to take care and manage the mckeon cath.   Patient has RUE scar from self-harm episode around 2 weeks prior. Currently does not have any thought of suicidal or homicidal thoughts.     ED Course:   Vitals: BP: 131/88, HR: 81, Temp: 97.8F, RR: 16, SpO2: 96% on RA   Labs:  BUN/Cr 13/1.1  ABG: pH: , PO2: , PCO2: , HCO3: , SpO2: %  UA: negative nitrites, negative LE   EKG: pending   Received in the ED: 1L NS bolus x 1  37M PMH of Bipolar Disorder, Autism, ADHD, MR, Depression, behavioral disorder requiring previous inpatient psychiatric admissions and constant 1:1 observation, Hypothyroidism, Asthma, DM2, HTN, HLD, BPH with urinary retention, hx testicular cancer s/p b/l orchiectomy B[facility]EMS from [facility] resides at [facility] in [place] comes in  for urinary retention. Per patient, last urination was early morning of 11/8/24 with some associated pain and blood observed. Since then, pt has been unable to urinate even with straining. Mckeon was placed with 550cc urine output. Of note, pt had been here multiple times for same issue, mckeon was placed,  TOV was completed and discharged back to his group home; pt's group home does not have the resources to take care and manage the mckeon cath.   Patient has RUE scar from self-harm episode around 2 weeks prior. Currently does not have any thought of suicidal or homicidal thoughts.     ED Course:   Vitals: BP: 131/88, HR: 81, Temp: 97.8F, RR: 16, SpO2: 96% on RA   Labs:  BUN/Cr 13/1.1  ABG: pH: , PO2: , PCO2: , HCO3: , SpO2: %  UA: negative nitrites, negative LE   EKG: pending   Received in the ED: 1L NS bolus x 1

## 2025-07-21 NOTE — PATIENT PROFILE ADULT - NSPRONUTRITIONRISK_GEN_A_NUR
[FreeTextEntry1] : follow up  [de-identified] : Ms. BLAS Teague. Denies chest pain, sob, montilla, dizziness, diaphoresis, palpitations, LE swelling, orthopnea, syncope, n/v, headache.  No indicators present